# Patient Record
Sex: MALE | Race: BLACK OR AFRICAN AMERICAN | Employment: UNEMPLOYED | URBAN - METROPOLITAN AREA
[De-identification: names, ages, dates, MRNs, and addresses within clinical notes are randomized per-mention and may not be internally consistent; named-entity substitution may affect disease eponyms.]

---

## 2020-02-05 ENCOUNTER — HOSPITAL ENCOUNTER (INPATIENT)
Age: 65
LOS: 27 days | Discharge: HOSPICE | DRG: 870 | End: 2020-03-03
Attending: EMERGENCY MEDICINE | Admitting: INTERNAL MEDICINE
Payer: MEDICARE

## 2020-02-05 ENCOUNTER — APPOINTMENT (OUTPATIENT)
Dept: GENERAL RADIOLOGY | Age: 65
DRG: 870 | End: 2020-02-05
Payer: MEDICARE

## 2020-02-05 PROBLEM — N17.9 ACUTE RENAL FAILURE (ARF) (HCC): Status: ACTIVE | Noted: 2020-02-05

## 2020-02-05 LAB
ALBUMIN SERPL-MCNC: 3.1 G/DL (ref 3.5–5.2)
ALP BLD-CCNC: 58 U/L (ref 40–129)
ALT SERPL-CCNC: 22 U/L (ref 0–40)
ANION GAP SERPL CALCULATED.3IONS-SCNC: 19 MMOL/L (ref 7–16)
APTT: 35.4 SEC (ref 24.5–35.1)
AST SERPL-CCNC: 91 U/L (ref 0–39)
BACTERIA: ABNORMAL /HPF
BASOPHILS ABSOLUTE: 0 E9/L (ref 0–0.2)
BASOPHILS RELATIVE PERCENT: 0.1 % (ref 0–2)
BILIRUB SERPL-MCNC: 1.3 MG/DL (ref 0–1.2)
BILIRUBIN URINE: NEGATIVE
BLOOD, URINE: ABNORMAL
BUN BLDV-MCNC: 59 MG/DL (ref 8–23)
CALCIUM SERPL-MCNC: 8 MG/DL (ref 8.6–10.2)
CHLORIDE BLD-SCNC: 91 MMOL/L (ref 98–107)
CHLORIDE URINE RANDOM: 32 MMOL/L
CLARITY: ABNORMAL
CO2: 23 MMOL/L (ref 22–29)
COLOR: YELLOW
CORTISOL TOTAL: 78.4 MCG/DL (ref 2.68–18.4)
CREAT SERPL-MCNC: 4.1 MG/DL (ref 0.7–1.2)
CREATININE URINE: 142 MG/DL (ref 40–278)
EKG ATRIAL RATE: 141 BPM
EKG P AXIS: 70 DEGREES
EKG P-R INTERVAL: 140 MS
EKG Q-T INTERVAL: 284 MS
EKG QRS DURATION: 68 MS
EKG QTC CALCULATION (BAZETT): 434 MS
EKG R AXIS: 39 DEGREES
EKG T AXIS: 62 DEGREES
EKG VENTRICULAR RATE: 141 BPM
EOSINOPHILS ABSOLUTE: 0 E9/L (ref 0.05–0.5)
EOSINOPHILS RELATIVE PERCENT: 0 % (ref 0–6)
GFR AFRICAN AMERICAN: 18
GFR NON-AFRICAN AMERICAN: 18 ML/MIN/1.73
GLUCOSE BLD-MCNC: 132 MG/DL (ref 74–99)
GLUCOSE URINE: NEGATIVE MG/DL
HCT VFR BLD CALC: 34.8 % (ref 37–54)
HEMOGLOBIN: 12.2 G/DL (ref 12.5–16.5)
HYPOCHROMIA: ABNORMAL
INFLUENZA A BY PCR: NOT DETECTED
INFLUENZA B BY PCR: NOT DETECTED
INR BLD: 1.1
KETONES, URINE: ABNORMAL MG/DL
LACTIC ACID, SEPSIS: 2.7 MMOL/L (ref 0.5–1.9)
LACTIC ACID, SEPSIS: 3.2 MMOL/L (ref 0.5–1.9)
LACTIC ACID: 2.1 MMOL/L (ref 0.5–2.2)
LACTIC ACID: 3.3 MMOL/L (ref 0.5–2.2)
LEUKOCYTE ESTERASE, URINE: NEGATIVE
LYMPHOCYTES ABSOLUTE: 0.15 E9/L (ref 1.5–4)
LYMPHOCYTES RELATIVE PERCENT: 1.7 % (ref 20–42)
MAGNESIUM: 1.2 MG/DL (ref 1.6–2.6)
MCH RBC QN AUTO: 28.9 PG (ref 26–35)
MCHC RBC AUTO-ENTMCNC: 35.1 % (ref 32–34.5)
MCV RBC AUTO: 82.5 FL (ref 80–99.9)
METAMYELOCYTES RELATIVE PERCENT: 0.9 % (ref 0–1)
MONOCYTES ABSOLUTE: 0.68 E9/L (ref 0.1–0.95)
MONOCYTES RELATIVE PERCENT: 8.7 % (ref 2–12)
NEUTROPHILS ABSOLUTE: 6.75 E9/L (ref 1.8–7.3)
NEUTROPHILS RELATIVE PERCENT: 88.7 % (ref 43–80)
NITRITE, URINE: NEGATIVE
PDW BLD-RTO: 14.8 FL (ref 11.5–15)
PH UA: 5 (ref 5–9)
PLATELET # BLD: 25 E9/L (ref 130–450)
PLATELET CONFIRMATION: NORMAL
PMV BLD AUTO: ABNORMAL FL (ref 7–12)
POIKILOCYTES: ABNORMAL
POLYCHROMASIA: ABNORMAL
POTASSIUM SERPL-SCNC: 4.2 MMOL/L (ref 3.5–5)
POTASSIUM, UR: 54.8 MMOL/L
PRO-BNP: 3379 PG/ML (ref 0–125)
PROCALCITONIN: >100 NG/ML (ref 0–0.08)
PROTEIN UA: 100 MG/DL
PROTHROMBIN TIME: 12.5 SEC (ref 9.3–12.4)
RBC # BLD: 4.22 E12/L (ref 3.8–5.8)
RBC UA: ABNORMAL /HPF (ref 0–2)
SODIUM BLD-SCNC: 133 MMOL/L (ref 132–146)
SODIUM URINE: 38 MMOL/L
SPECIFIC GRAVITY UA: 1.02 (ref 1–1.03)
TARGET CELLS: ABNORMAL
TOTAL PROTEIN: 7.4 G/DL (ref 6.4–8.3)
TROPONIN: 0.03 NG/ML (ref 0–0.03)
TROPONIN: 0.04 NG/ML (ref 0–0.03)
TROPONIN: 0.05 NG/ML (ref 0–0.03)
UREA NITROGEN, UR: 503 MG/DL (ref 800–1666)
UROBILINOGEN, URINE: 1 E.U./DL
WBC # BLD: 7.5 E9/L (ref 4.5–11.5)
WBC UA: ABNORMAL /HPF (ref 0–5)

## 2020-02-05 PROCEDURE — 6360000002 HC RX W HCPCS: Performed by: INTERNAL MEDICINE

## 2020-02-05 PROCEDURE — 87502 INFLUENZA DNA AMP PROBE: CPT

## 2020-02-05 PROCEDURE — 94760 N-INVAS EAR/PLS OXIMETRY 1: CPT

## 2020-02-05 PROCEDURE — 87450 HC DIRECT STREP B ANTIGEN: CPT

## 2020-02-05 PROCEDURE — 80053 COMPREHEN METABOLIC PANEL: CPT

## 2020-02-05 PROCEDURE — 82570 ASSAY OF URINE CREATININE: CPT

## 2020-02-05 PROCEDURE — 93005 ELECTROCARDIOGRAM TRACING: CPT | Performed by: EMERGENCY MEDICINE

## 2020-02-05 PROCEDURE — 87150 DNA/RNA AMPLIFIED PROBE: CPT

## 2020-02-05 PROCEDURE — 2580000003 HC RX 258: Performed by: INTERNAL MEDICINE

## 2020-02-05 PROCEDURE — 2500000003 HC RX 250 WO HCPCS: Performed by: INTERNAL MEDICINE

## 2020-02-05 PROCEDURE — 6360000002 HC RX W HCPCS

## 2020-02-05 PROCEDURE — 87088 URINE BACTERIA CULTURE: CPT

## 2020-02-05 PROCEDURE — 82550 ASSAY OF CK (CPK): CPT

## 2020-02-05 PROCEDURE — 84300 ASSAY OF URINE SODIUM: CPT

## 2020-02-05 PROCEDURE — 99285 EMERGENCY DEPT VISIT HI MDM: CPT

## 2020-02-05 PROCEDURE — 93005 ELECTROCARDIOGRAM TRACING: CPT | Performed by: INTERNAL MEDICINE

## 2020-02-05 PROCEDURE — 84540 ASSAY OF URINE/UREA-N: CPT

## 2020-02-05 PROCEDURE — 87186 SC STD MICRODIL/AGAR DIL: CPT

## 2020-02-05 PROCEDURE — 73630 X-RAY EXAM OF FOOT: CPT

## 2020-02-05 PROCEDURE — 82533 TOTAL CORTISOL: CPT

## 2020-02-05 PROCEDURE — 93010 ELECTROCARDIOGRAM REPORT: CPT | Performed by: INTERNAL MEDICINE

## 2020-02-05 PROCEDURE — 36415 COLL VENOUS BLD VENIPUNCTURE: CPT

## 2020-02-05 PROCEDURE — 83935 ASSAY OF URINE OSMOLALITY: CPT

## 2020-02-05 PROCEDURE — 85610 PROTHROMBIN TIME: CPT

## 2020-02-05 PROCEDURE — 81001 URINALYSIS AUTO W/SCOPE: CPT

## 2020-02-05 PROCEDURE — 96374 THER/PROPH/DIAG INJ IV PUSH: CPT

## 2020-02-05 PROCEDURE — 2500000003 HC RX 250 WO HCPCS: Performed by: EMERGENCY MEDICINE

## 2020-02-05 PROCEDURE — 83880 ASSAY OF NATRIURETIC PEPTIDE: CPT

## 2020-02-05 PROCEDURE — 84133 ASSAY OF URINE POTASSIUM: CPT

## 2020-02-05 PROCEDURE — 87040 BLOOD CULTURE FOR BACTERIA: CPT

## 2020-02-05 PROCEDURE — 94640 AIRWAY INHALATION TREATMENT: CPT

## 2020-02-05 PROCEDURE — 85025 COMPLETE CBC W/AUTO DIFF WBC: CPT

## 2020-02-05 PROCEDURE — 2060000000 HC ICU INTERMEDIATE R&B

## 2020-02-05 PROCEDURE — 84145 PROCALCITONIN (PCT): CPT

## 2020-02-05 PROCEDURE — 84484 ASSAY OF TROPONIN QUANT: CPT

## 2020-02-05 PROCEDURE — 0CJY8ZZ INSPECTION OF MOUTH AND THROAT, VIA NATURAL OR ARTIFICIAL OPENING ENDOSCOPIC: ICD-10-PCS | Performed by: INTERNAL MEDICINE

## 2020-02-05 PROCEDURE — 82553 CREATINE MB FRACTION: CPT

## 2020-02-05 PROCEDURE — 82436 ASSAY OF URINE CHLORIDE: CPT

## 2020-02-05 PROCEDURE — 6360000002 HC RX W HCPCS: Performed by: EMERGENCY MEDICINE

## 2020-02-05 PROCEDURE — 2580000003 HC RX 258: Performed by: EMERGENCY MEDICINE

## 2020-02-05 PROCEDURE — 83735 ASSAY OF MAGNESIUM: CPT

## 2020-02-05 PROCEDURE — 71046 X-RAY EXAM CHEST 2 VIEWS: CPT

## 2020-02-05 PROCEDURE — 6370000000 HC RX 637 (ALT 250 FOR IP): Performed by: EMERGENCY MEDICINE

## 2020-02-05 PROCEDURE — 87147 CULTURE TYPE IMMUNOLOGIC: CPT

## 2020-02-05 PROCEDURE — 83605 ASSAY OF LACTIC ACID: CPT

## 2020-02-05 PROCEDURE — 85730 THROMBOPLASTIN TIME PARTIAL: CPT

## 2020-02-05 PROCEDURE — 2500000003 HC RX 250 WO HCPCS

## 2020-02-05 RX ORDER — ASPIRIN 81 MG/1
81 TABLET ORAL DAILY
Status: DISCONTINUED | OUTPATIENT
Start: 2020-02-05 | End: 2020-02-13 | Stop reason: ALTCHOICE

## 2020-02-05 RX ORDER — MAGNESIUM SULFATE IN WATER 40 MG/ML
2 INJECTION, SOLUTION INTRAVENOUS ONCE
Status: COMPLETED | OUTPATIENT
Start: 2020-02-05 | End: 2020-02-05

## 2020-02-05 RX ORDER — LISINOPRIL 10 MG/1
10 TABLET ORAL DAILY
Status: ON HOLD | COMMUNITY
End: 2020-03-03 | Stop reason: HOSPADM

## 2020-02-05 RX ORDER — SODIUM CHLORIDE 9 MG/ML
INJECTION, SOLUTION INTRAVENOUS CONTINUOUS
Status: DISCONTINUED | OUTPATIENT
Start: 2020-02-05 | End: 2020-02-06

## 2020-02-05 RX ORDER — SODIUM CHLORIDE 0.9 % (FLUSH) 0.9 %
10 SYRINGE (ML) INJECTION EVERY 12 HOURS SCHEDULED
Status: DISCONTINUED | OUTPATIENT
Start: 2020-02-05 | End: 2020-02-06 | Stop reason: SDUPTHER

## 2020-02-05 RX ORDER — 0.9 % SODIUM CHLORIDE 0.9 %
30 INTRAVENOUS SOLUTION INTRAVENOUS ONCE
Status: DISCONTINUED | OUTPATIENT
Start: 2020-02-05 | End: 2020-02-05 | Stop reason: ALTCHOICE

## 2020-02-05 RX ORDER — FAMOTIDINE 20 MG/1
20 TABLET, FILM COATED ORAL 2 TIMES DAILY
Status: DISCONTINUED | OUTPATIENT
Start: 2020-02-05 | End: 2020-02-07 | Stop reason: SDUPTHER

## 2020-02-05 RX ORDER — FAMOTIDINE 20 MG/1
20 TABLET, FILM COATED ORAL 2 TIMES DAILY
Status: ON HOLD | COMMUNITY
End: 2020-03-03 | Stop reason: HOSPADM

## 2020-02-05 RX ORDER — METOPROLOL TARTRATE 5 MG/5ML
5 INJECTION INTRAVENOUS EVERY 6 HOURS
Status: DISCONTINUED | OUTPATIENT
Start: 2020-02-05 | End: 2020-02-13

## 2020-02-05 RX ORDER — 0.9 % SODIUM CHLORIDE 0.9 %
1000 INTRAVENOUS SOLUTION INTRAVENOUS ONCE
Status: DISCONTINUED | OUTPATIENT
Start: 2020-02-05 | End: 2020-02-19

## 2020-02-05 RX ORDER — SODIUM CHLORIDE 0.9 % (FLUSH) 0.9 %
10 SYRINGE (ML) INJECTION PRN
Status: DISCONTINUED | OUTPATIENT
Start: 2020-02-05 | End: 2020-02-06 | Stop reason: SDUPTHER

## 2020-02-05 RX ORDER — IPRATROPIUM BROMIDE AND ALBUTEROL SULFATE 2.5; .5 MG/3ML; MG/3ML
3 SOLUTION RESPIRATORY (INHALATION) ONCE
Status: COMPLETED | OUTPATIENT
Start: 2020-02-05 | End: 2020-02-05

## 2020-02-05 RX ORDER — THIAMINE HYDROCHLORIDE 100 MG/ML
100 INJECTION, SOLUTION INTRAMUSCULAR; INTRAVENOUS DAILY
Status: DISCONTINUED | OUTPATIENT
Start: 2020-02-05 | End: 2020-02-06

## 2020-02-05 RX ORDER — FOLIC ACID 5 MG/ML
1 INJECTION, SOLUTION INTRAMUSCULAR; INTRAVENOUS; SUBCUTANEOUS DAILY
Status: DISCONTINUED | OUTPATIENT
Start: 2020-02-05 | End: 2020-02-06

## 2020-02-05 RX ADMIN — MAGNESIUM SULFATE HEPTAHYDRATE 2 G: 40 INJECTION, SOLUTION INTRAVENOUS at 16:14

## 2020-02-05 RX ADMIN — THIAMINE HYDROCHLORIDE 100 MG: 100 INJECTION, SOLUTION INTRAMUSCULAR; INTRAVENOUS at 22:12

## 2020-02-05 RX ADMIN — SODIUM CHLORIDE, PRESERVATIVE FREE 10 ML: 5 INJECTION INTRAVENOUS at 22:25

## 2020-02-05 RX ADMIN — METOPROLOL TARTRATE 5 MG: 5 INJECTION INTRAVENOUS at 22:12

## 2020-02-05 RX ADMIN — DOXYCYCLINE 100 MG: 100 INJECTION, POWDER, LYOPHILIZED, FOR SOLUTION INTRAVENOUS at 16:20

## 2020-02-05 RX ADMIN — SODIUM CHLORIDE, PRESERVATIVE FREE 10 ML: 5 INJECTION INTRAVENOUS at 22:12

## 2020-02-05 RX ADMIN — SODIUM CHLORIDE: 9 INJECTION, SOLUTION INTRAVENOUS at 12:55

## 2020-02-05 RX ADMIN — FOLIC ACID 1 MG: 5 INJECTION, SOLUTION INTRAMUSCULAR; INTRAVENOUS; SUBCUTANEOUS at 22:12

## 2020-02-05 RX ADMIN — HYDROCORTISONE SODIUM SUCCINATE 100 MG: 100 INJECTION, POWDER, FOR SOLUTION INTRAMUSCULAR; INTRAVENOUS at 12:45

## 2020-02-05 RX ADMIN — VANCOMYCIN HYDROCHLORIDE 1000 MG: 1 INJECTION, POWDER, LYOPHILIZED, FOR SOLUTION INTRAVENOUS at 22:24

## 2020-02-05 RX ADMIN — SODIUM CHLORIDE: 9 INJECTION, SOLUTION INTRAVENOUS at 18:30

## 2020-02-05 RX ADMIN — CEFTRIAXONE 2 G: 2 INJECTION, POWDER, FOR SOLUTION INTRAMUSCULAR; INTRAVENOUS at 16:02

## 2020-02-05 RX ADMIN — IPRATROPIUM BROMIDE AND ALBUTEROL SULFATE 3 AMPULE: .5; 3 SOLUTION RESPIRATORY (INHALATION) at 11:15

## 2020-02-05 ASSESSMENT — PAIN SCALES - GENERAL: PAINLEVEL_OUTOF10: 7

## 2020-02-05 ASSESSMENT — PAIN DESCRIPTION - LOCATION: LOCATION: BACK;ABDOMEN;FOOT

## 2020-02-05 ASSESSMENT — PAIN DESCRIPTION - PAIN TYPE: TYPE: ACUTE PAIN

## 2020-02-05 NOTE — ED NOTES
Bed: HB  Expected date:   Expected time:   Means of arrival:   Comments:  Sandra Turner RN  02/05/20 9109

## 2020-02-05 NOTE — ED PROVIDER NOTES
- 0.03 ng/mL   CBC Auto Differential   Result Value Ref Range    WBC 7.5 4.5 - 11.5 E9/L    RBC 4.22 3.80 - 5.80 E12/L    Hemoglobin 12.2 (L) 12.5 - 16.5 g/dL    Hematocrit 34.8 (L) 37.0 - 54.0 %    MCV 82.5 80.0 - 99.9 fL    MCH 28.9 26.0 - 35.0 pg    MCHC 35.1 (H) 32.0 - 34.5 %    RDW 14.8 11.5 - 15.0 fL    Platelets 25 (L) 618 - 450 E9/L    MPV NOT CALC 7.0 - 12.0 fL    Neutrophils % 88.7 (H) 43.0 - 80.0 %    Lymphocytes % 1.7 (L) 20.0 - 42.0 %    Monocytes % 8.7 2.0 - 12.0 %    Eosinophils % 0.0 0.0 - 6.0 %    Basophils % 0.1 0.0 - 2.0 %    Neutrophils Absolute 6.75 1.80 - 7.30 E9/L    Lymphocytes Absolute 0.15 (L) 1.50 - 4.00 E9/L    Monocytes Absolute 0.68 0.10 - 0.95 E9/L    Eosinophils Absolute 0.00 (L) 0.05 - 0.50 E9/L    Basophils Absolute 0.00 0.00 - 0.20 E9/L    Metamyelocytes Relative 0.9 0.0 - 1.0 %    Polychromasia 1+     Hypochromia 1+     Poikilocytes 1+     Target Cells 1+    Comprehensive Metabolic Panel   Result Value Ref Range    Sodium 133 132 - 146 mmol/L    Potassium 4.2 3.5 - 5.0 mmol/L    Chloride 91 (L) 98 - 107 mmol/L    CO2 23 22 - 29 mmol/L    Anion Gap 19 (H) 7 - 16 mmol/L    Glucose 132 (H) 74 - 99 mg/dL    BUN 59 (H) 8 - 23 mg/dL    CREATININE 4.1 (H) 0.7 - 1.2 mg/dL    GFR Non-African American 18 >=60 mL/min/1.73    GFR African American 18     Calcium 8.0 (L) 8.6 - 10.2 mg/dL    Total Protein 7.4 6.4 - 8.3 g/dL    Alb 3.1 (L) 3.5 - 5.2 g/dL    Total Bilirubin 1.3 (H) 0.0 - 1.2 mg/dL    Alkaline Phosphatase 58 40 - 129 U/L    ALT 22 0 - 40 U/L    AST 91 (H) 0 - 39 U/L   APTT   Result Value Ref Range    aPTT 35.4 (H) 24.5 - 35.1 sec   Protime-INR   Result Value Ref Range    Protime 12.5 (H) 9.3 - 12.4 sec    INR 1.1    Brain Natriuretic Peptide   Result Value Ref Range    Pro-BNP 3,379 (H) 0 - 125 pg/mL   Magnesium   Result Value Ref Range    Magnesium 1.2 (L) 1.6 - 2.6 mg/dL   Platelet Confirmation   Result Value Ref Range    Platelet Confirmation SEE BELOW    EKG 12 Lead   Result spouse/SO and discussed todays results, in addition to providing specific details for the plan of care and counseling regarding the diagnosis and prognosis. Questions are answered at this time and they are agreeable with the plan.       --------------------------------- IMPRESSION AND DISPOSITION ---------------------------------    IMPRESSION  1. Acute renal failure, unspecified acute renal failure type ([facility] [address]    2. Cough    3. Acute upper respiratory infection    4. Elevated troponin    5. Oral cancer ([facility] [address]    6. SIRS (systemic inflammatory response syndrome) (HCC)    7. Hypomagnesemia        DISPOSITION  Disposition: Admit to telemetry  Patient condition is serious        NOTE: This report was transcribed using voice recognition software.  Every effort was made to ensure accuracy; however, inadvertent computerized transcription errors may be present       Brenda Jenkins DO  02/05/20 4882

## 2020-02-05 NOTE — ED NOTES
Bed:  HA  Expected date:   Expected time:   Means of arrival:   Comments:  Elly Opitz Reents, RN  02/05/20 8035

## 2020-02-06 ENCOUNTER — APPOINTMENT (OUTPATIENT)
Dept: NEUROLOGY | Age: 65
DRG: 870 | End: 2020-02-06
Payer: MEDICARE

## 2020-02-06 ENCOUNTER — APPOINTMENT (OUTPATIENT)
Dept: GENERAL RADIOLOGY | Age: 65
DRG: 870 | End: 2020-02-06
Payer: MEDICARE

## 2020-02-06 LAB
ACINETOBACTER BAUMANNII BY PCR: NOT DETECTED
ALBUMIN SERPL-MCNC: 2.6 G/DL (ref 3.5–5.2)
ALP BLD-CCNC: 36 U/L (ref 40–129)
ALT SERPL-CCNC: 21 U/L (ref 0–40)
ANION GAP SERPL CALCULATED.3IONS-SCNC: 13 MMOL/L (ref 7–16)
ANION GAP SERPL CALCULATED.3IONS-SCNC: 15 MMOL/L (ref 7–16)
ANION GAP SERPL CALCULATED.3IONS-SCNC: 18 MMOL/L (ref 7–16)
ANION GAP SERPL CALCULATED.3IONS-SCNC: 19 MMOL/L (ref 7–16)
ANISOCYTOSIS: ABNORMAL
ANISOCYTOSIS: ABNORMAL
AST SERPL-CCNC: 83 U/L (ref 0–39)
BASOPHILS ABSOLUTE: 0 E9/L (ref 0–0.2)
BASOPHILS ABSOLUTE: 0 E9/L (ref 0–0.2)
BASOPHILS RELATIVE PERCENT: 0.1 % (ref 0–2)
BASOPHILS RELATIVE PERCENT: 0.3 % (ref 0–2)
BILIRUB SERPL-MCNC: 1.5 MG/DL (ref 0–1.2)
BILIRUBIN DIRECT: 1.1 MG/DL (ref 0–0.3)
BOTTLE TYPE: ABNORMAL
BUN BLDV-MCNC: 65 MG/DL (ref 8–23)
BUN BLDV-MCNC: 65 MG/DL (ref 8–23)
BUN BLDV-MCNC: 69 MG/DL (ref 8–23)
BUN BLDV-MCNC: 78 MG/DL (ref 8–23)
BURR CELLS: ABNORMAL
CALCIUM SERPL-MCNC: 7.9 MG/DL (ref 8.6–10.2)
CALCIUM SERPL-MCNC: 7.9 MG/DL (ref 8.6–10.2)
CALCIUM SERPL-MCNC: 8 MG/DL (ref 8.6–10.2)
CALCIUM SERPL-MCNC: 8.2 MG/DL (ref 8.6–10.2)
CANDIDA ALBICANS BY PCR: NOT DETECTED
CANDIDA GLABRATA BY PCR: NOT DETECTED
CANDIDA KRUSEI BY PCR: NOT DETECTED
CANDIDA PARAPSILOSIS BY PCR: NOT DETECTED
CANDIDA TROPICALIS BY PCR: NOT DETECTED
CHLORIDE BLD-SCNC: 94 MMOL/L (ref 98–107)
CHLORIDE BLD-SCNC: 94 MMOL/L (ref 98–107)
CHLORIDE BLD-SCNC: 97 MMOL/L (ref 98–107)
CHLORIDE BLD-SCNC: 98 MMOL/L (ref 98–107)
CK MB: 3.6 NG/ML (ref 0–7.7)
CO2: 15 MMOL/L (ref 22–29)
CO2: 19 MMOL/L (ref 22–29)
CO2: 20 MMOL/L (ref 22–29)
CO2: 21 MMOL/L (ref 22–29)
CREAT SERPL-MCNC: 3.1 MG/DL (ref 0.7–1.2)
CREAT SERPL-MCNC: 3.1 MG/DL (ref 0.7–1.2)
CREAT SERPL-MCNC: 3.2 MG/DL (ref 0.7–1.2)
CREAT SERPL-MCNC: 3.2 MG/DL (ref 0.7–1.2)
ENTEROBACTER CLOACAE COMPLEX BY PCR: NOT DETECTED
ENTEROBACTERALES BY PCR: NOT DETECTED
ENTEROCOCCUS BY PCR: NOT DETECTED
EOSINOPHILS ABSOLUTE: 0 E9/L (ref 0.05–0.5)
EOSINOPHILS ABSOLUTE: 0 E9/L (ref 0.05–0.5)
EOSINOPHILS RELATIVE PERCENT: 0 % (ref 0–6)
EOSINOPHILS RELATIVE PERCENT: 0 % (ref 0–6)
ESCHERICHIA COLI BY PCR: NOT DETECTED
FOLATE: 18.2 NG/ML (ref 4.8–24.2)
GFR AFRICAN AMERICAN: 24
GFR AFRICAN AMERICAN: 24
GFR AFRICAN AMERICAN: 25
GFR AFRICAN AMERICAN: 25
GFR NON-AFRICAN AMERICAN: 24 ML/MIN/1.73
GFR NON-AFRICAN AMERICAN: 24 ML/MIN/1.73
GFR NON-AFRICAN AMERICAN: 25 ML/MIN/1.73
GFR NON-AFRICAN AMERICAN: 25 ML/MIN/1.73
GLUCOSE BLD-MCNC: 121 MG/DL (ref 74–99)
GLUCOSE BLD-MCNC: 123 MG/DL (ref 74–99)
GLUCOSE BLD-MCNC: 124 MG/DL (ref 74–99)
GLUCOSE BLD-MCNC: 125 MG/DL (ref 74–99)
HAEMOPHILUS INFLUENZAE BY PCR: NOT DETECTED
HAV IGM SER IA-ACNC: NORMAL
HCT VFR BLD CALC: 30.8 % (ref 37–54)
HCT VFR BLD CALC: 32.6 % (ref 37–54)
HEMOGLOBIN: 11 G/DL (ref 12.5–16.5)
HEMOGLOBIN: 11.4 G/DL (ref 12.5–16.5)
HEPATITIS B CORE IGM ANTIBODY: NORMAL
HEPATITIS B SURFACE ANTIGEN INTERPRETATION: NORMAL
HEPATITIS C ANTIBODY INTERPRETATION: NORMAL
HIV-1 AND HIV-2 ANTIBODIES: NORMAL
HYPOCHROMIA: ABNORMAL
KLEBSIELLA OXYTOCA BY PCR: NOT DETECTED
KLEBSIELLA PNEUMONIAE GROUP BY PCR: NOT DETECTED
L. PNEUMOPHILA SEROGP 1 UR AG: NORMAL
LACTIC ACID: 1.6 MMOL/L (ref 0.5–2.2)
LISTERIA MONOCYTOGENES BY PCR: NOT DETECTED
LYMPHOCYTES ABSOLUTE: 0.19 E9/L (ref 1.5–4)
LYMPHOCYTES ABSOLUTE: 0.86 E9/L (ref 1.5–4)
LYMPHOCYTES RELATIVE PERCENT: 10.4 % (ref 20–42)
LYMPHOCYTES RELATIVE PERCENT: 2.6 % (ref 20–42)
MAGNESIUM: 1.7 MG/DL (ref 1.6–2.6)
MAGNESIUM: 1.8 MG/DL (ref 1.6–2.6)
MCH RBC QN AUTO: 28.5 PG (ref 26–35)
MCH RBC QN AUTO: 28.7 PG (ref 26–35)
MCHC RBC AUTO-ENTMCNC: 35 % (ref 32–34.5)
MCHC RBC AUTO-ENTMCNC: 35.7 % (ref 32–34.5)
MCV RBC AUTO: 80.4 FL (ref 80–99.9)
MCV RBC AUTO: 81.5 FL (ref 80–99.9)
METAMYELOCYTES RELATIVE PERCENT: 0.9 % (ref 0–1)
METAMYELOCYTES RELATIVE PERCENT: 0.9 % (ref 0–1)
METER GLUCOSE: 87 MG/DL (ref 74–99)
MONOCYTES ABSOLUTE: 1.09 E9/L (ref 0.1–0.95)
MONOCYTES ABSOLUTE: 1.81 E9/L (ref 0.1–0.95)
MONOCYTES RELATIVE PERCENT: 16.5 % (ref 2–12)
MONOCYTES RELATIVE PERCENT: 20.9 % (ref 2–12)
NEISSERIA MENINGITIDIS BY PCR: NOT DETECTED
NEUTROPHILS ABSOLUTE: 5.18 E9/L (ref 1.8–7.3)
NEUTROPHILS ABSOLUTE: 5.93 E9/L (ref 1.8–7.3)
NEUTROPHILS RELATIVE PERCENT: 67.8 % (ref 43–80)
NEUTROPHILS RELATIVE PERCENT: 80 % (ref 43–80)
NUCLEATED RED BLOOD CELLS: 0.9 /100 WBC
ORDER NUMBER: ABNORMAL
OSMOLALITY URINE: 393 MOSM/KG (ref 300–900)
OVALOCYTES: ABNORMAL
PATHOLOGIST REVIEW: NORMAL
PDW BLD-RTO: 14.6 FL (ref 11.5–15)
PDW BLD-RTO: 14.6 FL (ref 11.5–15)
PLATELET # BLD: 18 E9/L (ref 130–450)
PLATELET # BLD: 22 E9/L (ref 130–450)
PLATELET CONFIRMATION: NORMAL
PLATELET CONFIRMATION: NORMAL
PMV BLD AUTO: ABNORMAL FL (ref 7–12)
PMV BLD AUTO: ABNORMAL FL (ref 7–12)
POIKILOCYTES: ABNORMAL
POIKILOCYTES: ABNORMAL
POLYCHROMASIA: ABNORMAL
POTASSIUM REFLEX MAGNESIUM: 3 MMOL/L (ref 3.5–5)
POTASSIUM REFLEX MAGNESIUM: 3.1 MMOL/L (ref 3.5–5)
POTASSIUM REFLEX MAGNESIUM: 3.7 MMOL/L (ref 3.5–5)
POTASSIUM SERPL-SCNC: 4.4 MMOL/L (ref 3.5–5)
PROTEUS BY PCR: NOT DETECTED
PSEUDOMONAS AERUGINOSA BY PCR: NOT DETECTED
RBC # BLD: 3.83 E12/L (ref 3.8–5.8)
RBC # BLD: 4 E12/L (ref 3.8–5.8)
SCHISTOCYTES: ABNORMAL
SERRATIA MARCESCENS BY PCR: NOT DETECTED
SODIUM BLD-SCNC: 127 MMOL/L (ref 132–146)
SODIUM BLD-SCNC: 132 MMOL/L (ref 132–146)
SOURCE OF BLOOD CULTURE: ABNORMAL
SPHEROCYTES: ABNORMAL
STAPHYLOCOCCUS AUREUS BY PCR: NOT DETECTED
STAPHYLOCOCCUS SPECIES BY PCR: NOT DETECTED
STREP PNEUMONIAE ANTIGEN, URINE: NORMAL
STREPTOCOCCUS AGALACTIAE BY PCR: NOT DETECTED
STREPTOCOCCUS PNEUMONIAE BY PCR: NOT DETECTED
STREPTOCOCCUS PYOGENES  BY PCR: NOT DETECTED
STREPTOCOCCUS SPECIES BY PCR: DETECTED
TARGET CELLS: ABNORMAL
TARGET CELLS: ABNORMAL
TOTAL CK: 138 U/L (ref 20–200)
TOTAL CK: 365 U/L (ref 20–200)
TOTAL PROTEIN: 6.2 G/DL (ref 6.4–8.3)
VACUOLATED NEUTROPHILS: ABNORMAL
WBC # BLD: 6.4 E9/L (ref 4.5–11.5)
WBC # BLD: 8.6 E9/L (ref 4.5–11.5)

## 2020-02-06 PROCEDURE — 86703 HIV-1/HIV-2 1 RESULT ANTBDY: CPT

## 2020-02-06 PROCEDURE — 80048 BASIC METABOLIC PNL TOTAL CA: CPT

## 2020-02-06 PROCEDURE — 80053 COMPREHEN METABOLIC PANEL: CPT

## 2020-02-06 PROCEDURE — 2580000003 HC RX 258: Performed by: INTERNAL MEDICINE

## 2020-02-06 PROCEDURE — 36415 COLL VENOUS BLD VENIPUNCTURE: CPT

## 2020-02-06 PROCEDURE — 82746 ASSAY OF FOLIC ACID SERUM: CPT

## 2020-02-06 PROCEDURE — 82550 ASSAY OF CK (CPK): CPT

## 2020-02-06 PROCEDURE — 2500000003 HC RX 250 WO HCPCS: Performed by: INTERNAL MEDICINE

## 2020-02-06 PROCEDURE — 6360000002 HC RX W HCPCS

## 2020-02-06 PROCEDURE — 95816 EEG AWAKE AND DROWSY: CPT | Performed by: PSYCHIATRY & NEUROLOGY

## 2020-02-06 PROCEDURE — 80074 ACUTE HEPATITIS PANEL: CPT

## 2020-02-06 PROCEDURE — 82248 BILIRUBIN DIRECT: CPT

## 2020-02-06 PROCEDURE — 6360000002 HC RX W HCPCS: Performed by: INTERNAL MEDICINE

## 2020-02-06 PROCEDURE — 83605 ASSAY OF LACTIC ACID: CPT

## 2020-02-06 PROCEDURE — 95816 EEG AWAKE AND DROWSY: CPT

## 2020-02-06 PROCEDURE — 2700000000 HC OXYGEN THERAPY PER DAY

## 2020-02-06 PROCEDURE — 99231 SBSQ HOSP IP/OBS SF/LOW 25: CPT | Performed by: INTERNAL MEDICINE

## 2020-02-06 PROCEDURE — 82962 GLUCOSE BLOOD TEST: CPT

## 2020-02-06 PROCEDURE — 71045 X-RAY EXAM CHEST 1 VIEW: CPT

## 2020-02-06 PROCEDURE — 2060000000 HC ICU INTERMEDIATE R&B

## 2020-02-06 PROCEDURE — 85025 COMPLETE CBC W/AUTO DIFF WBC: CPT

## 2020-02-06 PROCEDURE — 83735 ASSAY OF MAGNESIUM: CPT

## 2020-02-06 RX ORDER — LORAZEPAM 2 MG/ML
3 INJECTION INTRAMUSCULAR
Status: DISCONTINUED | OUTPATIENT
Start: 2020-02-06 | End: 2020-02-07

## 2020-02-06 RX ORDER — THIAMINE HYDROCHLORIDE 100 MG/ML
100 INJECTION, SOLUTION INTRAMUSCULAR; INTRAVENOUS DAILY
Status: DISCONTINUED | OUTPATIENT
Start: 2020-02-07 | End: 2020-02-07

## 2020-02-06 RX ORDER — DEXTROSE AND SODIUM CHLORIDE 5; .45 G/100ML; G/100ML
INJECTION, SOLUTION INTRAVENOUS CONTINUOUS
Status: ACTIVE | OUTPATIENT
Start: 2020-02-06 | End: 2020-02-07

## 2020-02-06 RX ORDER — LORAZEPAM 2 MG/ML
2 INJECTION INTRAMUSCULAR
Status: DISCONTINUED | OUTPATIENT
Start: 2020-02-06 | End: 2020-02-07

## 2020-02-06 RX ORDER — DEXTROSE AND SODIUM CHLORIDE 5; .45 G/100ML; G/100ML
INJECTION, SOLUTION INTRAVENOUS CONTINUOUS
Status: DISCONTINUED | OUTPATIENT
Start: 2020-02-06 | End: 2020-02-06

## 2020-02-06 RX ORDER — LORAZEPAM 1 MG/1
1 TABLET ORAL
Status: DISCONTINUED | OUTPATIENT
Start: 2020-02-06 | End: 2020-02-07

## 2020-02-06 RX ORDER — SODIUM CHLORIDE 0.9 % (FLUSH) 0.9 %
10 SYRINGE (ML) INJECTION EVERY 12 HOURS SCHEDULED
Status: DISCONTINUED | OUTPATIENT
Start: 2020-02-06 | End: 2020-02-29

## 2020-02-06 RX ORDER — SODIUM CHLORIDE 0.9 % (FLUSH) 0.9 %
10 SYRINGE (ML) INJECTION PRN
Status: DISCONTINUED | OUTPATIENT
Start: 2020-02-06 | End: 2020-02-19

## 2020-02-06 RX ORDER — POTASSIUM CHLORIDE 7.45 MG/ML
INJECTION INTRAVENOUS
Status: COMPLETED
Start: 2020-02-06 | End: 2020-02-06

## 2020-02-06 RX ORDER — LORAZEPAM 2 MG/ML
4 INJECTION INTRAMUSCULAR
Status: DISCONTINUED | OUTPATIENT
Start: 2020-02-06 | End: 2020-02-07

## 2020-02-06 RX ORDER — LORAZEPAM 2 MG/ML
1 INJECTION INTRAMUSCULAR
Status: DISCONTINUED | OUTPATIENT
Start: 2020-02-06 | End: 2020-02-07

## 2020-02-06 RX ORDER — LORAZEPAM 1 MG/1
2 TABLET ORAL
Status: DISCONTINUED | OUTPATIENT
Start: 2020-02-06 | End: 2020-02-07

## 2020-02-06 RX ORDER — POTASSIUM CHLORIDE 7.45 MG/ML
10 INJECTION INTRAVENOUS
Status: COMPLETED | OUTPATIENT
Start: 2020-02-06 | End: 2020-02-06

## 2020-02-06 RX ORDER — LORAZEPAM 1 MG/1
3 TABLET ORAL
Status: DISCONTINUED | OUTPATIENT
Start: 2020-02-06 | End: 2020-02-07

## 2020-02-06 RX ORDER — MAGNESIUM SULFATE IN WATER 40 MG/ML
2 INJECTION, SOLUTION INTRAVENOUS ONCE
Status: COMPLETED | OUTPATIENT
Start: 2020-02-06 | End: 2020-02-06

## 2020-02-06 RX ORDER — LORAZEPAM 1 MG/1
4 TABLET ORAL
Status: DISCONTINUED | OUTPATIENT
Start: 2020-02-06 | End: 2020-02-07

## 2020-02-06 RX ORDER — FOLIC ACID 5 MG/ML
1 INJECTION, SOLUTION INTRAMUSCULAR; INTRAVENOUS; SUBCUTANEOUS DAILY
Status: DISCONTINUED | OUTPATIENT
Start: 2020-02-07 | End: 2020-02-13 | Stop reason: CLARIF

## 2020-02-06 RX ADMIN — DEXTROSE AND SODIUM CHLORIDE: 5; 450 INJECTION, SOLUTION INTRAVENOUS at 08:26

## 2020-02-06 RX ADMIN — MAGNESIUM SULFATE HEPTAHYDRATE 2 G: 40 INJECTION, SOLUTION INTRAVENOUS at 06:30

## 2020-02-06 RX ADMIN — POTASSIUM CHLORIDE 10 MEQ: 7.46 INJECTION, SOLUTION INTRAVENOUS at 09:13

## 2020-02-06 RX ADMIN — PIPERACILLIN AND TAZOBACTAM 3.38 G: 3; .375 INJECTION, POWDER, LYOPHILIZED, FOR SOLUTION INTRAVENOUS at 10:28

## 2020-02-06 RX ADMIN — POTASSIUM CHLORIDE 10 MEQ: 10 INJECTION, SOLUTION INTRAVENOUS at 13:31

## 2020-02-06 RX ADMIN — METOPROLOL TARTRATE 5 MG: 5 INJECTION INTRAVENOUS at 04:56

## 2020-02-06 RX ADMIN — DEXTROSE AND SODIUM CHLORIDE: 5; 450 INJECTION, SOLUTION INTRAVENOUS at 21:28

## 2020-02-06 RX ADMIN — SODIUM CHLORIDE, PRESERVATIVE FREE 10 ML: 5 INJECTION INTRAVENOUS at 04:56

## 2020-02-06 RX ADMIN — POTASSIUM CHLORIDE 10 MEQ: 7.46 INJECTION, SOLUTION INTRAVENOUS at 08:26

## 2020-02-06 RX ADMIN — METOPROLOL TARTRATE 5 MG: 5 INJECTION INTRAVENOUS at 21:27

## 2020-02-06 RX ADMIN — SODIUM CHLORIDE, PRESERVATIVE FREE 10 ML: 5 INJECTION INTRAVENOUS at 10:34

## 2020-02-06 RX ADMIN — SODIUM CHLORIDE, PRESERVATIVE FREE 10 ML: 5 INJECTION INTRAVENOUS at 09:14

## 2020-02-06 RX ADMIN — THIAMINE HYDROCHLORIDE 100 MG: 100 INJECTION, SOLUTION INTRAMUSCULAR; INTRAVENOUS at 09:13

## 2020-02-06 RX ADMIN — METOPROLOL TARTRATE 5 MG: 5 INJECTION INTRAVENOUS at 16:53

## 2020-02-06 RX ADMIN — METOPROLOL TARTRATE 5 MG: 5 INJECTION INTRAVENOUS at 10:28

## 2020-02-06 RX ADMIN — POTASSIUM CHLORIDE 10 MEQ: 7.46 INJECTION, SOLUTION INTRAVENOUS at 10:32

## 2020-02-06 RX ADMIN — SODIUM CHLORIDE, PRESERVATIVE FREE 10 ML: 5 INJECTION INTRAVENOUS at 06:30

## 2020-02-06 RX ADMIN — PIPERACILLIN AND TAZOBACTAM 3.38 G: 3; .375 INJECTION, POWDER, LYOPHILIZED, FOR SOLUTION INTRAVENOUS at 22:10

## 2020-02-06 RX ADMIN — POTASSIUM CHLORIDE 10 MEQ: 7.46 INJECTION, SOLUTION INTRAVENOUS at 06:30

## 2020-02-06 RX ADMIN — Medication 10 ML: at 23:45

## 2020-02-06 RX ADMIN — SODIUM CHLORIDE, PRESERVATIVE FREE 10 ML: 5 INJECTION INTRAVENOUS at 00:48

## 2020-02-06 RX ADMIN — PIPERACILLIN AND TAZOBACTAM 3.38 G: 3; .375 INJECTION, POWDER, LYOPHILIZED, FOR SOLUTION INTRAVENOUS at 00:48

## 2020-02-06 RX ADMIN — DEXTROSE AND SODIUM CHLORIDE: 5; 450 INJECTION, SOLUTION INTRAVENOUS at 15:52

## 2020-02-06 RX ADMIN — SODIUM CHLORIDE, PRESERVATIVE FREE 10 ML: 5 INJECTION INTRAVENOUS at 21:28

## 2020-02-06 RX ADMIN — SODIUM CHLORIDE: 9 INJECTION, SOLUTION INTRAVENOUS at 14:05

## 2020-02-06 RX ADMIN — POTASSIUM CHLORIDE 10 MEQ: 7.46 INJECTION, SOLUTION INTRAVENOUS at 13:31

## 2020-02-06 RX ADMIN — SODIUM CHLORIDE: 9 INJECTION, SOLUTION INTRAVENOUS at 04:58

## 2020-02-06 RX ADMIN — FOLIC ACID 1 MG: 5 INJECTION, SOLUTION INTRAMUSCULAR; INTRAVENOUS; SUBCUTANEOUS at 09:13

## 2020-02-06 RX ADMIN — LORAZEPAM 2 MG: 2 INJECTION INTRAMUSCULAR; INTRAVENOUS at 23:45

## 2020-02-06 SDOH — HEALTH STABILITY: PHYSICAL HEALTH: ON AVERAGE, HOW MANY MINUTES DO YOU ENGAGE IN EXERCISE AT THIS LEVEL?: 0 MIN

## 2020-02-06 SDOH — ECONOMIC STABILITY: TRANSPORTATION INSECURITY
IN THE PAST 12 MONTHS, HAS LACK OF TRANSPORTATION KEPT YOU FROM MEETINGS, WORK, OR FROM GETTING THINGS NEEDED FOR DAILY LIVING?: NO

## 2020-02-06 SDOH — SOCIAL STABILITY: SOCIAL INSECURITY
WITHIN THE LAST YEAR, HAVE YOU BEEN HUMILIATED OR EMOTIONALLY ABUSED IN OTHER WAYS BY YOUR PARTNER OR EX-PARTNER?: PATIENT DECLINED

## 2020-02-06 SDOH — ECONOMIC STABILITY: FOOD INSECURITY: WITHIN THE PAST 12 MONTHS, THE FOOD YOU BOUGHT JUST DIDN'T LAST AND YOU DIDN'T HAVE MONEY TO GET MORE.: NEVER TRUE

## 2020-02-06 SDOH — SOCIAL STABILITY: SOCIAL INSECURITY
WITHIN THE LAST YEAR, HAVE YOU BEEN KICKED, HIT, SLAPPED, OR OTHERWISE PHYSICALLY HURT BY YOUR PARTNER OR EX-PARTNER?: PATIENT DECLINED

## 2020-02-06 SDOH — SOCIAL STABILITY: SOCIAL NETWORK: ARE YOU MARRIED, WIDOWED, DIVORCED, SEPARATED, NEVER MARRIED, OR LIVING WITH A PARTNER?: PATIENT DECLINED

## 2020-02-06 SDOH — SOCIAL STABILITY: SOCIAL NETWORK: HOW OFTEN DO YOU ATTENT MEETINGS OF THE CLUB OR ORGANIZATION YOU BELONG TO?: PATIENT DECLINED

## 2020-02-06 SDOH — ECONOMIC STABILITY: INCOME INSECURITY: HOW HARD IS IT FOR YOU TO PAY FOR THE VERY BASICS LIKE FOOD, HOUSING, MEDICAL CARE, AND HEATING?: NOT VERY HARD

## 2020-02-06 SDOH — SOCIAL STABILITY: SOCIAL NETWORK
DO YOU BELONG TO ANY CLUBS OR ORGANIZATIONS SUCH AS CHURCH GROUPS UNIONS, FRATERNAL OR ATHLETIC GROUPS, OR SCHOOL GROUPS?: PATIENT DECLINED

## 2020-02-06 SDOH — SOCIAL STABILITY: SOCIAL NETWORK: HOW OFTEN DO YOU GET TOGETHER WITH FRIENDS OR RELATIVES?: MORE THAN THREE TIMES A WEEK

## 2020-02-06 SDOH — HEALTH STABILITY: MENTAL HEALTH: HOW OFTEN DO YOU HAVE A DRINK CONTAINING ALCOHOL?: 4 OR MORE TIMES A WEEK

## 2020-02-06 SDOH — HEALTH STABILITY: PHYSICAL HEALTH: ON AVERAGE, HOW MANY DAYS PER WEEK DO YOU ENGAGE IN MODERATE TO STRENUOUS EXERCISE (LIKE A BRISK WALK)?: 0 DAYS

## 2020-02-06 SDOH — HEALTH STABILITY: MENTAL HEALTH: HOW MANY STANDARD DRINKS CONTAINING ALCOHOL DO YOU HAVE ON A TYPICAL DAY?: 10 OR MORE

## 2020-02-06 SDOH — SOCIAL STABILITY: SOCIAL INSECURITY
WITHIN THE LAST YEAR, HAVE TO BEEN RAPED OR FORCED TO HAVE ANY KIND OF SEXUAL ACTIVITY BY YOUR PARTNER OR EX-PARTNER?: PATIENT DECLINED

## 2020-02-06 SDOH — SOCIAL STABILITY: SOCIAL NETWORK: HOW OFTEN DO YOU ATTEND CHURCH OR RELIGIOUS SERVICES?: PATIENT DECLINED

## 2020-02-06 SDOH — SOCIAL STABILITY: SOCIAL INSECURITY: WITHIN THE LAST YEAR, HAVE YOU BEEN AFRAID OF YOUR PARTNER OR EX-PARTNER?: PATIENT DECLINED

## 2020-02-06 SDOH — HEALTH STABILITY: MENTAL HEALTH
STRESS IS WHEN SOMEONE FEELS TENSE, NERVOUS, ANXIOUS, OR CAN'T SLEEP AT NIGHT BECAUSE THEIR MIND IS TROUBLED. HOW STRESSED ARE YOU?: NOT AT ALL

## 2020-02-06 SDOH — SOCIAL STABILITY: SOCIAL NETWORK
IN A TYPICAL WEEK, HOW MANY TIMES DO YOU TALK ON THE PHONE WITH FAMILY, FRIENDS, OR NEIGHBORS?: MORE THAN THREE TIMES A WEEK

## 2020-02-06 SDOH — ECONOMIC STABILITY: TRANSPORTATION INSECURITY
IN THE PAST 12 MONTHS, HAS THE LACK OF TRANSPORTATION KEPT YOU FROM MEDICAL APPOINTMENTS OR FROM GETTING MEDICATIONS?: NO

## 2020-02-06 SDOH — ECONOMIC STABILITY: FOOD INSECURITY: WITHIN THE PAST 12 MONTHS, YOU WORRIED THAT YOUR FOOD WOULD RUN OUT BEFORE YOU GOT MONEY TO BUY MORE.: NEVER TRUE

## 2020-02-06 ASSESSMENT — PAIN SCALES - GENERAL
PAINLEVEL_OUTOF10: 0

## 2020-02-06 NOTE — PROGRESS NOTES
[facility]  Internal Medicine Residency Program  Progress Note - House Team    Patient:  Tha Arita 59 y.o. male MRN: [id number]     Date of Service: 2/6/2020     CC: had concerns including Fatigue (for a couple of days). Overnight events: none    Subjective     Patient seen and examined in am. Reports fatigue. Denies CP, SOB, NVDC. Objective     Physical Exam:  · Vitals: /86   Pulse 96   Temp 97.4 °F (36.3 °C) (Temporal)   Resp 16   Ht 5' 4\" (1.626 m)   Wt 116 lb 3.2 oz (52.7 kg)   SpO2 96%   BMI 19.95 kg/m²       · General Appearance: alert, appears stated age, cachectic and cooperative  · HEENT:  Head: Normocephalic, no lesions, without obvious abnormality. · Neck: no JVD and supple, symmetrical, trachea midline  · Lung: rhonchi bilaterally and wheezes bilaterally  · Heart: regular rate and rhythm, S1, S2 normal, no murmur, click, rub or gallop  · Abdomen: soft, non-tender; bowel sounds normal; no masses,  no organomegaly  · Extremities:  extremities normal, atraumatic, no cyanosis or edema  · Musculokeletal: No joint swelling, no muscle tenderness. ROM normal in all joints of extremities.    · Neurologic: Mental status: Alert, oriented, thought content appropriate  Subject  Pertinent Labs & Imaging Studies   janice  CBC:   Lab Results   Component Value Date    WBC 6.4 02/06/2020    RBC 4.00 02/06/2020    HGB 11.4 02/06/2020    HCT 32.6 02/06/2020    MCV 81.5 02/06/2020    MCH 28.5 02/06/2020    MCHC 35.0 02/06/2020    RDW 14.6 02/06/2020    PLT 18 02/06/2020    MPV NOT CALC 02/06/2020     CMP:    Lab Results   Component Value Date     02/06/2020     02/06/2020    K 3.1 02/06/2020    K 3.0 02/06/2020    CL 94 02/06/2020    CL 94 02/06/2020    CO2 20 02/06/2020    CO2 19 02/06/2020    BUN 65 02/06/2020    BUN 65 02/06/2020    CREATININE 3.2 02/06/2020    CREATININE 3.2 02/06/2020    GFRAA 24 02/06/2020    GFRAA 24 02/06/2020    LABGLOM 24 02/06/2020    LABGLOM 24 02/06/2020    GLUCOSE 124 02/06/2020    GLUCOSE 125 02/06/2020    PROT 6.2 02/06/2020    LABALBU 2.6 02/06/2020    CALCIUM 7.9 02/06/2020    CALCIUM 7.9 02/06/2020    BILITOT 1.5 02/06/2020    ALKPHOS 36 02/06/2020    AST 83 02/06/2020    ALT 21 02/06/2020       Resident's Assessment and Plan     Rocio Suarez is a 59 y.o. male with a [facility] laryngeal cancer cancer, s/p laryngectomy (2005), chemo and radiotherapy, H/o CVA, active smoker, alcohol use presented with the complaints of fatigue. 1. S/p fall likely environmental/ unwitnessed seizure  · Denies any loss of consciousness  · Has remote history of seizure - not on meds  · History of alcohol use, last drink on 2/3/2020  · UA positive for hematuria  · If any episode of seizure or deterioration of mental status, will consult neurology, possible EEG  · Seizure precautions     2. Productive cough likely 2/2 pneumonia/URI  · check blood culture - positive for gram positive cocci in chains  · CXR unremarkable  · Will check respiratory panel - negative  · Procalcitonin 100  · Lactic acid level elevated - resolved with IVF  · Started on Zosyn, (vancomycin -stopped)     3. YVETTE   · BUN/Cr: 59/4.1  · No baseline Cr  · Will check urine elecatrolytes, urine creatinine - FENa 0,7% - prerenal  · Continue IV hydration  · Avoid nephrotoxic medications     4. Rhabdomyolysis? · UA positive for hematuria - myoglobinuria  · Will check CK level - 365  · Continue IVF     5. Elevated Troponin likely 2/2 YVETTE  · Troponin 0.05-->0.04, trending down  · Will trend troponin level - trending down  · Will check CK/CKMB - cardiac unlikely     6. Hypomagnesemia  - improved  · Replace Mg  · Will check Mg level     7. Thrombocytopenia in the setting of alcohol use  · No history of platelet disorder  · Will be holding Aspirin  · Monitor platelet level  · Folate wnl  · Transaminitis  · HIV negative; Hep panel negative  · Malignancy? 8. Decreased vision in left eye  · ? cataract  · ?likely

## 2020-02-06 NOTE — PROGRESS NOTES
Clinical Pharmacy Note    Pharmacy consulted on 2/5 by Dr. Yeison Dominique for vancomycin dosing for this patient. Vancomycin 1000 mg x1 given on 2/5 @ 0168 and Cr = 4.1 (3.2 today; unknown baseline). Blood Cx and PCR positive for Streptococcus species; no Staph species present. Recommend stop vancomycin and continue pip-tazo until Strep identification and (S) reported. Discussed with IM resident (Dr. Sophie Berrios) who agrees. Clinical Pharmacy sign off.     Tong Nash, PharmD  2/6/2020  1:35 PM  Pager: [phone]

## 2020-02-06 NOTE — PROGRESS NOTES
[facility]  Internal Medicine Residency Program  Progress Note - House Team 1    Patient:  Amparo Dalal 59 y.o. male MRN: [id number]     Date of Service: 2/6/2020     CC: fatigue (for a couple of days)   Overnight events: none    Subjective     Patient states he feel the same as yesterday. He is still fatigued with productive cough, chills and myalgias. Patient admitted to a fall the day prior to admission, he states he felt lightheaded before the event and had a  positive LOC. He denied incontinence of stool or urine. Patient's sister thought he had worsened slurred speech and fatigue following the event. Patient states he has been experiencing some blurred vision in the left recently. Patient has a PMHx of laryngeal cancer with laryngectomy and CVA, his speech is slurred at base line and he is difficult to understand. Patient also has residual weakness on the left following CVA. Patient denied shortness of breath, chest pain, dizziness, headache, abdominal pain, nausea, vomiting, diarrhea, and constipation dn dysuria. Objective     Physical Exam:  · Vitals: /86   Pulse 96   Temp 97.4 °F (36.3 °C) (Temporal)   Resp 16   Ht 5' 4\" (1.626 m)   Wt 116 lb 3.2 oz (52.7 kg)   SpO2 96%   BMI 19.95 kg/m²     · General Appearance: alert, cachectic, cooperative and difficult to understand 2/2 slured speech   · HEENT:  Head: Normal, normocephalic, atraumatic. · Eye: Normal external eye, conjunctiva, lids cornea, JAK. · Neck: no adenopathy, no JVD and supple, symmetrical, trachea midline  · Lung: coarse lung sounds in all fields, wheezes, on 2 L O2 via nasal canula, not in respiratory distress  · Heart: regular rate and rhythm, S1, S2 normal, no murmur, click, rub or gallop  · Abdomen: soft, non-tender; bowel sounds normal; no masses,  no organomegaly  · Extremities:  extremities normal, atraumatic, no cyanosis or edema  · Musculokeletal: No joint swelling, no muscle tenderness.  Muscle 02/06/2020    MG 1.8 02/06/2020     U/A:    Lab Results   Component Value Date    COLORU Yellow 02/05/2020    PROTEINU 100 02/05/2020    PHUR 5.0 02/05/2020    WBCUA 1-3 02/05/2020    RBCUA 2-5 02/05/2020    BACTERIA MANY 02/05/2020    CLARITYU SL CLOUDY 02/05/2020    SPECGRAV 1.025 02/05/2020    LEUKOCYTESUR Negative 02/05/2020    UROBILINOGEN 1.0 02/05/2020    BILIRUBINUR Negative 02/05/2020    BLOODU LARGE 02/05/2020    GLUCOSEU Negative 02/05/2020     Blood cultures:   Gram positive cocci in chains    Blood PCR:  Streptococci detected     EEG:  \"General Impression - This EEG shows a mild diffuse encephalopathy.  No epileptiform   activities or lateralizing signs are seen. \"    CXR:  \"No acute cardiopulmonary findings. \"    Assessment and Plan     Amie Glaser is a 59 y.o. male with PMHx significant for laryngeal cancer s/p laryngectomy, chemo and radiation (~2 yrs ago), CVA w/residual left sided weakness and slurred speech at base line), alcohol use (last drink 2/3) who presented with increased fatigue times 2 days. Patient also indorsed productive cough, chills, and myalgias. He also reported a fall at home on 2/4. He admits to feeling light headed before the event and stated he did loss consciousness. He denied incontinence of stool or urine. Patient's sister thought he had worsened slurred speech and fatigue following the event. His speech is slurred at base line and he is difficult to understand. In the ED he was hypotensive but responded well to fluid bolus. He was found to be thrombocytopenic and have YVETTE. He was afebrile but tachycardic. CXR did not show any acute infiltrates. 1. Fall - syncope/pre-syncope vs seizure    -Endorses LOC - previously denied?   -Hx of seizures - last in high school   -Hx alcohol use - last drink 2/2/20   -EEG - mild diffuse encephalopathy. No epileptiform activities or lateralizing signs are seen. -Postural hypotension? - orthostatics   -Discontinue ACEi   -Hydrate, ?

## 2020-02-06 NOTE — PROGRESS NOTES
Nursing bedside swallow done and pt began coughing on swallowed water. Pts lung sounds are rhonci. Diet order not recommended. IM house resident Team 1 notified via perfect serve.

## 2020-02-06 NOTE — PROCEDURES
EEG Report  Shirley Garcia is a 59 y.o. male      Appointment Date 2/6/2020   Appointment Time 11:30am   Facility Location [facility] EEG Number [id number]   Type of Study routine Floor 4516-A     Technical Specifications  Technician [facility] of consciousness awake   Sleep deprived? no   Hyperventilation tested? no   Photic stim tested? yes   EEG recording Standard 10-20 electrode placement    Duration of recording 30 mins   EEG complete?  Yes         Clinical History   ams    Medications    Current Facility-Administered Medications:     dextrose 5 % and 0.45 % sodium chloride infusion, , Intravenous, Continuous, Thomas Lopez MD, Last Rate: 75 mL/hr at 02/06/20 0826    sodium chloride flush 0.9 % injection 10 mL, 10 mL, Intravenous, 2 times per day, Thomas Lopez MD, 10 mL at 02/06/20 0914    sodium chloride flush 0.9 % injection 10 mL, 10 mL, Intravenous, PRN, Deb Bernstein MD    0.9 % sodium chloride bolus, 1,000 mL, Intravenous, Once, Thomas Lopez MD    sodium chloride flush 0.9 % injection 10 mL, 10 mL, Intravenous, 2 times per day, Thomas Lopez MD, 10 mL at 02/06/20 0914    sodium chloride flush 0.9 % injection 10 mL, 10 mL, Intravenous, PRN, Thomas Lopez MD, 10 mL at 02/06/20 1034    magnesium hydroxide (MILK OF MAGNESIA) 400 MG/5ML suspension 30 mL, 30 mL, Oral, Daily PRN, Thomas Lopez MD    [Held by provider] aspirin EC tablet 81 mg, 81 mg, Oral, Daily, Deb Bernstein MD    famotidine (PEPCID) tablet 20 mg, 20 mg, Oral, BID, Thomas Lopez MD, Stopped at 02/05/20 2159    metoprolol (LOPRESSOR) injection 5 mg, 5 mg, Intravenous, Q6H, Kiet Dumont MD, 5 mg at 02/06/20 1028    thiamine (B-1) injection 100 mg, 100 mg, Intravenous, Daily, Kiet Dumont MD, 100 mg at 35/36/73 2766    folic acid injection 1 mg, 1 mg, Intravenous, Daily, Kiet Dumont MD, 1 mg at 02/06/20 0913    piperacillin-tazobactam (ZOSYN) 3.375 g in dextrose 5 % 100 mL

## 2020-02-06 NOTE — H&P
[facility]  Internal Medicine Residency Program  History and Physical    Patient:  Titus Power 59 y.o. male MRN: [id number]     Date of Service: 2/5/2020    Hospital Day: 1      Chief complaint: had concerns including Fatigue (for a couple of days). History of Present Illness   The patient is a 59 y.o. male with a PMH laryngeal cancer cancer, s/p laryngectomy (2005), chemo and radiotherapy, H/o CVA, active smoker, alcohol use presented with the complaints of fatigue. Per sister, he has been more weak for the past 2 days, has not been eating or drinking well. Had a fall yesterday, after that became more weak. Per sister he has been having problem with balance. Patient has some baseline left sided weakness along with slurring of speech. In the ED, he was hypotensive, BP improved after fluid bolus. CXR unremarkable. Found to have YVETTE (baseline creatinine unknown), thrombocytopenia with a platelet level of 07,990. H/H stable. No evidence of bleeding noted. Past Medical History:      Diagnosis Date    Cancer ([facility] [address]     mouth    Hip fracture ([facility] [address]     Hypertension        Past Surgical History:    No past surgical history on file. Medications Prior to Admission:    Prior to Admission medications    Medication Sig Start Date End Date Taking? Authorizing Provider   metoprolol tartrate (LOPRESSOR) 25 MG tablet Take 25 mg by mouth 2 times daily   Yes Historical Provider, MD   lisinopril (PRINIVIL;ZESTRIL) 10 MG tablet Take 10 mg by mouth daily   Yes Historical Provider, MD   famotidine (PEPCID) 20 MG tablet Take 20 mg by mouth 2 times daily   Yes Historical Provider, MD   aspirin 81 MG tablet Take 81 mg by mouth daily   Yes Historical Provider, MD       Allergies:  Patient has no known allergies. Social History:   TOBACCO:   has no history on file for tobacco.  ETOH:   has no history on file for alcohol. Family History:   No family history on file.     REVIEW OF SYSTEMS:    · Constitutional: Fatigue, tiredness  · HEENT: No blurred vision, no ear problems, no sore throat, no rhinorrhea. · Respiratory: No cough, no sputum production, no pleuritic chest pain, no shortness of breath  · Cardiology: No angina, no dyspnea on exertion, no paroxysmal nocturnal dyspnea, no orthopnea, no palpitation, no leg swelling. · Gastroenterology: No dysphagia, no reflux; no abdominal pain, no nausea or vomiting; no constipation or diarrhea.  No hematochezia   · Genitourinary: No dysuria, no frequency, hesitancy; no hematuria  · Musculoskeletal: no joint pain, no myalgia, no change in range of movement  · Neurology: no focal weakness in extremities, no slurred speech, no double vision, no tingling or numbness sensation  · Endocrinology: no temperature intolerance, no polyphagia, polydipsia or polyuria  · Hematology: no increased bleeding, no bruising, no lymphadenopathy  · Skin: no skin changes noticed by patient  · Psychology: no depressed mood, no suicidal ideation    Physical Exam   · Vitals: BP (!) 171/95   Pulse 117   Temp 98.3 °F (36.8 °C) (Temporal)   Resp 18   SpO2 99%     General: alert, awake, in no acute distress  HEENT: NC, AT, moist oral mucosa, slurring of speech and facial deviation to the right noted, which is baseline per sister  Neck: supple  Pulmonary: clear to auscultation bilaterally, no wheezing or crackles  CV: RRR, S1 S2 heard, no MRG  Abd: soft, nontender, nondistended, BS +  Ext: no pedal edema  Neuro: Alert, awake, left UE, LE 4/5 strength, right UE/LE 5/5 strength    Labs and Imaging Studies   Basic Labs  Recent Labs     02/05/20  1140      K 4.2   CL 91*   CO2 23   BUN 59*   CREATININE 4.1*   GLUCOSE 132*   CALCIUM 8.0*       Recent Labs     02/05/20  1140   WBC 7.5   RBC 4.22   HGB 12.2*   HCT 34.8*   MCV 82.5   MCH 28.9   MCHC 35.1*   RDW 14.8   PLT 25*   MPV NOT CALC     Imaging Studies:     Xr Chest Standard (2 Vw)    Result Date: 2/5/2020  Patient

## 2020-02-06 NOTE — PROGRESS NOTES
[address] [facility]  Internal Medicine Residency / [address]    Attending Physician Statement  I have discussed the case, including pertinent history and exam findings with the resident and the team.  I have seen and examined the patient and the key elements of the encounter have been performed by me. I agree with the assessment, plan and orders as documented by the resident. More alert today  Has 2 positive blood cultures with streptococcus  VS stable, hypotension responded to iv fluids  On Vancomycin and Zosyn  Question of seizure as well  H&L distant  Abdomen soft  Dehydration . R/O ATN   Plan Continue same with nephro           O2 and chest PT          Follow Cr improvement   Remainder of medical problems as per resident note.       Effie Contreras  Internal Medicine Residency Faculty

## 2020-02-07 ENCOUNTER — APPOINTMENT (OUTPATIENT)
Dept: ULTRASOUND IMAGING | Age: 65
DRG: 870 | End: 2020-02-07
Payer: MEDICARE

## 2020-02-07 LAB
ADENOVIRUS BY PCR: NOT DETECTED
ANION GAP SERPL CALCULATED.3IONS-SCNC: 15 MMOL/L (ref 7–16)
ANISOCYTOSIS: ABNORMAL
ANISOCYTOSIS: ABNORMAL
BASOPHILS ABSOLUTE: 0 E9/L (ref 0–0.2)
BASOPHILS ABSOLUTE: 0.03 E9/L (ref 0–0.2)
BASOPHILS RELATIVE PERCENT: 0 % (ref 0–2)
BASOPHILS RELATIVE PERCENT: 0.3 % (ref 0–2)
BORDETELLA PARAPERTUSSIS BY PCR: NOT DETECTED
BORDETELLA PERTUSSIS BY PCR: NOT DETECTED
BUN BLDV-MCNC: 82 MG/DL (ref 8–23)
BURR CELLS: ABNORMAL
C3 COMPLEMENT: 64 MG/DL (ref 90–180)
C4 COMPLEMENT: 22 MG/DL (ref 10–40)
CALCIUM SERPL-MCNC: 8.2 MG/DL (ref 8.6–10.2)
CHLAMYDOPHILIA PNEUMONIAE BY PCR: NOT DETECTED
CHLORIDE BLD-SCNC: 98 MMOL/L (ref 98–107)
CO2: 20 MMOL/L (ref 22–29)
CORONAVIRUS 229E BY PCR: NOT DETECTED
CORONAVIRUS HKU1 BY PCR: NOT DETECTED
CORONAVIRUS NL63 BY PCR: NOT DETECTED
CORONAVIRUS OC43 BY PCR: NOT DETECTED
CREAT SERPL-MCNC: 3 MG/DL (ref 0.7–1.2)
CREATININE URINE: 69 MG/DL (ref 40–278)
DOHLE BODIES: ABNORMAL
DOHLE BODIES: ABNORMAL
EKG ATRIAL RATE: 126 BPM
EKG P AXIS: 76 DEGREES
EKG P-R INTERVAL: 132 MS
EKG Q-T INTERVAL: 346 MS
EKG QRS DURATION: 82 MS
EKG QTC CALCULATION (BAZETT): 501 MS
EKG R AXIS: 53 DEGREES
EKG T AXIS: 84 DEGREES
EKG VENTRICULAR RATE: 126 BPM
EOSINOPHILS ABSOLUTE: 0 E9/L (ref 0.05–0.5)
EOSINOPHILS ABSOLUTE: 0.01 E9/L (ref 0.05–0.5)
EOSINOPHILS RELATIVE PERCENT: 0.1 % (ref 0–6)
EOSINOPHILS RELATIVE PERCENT: 0.1 % (ref 0–6)
GFR AFRICAN AMERICAN: 26
GFR NON-AFRICAN AMERICAN: 26 ML/MIN/1.73
GLUCOSE BLD-MCNC: 121 MG/DL (ref 74–99)
HCT VFR BLD CALC: 31 % (ref 37–54)
HCT VFR BLD CALC: 36.1 % (ref 37–54)
HEMOGLOBIN: 11.2 G/DL (ref 12.5–16.5)
HEMOGLOBIN: 12.5 G/DL (ref 12.5–16.5)
HUMAN METAPNEUMOVIRUS BY PCR: NOT DETECTED
HUMAN RHINOVIRUS/ENTEROVIRUS BY PCR: NOT DETECTED
HYPOCHROMIA: ABNORMAL
HYPOCHROMIA: ABNORMAL
IMMATURE GRANULOCYTES #: 0.08 E9/L
IMMATURE GRANULOCYTES %: 0.8 % (ref 0–5)
INFLUENZA A BY PCR: NOT DETECTED
INFLUENZA B BY PCR: NOT DETECTED
LYMPHOCYTES ABSOLUTE: 0.43 E9/L (ref 1.5–4)
LYMPHOCYTES ABSOLUTE: 0.43 E9/L (ref 1.5–4)
LYMPHOCYTES RELATIVE PERCENT: 4.4 % (ref 20–42)
LYMPHOCYTES RELATIVE PERCENT: 5.2 % (ref 20–42)
MAGNESIUM: 2.1 MG/DL (ref 1.6–2.6)
MCH RBC QN AUTO: 28 PG (ref 26–35)
MCH RBC QN AUTO: 28.6 PG (ref 26–35)
MCHC RBC AUTO-ENTMCNC: 34.6 % (ref 32–34.5)
MCHC RBC AUTO-ENTMCNC: 36.1 % (ref 32–34.5)
MCV RBC AUTO: 79.1 FL (ref 80–99.9)
MCV RBC AUTO: 80.9 FL (ref 80–99.9)
METAMYELOCYTES RELATIVE PERCENT: 0.9 % (ref 0–1)
MICROALBUMIN UR-MCNC: 55.9 MG/L
MICROALBUMIN/CREAT UR-RTO: 81 (ref 0–30)
MONOCYTES ABSOLUTE: 1.81 E9/L (ref 0.1–0.95)
MONOCYTES ABSOLUTE: 1.93 E9/L (ref 0.1–0.95)
MONOCYTES RELATIVE PERCENT: 19.9 % (ref 2–12)
MONOCYTES RELATIVE PERCENT: 20.9 % (ref 2–12)
MYCOPLASMA PNEUMONIAE BY PCR: NOT DETECTED
NEUTROPHILS ABSOLUTE: 6.36 E9/L (ref 1.8–7.3)
NEUTROPHILS ABSOLUTE: 7.22 E9/L (ref 1.8–7.3)
NEUTROPHILS RELATIVE PERCENT: 73 % (ref 43–80)
NEUTROPHILS RELATIVE PERCENT: 74.5 % (ref 43–80)
OVALOCYTES: ABNORMAL
PARAINFLUENZA VIRUS 1 BY PCR: NOT DETECTED
PARAINFLUENZA VIRUS 2 BY PCR: NOT DETECTED
PARAINFLUENZA VIRUS 3 BY PCR: NOT DETECTED
PARAINFLUENZA VIRUS 4 BY PCR: NOT DETECTED
PDW BLD-RTO: 14.6 FL (ref 11.5–15)
PDW BLD-RTO: 15.4 FL (ref 11.5–15)
PLATELET # BLD: 24 E9/L (ref 130–450)
PLATELET # BLD: 25 E9/L (ref 130–450)
PLATELET CONFIRMATION: NORMAL
PLATELET CONFIRMATION: NORMAL
PMV BLD AUTO: ABNORMAL FL (ref 7–12)
PMV BLD AUTO: ABNORMAL FL (ref 7–12)
POIKILOCYTES: ABNORMAL
POIKILOCYTES: ABNORMAL
POLYCHROMASIA: ABNORMAL
POLYCHROMASIA: ABNORMAL
POTASSIUM REFLEX MAGNESIUM: 3.2 MMOL/L (ref 3.5–5)
PROTEIN PROTEIN: 35 MG/DL (ref 0–12)
PROTEIN/CREAT RATIO: 0.5
PROTEIN/CREAT RATIO: 0.5 (ref 0–0.2)
RBC # BLD: 3.92 E12/L (ref 3.8–5.8)
RBC # BLD: 4.46 E12/L (ref 3.8–5.8)
REASON FOR REJECTION: NORMAL
REJECTED TEST: NORMAL
RESPIRATORY SYNCYTIAL VIRUS BY PCR: NOT DETECTED
SCHISTOCYTES: ABNORMAL
SCHISTOCYTES: ABNORMAL
SODIUM BLD-SCNC: 133 MMOL/L (ref 132–146)
SPHEROCYTES: ABNORMAL
TARGET CELLS: ABNORMAL
TARGET CELLS: ABNORMAL
TOXIC GRANULATION: ABNORMAL
TOXIC GRANULATION: ABNORMAL
URINE CULTURE, ROUTINE: NORMAL
VACUOLATED NEUTROPHILS: ABNORMAL
WBC # BLD: 8.6 E9/L (ref 4.5–11.5)
WBC # BLD: 9.7 E9/L (ref 4.5–11.5)

## 2020-02-07 PROCEDURE — 2580000003 HC RX 258: Performed by: INTERNAL MEDICINE

## 2020-02-07 PROCEDURE — 2700000000 HC OXYGEN THERAPY PER DAY

## 2020-02-07 PROCEDURE — 2500000003 HC RX 250 WO HCPCS: Performed by: INTERNAL MEDICINE

## 2020-02-07 PROCEDURE — 6360000002 HC RX W HCPCS: Performed by: INTERNAL MEDICINE

## 2020-02-07 PROCEDURE — 87070 CULTURE OTHR SPECIMN AEROBIC: CPT

## 2020-02-07 PROCEDURE — 82570 ASSAY OF URINE CREATININE: CPT

## 2020-02-07 PROCEDURE — 51702 INSERT TEMP BLADDER CATH: CPT

## 2020-02-07 PROCEDURE — 0100U HC RESPIRPTHGN MULT REV TRANS & AMP PRB TECH 21 TRGT: CPT

## 2020-02-07 PROCEDURE — 94669 MECHANICAL CHEST WALL OSCILL: CPT

## 2020-02-07 PROCEDURE — 87206 SMEAR FLUORESCENT/ACID STAI: CPT

## 2020-02-07 PROCEDURE — 99231 SBSQ HOSP IP/OBS SF/LOW 25: CPT | Performed by: INTERNAL MEDICINE

## 2020-02-07 PROCEDURE — 6370000000 HC RX 637 (ALT 250 FOR IP): Performed by: INTERNAL MEDICINE

## 2020-02-07 PROCEDURE — 83735 ASSAY OF MAGNESIUM: CPT

## 2020-02-07 PROCEDURE — 80048 BASIC METABOLIC PNL TOTAL CA: CPT

## 2020-02-07 PROCEDURE — 2060000000 HC ICU INTERMEDIATE R&B

## 2020-02-07 PROCEDURE — 94640 AIRWAY INHALATION TREATMENT: CPT

## 2020-02-07 PROCEDURE — 84156 ASSAY OF PROTEIN URINE: CPT

## 2020-02-07 PROCEDURE — 36415 COLL VENOUS BLD VENIPUNCTURE: CPT

## 2020-02-07 PROCEDURE — 93010 ELECTROCARDIOGRAM REPORT: CPT | Performed by: INTERNAL MEDICINE

## 2020-02-07 PROCEDURE — 86160 COMPLEMENT ANTIGEN: CPT

## 2020-02-07 PROCEDURE — 87186 SC STD MICRODIL/AGAR DIL: CPT

## 2020-02-07 PROCEDURE — 85025 COMPLETE CBC W/AUTO DIFF WBC: CPT

## 2020-02-07 PROCEDURE — 82044 UR ALBUMIN SEMIQUANTITATIVE: CPT

## 2020-02-07 PROCEDURE — 76770 US EXAM ABDO BACK WALL COMP: CPT

## 2020-02-07 RX ORDER — LORAZEPAM 2 MG/ML
1 INJECTION INTRAMUSCULAR EVERY 4 HOURS PRN
Status: DISCONTINUED | OUTPATIENT
Start: 2020-02-07 | End: 2020-02-10

## 2020-02-07 RX ORDER — SODIUM CHLORIDE FOR INHALATION 3 %
4 VIAL, NEBULIZER (ML) INHALATION 2 TIMES DAILY
Status: DISCONTINUED | OUTPATIENT
Start: 2020-02-07 | End: 2020-02-29

## 2020-02-07 RX ORDER — THIAMINE HYDROCHLORIDE 100 MG/ML
100 INJECTION, SOLUTION INTRAMUSCULAR; INTRAVENOUS DAILY
Status: DISCONTINUED | OUTPATIENT
Start: 2020-02-08 | End: 2020-02-13 | Stop reason: CLARIF

## 2020-02-07 RX ORDER — FAMOTIDINE 20 MG/1
20 TABLET, FILM COATED ORAL DAILY
Status: DISCONTINUED | OUTPATIENT
Start: 2020-02-07 | End: 2020-02-10

## 2020-02-07 RX ORDER — HYDRALAZINE HYDROCHLORIDE 20 MG/ML
10 INJECTION INTRAMUSCULAR; INTRAVENOUS EVERY 6 HOURS PRN
Status: DISCONTINUED | OUTPATIENT
Start: 2020-02-07 | End: 2020-02-10

## 2020-02-07 RX ORDER — POTASSIUM CHLORIDE 7.45 MG/ML
10 INJECTION INTRAVENOUS
Status: COMPLETED | OUTPATIENT
Start: 2020-02-07 | End: 2020-02-07

## 2020-02-07 RX ORDER — IPRATROPIUM BROMIDE AND ALBUTEROL SULFATE 2.5; .5 MG/3ML; MG/3ML
1 SOLUTION RESPIRATORY (INHALATION)
Status: DISCONTINUED | OUTPATIENT
Start: 2020-02-07 | End: 2020-02-19

## 2020-02-07 RX ORDER — DEXTROSE, SODIUM CHLORIDE, SODIUM LACTATE, POTASSIUM CHLORIDE, AND CALCIUM CHLORIDE 5; .6; .31; .03; .02 G/100ML; G/100ML; G/100ML; G/100ML; G/100ML
INJECTION, SOLUTION INTRAVENOUS CONTINUOUS
Status: DISCONTINUED | OUTPATIENT
Start: 2020-02-07 | End: 2020-02-08

## 2020-02-07 RX ORDER — LABETALOL HYDROCHLORIDE 5 MG/ML
10 INJECTION, SOLUTION INTRAVENOUS EVERY 6 HOURS PRN
Status: DISCONTINUED | OUTPATIENT
Start: 2020-02-07 | End: 2020-02-29

## 2020-02-07 RX ADMIN — METOPROLOL TARTRATE 5 MG: 5 INJECTION INTRAVENOUS at 17:18

## 2020-02-07 RX ADMIN — SODIUM CHLORIDE: 9 INJECTION, SOLUTION INTRAVENOUS at 15:07

## 2020-02-07 RX ADMIN — SODIUM CHLORIDE, SODIUM LACTATE, POTASSIUM CHLORIDE, CALCIUM CHLORIDE AND DEXTROSE MONOHYDRATE: 5; 600; 310; 30; 20 INJECTION, SOLUTION INTRAVENOUS at 17:09

## 2020-02-07 RX ADMIN — SODIUM CHLORIDE: 9 INJECTION, SOLUTION INTRAVENOUS at 02:29

## 2020-02-07 RX ADMIN — THIAMINE HYDROCHLORIDE 100 MG: 100 INJECTION, SOLUTION INTRAMUSCULAR; INTRAVENOUS at 09:36

## 2020-02-07 RX ADMIN — IPRATROPIUM BROMIDE AND ALBUTEROL SULFATE 1 AMPULE: 2.5; .5 SOLUTION RESPIRATORY (INHALATION) at 20:44

## 2020-02-07 RX ADMIN — PIPERACILLIN AND TAZOBACTAM 3.38 G: 3; .375 INJECTION, POWDER, LYOPHILIZED, FOR SOLUTION INTRAVENOUS at 09:36

## 2020-02-07 RX ADMIN — Medication 10 ML: at 23:24

## 2020-02-07 RX ADMIN — PIPERACILLIN AND TAZOBACTAM 3.38 G: 3; .375 INJECTION, POWDER, LYOPHILIZED, FOR SOLUTION INTRAVENOUS at 23:23

## 2020-02-07 RX ADMIN — Medication 10 ML: at 10:28

## 2020-02-07 RX ADMIN — SODIUM CHLORIDE, PRESERVATIVE FREE 10 ML: 5 INJECTION INTRAVENOUS at 04:11

## 2020-02-07 RX ADMIN — METOPROLOL TARTRATE 5 MG: 5 INJECTION INTRAVENOUS at 09:36

## 2020-02-07 RX ADMIN — METOPROLOL TARTRATE 5 MG: 5 INJECTION INTRAVENOUS at 04:10

## 2020-02-07 RX ADMIN — IPRATROPIUM BROMIDE AND ALBUTEROL SULFATE 1 AMPULE: 2.5; .5 SOLUTION RESPIRATORY (INHALATION) at 14:33

## 2020-02-07 RX ADMIN — SODIUM CHLORIDE SOLN NEBU 3% 4 ML: 3 NEBU SOLN at 14:34

## 2020-02-07 RX ADMIN — POTASSIUM CHLORIDE 10 MEQ: 7.46 INJECTION, SOLUTION INTRAVENOUS at 12:26

## 2020-02-07 RX ADMIN — IPRATROPIUM BROMIDE AND ALBUTEROL SULFATE 1 AMPULE: 2.5; .5 SOLUTION RESPIRATORY (INHALATION) at 16:54

## 2020-02-07 RX ADMIN — POTASSIUM CHLORIDE 10 MEQ: 7.46 INJECTION, SOLUTION INTRAVENOUS at 11:27

## 2020-02-07 RX ADMIN — SODIUM CHLORIDE, PRESERVATIVE FREE 10 ML: 5 INJECTION INTRAVENOUS at 11:28

## 2020-02-07 RX ADMIN — METOPROLOL TARTRATE 5 MG: 5 INJECTION INTRAVENOUS at 23:23

## 2020-02-07 RX ADMIN — HYDRALAZINE HYDROCHLORIDE 10 MG: 20 INJECTION INTRAMUSCULAR; INTRAVENOUS at 19:17

## 2020-02-07 RX ADMIN — LORAZEPAM 2 MG: 2 INJECTION INTRAMUSCULAR; INTRAVENOUS at 09:36

## 2020-02-07 RX ADMIN — POTASSIUM CHLORIDE 10 MEQ: 7.46 INJECTION, SOLUTION INTRAVENOUS at 13:31

## 2020-02-07 RX ADMIN — FOLIC ACID 1 MG: 5 INJECTION, SOLUTION INTRAMUSCULAR; INTRAVENOUS; SUBCUTANEOUS at 09:35

## 2020-02-07 RX ADMIN — SODIUM CHLORIDE SOLN NEBU 3% 4 ML: 3 NEBU SOLN at 20:44

## 2020-02-07 RX ADMIN — LORAZEPAM 1 MG: 2 INJECTION INTRAMUSCULAR; INTRAVENOUS at 17:18

## 2020-02-07 ASSESSMENT — PAIN SCALES - GENERAL: PAINLEVEL_OUTOF10: 0

## 2020-02-07 NOTE — CARE COORDINATION
Spoke with sister, I explained patient will likely not be ready for discharge Monday when plane ride is scheduled. She is reaching out to the airport to change his arrangements. I let her know if the airline is requesting a letter as proof of hospitalization I can provide that on Monday. Plan remains home with sister until he can return to [facility]. Currently utilizing CIWA scale.

## 2020-02-07 NOTE — PROGRESS NOTES
Toni Duran,    Your patient is on a medication that requires a renal dose adjustment. Renal Function Assessment:    Date Body Weight IBW Adj. Body Weight SCr CrCl Dialysis status   2/7/2020 54.4 kg  kg  kg 3.1 19 ml/min no       Pharmacy has renally dose-adjusted the following medication(s):    Date Medication Original Dosing Regimen New Dosing Regimen   2/7/2020 pepcid 20 mg bid 20 mg qd           These changes were made per protocol according to the [facility] Renal Function-Based Dose Adjustments Policy    *Please note this dose may need readjusted if your patient's renal function significantly improves. Please contact pharmacy with any questions regarding these changes.     Sujey Weems, PharmSANTI 2/7/2020 10:41 AM

## 2020-02-07 NOTE — CONSULTS
Department of Internal Medicine  Nephrology Attending Consult Note      Reason for Consult: YVETTE  Requesting Physician:  Dr. Nato Rodríguez:  Fatigue    History Obtained From:  patient    HISTORY OF PRESENT ILLNESS:  Briefly, Mr. Willian Spaulding is a 14-year-old gentleman with a significant past medical history of hypertension, laryngeal cancer s/p laryngectomy in 2005 and chemo and radiation, history of CVA with baseline left sided weakness and some slurred speech, current smoker, and history of alcohol abuse who presented to the ED on 2/5/20 for fatigue. Patient was found to be extremely hypotensive at 50/palpated, tachycardic and fatigued in the ED. He was given fluid bolus with BP improvement, was found to have YVETTE (baseline unknown) with a creatinine of 4.1. Patients creatinine today is 3.0, reason for this consult. Past Medical History:        Diagnosis Date    Cancer ([facility] [address]     mouth    Hip fracture ([facility] [address]     Hypertension      Past Surgical History:    No past surgical history on file.   Current Medications:    Current Facility-Administered Medications: [Held by provider] famotidine (PEPCID) tablet 20 mg, 20 mg, Oral, Daily  ipratropium-albuterol (DUONEB) nebulizer solution 1 ampule, 1 ampule, Inhalation, Q4H WA  sodium chloride (Inhalant) 3 % nebulizer solution 4 mL, 4 mL, Nebulization, BID  LORazepam (ATIVAN) injection 1 mg, 1 mg, Intravenous, Q4H PRN  sodium chloride flush 0.9 % injection 10 mL, 10 mL, Intravenous, 2 times per day  sodium chloride flush 0.9 % injection 10 mL, 10 mL, Intravenous, PRN  thiamine (B-1) injection 100 mg, 100 mg, Intramuscular, Daily  folic acid injection 1 mg, 1 mg, Intravenous, Daily  0.9 % sodium chloride bolus, 1,000 mL, Intravenous, Once  magnesium hydroxide (MILK OF MAGNESIA) 400 MG/5ML suspension 30 mL, 30 mL, Oral, Daily PRN  [Held by provider] aspirin EC tablet 81 mg, 81 mg, Oral, Daily  metoprolol (LOPRESSOR) injection 5 mg, 5 mg, Intravenous, Q6H  piperacillin-tazobactam (ZOSYN) 3.375 g in dextrose 5 % 100 mL IVPB extended infusion (mini-bag), 3.375 g, Intravenous, Q12H  0.9 % sodium chloride infusion admixture, , Intravenous, Q12H  Allergies:  Patient has no known allergies. Social History:    TOBACCO:   reports that he has been smoking cigarettes. He started smoking about 50 years ago. He has a 10.00 pack-year smoking history. He uses smokeless tobacco.  ETOH:   reports current alcohol use of about 14.0 standard drinks of alcohol per week. Family History:   No family history on file. REVIEW OF SYSTEMS:  Minimally verbal- unable to answer questions   CONSTITUTIONAL:   Negative  HEENT:  negative  RESPIRATORY:  negative  CARDIOVASCULAR:  negative  GASTROINTESTINAL: negative  MUSCULOSKELETAL: negative    NEUROLOGICAL: negative     PHYSICAL EXAM:      Vitals:    VITALS:  /68   Pulse 99   Temp 97.5 °F (36.4 °C) (Temporal)   Resp 18   Ht 5' 4\" (1.626 m)   Wt 120 lb (54.4 kg)   SpO2 95%   BMI 20.60 kg/m²     Constitutional:  Lethargic, laying in bed.  Responds to simple commands and nods appropriately  HEENT:  Scarred sugical incision at neck; facial droop  Respiratory:  Clear, diminished bases bilaterally  Cardiovascular/Edema:  RRR, no edema noted  Gastrointestinal:  Abdomen soft and nontender, bowel sounds active  Neurologic:  Contracted extremities, facial droop   Skin:  Dry,warm  flaky   Other:  No edema     DATA:    CBC:   Lab Results   Component Value Date    WBC 8.6 02/07/2020    RBC 3.92 02/07/2020    HGB 11.2 02/07/2020    HCT 31.0 02/07/2020    MCV 79.1 02/07/2020    MCH 28.6 02/07/2020    MCHC 36.1 02/07/2020    RDW 14.6 02/07/2020    PLT 24 02/07/2020    MPV NOT CALC 02/07/2020     CMP:    Lab Results   Component Value Date     02/07/2020    K 3.2 02/07/2020    CL 98 02/07/2020    CO2 20 02/07/2020    BUN 82 02/07/2020    CREATININE 3.0 02/07/2020    GFRAA 26 02/07/2020    LABGLOM 26 02/07/2020    GLUCOSE 121 02/07/2020

## 2020-02-07 NOTE — PROGRESS NOTES
[facility]  Internal Medicine Residency / [address]    Attending Physician Statement  I have discussed the case, including pertinent history and exam findings with the resident and the team.  I have seen and examined the patient and the key elements of the encounter have been performed by me. I agree with the assessment, plan and orders as documented by the resident. Very Zonked this Am after Ativan 2 mg last night  For ETOH withdrawl  VS stable  Platelets slightly better   Off ETOH on FA   Cr 3.0 , K is 3.2 mixed etiology  Has a CAP , strep in blood on Zosyn  Plan: Nephro consult           Consider ABG, decrease Ativan  Remainder of medical problems as per resident note.       Olive Pineda  Internal Medicine Residency Faculty

## 2020-02-07 NOTE — PROGRESS NOTES
Occupational Therapy  Received OT orders, Reviewed Chart, Presented to Pt's Room for OT assessment, however, per RN, pt very recently received medication d/t agitation and was sleeping soundly. RN Recommended therapist attempt Eval at a later time. Will check in w/ RN later this date.   Thank you for this referral.  ROSS Reaves, OTR/L  # [phone]

## 2020-02-07 NOTE — PROGRESS NOTES
[address] [facility]  Department of Internal Medicine  Internal Medicine Residency Program  Resident Progress  Note      Patient:  Marely Gilman 59 y.o. male MRN: [id number]     Date of Service: 2/7/2020    Allergy: Patient has no known allergies. CC: fatigue  Subjective       Patient was seen and examined this morning at bedside. He appeared very sleepy. Overnight, he was agitated, given his hx of heavy drinking, he was started on CIWA, received 2 mg Ativan at 11pm last night. Objective     Physical Exam:  · Vitals: /68   Pulse 99   Temp 97.5 °F (36.4 °C) (Temporal)   Resp 18   Ht 5' 4\" (1.626 m)   Wt 120 lb (54.4 kg)   SpO2 95%   BMI 20.60 kg/m²     · I & O - 24hr:    Intake/Output Summary (Last 24 hours) at 2/7/2020 1345  Last data filed at 2/7/2020 1325  Gross per 24 hour   Intake 2157.54 ml   Output 100 ml   Net 2057.54 ml     I/O last 3 completed shifts: In: 2167.5 [I.V.:1561.5; IV CWAJECOBC:974]  Out: 250 [Urine:250] I/O this shift:  In: 20 [I.V.:20]  Out: -    Weight change: 7 lb (3.175 kg)    · General Appearance: appeared malnutritional, lethargic, but follow commands  · HEENT:  Head: Normal, normocephalic, atraumatic.   · Neck: no carotid bruit and no JVD  · Lung: bilateral bronchial breath sound  · Heart: regular rate and rhythm, S1, S2 normal, no murmur, click, rub or gallop  · Abdomen: soft, non-tender; bowel sounds normal; no masses,  no organomegaly  · Extremities:  extremities normal, atraumatic, no cyanosis or edema  · Musculokeletal: No joint swelling,move all four extremities  · Neurologic: Mental status: awake to voice, but very lethargic        Pertinent/ New Labs and Imaging Studies     CBC:   Recent Labs     02/06/20  0429 02/06/20 2034 02/07/20  0833   WBC 6.4 8.6 8.6   HGB 11.4* 11.0* 11.2*   HCT 32.6* 30.8* 31.0*   MCV 81.5 80.4 79.1*   PLT 18* 22* 24*       BMP:    Recent Labs     02/06/20  1725 02/06/20 2034 02/07/20  0833   * 132 133   K 4.4 3.7 for mentation, if more obtunded, need ABG  · Nephrology consult, check PCR ACR, C3 C4, kidney ultrasound  · Monitor CBC daily, no antiplatelet or anticoagulation  · NPO, swallow benoit Fournier M.D., PGY 3    Attending physician: Dr. Valarie Anderson

## 2020-02-07 NOTE — PROGRESS NOTES
Speech Language Pathology      NAME:  Samuel Kate  :  1955  DATE: 2020  ROOM:  [address] [place] [address]    Consult received for clinical swallow. Unable to complete d/t Pt's lethargy, confirmed that Pt was given meds causing lethargy. Upon assessement, it was noted that Pt is not a total laryngectomy as he is without a stoma however is likely a partial laryngectomy and with possible oral involvement secondary to reports of completing XRT 2 years ago with notable scar down midline of chin. Given this information, will recommend Modified Barium Swallow Study (MBSS) to accurately assess oropharyngeal swallow function.     Acute renal failure (ARF) ([facility] [address] [N17.9]    Katerina Portillo., [address] Pathologist  [id number]  2020

## 2020-02-08 ENCOUNTER — APPOINTMENT (OUTPATIENT)
Dept: GENERAL RADIOLOGY | Age: 65
DRG: 870 | End: 2020-02-08
Payer: MEDICARE

## 2020-02-08 PROBLEM — R78.81 BACTEREMIA: Status: ACTIVE | Noted: 2020-02-08

## 2020-02-08 PROBLEM — R13.19 OTHER DYSPHAGIA: Status: ACTIVE | Noted: 2020-02-08

## 2020-02-08 LAB
AMMONIA: 17 UMOL/L (ref 16–60)
ANION GAP SERPL CALCULATED.3IONS-SCNC: 12 MMOL/L (ref 7–16)
ANION GAP SERPL CALCULATED.3IONS-SCNC: 13 MMOL/L (ref 7–16)
ANION GAP SERPL CALCULATED.3IONS-SCNC: 15 MMOL/L (ref 7–16)
ANISOCYTOSIS: ABNORMAL
ANISOCYTOSIS: ABNORMAL
BASOPHILS ABSOLUTE: 0.01 E9/L (ref 0–0.2)
BASOPHILS ABSOLUTE: 0.01 E9/L (ref 0–0.2)
BASOPHILS RELATIVE PERCENT: 0.1 % (ref 0–2)
BASOPHILS RELATIVE PERCENT: 0.1 % (ref 0–2)
BUN BLDV-MCNC: 66 MG/DL (ref 8–23)
BUN BLDV-MCNC: 71 MG/DL (ref 8–23)
BUN BLDV-MCNC: 81 MG/DL (ref 8–23)
BURR CELLS: ABNORMAL
BURR CELLS: ABNORMAL
CALCIUM SERPL-MCNC: 8.1 MG/DL (ref 8.6–10.2)
CALCIUM SERPL-MCNC: 8.3 MG/DL (ref 8.6–10.2)
CALCIUM SERPL-MCNC: 8.5 MG/DL (ref 8.6–10.2)
CHLORIDE BLD-SCNC: 101 MMOL/L (ref 98–107)
CHLORIDE BLD-SCNC: 103 MMOL/L (ref 98–107)
CHLORIDE BLD-SCNC: 106 MMOL/L (ref 98–107)
CO2: 19 MMOL/L (ref 22–29)
CO2: 22 MMOL/L (ref 22–29)
CO2: 22 MMOL/L (ref 22–29)
CREAT SERPL-MCNC: 2.6 MG/DL (ref 0.7–1.2)
CREAT SERPL-MCNC: 2.9 MG/DL (ref 0.7–1.2)
CREAT SERPL-MCNC: 2.9 MG/DL (ref 0.7–1.2)
CULTURE, BLOOD 2: ABNORMAL
CULTURE, BLOOD 2: ABNORMAL
EOSINOPHILS ABSOLUTE: 0 E9/L (ref 0.05–0.5)
EOSINOPHILS ABSOLUTE: 0 E9/L (ref 0.05–0.5)
EOSINOPHILS RELATIVE PERCENT: 0 % (ref 0–6)
EOSINOPHILS RELATIVE PERCENT: 0 % (ref 0–6)
GFR AFRICAN AMERICAN: 27
GFR AFRICAN AMERICAN: 27
GFR AFRICAN AMERICAN: 30
GFR NON-AFRICAN AMERICAN: 27 ML/MIN/1.73
GFR NON-AFRICAN AMERICAN: 27 ML/MIN/1.73
GFR NON-AFRICAN AMERICAN: 30 ML/MIN/1.73
GLUCOSE BLD-MCNC: 102 MG/DL (ref 74–99)
GLUCOSE BLD-MCNC: 90 MG/DL (ref 74–99)
GLUCOSE BLD-MCNC: 93 MG/DL (ref 74–99)
HCT VFR BLD CALC: 27.5 % (ref 37–54)
HCT VFR BLD CALC: 28 % (ref 37–54)
HEMOGLOBIN: 10.1 G/DL (ref 12.5–16.5)
HEMOGLOBIN: 9.8 G/DL (ref 12.5–16.5)
HYPOCHROMIA: ABNORMAL
HYPOCHROMIA: ABNORMAL
IMMATURE GRANULOCYTES #: 0.16 E9/L
IMMATURE GRANULOCYTES #: 0.19 E9/L
IMMATURE GRANULOCYTES %: 1.6 % (ref 0–5)
IMMATURE GRANULOCYTES %: 1.6 % (ref 0–5)
LYMPHOCYTES ABSOLUTE: 0.54 E9/L (ref 1.5–4)
LYMPHOCYTES ABSOLUTE: 0.86 E9/L (ref 1.5–4)
LYMPHOCYTES RELATIVE PERCENT: 5.4 % (ref 20–42)
LYMPHOCYTES RELATIVE PERCENT: 7 % (ref 20–42)
MAGNESIUM: 1.9 MG/DL (ref 1.6–2.6)
MAGNESIUM: 1.9 MG/DL (ref 1.6–2.6)
MCH RBC QN AUTO: 28.1 PG (ref 26–35)
MCH RBC QN AUTO: 29 PG (ref 26–35)
MCHC RBC AUTO-ENTMCNC: 35 % (ref 32–34.5)
MCHC RBC AUTO-ENTMCNC: 36.7 % (ref 32–34.5)
MCV RBC AUTO: 79 FL (ref 80–99.9)
MCV RBC AUTO: 80.2 FL (ref 80–99.9)
METER GLUCOSE: 89 MG/DL (ref 74–99)
MONOCYTES ABSOLUTE: 1.99 E9/L (ref 0.1–0.95)
MONOCYTES ABSOLUTE: 2.1 E9/L (ref 0.1–0.95)
MONOCYTES RELATIVE PERCENT: 16.3 % (ref 2–12)
MONOCYTES RELATIVE PERCENT: 20.9 % (ref 2–12)
NEUTROPHILS ABSOLUTE: 7.26 E9/L (ref 1.8–7.3)
NEUTROPHILS ABSOLUTE: 9.19 E9/L (ref 1.8–7.3)
NEUTROPHILS RELATIVE PERCENT: 72 % (ref 43–80)
NEUTROPHILS RELATIVE PERCENT: 75 % (ref 43–80)
ORGANISM: ABNORMAL
ORGANISM: ABNORMAL
OVALOCYTES: ABNORMAL
PDW BLD-RTO: 14.6 FL (ref 11.5–15)
PDW BLD-RTO: 15.1 FL (ref 11.5–15)
PLATELET # BLD: 45 E9/L (ref 130–450)
PLATELET # BLD: 61 E9/L (ref 130–450)
PLATELET CONFIRMATION: NORMAL
PMV BLD AUTO: 11.7 FL (ref 7–12)
PMV BLD AUTO: ABNORMAL FL (ref 7–12)
POIKILOCYTES: ABNORMAL
POIKILOCYTES: ABNORMAL
POLYCHROMASIA: ABNORMAL
POLYCHROMASIA: ABNORMAL
POTASSIUM REFLEX MAGNESIUM: 3.4 MMOL/L (ref 3.5–5)
POTASSIUM REFLEX MAGNESIUM: 3.4 MMOL/L (ref 3.5–5)
POTASSIUM REFLEX MAGNESIUM: 3.6 MMOL/L (ref 3.5–5)
RBC # BLD: 3.48 E12/L (ref 3.8–5.8)
RBC # BLD: 3.49 E12/L (ref 3.8–5.8)
REASON FOR REJECTION: NORMAL
REJECTED TEST: NORMAL
SCHISTOCYTES: ABNORMAL
SODIUM BLD-SCNC: 136 MMOL/L (ref 132–146)
SODIUM BLD-SCNC: 137 MMOL/L (ref 132–146)
SODIUM BLD-SCNC: 140 MMOL/L (ref 132–146)
TARGET CELLS: ABNORMAL
TARGET CELLS: ABNORMAL
TOXIC GRANULATION: ABNORMAL
TSH SERPL DL<=0.05 MIU/L-ACNC: 1.05 UIU/ML (ref 0.27–4.2)
WBC # BLD: 10.1 E9/L (ref 4.5–11.5)
WBC # BLD: 12.2 E9/L (ref 4.5–11.5)

## 2020-02-08 PROCEDURE — 2500000003 HC RX 250 WO HCPCS: Performed by: INTERNAL MEDICINE

## 2020-02-08 PROCEDURE — 2500000003 HC RX 250 WO HCPCS: Performed by: RADIOLOGY

## 2020-02-08 PROCEDURE — 94761 N-INVAS EAR/PLS OXIMETRY MLT: CPT

## 2020-02-08 PROCEDURE — 6360000002 HC RX W HCPCS: Performed by: INTERNAL MEDICINE

## 2020-02-08 PROCEDURE — 92611 MOTION FLUOROSCOPY/SWALLOW: CPT | Performed by: SPEECH-LANGUAGE PATHOLOGIST

## 2020-02-08 PROCEDURE — 2580000003 HC RX 258: Performed by: INTERNAL MEDICINE

## 2020-02-08 PROCEDURE — 82962 GLUCOSE BLOOD TEST: CPT

## 2020-02-08 PROCEDURE — 2700000000 HC OXYGEN THERAPY PER DAY

## 2020-02-08 PROCEDURE — 94760 N-INVAS EAR/PLS OXIMETRY 1: CPT

## 2020-02-08 PROCEDURE — 85025 COMPLETE CBC W/AUTO DIFF WBC: CPT

## 2020-02-08 PROCEDURE — 94640 AIRWAY INHALATION TREATMENT: CPT

## 2020-02-08 PROCEDURE — 80048 BASIC METABOLIC PNL TOTAL CA: CPT

## 2020-02-08 PROCEDURE — 84443 ASSAY THYROID STIM HORMONE: CPT

## 2020-02-08 PROCEDURE — 2060000000 HC ICU INTERMEDIATE R&B

## 2020-02-08 PROCEDURE — 87040 BLOOD CULTURE FOR BACTERIA: CPT

## 2020-02-08 PROCEDURE — 36415 COLL VENOUS BLD VENIPUNCTURE: CPT

## 2020-02-08 PROCEDURE — 83735 ASSAY OF MAGNESIUM: CPT

## 2020-02-08 PROCEDURE — 82140 ASSAY OF AMMONIA: CPT

## 2020-02-08 PROCEDURE — 74230 X-RAY XM SWLNG FUNCJ C+: CPT

## 2020-02-08 PROCEDURE — 99233 SBSQ HOSP IP/OBS HIGH 50: CPT | Performed by: INTERNAL MEDICINE

## 2020-02-08 PROCEDURE — 6370000000 HC RX 637 (ALT 250 FOR IP): Performed by: INTERNAL MEDICINE

## 2020-02-08 PROCEDURE — 94667 MNPJ CHEST WALL 1ST: CPT

## 2020-02-08 RX ORDER — 0.9 % SODIUM CHLORIDE 0.9 %
500 INTRAVENOUS SOLUTION INTRAVENOUS ONCE
Status: COMPLETED | OUTPATIENT
Start: 2020-02-08 | End: 2020-02-08

## 2020-02-08 RX ORDER — MAGNESIUM SULFATE IN WATER 40 MG/ML
2 INJECTION, SOLUTION INTRAVENOUS ONCE
Status: COMPLETED | OUTPATIENT
Start: 2020-02-08 | End: 2020-02-08

## 2020-02-08 RX ORDER — SODIUM CHLORIDE AND POTASSIUM CHLORIDE .9; .15 G/100ML; G/100ML
SOLUTION INTRAVENOUS CONTINUOUS
Status: DISCONTINUED | OUTPATIENT
Start: 2020-02-08 | End: 2020-02-10

## 2020-02-08 RX ORDER — POTASSIUM CHLORIDE 7.45 MG/ML
10 INJECTION INTRAVENOUS
Status: COMPLETED | OUTPATIENT
Start: 2020-02-08 | End: 2020-02-08

## 2020-02-08 RX ORDER — 0.9 % SODIUM CHLORIDE 0.9 %
1000 INTRAVENOUS SOLUTION INTRAVENOUS ONCE
Status: COMPLETED | OUTPATIENT
Start: 2020-02-08 | End: 2020-02-08

## 2020-02-08 RX ADMIN — SODIUM CHLORIDE SOLN NEBU 3% 4 ML: 3 NEBU SOLN at 07:46

## 2020-02-08 RX ADMIN — SODIUM CHLORIDE 500 ML: 9 INJECTION, SOLUTION INTRAVENOUS at 15:52

## 2020-02-08 RX ADMIN — SODIUM CHLORIDE, SODIUM LACTATE, POTASSIUM CHLORIDE, CALCIUM CHLORIDE AND DEXTROSE MONOHYDRATE: 5; 600; 310; 30; 20 INJECTION, SOLUTION INTRAVENOUS at 09:32

## 2020-02-08 RX ADMIN — Medication 10 ML: at 08:37

## 2020-02-08 RX ADMIN — SODIUM CHLORIDE: 9 INJECTION, SOLUTION INTRAVENOUS at 04:06

## 2020-02-08 RX ADMIN — SODIUM CHLORIDE SOLN NEBU 3% 4 ML: 3 NEBU SOLN at 19:00

## 2020-02-08 RX ADMIN — POTASSIUM CHLORIDE 10 MEQ: 7.46 INJECTION, SOLUTION INTRAVENOUS at 10:39

## 2020-02-08 RX ADMIN — PIPERACILLIN AND TAZOBACTAM 3.38 G: 3; .375 INJECTION, POWDER, LYOPHILIZED, FOR SOLUTION INTRAVENOUS at 10:46

## 2020-02-08 RX ADMIN — CEFTRIAXONE 2 G: 2 INJECTION, POWDER, FOR SOLUTION INTRAMUSCULAR; INTRAVENOUS at 19:04

## 2020-02-08 RX ADMIN — SODIUM CHLORIDE, SODIUM LACTATE, POTASSIUM CHLORIDE, CALCIUM CHLORIDE AND DEXTROSE MONOHYDRATE: 5; 600; 310; 30; 20 INJECTION, SOLUTION INTRAVENOUS at 04:03

## 2020-02-08 RX ADMIN — SODIUM CHLORIDE AND POTASSIUM CHLORIDE: .9; .15 SOLUTION INTRAVENOUS at 17:12

## 2020-02-08 RX ADMIN — POTASSIUM CHLORIDE 10 MEQ: 7.46 INJECTION, SOLUTION INTRAVENOUS at 08:44

## 2020-02-08 RX ADMIN — Medication 10 ML: at 22:19

## 2020-02-08 RX ADMIN — METOPROLOL TARTRATE 5 MG: 5 INJECTION INTRAVENOUS at 06:18

## 2020-02-08 RX ADMIN — SODIUM CHLORIDE 1000 ML: 9 INJECTION, SOLUTION INTRAVENOUS at 13:54

## 2020-02-08 RX ADMIN — FOLIC ACID 1 MG: 5 INJECTION, SOLUTION INTRAMUSCULAR; INTRAVENOUS; SUBCUTANEOUS at 09:23

## 2020-02-08 RX ADMIN — THIAMINE HYDROCHLORIDE 100 MG: 100 INJECTION, SOLUTION INTRAMUSCULAR; INTRAVENOUS at 09:23

## 2020-02-08 RX ADMIN — METOPROLOL TARTRATE 5 MG: 5 INJECTION INTRAVENOUS at 09:23

## 2020-02-08 RX ADMIN — IPRATROPIUM BROMIDE AND ALBUTEROL SULFATE 1 AMPULE: 2.5; .5 SOLUTION RESPIRATORY (INHALATION) at 15:34

## 2020-02-08 RX ADMIN — METOPROLOL TARTRATE 5 MG: 5 INJECTION INTRAVENOUS at 17:25

## 2020-02-08 RX ADMIN — SODIUM CHLORIDE, PRESERVATIVE FREE 10 ML: 5 INJECTION INTRAVENOUS at 10:47

## 2020-02-08 RX ADMIN — POTASSIUM CHLORIDE 10 MEQ: 7.46 INJECTION, SOLUTION INTRAVENOUS at 09:24

## 2020-02-08 RX ADMIN — METOPROLOL TARTRATE 5 MG: 5 INJECTION INTRAVENOUS at 22:18

## 2020-02-08 RX ADMIN — IPRATROPIUM BROMIDE AND ALBUTEROL SULFATE 1 AMPULE: 2.5; .5 SOLUTION RESPIRATORY (INHALATION) at 07:46

## 2020-02-08 RX ADMIN — BARIUM SULFATE 15 ML: 400 SUSPENSION ORAL at 11:45

## 2020-02-08 RX ADMIN — IPRATROPIUM BROMIDE AND ALBUTEROL SULFATE 1 AMPULE: 2.5; .5 SOLUTION RESPIRATORY (INHALATION) at 18:59

## 2020-02-08 RX ADMIN — BARIUM SULFATE 15 ML: 400 PASTE ORAL at 11:44

## 2020-02-08 RX ADMIN — SODIUM CHLORIDE: 9 INJECTION, SOLUTION INTRAVENOUS at 15:40

## 2020-02-08 RX ADMIN — MAGNESIUM SULFATE HEPTAHYDRATE 2 G: 40 INJECTION, SOLUTION INTRAVENOUS at 08:38

## 2020-02-08 ASSESSMENT — PAIN SCALES - GENERAL
PAINLEVEL_OUTOF10: 0
PAINLEVEL_OUTOF10: 0

## 2020-02-08 NOTE — PROGRESS NOTES
0.43 02/07/2020    EOSABS 0.01 02/07/2020    BASOSABS 0.03 02/07/2020     CMP:    Lab Results   Component Value Date     02/08/2020    K 3.4 02/08/2020     02/08/2020    CO2 22 02/08/2020    BUN 71 02/08/2020    CREATININE 2.9 02/08/2020    GFRAA 27 02/08/2020    LABGLOM 27 02/08/2020    GLUCOSE 102 02/08/2020    PROT 6.2 02/06/2020    LABALBU 2.6 02/06/2020    CALCIUM 8.5 02/08/2020    BILITOT 1.5 02/06/2020    ALKPHOS 36 02/06/2020    AST 83 02/06/2020    ALT 21 02/06/2020     FOLATE:    Lab Results   Component Value Date    FOLATE 18.2 02/06/2020     Kidney US:   Probable bilateral medical renal disease with some perinephric fluid   bilaterally that could indicate inflammation.       There is no evidence of calcification or obstruction in either kidney. Assessment and Plan     Rocio Suarez is a 59 y.o. male with a [facility] laryngeal cancer cancer, s/p laryngectomy (2005), chemo and radiotherapy, H/o CVA, active smoker, alcohol use presented with the complaints of fatigue. 1. S/p fall - likely secondary to metabolic encephalopathy   · Denies LOC, endorses light headedness prior to fall  · Has remote history of seizure - not on meds  · History of alcohol use, last drink on 2/3/2020  · UA positive for hematuria  · EEG - \"mid diffuse encephalopathy\"     2. Sepsis bacteremia   · Blood cultures positive for gram positive bacteria in chains  · PCR - group G streptococcus   · CXR - unremarkable   · Procalcitonin >100  · Lactic acid elevated - resolved with IV fluids   · Respiratory panel - negative   · Zosyn Q12h (vancomycin - stopped) - will check sensitivity and down grade antibiotic if appropriate     3.  YVETTE  · BUN 71, Cr 2.9  · Per Nephrology - \"likely volume responsive prerenal YVETTE\"  · Renal function improving with IV hydration,  Will continue   · Renal US - \"probably bilateral medical renal disease w/some perineohric fluid  bilaterally that could indicate inflammation\"   · C3 64  · Avoid

## 2020-02-08 NOTE — PROGRESS NOTES
[facility]  Internal Medicine Residency Program  Progress Note - House Team    Patient:  Willow Schilling 59 y.o. male MRN: [id number]     Date of Service: 2/8/2020     CC: had concerns including Fatigue (for a couple of days). Overnight events: none    Subjective     Patient seen and examined in am. Reports fatigue. Denies CP, SOB, NVDC. Objective     Physical Exam:  · Vitals: /78   Pulse 102   Temp 99.2 °F (37.3 °C) (Temporal)   Resp 16   Ht 5' 4\" (1.626 m)   Wt 126 lb 14.4 oz (57.6 kg)   SpO2 100%   BMI 21.78 kg/m²       · General Appearance: alert, appears stated age, cachectic and cooperative  · HEENT:  Head: Normocephalic, no lesions, without obvious abnormality. · Neck: no JVD and supple, symmetrical, trachea midline  · Lung: rhonchi bilaterally and wheezes bilaterally  · Heart: regular rate and rhythm, S1, S2 normal, no murmur, click, rub or gallop  · Abdomen: soft, non-tender; bowel sounds normal; no masses,  no organomegaly  · Extremities:  extremities normal, atraumatic, no cyanosis or edema  · Musculokeletal: No joint swelling, no muscle tenderness. ROM normal in all joints of extremities.    · Neurologic: Mental status: Alert, oriented, thought content appropriate  Subject  Pertinent Labs & Imaging Studies   janice  CBC:   Lab Results   Component Value Date    WBC 9.7 02/07/2020    RBC 4.46 02/07/2020    HGB 12.5 02/07/2020    HCT 36.1 02/07/2020    MCV 80.9 02/07/2020    MCH 28.0 02/07/2020    MCHC 34.6 02/07/2020    RDW 15.4 02/07/2020    PLT 25 02/07/2020    MPV NOT CALC 02/07/2020     CMP:    Lab Results   Component Value Date     02/08/2020    K 3.4 02/08/2020     02/08/2020    CO2 22 02/08/2020    BUN 71 02/08/2020    CREATININE 2.9 02/08/2020    GFRAA 27 02/08/2020    LABGLOM 27 02/08/2020    GLUCOSE 102 02/08/2020    PROT 6.2 02/06/2020    LABALBU 2.6 02/06/2020    CALCIUM 8.5 02/08/2020    BILITOT 1.5 02/06/2020    ALKPHOS 36 02/06/2020    AST 83 platelets  Platelets slightly better now, defer ITP workup for now  Improved    fena <1-- dry on admission - sp IV fluids  Cr 4.0 , K is 3.2 -- carmelo mixed etiology  Cr. 2.9    Low c3-- post infectious GN possibility, but P/C low  I feels needs more fluids  - holding ace  initiall trop elevation- due to carmelo and strain type 2 myocardial injury    Dec vision L eye - cataracts    Swallow eval when fully awake  Remainder of medical problems as per resident note.

## 2020-02-09 ENCOUNTER — APPOINTMENT (OUTPATIENT)
Dept: CT IMAGING | Age: 65
DRG: 870 | End: 2020-02-09
Payer: MEDICARE

## 2020-02-09 LAB
ANION GAP SERPL CALCULATED.3IONS-SCNC: 15 MMOL/L (ref 7–16)
ANION GAP SERPL CALCULATED.3IONS-SCNC: 16 MMOL/L (ref 7–16)
ANISOCYTOSIS: ABNORMAL
ANISOCYTOSIS: ABNORMAL
APTT: 33.2 SEC (ref 24.5–35.1)
BASOPHILS ABSOLUTE: 0 E9/L (ref 0–0.2)
BASOPHILS ABSOLUTE: 0.01 E9/L (ref 0–0.2)
BASOPHILS RELATIVE PERCENT: 0.1 % (ref 0–2)
BASOPHILS RELATIVE PERCENT: 0.1 % (ref 0–2)
BUN BLDV-MCNC: 69 MG/DL (ref 8–23)
BUN BLDV-MCNC: 72 MG/DL (ref 8–23)
CALCIUM SERPL-MCNC: 8 MG/DL (ref 8.6–10.2)
CALCIUM SERPL-MCNC: 8.1 MG/DL (ref 8.6–10.2)
CHLORIDE BLD-SCNC: 109 MMOL/L (ref 98–107)
CHLORIDE BLD-SCNC: 114 MMOL/L (ref 98–107)
CO2: 16 MMOL/L (ref 22–29)
CO2: 17 MMOL/L (ref 22–29)
CREAT SERPL-MCNC: 2.3 MG/DL (ref 0.7–1.2)
CREAT SERPL-MCNC: 2.5 MG/DL (ref 0.7–1.2)
EOSINOPHILS ABSOLUTE: 0 E9/L (ref 0.05–0.5)
EOSINOPHILS ABSOLUTE: 0 E9/L (ref 0.05–0.5)
EOSINOPHILS RELATIVE PERCENT: 0 % (ref 0–6)
EOSINOPHILS RELATIVE PERCENT: 0 % (ref 0–6)
GFR AFRICAN AMERICAN: 32
GFR AFRICAN AMERICAN: 35
GFR NON-AFRICAN AMERICAN: 32 ML/MIN/1.73
GFR NON-AFRICAN AMERICAN: 35 ML/MIN/1.73
GLUCOSE BLD-MCNC: 108 MG/DL (ref 74–99)
GLUCOSE BLD-MCNC: 94 MG/DL (ref 74–99)
HCT VFR BLD CALC: 25.5 % (ref 37–54)
HCT VFR BLD CALC: 26.4 % (ref 37–54)
HEMOGLOBIN: 8.9 G/DL (ref 12.5–16.5)
HEMOGLOBIN: 9.1 G/DL (ref 12.5–16.5)
HYPOCHROMIA: ABNORMAL
HYPOCHROMIA: ABNORMAL
IMMATURE GRANULOCYTES #: 0.25 E9/L
IMMATURE GRANULOCYTES %: 1.9 % (ref 0–5)
INR BLD: 1.5
LYMPHOCYTES ABSOLUTE: 0.49 E9/L (ref 1.5–4)
LYMPHOCYTES ABSOLUTE: 0.57 E9/L (ref 1.5–4)
LYMPHOCYTES RELATIVE PERCENT: 3.5 % (ref 20–42)
LYMPHOCYTES RELATIVE PERCENT: 4.4 % (ref 20–42)
MCH RBC QN AUTO: 28.4 PG (ref 26–35)
MCH RBC QN AUTO: 28.4 PG (ref 26–35)
MCHC RBC AUTO-ENTMCNC: 34.5 % (ref 32–34.5)
MCHC RBC AUTO-ENTMCNC: 34.9 % (ref 32–34.5)
MCV RBC AUTO: 81.5 FL (ref 80–99.9)
MCV RBC AUTO: 82.5 FL (ref 80–99.9)
METAMYELOCYTES RELATIVE PERCENT: 0.9 % (ref 0–1)
METER GLUCOSE: 100 MG/DL (ref 74–99)
MONOCYTES ABSOLUTE: 0.74 E9/L (ref 0.1–0.95)
MONOCYTES ABSOLUTE: 2.02 E9/L (ref 0.1–0.95)
MONOCYTES RELATIVE PERCENT: 15.4 % (ref 2–12)
MONOCYTES RELATIVE PERCENT: 6.1 % (ref 2–12)
MYELOCYTE PERCENT: 0.9 % (ref 0–0)
NEUTROPHILS ABSOLUTE: 10.23 E9/L (ref 1.8–7.3)
NEUTROPHILS ABSOLUTE: 11.19 E9/L (ref 1.8–7.3)
NEUTROPHILS RELATIVE PERCENT: 78.2 % (ref 43–80)
NEUTROPHILS RELATIVE PERCENT: 88.7 % (ref 43–80)
PDW BLD-RTO: 15.4 FL (ref 11.5–15)
PDW BLD-RTO: 15.9 FL (ref 11.5–15)
PLATELET # BLD: 82 E9/L (ref 130–450)
PLATELET # BLD: 96 E9/L (ref 130–450)
PLATELET CONFIRMATION: NORMAL
PMV BLD AUTO: 12.1 FL (ref 7–12)
PMV BLD AUTO: 12.1 FL (ref 7–12)
POIKILOCYTES: ABNORMAL
POIKILOCYTES: ABNORMAL
POLYCHROMASIA: ABNORMAL
POLYCHROMASIA: ABNORMAL
POTASSIUM REFLEX MAGNESIUM: 3.7 MMOL/L (ref 3.5–5)
POTASSIUM REFLEX MAGNESIUM: 4 MMOL/L (ref 3.5–5)
PREALBUMIN: 5 MG/DL (ref 20–40)
PROTHROMBIN TIME: 16.7 SEC (ref 9.3–12.4)
RBC # BLD: 3.13 E12/L (ref 3.8–5.8)
RBC # BLD: 3.2 E12/L (ref 3.8–5.8)
SCHISTOCYTES: ABNORMAL
SODIUM BLD-SCNC: 141 MMOL/L (ref 132–146)
SODIUM BLD-SCNC: 146 MMOL/L (ref 132–146)
TARGET CELLS: ABNORMAL
TARGET CELLS: ABNORMAL
WBC # BLD: 12.3 E9/L (ref 4.5–11.5)
WBC # BLD: 13.1 E9/L (ref 4.5–11.5)

## 2020-02-09 PROCEDURE — 2580000003 HC RX 258: Performed by: INTERNAL MEDICINE

## 2020-02-09 PROCEDURE — 85025 COMPLETE CBC W/AUTO DIFF WBC: CPT

## 2020-02-09 PROCEDURE — 6360000002 HC RX W HCPCS: Performed by: INTERNAL MEDICINE

## 2020-02-09 PROCEDURE — 6370000000 HC RX 637 (ALT 250 FOR IP): Performed by: INTERNAL MEDICINE

## 2020-02-09 PROCEDURE — 94761 N-INVAS EAR/PLS OXIMETRY MLT: CPT

## 2020-02-09 PROCEDURE — C9113 INJ PANTOPRAZOLE SODIUM, VIA: HCPCS | Performed by: INTERNAL MEDICINE

## 2020-02-09 PROCEDURE — 94760 N-INVAS EAR/PLS OXIMETRY 1: CPT

## 2020-02-09 PROCEDURE — 85610 PROTHROMBIN TIME: CPT

## 2020-02-09 PROCEDURE — 0DH63UZ INSERTION OF FEEDING DEVICE INTO STOMACH, PERCUTANEOUS APPROACH: ICD-10-PCS | Performed by: STUDENT IN AN ORGANIZED HEALTH CARE EDUCATION/TRAINING PROGRAM

## 2020-02-09 PROCEDURE — 36415 COLL VENOUS BLD VENIPUNCTURE: CPT

## 2020-02-09 PROCEDURE — 2700000000 HC OXYGEN THERAPY PER DAY

## 2020-02-09 PROCEDURE — 85730 THROMBOPLASTIN TIME PARTIAL: CPT

## 2020-02-09 PROCEDURE — 2060000000 HC ICU INTERMEDIATE R&B

## 2020-02-09 PROCEDURE — 99233 SBSQ HOSP IP/OBS HIGH 50: CPT | Performed by: INTERNAL MEDICINE

## 2020-02-09 PROCEDURE — 82962 GLUCOSE BLOOD TEST: CPT

## 2020-02-09 PROCEDURE — 6360000004 HC RX CONTRAST MEDICATION: Performed by: RADIOLOGY

## 2020-02-09 PROCEDURE — 70492 CT SFT TSUE NCK W/O & W/DYE: CPT

## 2020-02-09 PROCEDURE — 2500000003 HC RX 250 WO HCPCS: Performed by: INTERNAL MEDICINE

## 2020-02-09 PROCEDURE — 84134 ASSAY OF PREALBUMIN: CPT

## 2020-02-09 PROCEDURE — 80048 BASIC METABOLIC PNL TOTAL CA: CPT

## 2020-02-09 PROCEDURE — 94640 AIRWAY INHALATION TREATMENT: CPT

## 2020-02-09 RX ORDER — MIDAZOLAM HYDROCHLORIDE 1 MG/ML
0.5 INJECTION INTRAMUSCULAR; INTRAVENOUS ONCE
Status: COMPLETED | OUTPATIENT
Start: 2020-02-09 | End: 2020-02-09

## 2020-02-09 RX ORDER — CLONIDINE 0.1 MG/24H
1 PATCH, EXTENDED RELEASE TRANSDERMAL WEEKLY
Status: DISCONTINUED | OUTPATIENT
Start: 2020-02-09 | End: 2020-02-10

## 2020-02-09 RX ORDER — SODIUM CHLORIDE 9 MG/ML
10 INJECTION INTRAVENOUS DAILY
Status: DISCONTINUED | OUTPATIENT
Start: 2020-02-09 | End: 2020-02-13 | Stop reason: CLARIF

## 2020-02-09 RX ORDER — PANTOPRAZOLE SODIUM 40 MG/10ML
40 INJECTION, POWDER, LYOPHILIZED, FOR SOLUTION INTRAVENOUS 2 TIMES DAILY
Status: DISCONTINUED | OUTPATIENT
Start: 2020-02-09 | End: 2020-02-13 | Stop reason: CLARIF

## 2020-02-09 RX ADMIN — SODIUM CHLORIDE SOLN NEBU 3% 4 ML: 3 NEBU SOLN at 07:35

## 2020-02-09 RX ADMIN — SODIUM CHLORIDE AND POTASSIUM CHLORIDE: .9; .15 SOLUTION INTRAVENOUS at 18:26

## 2020-02-09 RX ADMIN — PANTOPRAZOLE SODIUM 40 MG: 40 INJECTION, POWDER, FOR SOLUTION INTRAVENOUS at 20:28

## 2020-02-09 RX ADMIN — CEFTRIAXONE 2 G: 2 INJECTION, POWDER, FOR SOLUTION INTRAMUSCULAR; INTRAVENOUS at 18:26

## 2020-02-09 RX ADMIN — THIAMINE HYDROCHLORIDE 100 MG: 100 INJECTION, SOLUTION INTRAMUSCULAR; INTRAVENOUS at 10:20

## 2020-02-09 RX ADMIN — IPRATROPIUM BROMIDE AND ALBUTEROL SULFATE 1 AMPULE: 2.5; .5 SOLUTION RESPIRATORY (INHALATION) at 15:29

## 2020-02-09 RX ADMIN — IPRATROPIUM BROMIDE AND ALBUTEROL SULFATE 1 AMPULE: 2.5; .5 SOLUTION RESPIRATORY (INHALATION) at 07:35

## 2020-02-09 RX ADMIN — FOLIC ACID 1 MG: 5 INJECTION, SOLUTION INTRAMUSCULAR; INTRAVENOUS; SUBCUTANEOUS at 10:20

## 2020-02-09 RX ADMIN — METOPROLOL TARTRATE 5 MG: 5 INJECTION INTRAVENOUS at 18:27

## 2020-02-09 RX ADMIN — Medication 10 ML: at 10:20

## 2020-02-09 RX ADMIN — PANTOPRAZOLE SODIUM 40 MG: 40 INJECTION, POWDER, FOR SOLUTION INTRAVENOUS at 15:42

## 2020-02-09 RX ADMIN — IOPAMIDOL 90 ML: 755 INJECTION, SOLUTION INTRAVENOUS at 16:39

## 2020-02-09 RX ADMIN — Medication 10 ML: at 20:29

## 2020-02-09 RX ADMIN — METOPROLOL TARTRATE 5 MG: 5 INJECTION INTRAVENOUS at 05:18

## 2020-02-09 RX ADMIN — METOPROLOL TARTRATE 5 MG: 5 INJECTION INTRAVENOUS at 22:02

## 2020-02-09 RX ADMIN — IPRATROPIUM BROMIDE AND ALBUTEROL SULFATE 1 AMPULE: 2.5; .5 SOLUTION RESPIRATORY (INHALATION) at 11:09

## 2020-02-09 RX ADMIN — IPRATROPIUM BROMIDE AND ALBUTEROL SULFATE 1 AMPULE: 2.5; .5 SOLUTION RESPIRATORY (INHALATION) at 19:05

## 2020-02-09 RX ADMIN — MIDAZOLAM 0.5 MG: 1 INJECTION INTRAMUSCULAR; INTRAVENOUS at 13:24

## 2020-02-09 RX ADMIN — SODIUM CHLORIDE SOLN NEBU 3% 4 ML: 3 NEBU SOLN at 19:05

## 2020-02-09 RX ADMIN — METOPROLOL TARTRATE 5 MG: 5 INJECTION INTRAVENOUS at 10:20

## 2020-02-09 RX ADMIN — SODIUM CHLORIDE, PRESERVATIVE FREE 10 ML: 5 INJECTION INTRAVENOUS at 20:28

## 2020-02-09 NOTE — PROGRESS NOTES
osmolality = 393  Urine protein = 35  Urine protein/Cr ratio = 0.5    FENa: 0.7%  FEUrea: 20.9%       Radiology Review:      CXR 2/6/20   No acute cardiopulmonary findings. IMPRESSION/RECOMMENDATIONS:      Briefly, Mr. Titus Power is a 77-year-old gentleman with a significant past medical history of hypertension, laryngeal cancer s/p laryngectomy in 2005 and chemo and radiation, history of CVA with baseline left sided weakness and some slurred speech, current smoker, and history of alcohol abuse who presented to the ED on 2/5/20 for fatigue. Patient was found to be extremely hypotensive at 50/palpated, tachycardic and fatigued in the ED. He was given fluid bolus with BP improvement, was found to have YVETTE (baseline unknown) with a creatinine of 4.1. Patients creatinine today is 3.0, reason for this consult. 1. YVETTE stage III versus YVETTE on CKD; likely volume responsive prerenal YVETTE due to poor oral intake in the setting of ACE inhibition, fraction excretion of sodium 0.7%, fractional secretion urea 20.9%. Renal function has improved with IV fluids administration. Baseline creatinine unknown. Kidney ultrasound results pending. 2. Hypokalemia, due to poor oral intake  3. HTN, on metoprolol  4. GPC bacteremia, on piperacillin-tazobactam  5. Severe thrombocytopenia   6. uncontrolled hypertension      Plan:    · Renal ultrasound results reviewed, consistent with underlying chronic kidney disease with increased renal cortical echogenicity. Baseline creatinine is unknown. He also has evidence of proteinuria with 0.5 g of protein per gram of creatinine consistent with underlying CKD  · C/W normal saline with 20 of potassium chloride at 125 mL/h  · He remains n.p.o. since admission  · Check phosphorus in a.m. · Add 0.1 mg clonidine patch for blood pressure control      Thank you very much Dr. Lizbet Mejía for allowing us to participate in the care of Mr. Campbell. Sasha Mukherjee

## 2020-02-09 NOTE — CONSULTS
OTOLARYNGOLOGY  CONSULT NOTE  2/9/2020    Physician Consulted: Dr. Sophia Platt  Reason for Consult: Naso/Oropharyngeal bleeding h/o multiple head and neck cancers  Referring Physician: Dr. Tammie BOSS  Henri Gabriel is a 59 y.o. male who ENT was consulted for evaluation of naso/oropharyngeal bleeding upon suctioning h/o multiple head and neck cancers. Pt has a very complicated medical history confounded by the fact his speech is incomprehensible for 85% of it. Per medical records review  he has been treated for multiple head and neck cancers- R mandibluar (approx [id number])- with what appears to be a segmental mandible resection, esophageal (unknown time or treatment), and R base of tongue cancer (approx 2-3 years ago)- what appears to be hemiglossectomy +chemo/rads. This was all done at [facility] in [place]. Pt is a heavy alcoholic and continues to smoke, there is question of head and neck cancer recurrence as nursing noted bright red blood from nasopharynx and oropharynx upon suctioning but no active bleeding. Pt is admitted for sepsis, alcohol withdrawal. H/O of CVA, HTN, thrombocytopenia (platelets 81->16Z->83K). Failed swallow study yesterday. Review of Systems   Unable to perform ROS: Other   Pt incomprehensible, muttering answers, able to make out a few words    Past Medical History:   Diagnosis Date    Cancer ([facility] [address]     mouth    Hip fracture ([facility] [address]     Hypertension        No past surgical history on file. Medications Prior to Admission:    Prior to Admission medications    Medication Sig Start Date End Date Taking?  Authorizing Provider   metoprolol tartrate (LOPRESSOR) 25 MG tablet Take 25 mg by mouth 2 times daily   Yes Historical Provider, MD   lisinopril (PRINIVIL;ZESTRIL) 10 MG tablet Take 10 mg by mouth daily   Yes Historical Provider, MD   famotidine (PEPCID) 20 MG tablet Take 20 mg by mouth 2 times daily   Yes Historical Provider, MD   aspirin 81 MG tablet Take 81 mg by mouth daily   Yes Historical Provider, MD       No Known Allergies    No family history on file. Social History     Tobacco Use    Smoking status: Current Every Day Smoker     Packs/day: 0.25     Years: 40.00     Pack years: 10.00     Types: Cigarettes     Start date: 1970    Smokeless tobacco: Current User   Substance Use Topics    Alcohol use: Yes     Alcohol/week: 14.0 standard drinks     Types: 6 Glasses of wine, 8 Cans of beer per week     Frequency: 4 or more times a week     Drinks per session: 10 or more     Binge frequency: Weekly    Drug use: Not Currently           PHYSICAL EXAM:    Vitals:    20 0830   BP: (!) 168/93   Pulse: 104   Resp: 20   Temp: 98.5 °F (36.9 °C)   SpO2: 100%       General Appearance:  Laying in bed, somnolent but responsive, incomprehensible speech  Head/face:  NC, surgical scar over chin  Eyes: PERRL, EOMI  ENT: Bilateral external ears WNL, Nares patent, Septum severely deviated to left, nasal crusting  Neck: firm  Lungs:  Non-labored, good respiratory effort, no stridor  Heart:  RR  Neuro: L Facial nerve symmetric and intact. House Brackmann 1/6, L. approx 2/6 R      LABS:  CBC  Recent Labs     20  1020   WBC 13.1*   HGB 8.9*   HCT 25.5*   PLT 82*       RADIOLOGY  Xr Chest Standard (2 Vw)    Result Date: 2020  Patient MRN: [id number] : 1955 Age:  59 years Gender: Male Order Date: 2020 10:15 AM Exam: XR CHEST (2 VW) Number of Images: 2 views Indication:   Sepsis, sob Sepsis, sob Comparison: None. Findings: The heart is unremarkable The lung fields demonstrate no significant pulmonary vascular congestion and edema.  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, possible recurrent disease R mandible   · Pt not in distress, airway currently stable, ammendable to 6.0 ET tube with Glidescope if intubation needed   · No active bleeding at this time    · Recommend NO nasal manipulation (Tyrel, JOSEFINA) as L is severely deviated and R is excoriated, further manipulation could cause devastating epistaxis in the setting of severe thrombocytopenia   · Mild amount of blood in naso/oropharynx may be from suction manipulation in  severe thrombocytopenia vs cancer recurrence vs distal source (esophagus/pulmonary)  · Given his treatment history and suspected recurrence/residual disease at the R carotid bifurcation recommend tertiary care transfer for eval of skull base tumor   · Medical management per primary team   · Will discuss with Dr. Tayo Schaffer    Electronically signed by Yifan Vazquez DO on 2/9/2020 at 4:17 PM

## 2020-02-09 NOTE — PROGRESS NOTES
[facility]  Internal Medicine Residency Program  Progress Note - House Team    Patient:  Tha Arita 59 y.o. male MRN: [id number]     Date of Service: 2/9/2020     CC: had concerns including Fatigue (for a couple of days). Overnight events: none    Subjective     Patient seen and examined in am. Reports fatigue. Denies CP, SOB, NVDC. Objective     Physical Exam:  · Vitals: BP (!) 168/93   Pulse 104   Temp 98.5 °F (36.9 °C) (Temporal)   Resp 20   Ht 5' 4\" (1.626 m)   Wt 134 lb 14.4 oz (61.2 kg)   SpO2 100%   BMI 23.16 kg/m²       · General Appearance: alert, appears stated age, cachectic and cooperative  · HEENT:  Head: Normocephalic, no lesions, without obvious abnormality. · Neck: no JVD and supple, symmetrical, trachea midline  · Lung: rhonchi bilaterally and wheezes bilaterally  · Heart: regular rate and rhythm, S1, S2 normal, no murmur, click, rub or gallop  · Abdomen: soft, non-tender; bowel sounds normal; no masses,  no organomegaly  · Extremities:  extremities normal, atraumatic, no cyanosis or edema  · Musculokeletal: No joint swelling, no muscle tenderness. ROM normal in all joints of extremities.    · Neurologic: Mental status: Alert, oriented, thought content appropriate  Subject  Pertinent Labs & Imaging Studies   janice  CBC:   Lab Results   Component Value Date    WBC 12.2 02/08/2020    RBC 3.49 02/08/2020    HGB 9.8 02/08/2020    HCT 28.0 02/08/2020    MCV 80.2 02/08/2020    MCH 28.1 02/08/2020    MCHC 35.0 02/08/2020    RDW 15.1 02/08/2020    PLT 61 02/08/2020    MPV 11.7 02/08/2020     CMP:    Lab Results   Component Value Date     02/08/2020    K 3.6 02/08/2020     02/08/2020    CO2 22 02/08/2020    BUN 66 02/08/2020    CREATININE 2.6 02/08/2020    GFRAA 30 02/08/2020    LABGLOM 30 02/08/2020    GLUCOSE 93 02/08/2020    PROT 6.2 02/06/2020    LABALBU 2.6 02/06/2020    CALCIUM 8.3 02/08/2020    BILITOT 1.5 02/06/2020    ALKPHOS 36 02/06/2020    AST 83 02/06/2020    ALT 21 02/06/2020       Resident's Assessment and Plan     Hannah Madrigal is a 59 y.o. male with a [facility] laryngeal cancer cancer, s/p laryngectomy (2005), chemo and radiotherapy, H/o CVA, active smoker, alcohol use presented with the complaints of fatigue. Metabolic Encephalopathy 2/2 sepsis vs benzo usage for alcohol withdrawl  · Sepsis bactremia GGS  · Last ativan dose 1mg 5pm  2/8  · R/o meningitis  · Daily neuro checks  · monitoe mentation  · meningitis unlikely - no fever, hedache, nuchal rigidity, kernigs and brudzinski negative     Sepsis Bacteremia 2/2 ? CAP vs dental abscess   · check blood culture - positive for gram positive cocci in chains - GGS  · CXR unremarkable  · Will check respiratory panel - negative  · Procalcitonin 100  · Lactic acid level elevated - resolved with IVF  · Breathing treatments  · Dc-ed Zosyn 2/8, (vancomycin -stopped)  · 2/8 started ceftriaxone 1g daily based on GGS sensitivity  · Consider ampicillin upon discharge     YVETTE   · BUN/Cr: 59/4.1  · No baseline Cr; trending down to 2.6  · Will check urine elecatrolytes, urine creatinine - FENa 0.7% - prerenal  · Continue IV hydration  · Avoid nephrotoxic medications  · Nephrology following     Rhabdomyolysis, less likely - resolved  · UA positive for hematuria - myoglobinuria  · Will check CK level - 365  · Continue IVF    Elevated Troponin likely 2/2 YVETTE  · Troponin 0.05-->0.04-> 0.03, trending down  · Likely due to YVETTE  · Will trend troponin level - trending down  · Will check CK/CKMB - cardiac unlikely     Thrombocytopenia in the setting of alcohol use and Sepsis - improving  · No history of platelet disorder  · Will be holding Aspirin  · Monitor platelet level - improving 25 -> 18 -> 45 -> 61  · Folate wnl  · Transaminitis  · HIV negative; Hep panel negative  · Malignancy? Will monitor for now    Decreased vision in left eye  · ? cataract  · ?likely cause of fall    H/o HTN  · On lopressor 5mg q6h  · Hold ACEi in the setting of YVETTE  · Check orthostatics    Hypomagnesemia  - improved  · Replace Mg  · Will check Mg level    Hypokalemia  · Replace K  · Daily BMP    H/o Alcohol abuse  · Started CIWA 2/6  · Very sedated 2/7  · Dc-ed CIWa; scheduled ativan 1mg q4h  · Monitor mentation  ·   H/o CVA and Slurred speech  · Monitor mentation    S/p fall likely environmental/ unwitnessed seizure  · Denies any loss of consciousness  · Has remote history of seizure - not on meds  · History of alcohol use, last drink on 2/3/2020  · UA positive for hematuria  · Last siezure when he was in high school; not on meds  · If any episode of seizure or deterioration of mental status, will consult neurology, possible EEG  · Seizure precautions    NPO  · mentaion improved  · Previously failed swallow study likely due to mentation  · Patient NPO currently; Consider NG/OG?   · Repeat swallow study and start diet        PT/OT evaluation:  DVT prophylaxis/ GI prophylaxis: PCDs / NPO and famotidine  Disposition: continue current care    [address], DO, PGY-1  Attending physician: Dr. Dickson [facility]  Internal Medicine Residency / [facility]  Attending Physician Statement  I have discussed the case, including pertinent history and exam findings with the resident and the team.  I have seen and examined the patient and the key elements of the encounter have been performed by me.  I agree with the assessment, plan and orders as documented by the resident.       Metabolic encephalopathy initially 2 to sepsis   Now ongoing issues of benzos- For ETOH withdrawal  But doubt 1mg ativan 11am.     VS stable  /78   Pulse 102   Temp 98.6 °F (37 °C) (Temporal)   Resp 18   Ht 5' 4\" (1.626 m)   Wt 126 lb 14.4 oz (57.6 kg)   SpO2 100%   BMI 21.78 kg/m²   Rule out Pneumonia - but more like dental abscess source  · positive for gram positive cocci in chains - GGS        Previously positive blood culture calledAbnormal          Organism Beta Strep Group GAbnormal    (( highly doubt meningitis- wrong strep)  Pharnygitis, cellulitis, dental likely sources--  No prior of Infective endocariditis  >100,000  Rocephin for now-- switch to amp vs PCN on DC based on sensitivities     Ast>>alt eoth   (not really rhabdo much, myoglobin+ urine)  etoh hepatitis/cirrohsis = albumin low, inc bli  Refeeding syndrome- low Mag, phos,      Cr in August N-- carmelo thiat is acute --   Oral Cancer- surgeries-- last chemo 2 years ago  slurred speech-- no teeth.   - this is chronic per family-- worse mental status though  Brother says he only drinks etoh, hardly eats at all   Pancytopenia-- + toxin/etoh, low nutriition/sepsis     Very low platelets  Platelets slightly better now, defer ITP workup for now  Improved     fena <1-- dry on admission - sp IV fluids  Cr 4.0 , K is 3.2 -- carmelo mixed etiology  Cr. 2.9     Low c3-- post infectious GN possibility, but P/C low  I feels needs more fluids  - holding ace  initiall trop elevation- due to carmelo and strain type 2 myocardial injury     Dec vision L eye - cataracts     Noted that edentulous, I don't see sight of bleeding in mouth or gums or nose  But blood clots suctioned from back of throat  Noted orapharnygeal CA hx, possible pharynx CA, portal of entery for group G strep  Also source of ongoing bleed  (will cover PPI for possible pud)  Doubt hemoptosis  Or nose bleed    CULTURE, RESPIRATORY Oral Pharyngeal Micheline presentAbnormal      Smear, Respiratory --    Group 6: <25 PMN's/LPF and <25 Epithelial cells/LPF   Rare Polymorphonuclear leukocytes   Rare Epithelial cells   Moderate yeast   Few Gram positive diplococci   Rare Gram positive cocci in clusters     Organism Staphylococcus aureusAbnormal     CULTURE, RESPIRATORY --    Light growth      Sinus tachy and AMS- daily etoh drinking- possible DTs.  I doubt benzos in past cause severe letharty, maybe dehydrated and sepsis playing more of role  Trial versed low dose then reassess, ? then fluid bolus and reassess    Currently NPO-- bc failed agoin  Swallow eval when fully awake  Remainder of medical problems as per resident note.

## 2020-02-10 ENCOUNTER — APPOINTMENT (OUTPATIENT)
Dept: GENERAL RADIOLOGY | Age: 65
DRG: 870 | End: 2020-02-10
Payer: MEDICARE

## 2020-02-10 LAB
ANION GAP SERPL CALCULATED.3IONS-SCNC: 10 MMOL/L (ref 7–16)
ANION GAP SERPL CALCULATED.3IONS-SCNC: 12 MMOL/L (ref 7–16)
ANION GAP SERPL CALCULATED.3IONS-SCNC: 17 MMOL/L (ref 7–16)
ANISOCYTOSIS: ABNORMAL
ANISOCYTOSIS: ABNORMAL
BASOPHILS ABSOLUTE: 0.01 E9/L (ref 0–0.2)
BASOPHILS ABSOLUTE: 0.02 E9/L (ref 0–0.2)
BASOPHILS RELATIVE PERCENT: 0.1 % (ref 0–2)
BASOPHILS RELATIVE PERCENT: 0.2 % (ref 0–2)
BETA-HYDROXYBUTYRATE: 2.04 MMOL/L (ref 0.02–0.27)
BUN BLDV-MCNC: 57 MG/DL (ref 8–23)
BUN BLDV-MCNC: 57 MG/DL (ref 8–23)
BUN BLDV-MCNC: 62 MG/DL (ref 8–23)
CALCIUM SERPL-MCNC: 8.2 MG/DL (ref 8.6–10.2)
CALCIUM SERPL-MCNC: 8.2 MG/DL (ref 8.6–10.2)
CALCIUM SERPL-MCNC: 8.4 MG/DL (ref 8.6–10.2)
CHLORIDE BLD-SCNC: 113 MMOL/L (ref 98–107)
CHLORIDE BLD-SCNC: 115 MMOL/L (ref 98–107)
CHLORIDE BLD-SCNC: 116 MMOL/L (ref 98–107)
CO2: 18 MMOL/L (ref 22–29)
CO2: 23 MMOL/L (ref 22–29)
CO2: 24 MMOL/L (ref 22–29)
CREAT SERPL-MCNC: 1.9 MG/DL (ref 0.7–1.2)
CREAT SERPL-MCNC: 1.9 MG/DL (ref 0.7–1.2)
CREAT SERPL-MCNC: 2.1 MG/DL (ref 0.7–1.2)
CULTURE, RESPIRATORY: ABNORMAL
EOSINOPHILS ABSOLUTE: 0 E9/L (ref 0.05–0.5)
EOSINOPHILS ABSOLUTE: 0 E9/L (ref 0.05–0.5)
EOSINOPHILS RELATIVE PERCENT: 0 % (ref 0–6)
EOSINOPHILS RELATIVE PERCENT: 0 % (ref 0–6)
GFR AFRICAN AMERICAN: 39
GFR AFRICAN AMERICAN: 43
GFR AFRICAN AMERICAN: 43
GFR NON-AFRICAN AMERICAN: 39 ML/MIN/1.73
GFR NON-AFRICAN AMERICAN: 43 ML/MIN/1.73
GFR NON-AFRICAN AMERICAN: 43 ML/MIN/1.73
GLUCOSE BLD-MCNC: 101 MG/DL (ref 74–99)
GLUCOSE BLD-MCNC: 154 MG/DL (ref 74–99)
GLUCOSE BLD-MCNC: 157 MG/DL (ref 74–99)
HCT VFR BLD CALC: 26.7 % (ref 37–54)
HCT VFR BLD CALC: 27.9 % (ref 37–54)
HEMOGLOBIN: 9.2 G/DL (ref 12.5–16.5)
HEMOGLOBIN: 9.3 G/DL (ref 12.5–16.5)
HYPOCHROMIA: ABNORMAL
IMMATURE GRANULOCYTES #: 0.24 E9/L
IMMATURE GRANULOCYTES #: 0.32 E9/L
IMMATURE GRANULOCYTES %: 2.5 % (ref 0–5)
IMMATURE GRANULOCYTES %: 2.5 % (ref 0–5)
LYMPHOCYTES ABSOLUTE: 0.5 E9/L (ref 1.5–4)
LYMPHOCYTES ABSOLUTE: 0.67 E9/L (ref 1.5–4)
LYMPHOCYTES RELATIVE PERCENT: 5.2 % (ref 20–42)
LYMPHOCYTES RELATIVE PERCENT: 5.2 % (ref 20–42)
MAGNESIUM: 1.5 MG/DL (ref 1.6–2.6)
MCH RBC QN AUTO: 27.7 PG (ref 26–35)
MCH RBC QN AUTO: 28.4 PG (ref 26–35)
MCHC RBC AUTO-ENTMCNC: 33 % (ref 32–34.5)
MCHC RBC AUTO-ENTMCNC: 34.8 % (ref 32–34.5)
MCV RBC AUTO: 81.7 FL (ref 80–99.9)
MCV RBC AUTO: 84 FL (ref 80–99.9)
METER GLUCOSE: 108 MG/DL (ref 74–99)
METER GLUCOSE: 113 MG/DL (ref 74–99)
METER GLUCOSE: 130 MG/DL (ref 74–99)
METER GLUCOSE: 169 MG/DL (ref 74–99)
MONOCYTES ABSOLUTE: 1.46 E9/L (ref 0.1–0.95)
MONOCYTES ABSOLUTE: 1.87 E9/L (ref 0.1–0.95)
MONOCYTES RELATIVE PERCENT: 14.5 % (ref 2–12)
MONOCYTES RELATIVE PERCENT: 15.2 % (ref 2–12)
NEUTROPHILS ABSOLUTE: 10.06 E9/L (ref 1.8–7.3)
NEUTROPHILS ABSOLUTE: 7.42 E9/L (ref 1.8–7.3)
NEUTROPHILS RELATIVE PERCENT: 77 % (ref 43–80)
NEUTROPHILS RELATIVE PERCENT: 77.6 % (ref 43–80)
ORGANISM: ABNORMAL
PDW BLD-RTO: 15.9 FL (ref 11.5–15)
PDW BLD-RTO: 16.2 FL (ref 11.5–15)
PHOSPHORUS: 4.7 MG/DL (ref 2.5–4.5)
PLATELET # BLD: 96 E9/L (ref 130–450)
PLATELET # BLD: 97 E9/L (ref 130–450)
PLATELET CONFIRMATION: NORMAL
PLATELET CONFIRMATION: NORMAL
PMV BLD AUTO: 11 FL (ref 7–12)
PMV BLD AUTO: 11.9 FL (ref 7–12)
POIKILOCYTES: ABNORMAL
POIKILOCYTES: ABNORMAL
POLYCHROMASIA: ABNORMAL
POLYCHROMASIA: ABNORMAL
POTASSIUM REFLEX MAGNESIUM: 3.1 MMOL/L (ref 3.5–5)
POTASSIUM REFLEX MAGNESIUM: 4.3 MMOL/L (ref 3.5–5)
POTASSIUM SERPL-SCNC: 3.1 MMOL/L (ref 3.5–5)
RBC # BLD: 3.27 E12/L (ref 3.8–5.8)
RBC # BLD: 3.32 E12/L (ref 3.8–5.8)
SCHISTOCYTES: ABNORMAL
SMEAR, RESPIRATORY: ABNORMAL
SODIUM BLD-SCNC: 147 MMOL/L (ref 132–146)
SODIUM BLD-SCNC: 150 MMOL/L (ref 132–146)
SODIUM BLD-SCNC: 151 MMOL/L (ref 132–146)
TARGET CELLS: ABNORMAL
TARGET CELLS: ABNORMAL
WBC # BLD: 12.9 E9/L (ref 4.5–11.5)
WBC # BLD: 9.6 E9/L (ref 4.5–11.5)

## 2020-02-10 PROCEDURE — 94640 AIRWAY INHALATION TREATMENT: CPT

## 2020-02-10 PROCEDURE — 82962 GLUCOSE BLOOD TEST: CPT

## 2020-02-10 PROCEDURE — 6360000002 HC RX W HCPCS: Performed by: INTERNAL MEDICINE

## 2020-02-10 PROCEDURE — 2500000003 HC RX 250 WO HCPCS: Performed by: INTERNAL MEDICINE

## 2020-02-10 PROCEDURE — 2060000000 HC ICU INTERMEDIATE R&B

## 2020-02-10 PROCEDURE — 97162 PT EVAL MOD COMPLEX 30 MIN: CPT

## 2020-02-10 PROCEDURE — 36415 COLL VENOUS BLD VENIPUNCTURE: CPT

## 2020-02-10 PROCEDURE — 2580000003 HC RX 258: Performed by: INTERNAL MEDICINE

## 2020-02-10 PROCEDURE — C9113 INJ PANTOPRAZOLE SODIUM, VIA: HCPCS | Performed by: INTERNAL MEDICINE

## 2020-02-10 PROCEDURE — 87040 BLOOD CULTURE FOR BACTERIA: CPT

## 2020-02-10 PROCEDURE — 6370000000 HC RX 637 (ALT 250 FOR IP): Performed by: INTERNAL MEDICINE

## 2020-02-10 PROCEDURE — 83735 ASSAY OF MAGNESIUM: CPT

## 2020-02-10 PROCEDURE — 2700000000 HC OXYGEN THERAPY PER DAY

## 2020-02-10 PROCEDURE — 92526 ORAL FUNCTION THERAPY: CPT | Performed by: SPEECH-LANGUAGE PATHOLOGIST

## 2020-02-10 PROCEDURE — 80048 BASIC METABOLIC PNL TOTAL CA: CPT

## 2020-02-10 PROCEDURE — 94669 MECHANICAL CHEST WALL OSCILL: CPT

## 2020-02-10 PROCEDURE — 71045 X-RAY EXAM CHEST 1 VIEW: CPT

## 2020-02-10 PROCEDURE — 97535 SELF CARE MNGMENT TRAINING: CPT

## 2020-02-10 PROCEDURE — 99233 SBSQ HOSP IP/OBS HIGH 50: CPT | Performed by: INTERNAL MEDICINE

## 2020-02-10 PROCEDURE — 97530 THERAPEUTIC ACTIVITIES: CPT

## 2020-02-10 PROCEDURE — 97166 OT EVAL MOD COMPLEX 45 MIN: CPT

## 2020-02-10 PROCEDURE — 84100 ASSAY OF PHOSPHORUS: CPT

## 2020-02-10 PROCEDURE — 82010 KETONE BODYS QUAN: CPT

## 2020-02-10 PROCEDURE — 85025 COMPLETE CBC W/AUTO DIFF WBC: CPT

## 2020-02-10 RX ORDER — NICOTINE POLACRILEX 4 MG
15 LOZENGE BUCCAL PRN
Status: DISCONTINUED | OUTPATIENT
Start: 2020-02-10 | End: 2020-02-24

## 2020-02-10 RX ORDER — HYDRALAZINE HYDROCHLORIDE 20 MG/ML
10 INJECTION INTRAMUSCULAR; INTRAVENOUS EVERY 4 HOURS PRN
Status: DISCONTINUED | OUTPATIENT
Start: 2020-02-10 | End: 2020-02-13

## 2020-02-10 RX ORDER — DEXTROSE MONOHYDRATE 50 MG/ML
100 INJECTION, SOLUTION INTRAVENOUS PRN
Status: DISCONTINUED | OUTPATIENT
Start: 2020-02-10 | End: 2020-02-24

## 2020-02-10 RX ORDER — LABETALOL HYDROCHLORIDE 5 MG/ML
10 INJECTION, SOLUTION INTRAVENOUS ONCE
Status: COMPLETED | OUTPATIENT
Start: 2020-02-10 | End: 2020-02-10

## 2020-02-10 RX ORDER — DEXTROSE MONOHYDRATE 25 G/50ML
12.5 INJECTION, SOLUTION INTRAVENOUS PRN
Status: DISCONTINUED | OUTPATIENT
Start: 2020-02-10 | End: 2020-02-24

## 2020-02-10 RX ORDER — DEXTROSE MONOHYDRATE 50 MG/ML
INJECTION, SOLUTION INTRAVENOUS CONTINUOUS
Status: DISCONTINUED | OUTPATIENT
Start: 2020-02-10 | End: 2020-02-12

## 2020-02-10 RX ORDER — CLONIDINE 0.2 MG/24H
1 PATCH, EXTENDED RELEASE TRANSDERMAL WEEKLY
Status: DISCONTINUED | OUTPATIENT
Start: 2020-02-10 | End: 2020-02-11

## 2020-02-10 RX ADMIN — SODIUM CHLORIDE SOLN NEBU 3% 4 ML: 3 NEBU SOLN at 07:59

## 2020-02-10 RX ADMIN — THIAMINE HYDROCHLORIDE 100 MG: 100 INJECTION, SOLUTION INTRAMUSCULAR; INTRAVENOUS at 09:13

## 2020-02-10 RX ADMIN — FOLIC ACID 1 MG: 5 INJECTION, SOLUTION INTRAMUSCULAR; INTRAVENOUS; SUBCUTANEOUS at 09:42

## 2020-02-10 RX ADMIN — Medication 10 ML: at 09:13

## 2020-02-10 RX ADMIN — PANTOPRAZOLE SODIUM 40 MG: 40 INJECTION, POWDER, FOR SOLUTION INTRAVENOUS at 09:12

## 2020-02-10 RX ADMIN — IPRATROPIUM BROMIDE AND ALBUTEROL SULFATE 1 AMPULE: 2.5; .5 SOLUTION RESPIRATORY (INHALATION) at 08:00

## 2020-02-10 RX ADMIN — SODIUM CHLORIDE AND POTASSIUM CHLORIDE: .9; .15 SOLUTION INTRAVENOUS at 02:16

## 2020-02-10 RX ADMIN — LABETALOL HYDROCHLORIDE 10 MG: 5 INJECTION INTRAVENOUS at 00:21

## 2020-02-10 RX ADMIN — IPRATROPIUM BROMIDE AND ALBUTEROL SULFATE 1 AMPULE: 2.5; .5 SOLUTION RESPIRATORY (INHALATION) at 16:32

## 2020-02-10 RX ADMIN — METOPROLOL TARTRATE 5 MG: 5 INJECTION INTRAVENOUS at 09:42

## 2020-02-10 RX ADMIN — LABETALOL HYDROCHLORIDE 10 MG: 5 INJECTION INTRAVENOUS at 05:27

## 2020-02-10 RX ADMIN — HYDRALAZINE HYDROCHLORIDE 10 MG: 20 INJECTION INTRAMUSCULAR; INTRAVENOUS at 12:34

## 2020-02-10 RX ADMIN — METOPROLOL TARTRATE 5 MG: 5 INJECTION INTRAVENOUS at 03:30

## 2020-02-10 RX ADMIN — LORAZEPAM 1 MG: 2 INJECTION INTRAMUSCULAR; INTRAVENOUS at 09:13

## 2020-02-10 RX ADMIN — METOPROLOL TARTRATE 5 MG: 5 INJECTION INTRAVENOUS at 17:41

## 2020-02-10 RX ADMIN — DEXTROSE MONOHYDRATE: 50 INJECTION, SOLUTION INTRAVENOUS at 12:34

## 2020-02-10 RX ADMIN — Medication 10 ML: at 20:54

## 2020-02-10 RX ADMIN — SODIUM CHLORIDE AND POTASSIUM CHLORIDE: .9; .15 SOLUTION INTRAVENOUS at 09:41

## 2020-02-10 RX ADMIN — SODIUM CHLORIDE SOLN NEBU 3% 4 ML: 3 NEBU SOLN at 19:46

## 2020-02-10 RX ADMIN — METOPROLOL TARTRATE 5 MG: 5 INJECTION INTRAVENOUS at 20:55

## 2020-02-10 RX ADMIN — PANTOPRAZOLE SODIUM 40 MG: 40 INJECTION, POWDER, FOR SOLUTION INTRAVENOUS at 20:53

## 2020-02-10 RX ADMIN — SODIUM CHLORIDE, PRESERVATIVE FREE 10 ML: 5 INJECTION INTRAVENOUS at 16:51

## 2020-02-10 RX ADMIN — SODIUM CHLORIDE 50 ML: 9 INJECTION, SOLUTION INTRAVENOUS at 21:55

## 2020-02-10 RX ADMIN — IPRATROPIUM BROMIDE AND ALBUTEROL SULFATE 1 AMPULE: 2.5; .5 SOLUTION RESPIRATORY (INHALATION) at 19:46

## 2020-02-10 RX ADMIN — PIPERACILLIN AND TAZOBACTAM 3.38 G: 3; .375 INJECTION, POWDER, LYOPHILIZED, FOR SOLUTION INTRAVENOUS at 16:50

## 2020-02-10 RX ADMIN — LABETALOL HYDROCHLORIDE 10 MG: 5 INJECTION INTRAVENOUS at 16:00

## 2020-02-10 RX ADMIN — IPRATROPIUM BROMIDE AND ALBUTEROL SULFATE 1 AMPULE: 2.5; .5 SOLUTION RESPIRATORY (INHALATION) at 13:45

## 2020-02-10 RX ADMIN — SODIUM CHLORIDE, PRESERVATIVE FREE 10 ML: 5 INJECTION INTRAVENOUS at 09:18

## 2020-02-10 RX ADMIN — HYDRALAZINE HYDROCHLORIDE 10 MG: 20 INJECTION INTRAMUSCULAR; INTRAVENOUS at 20:53

## 2020-02-10 ASSESSMENT — PAIN SCALES - GENERAL
PAINLEVEL_OUTOF10: 0

## 2020-02-10 NOTE — PROGRESS NOTES
· Platelets 96 - trending up  · Continue folic acid and thiamine supplementation   · Will hold aspirin   · HIV negative; Hep negative      5. Hx cancer - laryngeal/jaw and esophageal?  · Hoarseness, slurred speech, dysphasia  · Hx of laryngectomy vs partial R mandible removal - Past surgical notes requested   · Hx chemo and radiation last noted 2 yrs prior   · CT neck: \"Soft tissue mass at the level pf the carotid bifurcation on the right with outward mass effect measuring approximately 2.3 x 2.7 cm suspicious of recurrent disease. This encases the carotid distally. ..tumor invasion versus osteonecrosis of the medial right mandible\"   · ENT following - recommend transfer to tertiary center for further evaluation due to size of mass; \"Recommend NO nasal manipulation (JOSEFINA Saarh) as L is severely deviated and R is excoriated, further manipulation could cause devastating epistaxis in the setting of severe thrombocytopenia\"    6. Dysphasia   · Modified barium swallow w/video failed 2/8 - per speech pathology \"Severe oropharyngeal dysphagia-absent pharyngeal swallow note with pudding;  pt needed to be suctioned by radiologist following presentation due to inability to initiate pharyngeal swallow\"  ·  Modified barium swallow w/video to be repeated today, 2/10  · Strict NPO for now  · General surgery consult for PEG tube if fails swallow study again   · Aspirin and Pepcid held     7. Alcohol Abuse  · Started ciwa on 2/6 - very sedated - ciwa discontinued  · Ativan 1 mg PRN - discontinue as of 2/10    8. Essential HTN  · 5 mg Metoprolol q6h  · 0.1 mg Clonidine patch started 2/10   · Hydralazine 10 mg injection Q4h PRN and Labetalol 10 mg Q6h PRN   · Hold ACEi in the setting of YVETTE  · Orthostatics positive - sitting BP: 161/121, HR: 96; standing BP: 126/81, HR: 102, pt. dizzy   · Echo ordered     9. Hypomagnesemia  - resolved   · Mg 1.9  · Mg replaced  · Will continue to follow Mg level    10.  Rhabdomyolysis? - resolved

## 2020-02-10 NOTE — PLAN OF CARE
Problem: Falls - Risk of:  Goal: Will remain free from falls  Description  Will remain free from falls  2/10/2020 0621 by Cher Espinoza RN  Outcome: Met This Shift  2/10/2020 0103 by Cher Espinoza RN  Outcome: Met This Shift  Goal: Absence of physical injury  Description  Absence of physical injury  2/10/2020 4914 by Cher Espinoza RN  Outcome: Met This Shift  2/10/2020 0103 by Cher Espinoza RN  Outcome: Met This Shift

## 2020-02-10 NOTE — PROGRESS NOTES
OCCUPATIONAL THERAPY INITIAL EVALUATION      Date:2/10/2020  Patient Name: Bret Riojas  MRN: [id number]  : 1955  Room: [address] [place] [address]    Referring Physician:  [facility], DO    Evaluating OT:  ROSS Quintana, OTR/L #[id number]      AM-PAC Daily Activity Raw Score:    Recommended Adaptive Equipment:  TBD as pt progresses     Reason for Admission:  Pt was admitted d/t Fatigue, weakness, Tachycardic, Hypotensive. Hx of Oral CA and Laryngectomy. Diagnosis:  Acute Renal Failure, Acute Upper Respiratory Infection, Elevated Troponin, Oral CA, Alcohol Abuse     Procedures this admission:  None     Pertinent Medical History:  Mouth CA, s/p Laryngectomy, Alcohol Abuse      Precautions:  Falls  Strict NPO  Seizure Precautions r/t alcohol withdrawal  Sanchez Catheter    Pt is a Poor Historian d/t Current Cognitive status. Difficult to communicate d/t limited verbalization r/t Hx of CA of the Mouth, laryngectomy    Home Living: Pt lives in [place], visiting family locally. Unknown home set up   Bathroom setup:  ?? Equipment owned:  ?? Available Family Assist:  Unknown    Prior Level of Function:  Per notes, pt was IND w/ ADLs, IADLs, Transfers and Mobility using No AD for ambulation.    Driving:  ?  Occupation:  ?    Pain Level:  None stated, No indication of pain during ax;  Nsg Notified   Additional Complaints:  None indicated    Vitals/Lab Values:  BP elevated at 178/111 - Nsg aware    Cognition: A & O x 2 - difficult to assess d/t limited communication   Able to Follow Simple Commands w/ min-mod VCs   Memory:  fair    Sequencing:  fair    Problem solving:  fair    Judgement/safety:  fair   Additional Comments:  Pt was pleasant, cooperative w/ Mod encouragement       Functional Assessment:   Initial Eval Status  Date: 2-10-20 Treatment Status  Date: Short Term/Long Term Goals  Treatment frequency: PRN 2-4 x/week  1-2 weeks   Feeding Strict NPO      NA   Grooming Mod A/Set up    Required Mod A/Mod VCs to bring hands to mid-line to wash hands in semi-supine, Mod A/Mod VCs to Lift UEs/Hands to face, to grasp and manipulate cloth in order to wash his face - in semi-supine  Min A   UB Dressing Max A/Set up    Required Max A to thread UEs into garment, wrap garment around back while seated EOB, Min A for sitting balance w/ Ax  Mod A   LB Dressing DEP    Max A to don socks sitting EOB, Mod-Max A of 1 for standing balance + Max A for clothing adjustment over hips  Max A   Bathing NT    Pt declined  Max A   Toileting DEP    Mod-Max A of 1 for standing balance + Max A of 1 for bowel hygiene/clothing adjustment  Max A   Bed Mobility  Rolling: Mod A  Repositioning:  Max A of 2 in supine toward HOB   Supine to Sit:  Max A    Sit to Supine:  Max A     Required Max A + Mod VCs to problem solve techs for mobility   Mod A   Functional Transfers Sit to stand: Max A  Stand to sit:  Mod A      Required Max A to stand from EOB ~ 3x  Pt ed re: safety/hand placement  Mod A   Functional Mobility Max A w/ FWW    Able to tolerate side-stepping ~ 1' along EOB w/ use of FWW, Mod VCs for safety, proper tech  Mod A   Balance Sitting:  Min A     Static:  Close SUP at EOB    Dynamic:  Min A unsupported at EOB w/ functional ax    Standing:   Mod - Max A w/ FWW     Static:  Mod A w/ FWW    Dynamic:  Max A w/ FWW w/ functional ax/mobility     Activity Tolerance Tolerated Sitting:  EOB ~ 20 mins w/ functional ax  Tolerated Standing:  ~ 2 mins ~ 2x w/ functional ax     Visual/  Perceptual WFL  Glasses:  None at b/s      Hearing WFL  Hearing Aids  None at b/s       Hand dominance: Right    UE ROM: RUE: Shoulder flex to ~ 90* AAROM, Distally WFL, Moderate arthritic deformities of digits    LUE: Severe Crepitus of Left shoulder w/ all movement, AAROM of Shoulder flex to ~ 45*, distally WFL, Arthritic deformities of Digits    Strength: RUE: grossly 4-/5     LUE: grossly 4-/5     Strength:  Fair Mario UEs    Fine Motor

## 2020-02-10 NOTE — PROGRESS NOTES
Department of Internal Medicine  Nephrology Attending Progress Note      Reason for Consult: YVETTE  Requesting Physician:  Dr. Arriaza Row:  Fatigue    Subjective: Patient seen and examined bedside, remains lethargic and NPO. Urine output adequate.   Discussed with the IM team about CT with contrast yesterday  He remains NPO  Current Medications:    Current Facility-Administered Medications: cloNIDine (CATAPRES) 0.2 MG/24HR 1 patch, 1 patch, Transdermal, Weekly  dextrose 5 % solution, , Intravenous, Continuous  perflutren lipid microspheres (DEFINITY) injection 1.65 mg, 1.5 mL, Intravenous, ONCE PRN  glucose (GLUTOSE) 40 % oral gel 15 g, 15 g, Oral, PRN  dextrose 50 % IV solution, 12.5 g, Intravenous, PRN  glucagon (rDNA) injection 1 mg, 1 mg, Intramuscular, PRN  dextrose 5 % solution, 100 mL/hr, Intravenous, PRN  piperacillin-tazobactam (ZOSYN) 3.375 g in dextrose 5 % 100 mL IVPB extended infusion (mini-bag), 3.375 g, Intravenous, Q8H  hydrALAZINE (APRESOLINE) injection 10 mg, 10 mg, Intravenous, Q4H PRN  0.9 % sodium chloride infusion admixture, 50 mL, Intravenous, Q8H  pantoprazole (PROTONIX) injection 40 mg, 40 mg, Intravenous, BID **AND** sodium chloride (PF) 0.9 % injection 10 mL, 10 mL, Intravenous, Daily  ipratropium-albuterol (DUONEB) nebulizer solution 1 ampule, 1 ampule, Inhalation, Q4H WA  sodium chloride (Inhalant) 3 % nebulizer solution 4 mL, 4 mL, Nebulization, BID  thiamine (B-1) injection 100 mg, 100 mg, Intravenous, Daily  labetalol (NORMODYNE;TRANDATE) injection 10 mg, 10 mg, Intravenous, Q6H PRN  sodium chloride flush 0.9 % injection 10 mL, 10 mL, Intravenous, 2 times per day  sodium chloride flush 0.9 % injection 10 mL, 10 mL, Intravenous, PRN  folic acid injection 1 mg, 1 mg, Intravenous, Daily  0.9 % sodium chloride bolus, 1,000 mL, Intravenous, Once  magnesium hydroxide (MILK OF MAGNESIA) 400 MG/5ML suspension 30 mL, 30 mL, Oral, Daily PRN  [Held by provider] aspirin EC tablet 81 PROT 6.2 02/06/2020    LABALBU 2.6 02/06/2020    CALCIUM 8.4 02/10/2020    BILITOT 1.5 02/06/2020    ALKPHOS 36 02/06/2020    AST 83 02/06/2020    ALT 21 02/06/2020     Magnesium:    Lab Results   Component Value Date    MG 1.9 02/08/2020     Urine chemistry:  Urine creatinine = 69  Urine Microalbumin = 55.9  Urine Microalbumin/Cr ratio = 81  Urine osmolality = 393  Urine protein = 35  Urine protein/Cr ratio = 0.5    FENa: 0.7%  FEUrea: 20.9%       Radiology Review:      CXR 2/6/20   No acute cardiopulmonary findings. Problems resolved:   · Hypokalemia, due to poor oral intake    IMPRESSION/RECOMMENDATIONS:      Briefly, Mr. 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 Patient was found to be extremely hypotensive at 50/palpated, tachycardic and fatigued in the ED. He was given fluid bolus with BP improvement, was found to have YVETTE (baseline unknown) with a creatinine of 4.1. Patients creatinine today is 3.0, reason for this consult. 1. YVETTE stage III on CKD; likely volume responsive prerenal YVETTE due to poor oral intake in the setting of ACE inhibition, fraction excretion of sodium 0.7%, fractional secretion urea 20.9%. Renal function has improved with IV fluid administration. Baseline creatinine unknown however does have evidence of proteinuria with 0.5 g of protein per gram of creatinine, also kidney ultrasound consistent with underlying chronic kidney disease with increased renal cortical echogenicity. · Creatinine 2.1 mg/dL today, urine output excellent. 2. Uncontrolled HTN, on metoprolol and clonidine patch.  Patch increased to 0.2 mg/24 hour this morning per medicine team.  3. GPC bacteremia, on

## 2020-02-10 NOTE — PROGRESS NOTES
current medical information, gathering information on past medical history/social history and prior level of function, completion of standardized testing/informal observation of tasks, assessment of data and education on plan of care and goals.     CPT codes:  [] Low Complexity PT evaluation 63061  [x] Moderate Complexity PT evaluation 66670  [] High Complexity PT evaluation 66134  [] PT Re-evaluation 40503  [] Gait training 60271 0 minutes  [] Manual therapy 85163 0 minutes  [x] Therapeutic activities 63081 27 minutes  [] Therapeutic exercises 52543 0 minutes  [] Neuromuscular reeducation 08454 0 minutes     Violeta Sorenson, PT, DPT  License [id number]

## 2020-02-11 ENCOUNTER — APPOINTMENT (OUTPATIENT)
Dept: GENERAL RADIOLOGY | Age: 65
DRG: 870 | End: 2020-02-11
Payer: MEDICARE

## 2020-02-11 PROBLEM — E43 SEVERE PROTEIN-CALORIE MALNUTRITION (HCC): Chronic | Status: ACTIVE | Noted: 2020-02-11

## 2020-02-11 LAB
ANION GAP SERPL CALCULATED.3IONS-SCNC: 10 MMOL/L (ref 7–16)
ANION GAP SERPL CALCULATED.3IONS-SCNC: 13 MMOL/L (ref 7–16)
ANISOCYTOSIS: ABNORMAL
BASOPHILS ABSOLUTE: 0 E9/L (ref 0–0.2)
BASOPHILS ABSOLUTE: 0.01 E9/L (ref 0–0.2)
BASOPHILS RELATIVE PERCENT: 0.1 % (ref 0–2)
BASOPHILS RELATIVE PERCENT: 0.1 % (ref 0–2)
BUN BLDV-MCNC: 46 MG/DL (ref 8–23)
BUN BLDV-MCNC: 50 MG/DL (ref 8–23)
CALCIUM SERPL-MCNC: 8.1 MG/DL (ref 8.6–10.2)
CALCIUM SERPL-MCNC: 8.2 MG/DL (ref 8.6–10.2)
CHLORIDE BLD-SCNC: 110 MMOL/L (ref 98–107)
CHLORIDE BLD-SCNC: 113 MMOL/L (ref 98–107)
CHLORIDE URINE RANDOM: 62 MMOL/L
CO2: 23 MMOL/L (ref 22–29)
CO2: 24 MMOL/L (ref 22–29)
CREAT SERPL-MCNC: 1.7 MG/DL (ref 0.7–1.2)
CREAT SERPL-MCNC: 1.7 MG/DL (ref 0.7–1.2)
EOSINOPHILS ABSOLUTE: 0 E9/L (ref 0.05–0.5)
EOSINOPHILS ABSOLUTE: 0.02 E9/L (ref 0.05–0.5)
EOSINOPHILS RELATIVE PERCENT: 0.2 % (ref 0–6)
EOSINOPHILS RELATIVE PERCENT: 0.2 % (ref 0–6)
GFR AFRICAN AMERICAN: 49
GFR AFRICAN AMERICAN: 49
GFR NON-AFRICAN AMERICAN: 49 ML/MIN/1.73
GFR NON-AFRICAN AMERICAN: 49 ML/MIN/1.73
GLUCOSE BLD-MCNC: 151 MG/DL (ref 74–99)
GLUCOSE BLD-MCNC: 153 MG/DL (ref 74–99)
HCT VFR BLD CALC: 23.9 % (ref 37–54)
HCT VFR BLD CALC: 26.4 % (ref 37–54)
HEMOGLOBIN: 8.1 G/DL (ref 12.5–16.5)
HEMOGLOBIN: 9.2 G/DL (ref 12.5–16.5)
HYPOCHROMIA: ABNORMAL
IMMATURE GRANULOCYTES #: 0.31 E9/L
IMMATURE GRANULOCYTES %: 3.4 % (ref 0–5)
LYMPHOCYTES ABSOLUTE: 0.23 E9/L (ref 1.5–4)
LYMPHOCYTES ABSOLUTE: 0.68 E9/L (ref 1.5–4)
LYMPHOCYTES RELATIVE PERCENT: 1.7 % (ref 20–42)
LYMPHOCYTES RELATIVE PERCENT: 7.5 % (ref 20–42)
MAGNESIUM: 1.3 MG/DL (ref 1.6–2.6)
MCH RBC QN AUTO: 28.2 PG (ref 26–35)
MCH RBC QN AUTO: 28.8 PG (ref 26–35)
MCHC RBC AUTO-ENTMCNC: 33.9 % (ref 32–34.5)
MCHC RBC AUTO-ENTMCNC: 34.8 % (ref 32–34.5)
MCV RBC AUTO: 82.8 FL (ref 80–99.9)
MCV RBC AUTO: 83.3 FL (ref 80–99.9)
METER GLUCOSE: 152 MG/DL (ref 74–99)
METER GLUCOSE: 155 MG/DL (ref 74–99)
METER GLUCOSE: 159 MG/DL (ref 74–99)
MONOCYTES ABSOLUTE: 0.7 E9/L (ref 0.1–0.95)
MONOCYTES ABSOLUTE: 1.06 E9/L (ref 0.1–0.95)
MONOCYTES RELATIVE PERCENT: 11.7 % (ref 2–12)
MONOCYTES RELATIVE PERCENT: 6.1 % (ref 2–12)
MYELOCYTE PERCENT: 1.7 % (ref 0–0)
NEUTROPHILS ABSOLUTE: 10.76 E9/L (ref 1.8–7.3)
NEUTROPHILS ABSOLUTE: 6.98 E9/L (ref 1.8–7.3)
NEUTROPHILS RELATIVE PERCENT: 77.1 % (ref 43–80)
NEUTROPHILS RELATIVE PERCENT: 90.4 % (ref 43–80)
OSMOLALITY URINE: 463 MOSM/KG (ref 300–900)
PDW BLD-RTO: 15.9 FL (ref 11.5–15)
PDW BLD-RTO: 15.9 FL (ref 11.5–15)
PLATELET # BLD: 104 E9/L (ref 130–450)
PLATELET # BLD: 91 E9/L (ref 130–450)
PLATELET CONFIRMATION: NORMAL
PMV BLD AUTO: 11.5 FL (ref 7–12)
PMV BLD AUTO: 12.2 FL (ref 7–12)
POIKILOCYTES: ABNORMAL
POTASSIUM REFLEX MAGNESIUM: 2.9 MMOL/L (ref 3.5–5)
POTASSIUM SERPL-SCNC: 3.3 MMOL/L (ref 3.5–5)
POTASSIUM, UR: 40.6 MMOL/L
RBC # BLD: 2.87 E12/L (ref 3.8–5.8)
RBC # BLD: 3.19 E12/L (ref 3.8–5.8)
SODIUM BLD-SCNC: 146 MMOL/L (ref 132–146)
SODIUM BLD-SCNC: 147 MMOL/L (ref 132–146)
SODIUM URINE: 37 MMOL/L
TARGET CELLS: ABNORMAL
UREA NITROGEN, UR: 681 MG/DL (ref 800–1666)
WBC # BLD: 11.7 E9/L (ref 4.5–11.5)
WBC # BLD: 9.1 E9/L (ref 4.5–11.5)

## 2020-02-11 PROCEDURE — 2580000003 HC RX 258: Performed by: INTERNAL MEDICINE

## 2020-02-11 PROCEDURE — 2500000003 HC RX 250 WO HCPCS: Performed by: INTERNAL MEDICINE

## 2020-02-11 PROCEDURE — 6360000002 HC RX W HCPCS: Performed by: INTERNAL MEDICINE

## 2020-02-11 PROCEDURE — 80048 BASIC METABOLIC PNL TOTAL CA: CPT

## 2020-02-11 PROCEDURE — 6370000000 HC RX 637 (ALT 250 FOR IP): Performed by: INTERNAL MEDICINE

## 2020-02-11 PROCEDURE — 94640 AIRWAY INHALATION TREATMENT: CPT

## 2020-02-11 PROCEDURE — 82962 GLUCOSE BLOOD TEST: CPT

## 2020-02-11 PROCEDURE — 84133 ASSAY OF URINE POTASSIUM: CPT

## 2020-02-11 PROCEDURE — 83735 ASSAY OF MAGNESIUM: CPT

## 2020-02-11 PROCEDURE — C9113 INJ PANTOPRAZOLE SODIUM, VIA: HCPCS | Performed by: INTERNAL MEDICINE

## 2020-02-11 PROCEDURE — 36415 COLL VENOUS BLD VENIPUNCTURE: CPT

## 2020-02-11 PROCEDURE — 2700000000 HC OXYGEN THERAPY PER DAY

## 2020-02-11 PROCEDURE — 92611 MOTION FLUOROSCOPY/SWALLOW: CPT

## 2020-02-11 PROCEDURE — 74230 X-RAY XM SWLNG FUNCJ C+: CPT

## 2020-02-11 PROCEDURE — 83935 ASSAY OF URINE OSMOLALITY: CPT

## 2020-02-11 PROCEDURE — 82436 ASSAY OF URINE CHLORIDE: CPT

## 2020-02-11 PROCEDURE — 85025 COMPLETE CBC W/AUTO DIFF WBC: CPT

## 2020-02-11 PROCEDURE — 84300 ASSAY OF URINE SODIUM: CPT

## 2020-02-11 PROCEDURE — 99233 SBSQ HOSP IP/OBS HIGH 50: CPT | Performed by: INTERNAL MEDICINE

## 2020-02-11 PROCEDURE — 97530 THERAPEUTIC ACTIVITIES: CPT

## 2020-02-11 PROCEDURE — 2060000000 HC ICU INTERMEDIATE R&B

## 2020-02-11 PROCEDURE — 97535 SELF CARE MNGMENT TRAINING: CPT

## 2020-02-11 PROCEDURE — 84540 ASSAY OF URINE/UREA-N: CPT

## 2020-02-11 RX ORDER — HYDRALAZINE HYDROCHLORIDE 20 MG/ML
10 INJECTION INTRAMUSCULAR; INTRAVENOUS EVERY 6 HOURS
Status: DISCONTINUED | OUTPATIENT
Start: 2020-02-11 | End: 2020-02-13

## 2020-02-11 RX ORDER — MAGNESIUM SULFATE IN WATER 40 MG/ML
2 INJECTION, SOLUTION INTRAVENOUS ONCE
Status: COMPLETED | OUTPATIENT
Start: 2020-02-11 | End: 2020-02-11

## 2020-02-11 RX ORDER — CLONIDINE 0.1 MG/24H
1 PATCH, EXTENDED RELEASE TRANSDERMAL WEEKLY
Status: DISCONTINUED | OUTPATIENT
Start: 2020-02-11 | End: 2020-02-13

## 2020-02-11 RX ORDER — POTASSIUM CHLORIDE 7.45 MG/ML
10 INJECTION INTRAVENOUS
Status: COMPLETED | OUTPATIENT
Start: 2020-02-11 | End: 2020-02-11

## 2020-02-11 RX ORDER — DEXTROSE MONOHYDRATE 50 MG/ML
INJECTION, SOLUTION INTRAVENOUS CONTINUOUS
Status: DISCONTINUED | OUTPATIENT
Start: 2020-02-11 | End: 2020-02-11

## 2020-02-11 RX ORDER — AMLODIPINE BESYLATE 10 MG/1
10 TABLET ORAL DAILY
Status: DISCONTINUED | OUTPATIENT
Start: 2020-02-11 | End: 2020-02-11

## 2020-02-11 RX ADMIN — SODIUM CHLORIDE 50 ML: 9 INJECTION, SOLUTION INTRAVENOUS at 05:26

## 2020-02-11 RX ADMIN — PANTOPRAZOLE SODIUM 40 MG: 40 INJECTION, POWDER, FOR SOLUTION INTRAVENOUS at 11:39

## 2020-02-11 RX ADMIN — METOPROLOL TARTRATE 5 MG: 5 INJECTION INTRAVENOUS at 22:18

## 2020-02-11 RX ADMIN — Medication 10 ML: at 22:18

## 2020-02-11 RX ADMIN — POTASSIUM CHLORIDE 10 MEQ: 10 INJECTION, SOLUTION INTRAVENOUS at 13:59

## 2020-02-11 RX ADMIN — POTASSIUM CHLORIDE 10 MEQ: 10 INJECTION, SOLUTION INTRAVENOUS at 12:47

## 2020-02-11 RX ADMIN — IPRATROPIUM BROMIDE AND ALBUTEROL SULFATE 1 AMPULE: 2.5; .5 SOLUTION RESPIRATORY (INHALATION) at 21:27

## 2020-02-11 RX ADMIN — THIAMINE HYDROCHLORIDE 100 MG: 100 INJECTION, SOLUTION INTRAMUSCULAR; INTRAVENOUS at 12:00

## 2020-02-11 RX ADMIN — IPRATROPIUM BROMIDE AND ALBUTEROL SULFATE 1 AMPULE: 2.5; .5 SOLUTION RESPIRATORY (INHALATION) at 08:49

## 2020-02-11 RX ADMIN — DEXTROSE MONOHYDRATE: 50 INJECTION, SOLUTION INTRAVENOUS at 10:41

## 2020-02-11 RX ADMIN — METOPROLOL TARTRATE 5 MG: 5 INJECTION INTRAVENOUS at 11:40

## 2020-02-11 RX ADMIN — PIPERACILLIN AND TAZOBACTAM 3.38 G: 3; .375 INJECTION, POWDER, LYOPHILIZED, FOR SOLUTION INTRAVENOUS at 00:23

## 2020-02-11 RX ADMIN — SODIUM CHLORIDE 50 ML: 9 INJECTION, SOLUTION INTRAVENOUS at 12:48

## 2020-02-11 RX ADMIN — LABETALOL HYDROCHLORIDE 10 MG: 5 INJECTION INTRAVENOUS at 00:13

## 2020-02-11 RX ADMIN — HYDRALAZINE HYDROCHLORIDE 10 MG: 20 INJECTION INTRAMUSCULAR; INTRAVENOUS at 14:11

## 2020-02-11 RX ADMIN — IPRATROPIUM BROMIDE AND ALBUTEROL SULFATE 1 AMPULE: 2.5; .5 SOLUTION RESPIRATORY (INHALATION) at 18:28

## 2020-02-11 RX ADMIN — PIPERACILLIN AND TAZOBACTAM 3.38 G: 3; .375 INJECTION, POWDER, LYOPHILIZED, FOR SOLUTION INTRAVENOUS at 16:54

## 2020-02-11 RX ADMIN — FOLIC ACID 1 MG: 5 INJECTION, SOLUTION INTRAMUSCULAR; INTRAVENOUS; SUBCUTANEOUS at 11:45

## 2020-02-11 RX ADMIN — SODIUM CHLORIDE SOLN NEBU 3% 4 ML: 3 NEBU SOLN at 08:56

## 2020-02-11 RX ADMIN — PANTOPRAZOLE SODIUM 40 MG: 40 INJECTION, POWDER, FOR SOLUTION INTRAVENOUS at 22:17

## 2020-02-11 RX ADMIN — METOPROLOL TARTRATE 5 MG: 5 INJECTION INTRAVENOUS at 05:26

## 2020-02-11 RX ADMIN — DEXTROSE MONOHYDRATE: 50 INJECTION, SOLUTION INTRAVENOUS at 19:30

## 2020-02-11 RX ADMIN — POTASSIUM CHLORIDE 10 MEQ: 10 INJECTION, SOLUTION INTRAVENOUS at 15:27

## 2020-02-11 RX ADMIN — METOPROLOL TARTRATE 5 MG: 5 INJECTION INTRAVENOUS at 17:14

## 2020-02-11 RX ADMIN — Medication 10 ML: at 11:58

## 2020-02-11 RX ADMIN — SODIUM CHLORIDE SOLN NEBU 3% 4 ML: 3 NEBU SOLN at 21:27

## 2020-02-11 RX ADMIN — PIPERACILLIN AND TAZOBACTAM 3.38 G: 3; .375 INJECTION, POWDER, LYOPHILIZED, FOR SOLUTION INTRAVENOUS at 08:11

## 2020-02-11 RX ADMIN — MAGNESIUM SULFATE HEPTAHYDRATE 2 G: 40 INJECTION, SOLUTION INTRAVENOUS at 11:57

## 2020-02-11 RX ADMIN — SODIUM CHLORIDE 50 ML: 9 INJECTION, SOLUTION INTRAVENOUS at 22:17

## 2020-02-11 RX ADMIN — HYDRALAZINE HYDROCHLORIDE 10 MG: 20 INJECTION INTRAMUSCULAR; INTRAVENOUS at 22:18

## 2020-02-11 RX ADMIN — IPRATROPIUM BROMIDE AND ALBUTEROL SULFATE 1 AMPULE: 2.5; .5 SOLUTION RESPIRATORY (INHALATION) at 12:39

## 2020-02-11 RX ADMIN — LABETALOL HYDROCHLORIDE 10 MG: 5 INJECTION INTRAVENOUS at 08:22

## 2020-02-11 RX ADMIN — SODIUM CHLORIDE, PRESERVATIVE FREE 10 ML: 5 INJECTION INTRAVENOUS at 11:39

## 2020-02-11 RX ADMIN — POTASSIUM CHLORIDE 10 MEQ: 10 INJECTION, SOLUTION INTRAVENOUS at 11:40

## 2020-02-11 ASSESSMENT — PAIN SCALES - GENERAL
PAINLEVEL_OUTOF10: 0

## 2020-02-11 NOTE — PROGRESS NOTES
Department of Internal Medicine  Nephrology Attending Progress Note    Events reviewed. SUBJECTIVE: We are following Mr. Nilesh Gilman for YVETTE on CKD. Patient seen and examined bedside. No events overnight. Patient is awake and alert. PHYSICAL EXAM:      Vitals:    VITALS:  BP (!) 170/103   Pulse 100   Temp 99.1 °F (37.3 °C) (Tympanic)   Resp 28   Ht 5' 4\" (1.626 m)   Wt 134 lb 1.6 oz (60.8 kg)   SpO2 100%   BMI 23.02 kg/m²     Constitutional:  Lethargic, laying in bed. Answer simple questions appropriately.   HEENT:  Scarred sugical incision at neck; facial droop  Respiratory:  Clear, diminished bases bilaterally  Cardiovascular/Edema:  RRR, no edema noted  Gastrointestinal:  Abdomen soft and nontender, bowel sounds active  Neurologic:  Contracted extremities, facial droop   Skin:  Dry,warm  flaky   Other:  No edema     Scheduled Meds:   magnesium sulfate  2 g Intravenous Once    potassium chloride  10 mEq Intravenous Q1H    amLODIPine  10 mg Oral Daily    cloNIDine  1 patch Transdermal Weekly    piperacillin-tazobactam  3.375 g Intravenous Q8H    sodium chloride  50 mL Intravenous Q8H    pantoprazole  40 mg Intravenous BID    And    sodium chloride (PF)  10 mL Intravenous Daily    ipratropium-albuterol  1 ampule Inhalation Q4H WA    sodium chloride (Inhalant)  4 mL Nebulization BID    thiamine  100 mg Intravenous Daily    sodium chloride flush  10 mL Intravenous 2 times per day    folic acid  1 mg Intravenous Daily    sodium chloride  1,000 mL Intravenous Once    [Held by provider] aspirin  81 mg Oral Daily    metoprolol  5 mg Intravenous Q6H    sodium chloride   Intravenous Q12H     Continuous Infusions:   dextrose 100 mL/hr at 02/11/20 1041    dextrose       PRN Meds:.perflutren lipid microspheres, glucose, dextrose, glucagon (rDNA), dextrose, hydrALAZINE, labetalol, sodium chloride flush, magnesium hydroxide      DATA:    CBC:   Lab Results   Component Value Date    WBC 11.7 invasion. Comparison   studies would be necessary to distinguish recurrent neoplasm from   previous radiation therapy. Tumor invasion versus osteonecrosis of the medial right mandible           Problems resolved:       IMPRESSION/RECOMMENDATIONS:      Briefly, Mr. 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 Patient was found to be extremely hypotensive at 50/palpated, tachycardic and fatigued in the ED. He was given fluid bolus with BP improvement, was found to have YVETTE (baseline unknown) with a creatinine of 4.1. Patients creatinine today is 3.0, reason for this consult. 1. YVETTE stage III on CKD;  volume responsive prerenal YVETTE due to poor oral intake in the setting of ACE inhibition, fraction excretion of sodium 0.7%, fractional secretion urea 20.9%. Renal function has improved with IV fluid administration. Creatinine 1.7 mg/dL today, urine output excellent. 2. CKD, stage unknown; Baseline creatinine unknown however does have evidence of proteinuria with 0.5 g of protein per gram of creatinine, also kidney ultrasound consistent with underlying chronic kidney disease with increased renal cortical echogenicity. 3. Hypernatremia, with water deficit; calculated free water deficit is 1.8 L, increase IVF to D5W @ 125 mL/hr  4. Uncontrolled HTN, on metoprolol and clonidine patch. Will decrease patch and add hydralazine scheduled. 5. Hypokalemia likely 2/2 to poor oral intake. 6. Hypomagnesemia likely 2/2 poor oral intake  7. GPC bacteremia, on piperacillin-tazobactam  8. Severe thrombocytopenia, improving.   9. Nutrition, NPO      Plan:    · Increase D5 water to 125 cc/hour  · Decrease Clonidine patch to 0.1mg/24hr patch  · Start hydralazine 10mg IV every 6 hours   · Replace K  · Replace Mg  · Continue to monitor BP  · Continue to monitor renal function

## 2020-02-11 NOTE — PROGRESS NOTES
Telephone consent obtained for PEG placement from patient's sister, Coy Whitley [phone]. Consent placed in soft chart.

## 2020-02-11 NOTE — PLAN OF CARE
Problem: Malnutrition  (NI-5.2)  Goal: Food and/or Nutrient Delivery  Description- monitor/ EN  Individualized approach for food/nutrient provision.   Outcome: Met This Shift

## 2020-02-11 NOTE — PROGRESS NOTES
Strict NPO NA   Grooming Mod A/Set up     Required Mod A/Mod VCs to bring hands to mid-line to wash hands in semi-supine, Mod A/Mod VCs to Lift UEs/Hands to face, to grasp and manipulate cloth in order to wash his face - in semi-supine  Min A;    Min A with min verbal prompting to wash face while sitting up on the EOB. Min A   UB Dressing Max A/Set up     Required Max A to thread UEs into garment, wrap garment around back while seated EOB, Min A for sitting balance w/ Ax  Mod A; To maria/doff gown with assistance due to monitors. Mod A   LB Dressing DEP     Max A to don socks sitting EOB, Mod-Max A of 1 for standing balance + Max A for clothing adjustment over hips  Dep; To maria/doff socks due to limited ROM. Max A   Bathing NT     Pt declined Dep; To complete bathing tasks while in supine with increased time. Max A   Toileting DEP     Mod-Max A of 1 for standing balance + Max A of 1 for bowel hygiene/clothing adjustment  Dep; Due to incontinence when in standing with use of islas present. Max A   Bed Mobility  Rolling: Mod A  Repositioning:  Max A of 2 in supine toward HOB   Supine to Sit:  Max A    Sit to Supine:  Max A      Required Max A + Mod VCs to problem solve techs for mobility  Rolling: Mod A  Repositioning:  Max A of 2 in supine toward HOB   Supine to Sit:  Max A x2    Sit to Supine:  Max A x2;    Max A of 2 to transfer from supine to sitting position with increased time and increased time due to fear.        Mod A   Functional Transfers Sit to stand: Max A  Stand to sit:  Mod A       Required Max A to stand from EOB ~ 3x  Pt ed re: safety/hand placement Sit to stand: Mod A  Stand to sit:  Mod A; To transfer on/off EOB with Mod A with HHA.    Mod A   Functional Mobility Max A w/ FWW     Able to tolerate side-stepping ~ 1' along EOB w/ use of FWW, Mod VCs for safety, proper tech  Mod A with HHA    To side step 2-3 steps with HHA.   Mod A   Balance Sitting:  Min A     Static: Close SUP at EOB    Dynamic:  Min A unsupported at EOB w/ functional ax     Standing: Mod - Max A w/ FWW     Static:  Mod A w/ FWW    Dynamic:  Max A w/ FWW w/ functional ax/mobility  Sitting:      Static: Sup    Dynamic: SBA     Standing:     Static: Mod A    Dynamic:  Max A w/ HHA w/ functional ax/mobility       Activity Tolerance Tolerated Sitting:  EOB ~ 20 mins w/ functional ax  Tolerated Standing:  ~ 2 mins ~ 2x w/ functional ax  Tolerated Sitting:  EOB ~ 20 mins w/ functional ax    Tolerated Standing:  ~ 1 min ~ 2x w/ functional ax       Visual/  Perceptual WFL  Glasses:  None at b/s        Hearing WFL  Hearing Aids  None at b/s          Hand dominance: Right     UE ROM:        RUE:    Shoulder flex to ~ 90* AAROM, Distally WFL, Moderate arthritic deformities of digits                          LUE:    Severe Crepitus of Left shoulder w/ all movement, AAROM of Shoulder flex to ~ 45*, distally WFL, Arthritic deformities of Digits     Strength:        RUE:    grossly 4-/5                               LUE:    grossly 4-/5      Strength:  Fair Mario UEs     Fine Motor Coordination:  Fair(-) Mario UEs     Sensation:  Denies numbness or tingling Mario UEs  Tone:  WFL Mario UEs  Edema: Moderate edema Left Forearm - Infiltration of IV - Nsg aware                            Comments: Upon arrival, patient supine in bed and agreeable to therapy session. Pt demonstrating fair understanding of education/techniques, requiring additional training / education. At end of session, patient in semi-supine position in bed with HOB elevated, BUEs and BLEs elevated on pillows to reduce edema, call light and phone within reach, all lines and tubes intact. Pt would benefit from continued skilled OT to increase safety,  independence and quality of life.      Treatment:  OT services provided: Facilitation of bed mobility, sitting balance at EOB, functional sit to stand transfers - skilled cuing on hand placement, posture, body mechanics

## 2020-02-11 NOTE — PROGRESS NOTES
Physical Therapy     Name: Vanessa Blue  : 1955  MRN: [id number]    Referring Provider:  [address], DO    Date of Service: 2020    Evaluating PT:  Andi Sheppard PT, DPT    Room #:  6143/0273-R  Diagnosis:  Acute renal failure  PMHx/PSHx:  Mouth/throat Cancer  Procedure/Surgery:  NA  Precautions:  Falls, Seizure pads, O2  Equipment Needs:  TBD    SUBJECTIVE:    Pt lives with ? in a ? story home with ? stairs to enter and ? rail. Patient lives in [place] and per CM note was independent prior. Patient not able to provide home set up. Pt ambulated with a [employer] PTA. OBJECTIVE:   Initial Evaluation  Date: 2/10/2020 Treatment   Short Term/ Long Term   Goals   AM-PAC 6 Clicks     Was pt agreeable to Eval/treatment? Yes  yes    Does pt have pain? No indications or c/o pain. no    Bed Mobility  Rolling: Max A  Supine to sit: Max A  Sit to supine: Max A  Scooting: Max A Rolling:max a  Supine <>sit:max a x2  Scooting;max a Min A   Transfers Sit to stand: Max A  Stand to sit: Max A  Stand pivot: NT Sit<>stand:mod a  Stand pivot:NT Mod A with [employer]   Ambulation    1 step laterally with no AD and Mod A +2 2 side steps mod a no AD >50 feet with WW with Mod A   Stair negotiation: ascended and descended  NT NT 4 steps with 1 rail Min A   ROM BUE:  WFL  BLE:  WFL     Strength BUE:  Per OT note  BLE:  3-/5  3+/5   Balance Sitting EOB:  SBA  Dynamic Standing: Max A  Sitting EOB:  Supervision  Dynamic Standing:  Min/Mod A with [employer]       Therapeutic Exercises:  10x of the following:ankle pumps, glute sets, quad sets, LAQ, 5x B UE flex/ext at elbow, 3x finger dexterity/pinching    Patient education  Pt educated on importance of mobility and therapy    Patient response to education:   Pt verbalized understanding Pt demonstrated skill Pt requires further education in this area   x x x     ASSESSMENT:    Comments:  Pt supine upon entering room.   Pt returned to bed at end of session d/t

## 2020-02-11 NOTE — PROGRESS NOTES
Attempted to call [facility] to obtain medical records at [phone]. They can not find record of him ever being treated there. Attempted to call ENT office at [phone]/ The office was closed. Please, try to call in the morning.

## 2020-02-11 NOTE — PROGRESS NOTES
LABALBU 2.6 02/06/2020    CREATININE 1.7 02/11/2020    CALCIUM 8.1 02/11/2020    GFRAA 49 02/11/2020    LABGLOM 49 02/11/2020    GLUCOSE 151 02/11/2020     Magnesium:    Lab Results   Component Value Date    MG 1.3 02/11/2020       Resident's Assessment and Plan     Samuel Kate is a 59 y.o. male with a PMH laryngeal cancer cancer, s/p laryngectomy (2005), chemo and radiotherapy, H/o CVA, active smoker, alcohol use presented with the complaints of fatigue.     Assessment   1. Group G Strep Bacteremia   2. Acute Metabolic Encephalopathy 2/2 Alcohol withdrawal, Bacteremia, and benzodiazepine   3. Dysphagia   4. Syncope with non mechanical fall  5. YVETTE on CKD - improving   6. Elevated CK - resolved   7. Type 2 NSTEMI - samantha 2/2 prerenal in nature   8. Thrombocytopenia - possible 2/2 cancer reoccurance   9. Right Carotid Artery Bifurcation mass - consider reoccurance of cancer   10. HTN  11. Alcohol Abuse - withdrawals resolved      Plan   1. 2/2 staph aureus in respiratory culture stop rocephin and start zosyn, switch to augmentin when able to give oral, both staph and strep sensitive to oxacillin,  2. Stop ativan 2/2 encephalopathy   3. Monitor Mentation   4. Continue monitoring renal status   5. Formal swallow evaluation with modified barium swallow showed silent aspiration w/ nectar consistency, consult General Surgery for PEG, patient has had PEG in past and wants PEG and or Trach if needed; understands risks and benefits   6. ENT unable to place NG/corpak due to bleeding  7. General Surgery consulted for PEG placement  8. Records from [facility] unable to be obtained, nursing attempted calling [facility] and they have not record of treating patient  9. Continue IV and patch HTN medications, switch to crushed when patient gets PEG   10. RAFI vs LTAC placement in future  11.  Echocardiogram 2/2 syncope and +orthostatics; decrease in SBP of 20 with symptoms and increased HR; patient has

## 2020-02-11 NOTE — PROGRESS NOTES
Walked into pt room, found pt sitting on floor with back against the side of the bed. Antonio STRONG was notified, pt showed no sign of injury, VSS. Pt was assisted back into bed with bed alarm on.   Electronically signed by Pretty Linder RN on 2/11/2020 at 5:08 AM

## 2020-02-11 NOTE — PROGRESS NOTES
Nutrition Assessment    Type and Reason for Visit: Initial    Nutrition Recommendations: NPO status x 6 days. Noted failed MBS x 2. Possible PEG placement. TF rec when needed: Standard with fiber (Jevity1.2) @ 50 ml/hr + 1 pro mod daily. Will provide: 1200 ml tv, 1440 kcals, 67 gm pro (1540 kcals & 93 gm pro w/ mod), 968 ml free water  Flush with 100 ml water q 6 hr when IVF d/c= 1568 ml total water     *Monitor for re-feeding syndrome/ replace electrolytes prn - current hypokalemia & hypomagnesemia     Nutrition Assessment: Pt w/ severe malnutrition AEB h/o decreased intake & fat/muscle wasting/ cachectic state 2/2 multiple head/neck CA h/o chemoRT. Noted ETOH abuse PTA & withdrawal. Pt at further risk d/t failed swallow eval/dysphagia & h/o previous PEG. Will provide TF rec if/when needed & continue to monitor. Malnutrition Assessment:  · Malnutrition Status: Meets the criteria for severe malnutrition  · Context: Chronic illness  · Findings of the 6 clinical characteristics of malnutrition (Minimum of 2 out of 6 clinical characteristics is required to make the diagnosis of moderate or severe Protein Calorie Malnutrition based on AND/ASPEN Guidelines):  1. Energy Intake-Less than or equal to 50% of estimated energy requirement, Greater than or equal to 3 months    2. Weight Loss-Unable to assess(no hx on file)   3. Fat Loss-Severe subcutaneous fat loss, Orbital, Triceps  4. Muscle Loss-Severe muscle mass loss, Temples (temporalis muscle), Clavicles (pectoralis and deltoids), Thigh (quadriceps), Calf (gastrocnemius)  5. Fluid Accumulation-No significant fluid accumulation  6.   Strength-Not measured    Nutrition Risk Level: High    Nutrient Needs:  · Estimated Daily Total Kcal: 1730-7848(MSJ REE 1219 x 1.3 SF)  · Estimated Daily Protein (g): 75-90(1.5-1.8 g/kg)  · Estimated Daily Total Fluid (ml/day): 2007-0956 (1 ml/kcal )    Nutrition Diagnosis:   · Problem: Severe malnutrition, In context of chronic illness  · Etiology: related to Catabolic illness(head/neck CA)     Signs and symptoms:  as evidenced by Diet history of poor intake, Severe loss of subcutaneous fat, Severe muscle loss    Objective Information:  · Nutrition-Focused Physical Findings: poor attention, +I/O's, +3 pitting LUE edema, active BS, h/o head & neck CA, dysphagia     · Wound Type: None     · Current Nutrition Therapies:  · Oral Diet Orders: NPO(x 6 days)     · Anthropometric Measures:  · Ht: 5' 4\" (162.6 cm)   · Current Body Wt: 134 lb (60.8 kg)(2/10 actual - wt is elevated)  · Admission Body Wt: 113 lb (51.3 kg)(2/5 first measured)  · Usual Body Wt: (UTO no actual EMR hx on file)  · % Weight Change:   Unable to properly assess wt changes no hx on file   · Ideal Body Wt: 130 lb (59 kg), % Ideal Body 87%(using adm wt)  · BMI Classification: BMI 18.5 - 24.9 Normal Weight(using adm wt 19.4)    Nutrition Interventions:   Continued Inpatient Monitoring, Education not appropriate at this time, Coordination of Care(Pt status d/w charge RN)    Nutrition Evaluation:   · Evaluation: Goals set   · Goals: Nutrition Progression    · Monitoring: Nutrition Progression, Skin Integrity, I&O, Mental Status/Confusion, Weight, Pertinent Labs, Monitor Bowel Function, Chewing/Swallowing      Electronically signed by Aroldo Aragon RD, LD on 2/11/20 at 11:44 AM    Contact Number: Ext [phone]

## 2020-02-11 NOTE — CONSULTS
Provider, MD   lisinopril (PRINIVIL;ZESTRIL) 10 MG tablet Take 10 mg by mouth daily   Yes Historical Provider, MD   famotidine (PEPCID) 20 MG tablet Take 20 mg by mouth 2 times daily   Yes Historical Provider, MD   aspirin 81 MG tablet Take 81 mg by mouth daily   Yes Historical Provider, MD       No Known Allergies    No family history on file. Social History     Tobacco Use    Smoking status: Current Every Day Smoker     Packs/day: 0.25     Years: 40.00     Pack years: 10.00     Types: Cigarettes     Start date: 2/6/1970    Smokeless tobacco: Current User   Substance Use Topics    Alcohol use: Yes     Alcohol/week: 14.0 standard drinks     Types: 6 Glasses of wine, 8 Cans of beer per week     Frequency: 4 or more times a week     Drinks per session: 10 or more     Binge frequency: Weekly    Drug use: Not Currently         Review of Systems - unable to assess  General ROS: negative  Hematological and Lymphatic ROS: negative  Respiratory ROS: negative  Cardiovascular ROS: negative  Gastrointestinal ROS: negative  Genito-Urinary ROS: negative  Musculoskeletal ROS: negative      PHYSICAL EXAM:    Vitals:    02/11/20 1431   BP: 135/80   Pulse: 99   Resp: 22   Temp: 98.7 °F (37.1 °C)   SpO2: 99%       General Appearance:  awake, alert, in no acute distress  Skin:  Skin color, texture, turgor normal. No rashes or lesions. Head/face:  Evidence of partial mandibular resection  Eyes:  No gross abnormalities. Lungs:  No increased work of breathing on 3LNC  Heart:  Heart tachycardic rate and regular rhythm  Abdomen:  Soft, NT, ND. No rebound, guarding, rigidity. Scar from previous PEG  Extremities: pulses present in all extremities    LABS:    CBC  Recent Labs     02/11/20  0743   WBC 11.7*   HGB 9.2*   HCT 26.4*   *     BMP  Recent Labs     02/11/20  1428      K 3.3*   *   CO2 23   BUN 46*   CREATININE 1.7*   CALCIUM 8.2*     Liver Function  No results for input(s):  AMYLASE, LIPASE, BILITOT, BILIDIR, AST, ALT, ALKPHOS, PROT, LABALBU in the last 72 hours. No results for input(s): LACTATE in the last 72 hours. Recent Labs     20  1540   INR 1.5       RADIOLOGY    Xr Chest Standard (2 Vw)    Result Date: 2020  Patient MRN: [id number] : 1955 Age:  59 years Gender: Male Order Date: 2020 10:15 AM Exam: XR CHEST (2 VW) Number of Images: 2 views Indication:   Sepsis, sob Sepsis, sob Comparison: None. Findings: The heart is unremarkable The lung fields demonstrate no significant pulmonary vascular congestion and edema. The aorta is tortuous ectatic There are findings throughout the lung fields which are likely chronic     Findings compatible with atherosclerotic disease No acute infiltrate     Xr Foot Left (min 3 Views)    Result Date: 2020  Patient MRN:  [id number] : 1955 Age: 59 years Gender: Male Order Date:  2020 2:45 PM EXAM: XR FOOT LEFT (MIN 3 VIEWS) NUMBER OF IMAGES:  3 views INDICATION:  pain, swelling pain, swelling COMPARISON: None . The bones appear to be in anatomic alignment. No foreign body is identified No fracture is identified  There is moderate joint space loss The are moderate degenerative changes present     Moderate degenerative changes    Xr Chest 1 Vw    Result Date: 2020  Patient MRN: [id number] : 1955 Age:  59 years Gender: Male Order Date: 2020 6:00 AM Exam: XR CHEST 1 VIEW Number of Images: 1 view Indication: Acute weakness. Comparison: None. FINDINGS: Heart and pulmonary vascularity normal. Lungs clear. Costophrenic angles sharp. Normal aorta. No acute cardiopulmonary findings.          ASSESSMENT:  59 y.o. male with history of multiple head and neck cancers, failed swallow studies for PEG consult    PLAN:  - Continue to replete electrolytes  - Will plan for PEG   - Continue care as per primary    Electronically signed by Kary Murcia MD on 20 at 4:49 PM

## 2020-02-11 NOTE — PROGRESS NOTES
SPEECH/LANGUAGE PATHOLOGY  VIDEOFLUOROSCOPIC STUDY OF SWALLOWING (MBS)      PATIENT NAME:  Violette Valentin      :  1955      TODAY'S DATE:  2020    SUMMARY OF EVALUATION     DYSPHAGIA DIAGNOSIS:  Severe oropharyngeal dysphagia-      DIET RECOMMENDATIONS: Strict NPO (nothing by mouth including oral meds)      FEEDING RECOMMENDATIONS:       Assistance level:  Not applicable      Compensatory strategies recommended: Not applicable    THERAPY RECOMMENDATIONS:      Dysphagia therapy is  recommended to be attempted in attempt to improve oral ROM, pharyngeal onset of swallow               PROCEDURE     Consistencies Administered During the Evaluation   Liquids: nectar thick liquid   Solids:  Not administered    Method of Intake:   spoon  Fed by clinician      Position:   Seated, upright, Lateral plane    Current Respiratory Status   nasal canula                RESULTS     ORAL STAGE       Inadequate labial seal resulting anterior labial spillage from midline , Delayed A-P transit due to: decreased ability for initiation and reduced lingual strength and ROM , Oral residuals were noted :  throughout the oral cavity and Decreased bolus formation resulting in premature pharyngeal spillage. Limited oral cavity opening during evaluation. PHARYNGEAL STAGE           ONSET TIME     Marked delay in pharyngeal swallow with NTL. Swallow initiated only after verbal, cues. PHARYNGEAL RESIDUALS          Vallecula/Pharyngeal Wall           Reduced tongue base retraction resulting in residuals in vallecula and/or posterior pharyngeal wall for all consistencies administered which did not clear with cued multiple swallow and Residuals in the vallecula due to inadequate epiglottic inversion were noted for all consistencies administered which did not clear with cued multiple swallow. Pt as unable to complete multiple swallow on command.        Pyriform Sinuses      Residuals in the pyriform sinuses were noted due to

## 2020-02-12 ENCOUNTER — ANESTHESIA (OUTPATIENT)
Dept: ENDOSCOPY | Age: 65
DRG: 870 | End: 2020-02-12
Payer: MEDICARE

## 2020-02-12 ENCOUNTER — ANESTHESIA EVENT (OUTPATIENT)
Dept: ENDOSCOPY | Age: 65
DRG: 870 | End: 2020-02-12
Payer: MEDICARE

## 2020-02-12 VITALS — OXYGEN SATURATION: 100 % | SYSTOLIC BLOOD PRESSURE: 86 MMHG | DIASTOLIC BLOOD PRESSURE: 48 MMHG

## 2020-02-12 LAB
ANION GAP SERPL CALCULATED.3IONS-SCNC: 12 MMOL/L (ref 7–16)
ANION GAP SERPL CALCULATED.3IONS-SCNC: 13 MMOL/L (ref 7–16)
BASOPHILS ABSOLUTE: 0.02 E9/L (ref 0–0.2)
BASOPHILS RELATIVE PERCENT: 0.2 % (ref 0–2)
BUN BLDV-MCNC: 31 MG/DL (ref 8–23)
BUN BLDV-MCNC: 35 MG/DL (ref 8–23)
CALCIUM SERPL-MCNC: 8 MG/DL (ref 8.6–10.2)
CALCIUM SERPL-MCNC: 8 MG/DL (ref 8.6–10.2)
CHLORIDE BLD-SCNC: 105 MMOL/L (ref 98–107)
CHLORIDE BLD-SCNC: 105 MMOL/L (ref 98–107)
CO2: 19 MMOL/L (ref 22–29)
CO2: 23 MMOL/L (ref 22–29)
CREAT SERPL-MCNC: 1.5 MG/DL (ref 0.7–1.2)
CREAT SERPL-MCNC: 1.6 MG/DL (ref 0.7–1.2)
EKG ATRIAL RATE: 109 BPM
EKG P AXIS: 74 DEGREES
EKG P-R INTERVAL: 134 MS
EKG Q-T INTERVAL: 354 MS
EKG QRS DURATION: 76 MS
EKG QTC CALCULATION (BAZETT): 476 MS
EKG R AXIS: -5 DEGREES
EKG T AXIS: 30 DEGREES
EKG VENTRICULAR RATE: 109 BPM
EOSINOPHILS ABSOLUTE: 0.02 E9/L (ref 0.05–0.5)
EOSINOPHILS RELATIVE PERCENT: 0.2 % (ref 0–6)
GFR AFRICAN AMERICAN: 53
GFR AFRICAN AMERICAN: 57
GFR NON-AFRICAN AMERICAN: 53 ML/MIN/1.73
GFR NON-AFRICAN AMERICAN: 57 ML/MIN/1.73
GLUCOSE BLD-MCNC: 101 MG/DL (ref 74–99)
GLUCOSE BLD-MCNC: 113 MG/DL (ref 74–99)
HCT VFR BLD CALC: 29.8 % (ref 37–54)
HEMOGLOBIN: 9.5 G/DL (ref 12.5–16.5)
IMMATURE GRANULOCYTES #: 0.36 E9/L
IMMATURE GRANULOCYTES %: 3.7 % (ref 0–5)
LV EF: 63 %
LVEF MODALITY: NORMAL
LYMPHOCYTES ABSOLUTE: 0.87 E9/L (ref 1.5–4)
LYMPHOCYTES RELATIVE PERCENT: 8.9 % (ref 20–42)
MAGNESIUM: 1.6 MG/DL (ref 1.6–2.6)
MCH RBC QN AUTO: 27.9 PG (ref 26–35)
MCHC RBC AUTO-ENTMCNC: 31.9 % (ref 32–34.5)
MCV RBC AUTO: 87.6 FL (ref 80–99.9)
METER GLUCOSE: 105 MG/DL (ref 74–99)
METER GLUCOSE: 114 MG/DL (ref 74–99)
METER GLUCOSE: 129 MG/DL (ref 74–99)
MONOCYTES ABSOLUTE: 1.15 E9/L (ref 0.1–0.95)
MONOCYTES RELATIVE PERCENT: 11.8 % (ref 2–12)
NEUTROPHILS ABSOLUTE: 7.31 E9/L (ref 1.8–7.3)
NEUTROPHILS RELATIVE PERCENT: 75.2 % (ref 43–80)
PDW BLD-RTO: 17.2 FL (ref 11.5–15)
PHOSPHORUS: 2.8 MG/DL (ref 2.5–4.5)
PLATELET # BLD: 130 E9/L (ref 130–450)
PMV BLD AUTO: 13.6 FL (ref 7–12)
POTASSIUM SERPL-SCNC: 2.9 MMOL/L (ref 3.5–5)
POTASSIUM SERPL-SCNC: 3.9 MMOL/L (ref 3.5–5)
RBC # BLD: 3.4 E12/L (ref 3.8–5.8)
SODIUM BLD-SCNC: 137 MMOL/L (ref 132–146)
SODIUM BLD-SCNC: 140 MMOL/L (ref 132–146)
WBC # BLD: 9.7 E9/L (ref 4.5–11.5)

## 2020-02-12 PROCEDURE — 2580000003 HC RX 258: Performed by: INTERNAL MEDICINE

## 2020-02-12 PROCEDURE — 6360000002 HC RX W HCPCS: Performed by: INTERNAL MEDICINE

## 2020-02-12 PROCEDURE — 6360000002 HC RX W HCPCS: Performed by: STUDENT IN AN ORGANIZED HEALTH CARE EDUCATION/TRAINING PROGRAM

## 2020-02-12 PROCEDURE — 6370000000 HC RX 637 (ALT 250 FOR IP): Performed by: INTERNAL MEDICINE

## 2020-02-12 PROCEDURE — 92526 ORAL FUNCTION THERAPY: CPT

## 2020-02-12 PROCEDURE — 2500000003 HC RX 250 WO HCPCS: Performed by: SURGERY

## 2020-02-12 PROCEDURE — 2580000003 HC RX 258: Performed by: STUDENT IN AN ORGANIZED HEALTH CARE EDUCATION/TRAINING PROGRAM

## 2020-02-12 PROCEDURE — 3700000000 HC ANESTHESIA ATTENDED CARE: Performed by: SURGERY

## 2020-02-12 PROCEDURE — 7100000001 HC PACU RECOVERY - ADDTL 15 MIN: Performed by: SURGERY

## 2020-02-12 PROCEDURE — 36415 COLL VENOUS BLD VENIPUNCTURE: CPT

## 2020-02-12 PROCEDURE — 2500000003 HC RX 250 WO HCPCS: Performed by: INTERNAL MEDICINE

## 2020-02-12 PROCEDURE — 83735 ASSAY OF MAGNESIUM: CPT

## 2020-02-12 PROCEDURE — C9113 INJ PANTOPRAZOLE SODIUM, VIA: HCPCS | Performed by: STUDENT IN AN ORGANIZED HEALTH CARE EDUCATION/TRAINING PROGRAM

## 2020-02-12 PROCEDURE — 0DH63UZ INSERTION OF FEEDING DEVICE INTO STOMACH, PERCUTANEOUS APPROACH: ICD-10-PCS | Performed by: SURGERY

## 2020-02-12 PROCEDURE — 94640 AIRWAY INHALATION TREATMENT: CPT

## 2020-02-12 PROCEDURE — 3700000001 HC ADD 15 MINUTES (ANESTHESIA): Performed by: SURGERY

## 2020-02-12 PROCEDURE — C9113 INJ PANTOPRAZOLE SODIUM, VIA: HCPCS | Performed by: INTERNAL MEDICINE

## 2020-02-12 PROCEDURE — 3609013300 HC EGD TUBE PLACEMENT: Performed by: SURGERY

## 2020-02-12 PROCEDURE — 6370000000 HC RX 637 (ALT 250 FOR IP): Performed by: STUDENT IN AN ORGANIZED HEALTH CARE EDUCATION/TRAINING PROGRAM

## 2020-02-12 PROCEDURE — 80048 BASIC METABOLIC PNL TOTAL CA: CPT

## 2020-02-12 PROCEDURE — 2700000000 HC OXYGEN THERAPY PER DAY

## 2020-02-12 PROCEDURE — 93306 TTE W/DOPPLER COMPLETE: CPT

## 2020-02-12 PROCEDURE — 99233 SBSQ HOSP IP/OBS HIGH 50: CPT | Performed by: INTERNAL MEDICINE

## 2020-02-12 PROCEDURE — 85025 COMPLETE CBC W/AUTO DIFF WBC: CPT

## 2020-02-12 PROCEDURE — 2500000003 HC RX 250 WO HCPCS: Performed by: STUDENT IN AN ORGANIZED HEALTH CARE EDUCATION/TRAINING PROGRAM

## 2020-02-12 PROCEDURE — 84100 ASSAY OF PHOSPHORUS: CPT

## 2020-02-12 PROCEDURE — 2709999900 HC NON-CHARGEABLE SUPPLY: Performed by: SURGERY

## 2020-02-12 PROCEDURE — 6360000002 HC RX W HCPCS: Performed by: NURSE ANESTHETIST, CERTIFIED REGISTERED

## 2020-02-12 PROCEDURE — 82962 GLUCOSE BLOOD TEST: CPT

## 2020-02-12 PROCEDURE — 43246 EGD PLACE GASTROSTOMY TUBE: CPT | Performed by: SURGERY

## 2020-02-12 PROCEDURE — 2060000000 HC ICU INTERMEDIATE R&B

## 2020-02-12 PROCEDURE — 2580000003 HC RX 258: Performed by: NURSE ANESTHETIST, CERTIFIED REGISTERED

## 2020-02-12 PROCEDURE — 7100000000 HC PACU RECOVERY - FIRST 15 MIN: Performed by: SURGERY

## 2020-02-12 RX ORDER — MEPERIDINE HYDROCHLORIDE 50 MG/ML
12.5 INJECTION INTRAMUSCULAR; INTRAVENOUS; SUBCUTANEOUS EVERY 5 MIN PRN
Status: DISCONTINUED | OUTPATIENT
Start: 2020-02-12 | End: 2020-02-12 | Stop reason: HOSPADM

## 2020-02-12 RX ORDER — HYDROCODONE BITARTRATE AND ACETAMINOPHEN 5; 325 MG/1; MG/1
2 TABLET ORAL PRN
Status: DISCONTINUED | OUTPATIENT
Start: 2020-02-12 | End: 2020-02-12 | Stop reason: HOSPADM

## 2020-02-12 RX ORDER — SODIUM CHLORIDE 9 MG/ML
INJECTION, SOLUTION INTRAVENOUS CONTINUOUS PRN
Status: DISCONTINUED | OUTPATIENT
Start: 2020-02-12 | End: 2020-02-12 | Stop reason: SDUPTHER

## 2020-02-12 RX ORDER — LIDOCAINE HYDROCHLORIDE 10 MG/ML
INJECTION, SOLUTION INFILTRATION; PERINEURAL PRN
Status: DISCONTINUED | OUTPATIENT
Start: 2020-02-12 | End: 2020-02-12 | Stop reason: ALTCHOICE

## 2020-02-12 RX ORDER — MORPHINE SULFATE 2 MG/ML
1 INJECTION, SOLUTION INTRAMUSCULAR; INTRAVENOUS EVERY 5 MIN PRN
Status: DISCONTINUED | OUTPATIENT
Start: 2020-02-12 | End: 2020-02-12 | Stop reason: HOSPADM

## 2020-02-12 RX ORDER — POTASSIUM CHLORIDE 7.45 MG/ML
10 INJECTION INTRAVENOUS
Status: COMPLETED | OUTPATIENT
Start: 2020-02-12 | End: 2020-02-12

## 2020-02-12 RX ORDER — PROMETHAZINE HYDROCHLORIDE 25 MG/ML
6.25 INJECTION, SOLUTION INTRAMUSCULAR; INTRAVENOUS EVERY 10 MIN PRN
Status: DISCONTINUED | OUTPATIENT
Start: 2020-02-12 | End: 2020-02-12 | Stop reason: HOSPADM

## 2020-02-12 RX ORDER — MORPHINE SULFATE 2 MG/ML
2 INJECTION, SOLUTION INTRAMUSCULAR; INTRAVENOUS EVERY 5 MIN PRN
Status: DISCONTINUED | OUTPATIENT
Start: 2020-02-12 | End: 2020-02-12 | Stop reason: HOSPADM

## 2020-02-12 RX ORDER — POTASSIUM CHLORIDE 20 MEQ/1
40 TABLET, EXTENDED RELEASE ORAL ONCE
Status: COMPLETED | OUTPATIENT
Start: 2020-02-12 | End: 2020-02-12

## 2020-02-12 RX ORDER — DEXTROSE, SODIUM CHLORIDE, AND POTASSIUM CHLORIDE 5; .45; .15 G/100ML; G/100ML; G/100ML
INJECTION INTRAVENOUS CONTINUOUS
Status: DISCONTINUED | OUTPATIENT
Start: 2020-02-12 | End: 2020-02-12

## 2020-02-12 RX ORDER — HYDROCODONE BITARTRATE AND ACETAMINOPHEN 5; 325 MG/1; MG/1
1 TABLET ORAL PRN
Status: DISCONTINUED | OUTPATIENT
Start: 2020-02-12 | End: 2020-02-12 | Stop reason: HOSPADM

## 2020-02-12 RX ORDER — MAGNESIUM SULFATE IN WATER 40 MG/ML
2 INJECTION, SOLUTION INTRAVENOUS ONCE
Status: COMPLETED | OUTPATIENT
Start: 2020-02-12 | End: 2020-02-12

## 2020-02-12 RX ORDER — PROPOFOL 10 MG/ML
INJECTION, EMULSION INTRAVENOUS PRN
Status: DISCONTINUED | OUTPATIENT
Start: 2020-02-12 | End: 2020-02-12 | Stop reason: SDUPTHER

## 2020-02-12 RX ADMIN — POTASSIUM CHLORIDE 40 MEQ: 20 TABLET, EXTENDED RELEASE ORAL at 23:58

## 2020-02-12 RX ADMIN — PANTOPRAZOLE SODIUM 40 MG: 40 INJECTION, POWDER, FOR SOLUTION INTRAVENOUS at 09:39

## 2020-02-12 RX ADMIN — METOPROLOL TARTRATE 5 MG: 5 INJECTION INTRAVENOUS at 04:40

## 2020-02-12 RX ADMIN — FOLIC ACID 1 MG: 5 INJECTION, SOLUTION INTRAMUSCULAR; INTRAVENOUS; SUBCUTANEOUS at 09:38

## 2020-02-12 RX ADMIN — PANTOPRAZOLE SODIUM 40 MG: 40 INJECTION, POWDER, FOR SOLUTION INTRAVENOUS at 20:27

## 2020-02-12 RX ADMIN — THIAMINE HYDROCHLORIDE 100 MG: 100 INJECTION, SOLUTION INTRAMUSCULAR; INTRAVENOUS at 09:39

## 2020-02-12 RX ADMIN — SODIUM CHLORIDE 50 ML: 9 INJECTION, SOLUTION INTRAVENOUS at 04:41

## 2020-02-12 RX ADMIN — POTASSIUM CHLORIDE 10 MEQ: 10 INJECTION, SOLUTION INTRAVENOUS at 23:00

## 2020-02-12 RX ADMIN — SODIUM CHLORIDE, PRESERVATIVE FREE 10 ML: 5 INJECTION INTRAVENOUS at 17:11

## 2020-02-12 RX ADMIN — HYDRALAZINE HYDROCHLORIDE 10 MG: 20 INJECTION INTRAMUSCULAR; INTRAVENOUS at 20:28

## 2020-02-12 RX ADMIN — POTASSIUM CHLORIDE 10 MEQ: 10 INJECTION, SOLUTION INTRAVENOUS at 22:03

## 2020-02-12 RX ADMIN — Medication 10 ML: at 20:28

## 2020-02-12 RX ADMIN — IPRATROPIUM BROMIDE AND ALBUTEROL SULFATE 1 AMPULE: 2.5; .5 SOLUTION RESPIRATORY (INHALATION) at 12:07

## 2020-02-12 RX ADMIN — SODIUM CHLORIDE SOLN NEBU 3% 4 ML: 3 NEBU SOLN at 19:54

## 2020-02-12 RX ADMIN — Medication 10 ML: at 22:02

## 2020-02-12 RX ADMIN — METOPROLOL TARTRATE 5 MG: 5 INJECTION INTRAVENOUS at 09:39

## 2020-02-12 RX ADMIN — AMPICILLIN SODIUM AND SULBACTAM SODIUM 3 G: 2; 1 INJECTION, POWDER, FOR SOLUTION INTRAMUSCULAR; INTRAVENOUS at 17:09

## 2020-02-12 RX ADMIN — SODIUM CHLORIDE, PRESERVATIVE FREE 10 ML: 5 INJECTION INTRAVENOUS at 09:54

## 2020-02-12 RX ADMIN — SODIUM CHLORIDE, PRESERVATIVE FREE 10 ML: 5 INJECTION INTRAVENOUS at 04:40

## 2020-02-12 RX ADMIN — PROPOFOL 250 MG: 10 INJECTION, EMULSION INTRAVENOUS at 14:25

## 2020-02-12 RX ADMIN — METOPROLOL TARTRATE 5 MG: 5 INJECTION INTRAVENOUS at 22:02

## 2020-02-12 RX ADMIN — Medication 10 ML: at 09:39

## 2020-02-12 RX ADMIN — POTASSIUM CHLORIDE 10 MEQ: 10 INJECTION, SOLUTION INTRAVENOUS at 23:57

## 2020-02-12 RX ADMIN — DEXTROSE MONOHYDRATE: 50 INJECTION, SOLUTION INTRAVENOUS at 04:39

## 2020-02-12 RX ADMIN — DEXTROSE, SODIUM CHLORIDE, AND POTASSIUM CHLORIDE: 5; .45; .15 INJECTION INTRAVENOUS at 16:10

## 2020-02-12 RX ADMIN — METOPROLOL TARTRATE 5 MG: 5 INJECTION INTRAVENOUS at 17:11

## 2020-02-12 RX ADMIN — PIPERACILLIN AND TAZOBACTAM 3.38 G: 3; .375 INJECTION, POWDER, LYOPHILIZED, FOR SOLUTION INTRAVENOUS at 00:45

## 2020-02-12 RX ADMIN — HYDRALAZINE HYDROCHLORIDE 10 MG: 20 INJECTION INTRAMUSCULAR; INTRAVENOUS at 09:39

## 2020-02-12 RX ADMIN — SODIUM CHLORIDE 50 ML: 9 INJECTION, SOLUTION INTRAVENOUS at 17:40

## 2020-02-12 RX ADMIN — PIPERACILLIN AND TAZOBACTAM 3.38 G: 3; .375 INJECTION, POWDER, LYOPHILIZED, FOR SOLUTION INTRAVENOUS at 09:39

## 2020-02-12 RX ADMIN — POTASSIUM CHLORIDE 40 MEQ: 1500 TABLET, EXTENDED RELEASE ORAL at 21:43

## 2020-02-12 RX ADMIN — MAGNESIUM SULFATE HEPTAHYDRATE 2 G: 40 INJECTION, SOLUTION INTRAVENOUS at 09:39

## 2020-02-12 RX ADMIN — HYDRALAZINE HYDROCHLORIDE 10 MG: 20 INJECTION INTRAMUSCULAR; INTRAVENOUS at 02:50

## 2020-02-12 RX ADMIN — SODIUM CHLORIDE: 9 INJECTION, SOLUTION INTRAVENOUS at 14:23

## 2020-02-12 RX ADMIN — POTASSIUM CHLORIDE 10 MEQ: 10 INJECTION, SOLUTION INTRAVENOUS at 21:01

## 2020-02-12 RX ADMIN — IPRATROPIUM BROMIDE AND ALBUTEROL SULFATE 1 AMPULE: 2.5; .5 SOLUTION RESPIRATORY (INHALATION) at 19:53

## 2020-02-12 ASSESSMENT — PAIN SCALES - GENERAL
PAINLEVEL_OUTOF10: 0

## 2020-02-12 ASSESSMENT — LIFESTYLE VARIABLES: SMOKING_STATUS: 1

## 2020-02-12 NOTE — PROGRESS NOTES
Physical Therapy  Facility/Department: [facility]  Daily Treatment Note  NAME: Stephany Watson  : 1955  MRN: [id number]    Date of Service: 2020    Pt unavailable at this time d/t testing. Cont with POC at later date/time.     Dianne Sanders PTA 1449

## 2020-02-12 NOTE — PROGRESS NOTES
Occupational Therapy       Date:2020  Patient Name: Marely Gilman  MRN: [id number]  : 1955  Room: [facility] POOL ROOM/NONE       Reviewed chart and attempted to treat pt however pt off unit at testing. Will attempt at later date/time.          Taylor Tarango 46, 50 [facility]

## 2020-02-12 NOTE — PROGRESS NOTES
Department of Internal Medicine  Nephrology Attending Progress Note    Events reviewed. SUBJECTIVE: We are following Mr. Daniela Gold for YVETTE on CKD. Patient seen and examined bedside. No events overnight. PHYSICAL EXAM:      Vitals:    VITALS:  /78   Pulse 104   Temp 99.3 °F (37.4 °C) (Temporal)   Resp 22   Ht 5' 4\" (1.626 m)   Wt 134 lb 1.6 oz (60.8 kg)   SpO2 99%   BMI 23.02 kg/m²     Constitutional:  Lethargic, laying in bed. Answer simple questions appropriately.   HEENT:  Scarred sugical incision at neck; facial droop  Respiratory:  Clear, diminished bases bilaterally  Cardiovascular/Edema:  RRR, no edema noted  Gastrointestinal:  Abdomen soft and nontender, bowel sounds active  Neurologic:  Contracted extremities, facial droop   Skin:  Dry,warm  flaky   Other:  No edema     Scheduled Meds:   cloNIDine  1 patch Transdermal Weekly    hydrALAZINE  10 mg Intravenous Q6H    piperacillin-tazobactam  3.375 g Intravenous Q8H    sodium chloride  50 mL Intravenous Q8H    pantoprazole  40 mg Intravenous BID    And    sodium chloride (PF)  10 mL Intravenous Daily    ipratropium-albuterol  1 ampule Inhalation Q4H WA    sodium chloride (Inhalant)  4 mL Nebulization BID    thiamine  100 mg Intravenous Daily    sodium chloride flush  10 mL Intravenous 2 times per day    folic acid  1 mg Intravenous Daily    sodium chloride  1,000 mL Intravenous Once    [Held by provider] aspirin  81 mg Oral Daily    metoprolol  5 mg Intravenous Q6H    sodium chloride   Intravenous Q12H     Continuous Infusions:   dextrose 125 mL/hr at 02/12/20 0439    dextrose       PRN Meds:.perflutren lipid microspheres, glucose, dextrose, glucagon (rDNA), dextrose, hydrALAZINE, labetalol, sodium chloride flush, magnesium hydroxide      DATA:    CBC:   Lab Results   Component Value Date    WBC 9.7 02/12/2020    RBC 3.40 02/12/2020    HGB 9.5 02/12/2020    HCT 29.8 02/12/2020    MCV 87.6 02/12/2020    MCH 27.9 previous radiation therapy. Tumor invasion versus osteonecrosis of the medial right mandible           Problems resolved:       BRIEF SUMMARY OF INITIAL CONSULT:    Briefly, Mr. Henri Gabriel is a 70-year-old gentleman with a significant past medical history of hypertension, laryngeal cancer s/p laryngectomy in 2005 and chemo and radiation, history of CVA with baseline left sided weakness and some slurred speech, current smoker, and history of alcohol abuse who presented to the ED on 2/5/20 for fatigue. Patient was found to be extremely hypotensive at 50/palpated, tachycardic and fatigued in the ED. He was given fluid bolus with BP improvement, was found to have YVETTE (baseline unknown) with a creatinine of 4.1. Patients creatinine today is 3.0, reason for this consult. IMPRESSION/RECOMMENDATIONS:      1. YVETTE stage III on CKD;  volume responsive prerenal YVETTE due to poor oral intake in the setting of ACE inhibition, fraction excretion of sodium 0.7%, fractional secretion urea 20.9%. Renal function continues to improve, creatinine 1.5 mg/dL with excellent urine. 2. CKD, stage unknown; Baseline creatinine unknown however does have evidence of proteinuria with 0.5 g of protein per gram of creatinine. Kidney ultrasound with increased renal cortical echogenicity. 3. Hypernatremia, with water deficit; calculated free water deficit is 1.8 L. Resolved   4. HTN, improved; on hydralazine, metoprolol and clonidine patch. 5. Hypokalemia likely 2/2 to poor oral intake. Improved   6. Hypomagnesemia likely 2/2 poor oral intake  7. GPC bacteremia, on piperacillin-tazobactam  8. Severe thrombocytopenia, improving.   9. Nutrition, NPO      Plan:    · Change iv fluids to D5 0.45% + 20 mEq KCL at 75 cc/hour while npo   · Continue  Clonidine patch to 0.1mg/24hr patch  · Continue hydralazine 10mg IV every 6 hours   · Replace Mg  · Continue to monitor BP  · Continue to monitor renal function

## 2020-02-12 NOTE — PROGRESS NOTES
[facility]  Internal Medicine Residency Program  Progress Note  - House Team 1    Patient:  Bret Riojas 59 y.o. male MRN: [id number]     Date of Service: 2/12/2020     CC: fatigue    Subjective       Patient was seen and examined in AM. Was sleeping in bed and breathing 3L NC, pO2at 99%. Patient states feels alright. No complaints. Denies fever, CP, SOB, chills, and N/V.     24 hour change: Stable overnight. No acute overnight issues reported. Objective     Physical Exam:  · Vitals: /78   Pulse 104   Temp 99.3 °F (37.4 °C) (Temporal)   Resp 22   Ht 5' 4\" (1.626 m)   Wt 134 lb 1.6 oz (60.8 kg)   SpO2 99%   BMI 23.02 kg/m²     · I & O - 24hr: I/O this shift:  · In: 511 [I.V.:461; IV Piggyback:50]  · Out: -    General Appearance:   Somnolent, malnutrition,  cooperative, no acute distress. HEENT:   Evidence of partial mandibular resection, mass in right subclavicular region with no tenderness, supple,       Resp:    CTAB, No wheezes, No rhonchi   Heart:    RRR, S1 and S2 normal, no murmur, rub or gallop. Abdomen:     Soft, non tender, BS + all four quadrants, no masses, no organomegaly   Extremities:   Atraumatic, no cyanosis or edema   Pulses:  Radial and pedal pulses are intact bilaterally   Neurologic:  drowsy, follows all commends, no focal weakness     Pertinent Labs & Imaging Studies     CBC:   Recent Labs     02/11/20  0743 02/11/20  2048 02/12/20  0442   WBC 11.7* 9.1 9.7   HGB 9.2* 8.1* 9.5*   HCT 26.4* 23.9* 29.8*   MCV 82.8 83.3 87.6   * 91* 130       BMP:    Recent Labs     02/11/20  0743 02/11/20  1428 02/12/20  0442   * 146 137   K 2.9* 3.3* 3.9   * 110* 105   CO2 24 23 19*   BUN 50* 46* 35*   CREATININE 1.7* 1.7* 1.5*   GLUCOSE 151* 153* 113*       LIVER PROFILE:   No results for input(s): AST, ALT, LIPASE, BILIDIR, BILITOT, ALKPHOS in the last 72 hours. Invalid input(s):   AMYLASE,  ALB    PT/INR:   Recent Labs     02/09/20  1540   PROTIME 16.7*   INR 1.5       APTT:   Recent Labs     02/09/20  1540   APTT 33.2     Cardiac Enzymes:    Lab Results   Component Value Date    CKTOTAL 138 02/06/2020    CKTOTAL 365 (H) 02/05/2020    CKMB 3.6 02/05/2020    TROPONINI 0.03 02/05/2020    TROPONINI 0.04 (H) 02/05/2020    TROPONINI 0.05 (H) 02/05/2020     TSH:    Lab Results   Component Value Date    TSH 1.050 02/08/2020       Imaging studies:     Radiology  Date  Result    barium swallow  2/11  Study is remarkable for silent aspiration with nectar  consistency. cxr  2/10  Tortuous ectatic aorta  Cardiomegaly   Airspace disease compatible with atelectasis or pneumonia, worse at the left lung base as compared to the right  The chest appears to be worse in the interval                   Resident's Assessment and Plan     Toya Herzog is a 59 y.o. male with HTN, laryngeal cancer cancer, s/p laryngectomy (2005), Ctx, Rtx, H/o CVA, active smoker, alcohol use presented with CC of fatigue. Assessment  · Group G Strepococcus bacteremia, unclear source, possible neck abscess? · MSSA PNA  · Acute metabolic encephalopathy 2/2 alcohol withdrawal, bacteremia, and benzodiazepine, off CIWA-resolved  · Dysphagia 2/2 neck mass  · Syncope with non mechanical fall  · YVETTE on CKD - improving   · Thrombocytopenia - possible 2/2 cancer reoccurance   · soft tissue mass 2.3x2.7 cm mass at right carotid artery bifurcation leve -concern for recur vs abscess   · Orthostatic hypotension    Plan  · Will switch from Zosyn to Unasyn per ID. Will switch to Augmentin once PEG tube in. · Will talk with radiologist for [facility]oun[facility] unit for neck mass  · Echo showed no definitive vegetations. But concern for suspicious leaflet thickening.  Consider GERDA  · Get CXR in AM  · For peg, NPO  · On D5 0.45% + 20 mEq KCL at 75 cc/h while NPO   · Consider to restart aspirin and DVT prophylaxis after PEG tube  · Check morning cortisol to r/o adrenal insufficiency   · Oral care   · Tried to obtain records from [facility]) his sister mentioned but no record found    PT/OT evaluation: pending  DVT prophylaxis/ GI prophylaxis: PCD, anticoagulation held due to bleeding from NG attempts/protonix  Disposition: RAFI Miller MD , PGY-3  Attending physician: Dr. Jeronimo Roque  Department of Internal Medicine  Internal Medicine Residency / [address] [facility] [place] [facility]    Attending Physician Statement    Violette Valentin case was discussed, including pertinent history and examination findings with the multidisciplinary team during bedside rounds. I have seen and examined the patient and the key elements of the encounter have been performed by myself. I have read and reviewed the documentation. If needed or disputed the following findings, counseling and treatment options which have been corrected and communicated back to the patient, family if applicable and contributing physicians. I agree with the assessment, plan and orders as noted by the resident.       Damian Lombard, DO , [place]   Professor of Medicine  Pulmonary, [facility]  Internal Medicine Academic Faculty

## 2020-02-12 NOTE — CONSULTS
02/08-02/10, piperacillin-tazobactam since 02/05 then switched to nafcillin on 02/12. ID service was subsequently consulted for further recommendations.     Past Medical History  Past Medical History:   Diagnosis Date    Cancer ([facility] [address]     mouth    Hip fracture ([facility] [address]     Hypertension        Current Facility-Administered Medications   Medication Dose Route Frequency Provider Last Rate Last Dose    dextrose 5 % and 0.45 % NaCl with KCl 20 mEq infusion   Intravenous Continuous Saroj Schumacher MD        nafcillin 2 g in dextrose 5 % 100 mL IVPB (mini-bag)  2 g Intravenous Q4H Jared Maldonado MD        morphine (PF) injection 1 mg  1 mg Intravenous Q5 Min PRN Aidan Osorio MD        morphine (PF) injection 2 mg  2 mg Intravenous Q5 Min PRN Aidan Osorio MD        HYDROmorphone (DILAUDID) injection 0.25 mg  0.25 mg Intravenous Q5 Min PRN Aidan Osorio MD        HYDROmorphone (DILAUDID) injection 0.5 mg  0.5 mg Intravenous Q5 Min PRN Aidan Osorio MD        HYDROcodone-acetaminophen (NORCO) 5-325 MG per tablet 1 tablet  1 tablet Oral PRN Aidan Osorio MD        Or    HYDROcodone-acetaminophen (NORCO) 5-325 MG per tablet 2 tablet  2 tablet Oral PRN Aidan Osorio MD        promethazine (PHENERGAN) injection 6.25 mg  6.25 mg Intravenous Q10 Min PRN Aidan Osorio MD        meperidine (DEMEROL) injection 12.5 mg  12.5 mg Intravenous Q5 Min PRN Aidan Osorio MD        cloNIDine (CATAPRES) 0.1 MG/24HR 1 patch  1 patch Transdermal Weekly Saroj Florence MD   1 patch at 02/11/20 1410    hydrALAZINE (APRESOLINE) injection 10 mg  10 mg Intravenous Q6H Saroj Schumacher MD   10 mg at 02/12/20 2373    perflutren lipid microspheres (DEFINITY) injection 1.65 mg  1.5 mL Intravenous ONCE PRN Sophia Dave., DO        glucose (GLUTOSE) 40 % oral gel 15 g  15 g Oral PRN Sophia Dave., DO        dextrose 50 % IV solution  12.5 g Intravenous PRN Sophiacary Dave., DO Intravenous Q12H Michael Skaggs MD   Stopped at 02/08/20 1725       No Known Allergies    Surgical History  No past surgical history on file. Social History  Social History     Socioeconomic History    Marital status: Single   Tobacco Use    Smoking status: Current Every Day Smoker     Packs/day: 0.25     Years: 40.00     Pack years: 10.00     Types: Cigarettes     Start date: 2/6/1970    Smokeless tobacco: Current User   Substance and Sexual Activity    Alcohol use: Yes     Alcohol/week: 14.0 standard drinks     Types: 6 Glasses of wine, 8 Cans of beer per week     Frequency: 4 or more times a week     Drinks per session: 10 or more     Binge frequency: Weekly    Drug use: Not Currently     Family Medical History  No family history on file. Review of Systems:  Constitutional: No fever, no chills  Eyes: No vision changes, no retroorbital pain  ENT: No hearing changes, no ear pain  Respiratory: No cough, no dyspnea  Cardiovascular: No chest pain, no palpitations  Gastrointestinal: No abdominal pain, no diarrhea  Genitourinary: No dysuria, no hematuria  Integumentary: No rash, no itching  Musculoskeletal: No muscle pain, no joint pain  Neurologic: No headache, no numbness in extremities    Physical Examination:  Vitals:    02/11/20 1921 02/12/20 0000 02/12/20 0245 02/12/20 0930   BP: (!) 138/54 (!) 149/93 (!) 155/98 133/78   Pulse: 101 102 108 104   Resp: 20 20 22   Temp: 99.4 °F (37.4 °C) 99 °F (37.2 °C)  99.3 °F (37.4 °C)   TempSrc: Temporal Temporal  Temporal   SpO2:  99%  99%   Weight:       Height:         Constitutional: Alert, not in distress  Eyes: Sclerae anicteric, no conjunctival erythema  ENT: Has necrotic-appearing tissue on oropharyngeal area  Neck: No nuchal rigidity, no cervical adenopathy  Lungs: Clear breath sounds, no crackles, no wheezes  Heart: Regular rate and rhythm, no murmurs  Abdomen:  Bowel sounds present, soft, nontender  Skin: Warm and dry, no active dermatoses  Musculoskeletal: No joint erythema, no joint swelling    Labs, imaging, and medical records/notes were personally reviewed. Assessment:  Group G Streptococcus bacteremia, etiology unclear, suspect oropharyngeal in etiology with possible aspiration pneumonia  MSSA pneumonia  History of head and neck cancer, s/p resection and chemoradiation therapy (detail unclear)    Plan:  Change nafcillin to ampicillin-sulbactam 3g q8h dosed according to renal function to target dose of 3 g every 6 hours for creatinine clearance of at least 50 mL/min. Plan to switch to amoxicillin-clavulanate once PEG tube in place and functioning. Observe aspiration precautions and oral hygiene. Thank you for involving me in the care of Samuel Kate. I will continue to follow. Please do not hesitate to call for any questions or concerns.     Electronically signed by Yohan Guerrero MD on 2/12/2020 at 2:26 PM

## 2020-02-12 NOTE — ANESTHESIA PRE PROCEDURE
Department of Anesthesiology  Preprocedure Note       Name:  Titus Power   Age:  59 y.o.  :  1955                                          MRN:  [id number]         Date:  2020      Surgeon: Carlton Luna):  Hernán Johnson MD    Procedure: EGD PEG TUBE PLACEMENT (N/A )    Vital Signs (Current)   Vitals:    20 0930   BP: 133/78   Pulse: 104   Resp: 22   Temp: 37.4 °C (99.3 °F)   SpO2: 99%     Vital Signs Statistics (for past 48 hrs)     Temp  Av.9 °C (98.4 °F)  Min: 36.3 °C (97.3 °F)   Min taken time: 02/10/20 1550  Max: 37.4 °C (99.4 °F)   Max taken time: 20 1921  Pulse  Av.5  Min: 80   Min taken time: 20 1135  Max: 115   Max taken time: 02/10/20 1550  Resp  Av.4  Min: 25   Min taken time: 20 0425  Max: 29   Max taken time: 20 0807  BP  Min: 115/77   Min taken time: 20 0425  Max: 176/104   Max taken time: 02/10/20 2000  SpO2  Av.1 %  Min: 98 %   Min taken time: 20 0000  Max: 100 %   Max taken time: 20 1135    BP Readings from Last 3 Encounters:   20 133/78     BMI  Body mass index is 23.02 kg/m². Estimated body mass index is 23.02 kg/m² as calculated from the following:    Height as of this encounter: 5' 4\" (1.626 m). Weight as of this encounter: 134 lb 1.6 oz (60.8 kg).     CBC   Lab Results   Component Value Date    WBC 9.7 2020    RBC 3.40 2020    HGB 9.5 2020    HCT 29.8 2020    MCV 87.6 2020    RDW 17.2 2020     2020     CMP    Lab Results   Component Value Date     2020    K 3.9 2020    K 2.9 2020     2020    CO2 19 2020    BUN 35 2020    CREATININE 1.5 2020    GFRAA 57 2020    LABGLOM 57 2020    GLUCOSE 113 2020    PROT 6.2 2020    CALCIUM 8.0 2020    BILITOT 1.5 2020    ALKPHOS 36 2020    AST 83 2020    ALT 21 2020     BMP    Lab Results   Component Value Date    NA Intravenous ONCE PRN Lorie Post., DO        glucose (GLUTOSE) 40 % oral gel 15 g  15 g Oral PRN Lorie Post., DO        dextrose 50 % IV solution  12.5 g Intravenous PRN Lorie Post., DO        glucagon (rDNA) injection 1 mg  1 mg Intramuscular PRN Lorie Post., DO        dextrose 5 % solution  100 mL/hr Intravenous PRN Lorie Post., DO        hydrALAZINE (APRESOLINE) injection 10 mg  10 mg Intravenous Q4H PRN Lorie Post., DO   10 mg at 02/10/20 2053    0.9 % sodium chloride infusion admixture  50 mL Intravenous Q8H Lorie Post., DO 12.5 mL/hr at 02/12/20 0441 50 mL at 02/12/20 0441    pantoprazole (PROTONIX) injection 40 mg  40 mg Intravenous BID Pranav Vega MD   40 mg at 02/12/20 2845    And    sodium chloride (PF) 0.9 % injection 10 mL  10 mL Intravenous Daily Pranav Vega MD   10 mL at 02/12/20 0954    ipratropium-albuterol (DUONEB) nebulizer solution 1 ampule  1 ampule Inhalation Q4H WA Berenice Chaudhary MD   1 ampule at 02/12/20 1207    sodium chloride (Inhalant) 3 % nebulizer solution 4 mL  4 mL Nebulization BID Joey Jason MD   4 mL at 02/11/20 2127    thiamine (B-1) injection 100 mg  100 mg Intravenous Daily Joey Jason MD   100 mg at 02/12/20 0939    labetalol (NORMODYNE;TRANDATE) injection 10 mg  10 mg Intravenous Q6H PRN Singh Waddell MD   10 mg at 02/11/20 1509    sodium chloride flush 0.9 % injection 10 mL  10 mL Intravenous 2 times per day Lorie Post., DO   10 mL at 02/12/20 8931    sodium chloride flush 0.9 % injection 10 mL  10 mL Intravenous PRN Lorie Post., DO   10 mL at 98/35/26 6229    folic acid injection 1 mg  1 mg Intravenous Daily Lorie Post., DO   1 mg at 02/12/20 3233    0.9 % sodium chloride bolus  1,000 mL Intravenous Once Fanny Salas MD        magnesium hydroxide (MILK OF MAGNESIA) 400 MG/5ML suspension 30 mL  30 mL Oral Daily PRN Fanny Salas MD        [Held by provider] aspirin EC tablet 81 mg  81 mg Oral Daily Deb Sierra MD        metoprolol (LOPRESSOR) injection 5 mg  5 mg Intravenous Q6H Mu Owen MD   5 mg at 02/12/20 3948    0.9 % sodium chloride infusion admixture   Intravenous Q12H Mu Owen MD   Stopped at 02/08/20 1725       Allergies:  No Known Allergies    Problem List:    Patient Active Problem List   Diagnosis Code    Acute renal failure (ARF) ([facility] N17.9    Bacteremia R78.81    Other dysphagia R13.19    Severe protein-calorie malnutrition ([facility] E43       Past Medical History:        Diagnosis Date    Cancer ([facility]     mouth    [facility]     Hypertension        Past Surgical History:  No past surgical history on file. Social History:    Social History     Tobacco Use    Smoking status: Current Every Day Smoker     Packs/day: 0.25     Years: 40.00     Pack years: 10.00     Types: Cigarettes     Start date: 2/6/1970    Smokeless tobacco: Current User   Substance Use Topics    Alcohol use: Yes     Alcohol/week: 14.0 standard drinks     Types: 6 Glasses of wine, 8 Cans of beer per week     Frequency: 4 or more times a week     Drinks per session: 10 or more     Binge frequency: Weekly                                Ready to quit: No  Counseling given: Yes      Vital Signs (Current):   Vitals:    02/11/20 1921 02/12/20 0000 02/12/20 0245 02/12/20 0930   BP: (!) 138/54 (!) 149/93 (!) 155/98 133/78   Pulse: 101 102 108 104   Resp: 20 20 22   Temp: 37.4 °C (99.4 °F) 37.2 °C (99 °F)  37.4 °C (99.3 °F)   TempSrc: Temporal Temporal  Temporal   SpO2:  99%  99%   Weight:       Height:                                                  BP Readings from Last 3 Encounters:   02/12/20 133/78       NPO Status:                                                                                 BMI:   Wt Readings from Last 3 Encounters:   02/10/20 134 lb 1.6 oz (60.8 kg)     Body mass index is 23.02 kg/m².     CBC:   Lab Results   Component Value Date    WBC 9.7 discussed with attending. TAI Tavarez CRNA   2/12/2020        Pt seen, examined, chart reviewed, plan discussed.   Nicholas Villanueva  2/12/2020  2:17 PM

## 2020-02-12 NOTE — PROGRESS NOTES
Int med resident notified we are unable to obtain medical records at this time, no record of patient at facilities mentioned and previous Dr doty

## 2020-02-12 NOTE — ANESTHESIA POSTPROCEDURE EVALUATION
Department of Anesthesiology  Postprocedure Note    Patient: Dang Arcos  MRN: [id number]  YOB: 1955  Date of evaluation: 2/13/2020  Time:  7:07 AM     Procedure Summary     Date:  02/12/20 Room / Location:  [facility] 01 / [facility]    Anesthesia Start:   Anesthesia Stop:      Procedure:  EGD PEG TUBE PLACEMENT (N/A ) Diagnosis:  (dysphagia)    Surgeon:  Pranav Weeks MD Responsible Provider:      Anesthesia Type:  MAC ASA Status:  3          Anesthesia Type: MAC    Cassius Phase I:      Cassius Phase II:      Last vitals: Reviewed and per EMR flowsheets.        Anesthesia Post Evaluation    Patient location during evaluation: PACU  Patient participation: complete - patient participated  Level of consciousness: awake  Pain score: 3  Airway patency: patent  Nausea & Vomiting: no nausea and no vomiting  Complications: no  Cardiovascular status: blood pressure returned to baseline  Respiratory status: acceptable  Hydration status: euvolemic

## 2020-02-12 NOTE — CARE COORDINATION
[facility] plans to accept at discharge. Patient for PEG today. Facility to initiate precert today. HENS and ambulance on soft chart.

## 2020-02-12 NOTE — CARE COORDINATION
Spoke to representative with [facility], precert obtained to [facility]. Auth good for 14 days, 2/12-2/25. Jenna notified.

## 2020-02-12 NOTE — PROGRESS NOTES
31.9 02/12/2020    RDW 17.2 02/12/2020    NRBC 0.9 02/06/2020    METASPCT 0.9 02/09/2020    LYMPHOPCT 8.9 02/12/2020    MONOPCT 11.8 02/12/2020    MYELOPCT 1.7 02/11/2020    BASOPCT 0.2 02/12/2020    MONOSABS 1.15 02/12/2020    LYMPHSABS 0.87 02/12/2020    EOSABS 0.02 02/12/2020    BASOSABS 0.02 02/12/2020     CMP:    Lab Results   Component Value Date     02/12/2020    K 3.9 02/12/2020    K 2.9 02/11/2020     02/12/2020    CO2 19 02/12/2020    BUN 35 02/12/2020    CREATININE 1.5 02/12/2020    GFRAA 57 02/12/2020    LABGLOM 57 02/12/2020    GLUCOSE 113 02/12/2020    PROT 6.2 02/06/2020    LABALBU 2.6 02/06/2020    CALCIUM 8.0 02/12/2020    BILITOT 1.5 02/06/2020    ALKPHOS 36 02/06/2020    AST 83 02/06/2020    ALT 21 02/06/2020     CT soft tissue neck: 2/9  Probable prior right radical neck dissection with sequela   of prior surgery and radiation therapy. There is extensive soft tissue   distortion which is suggestive of previous therapy   Finds compatible with a approximately 1 cm right thyroid nodule   There is evidence to suggest severe stenosis of the right distal   internal carotid artery   Soft tissue mass at the level the carotid bifurcation on the right   with outward mass effect measuring approximately 2.3 x 2.7 cm   suspicious for recurrent disease. This encases the carotid distally   and the distal carotid stenosis may represent invasion. Comparison   studies would be necessary to distinguish recurrent neoplasm from   previous radiation therapy.    Tumor invasion versus osteonecrosis of the medial right mandible      CXR 2/10  Tortuous ectatic aorta   Cardiomegaly    Airspace disease compatible with atelectasis or pneumonia, worse at   the left lung base as compared to the right   The chest appears to be worse in the interval     Assessment and Plan     Jaylyn Mckeon is a 59 y.o. male, with PMH laryngeal cancer cancer, s/p laryngectomy (2005), chemo and radiotherapy, H/o CVA, active smoker, alcohol use presented with the complaints of fatigue. 1. S/p fall - likely secondary to metabolic encephalopathy   · Denies LOC, endorses light headedness prior to fall  · Has remote history of seizure, high school - not on meds  · History of alcohol use, last drink on 2/3/2020  · UA positive for hematuria  · EEG - \"mid diffuse encephalopathy\"  · Seizure precautions discontinued      2. Sepsis bacteremia   · Blood cultures positive for gram positive bacteria in chains - 2/5  · PCR - group G streptococcus - 2/5  · CXR - unremarkable   · Procalcitonin >100  · Lactic acid elevated - resolved with IV fluids   · Respiratory panel negative - 2/7  · Respiratory culture positive for Staph aureus - 2/7  · Repeat blood cultures negative - 2/9 and 2/11  · Discontinued zosyn  · Started Unasyn 3 g Q6h           3. Positive respiratory culture   · Culture (2/10) positive for Staph aureus, candida albicans and other non-candida yeast   · Zosyn discontinued - 2/12  · Started Unasyn 3 g Q6h  · CXR ordered     4. YVETTE  · BUN 35, Cr 1.5 - baseline creatinine unknown   · Per Nephrology - \"likely volume responsive prerenal YVETTE\"  · Renal function improving with IV hydration,  will continue   · Renal US - \"probably bilateral medical renal disease w/some perineohric fluid  bilaterally that could indicate inflammation\"   · Fraction excretion of sodium 0.7%   · Fractional secretion urea 20.9%  · C3 64  · Protein urea 35  · Microalbuminuria 55.9   · Avoid nephrotoxin medications                 5. Thrombocytopenia in the setting of alcohol use   · Platelets 857 - trending up  · Continue folic acid and thiamine supplementation   · Will hold aspirin   · HIV negative;  Hep negative                 6. Hx cancer - laryngeal/jaw and esophageal?  · Hoarseness, slurred speech, dysphasia  · Hx of laryngectomy vs partial R mandible removal - Past surgical notes requested   · Hx chemo and radiation last noted 2 yrs prior   · CT neck: \"Soft tissue mass

## 2020-02-13 ENCOUNTER — APPOINTMENT (OUTPATIENT)
Dept: GENERAL RADIOLOGY | Age: 65
DRG: 870 | End: 2020-02-13
Payer: MEDICARE

## 2020-02-13 LAB
ANION GAP SERPL CALCULATED.3IONS-SCNC: 12 MMOL/L (ref 7–16)
ANION GAP SERPL CALCULATED.3IONS-SCNC: 12 MMOL/L (ref 7–16)
BASOPHILS ABSOLUTE: 0.02 E9/L (ref 0–0.2)
BASOPHILS RELATIVE PERCENT: 0.2 % (ref 0–2)
BLOOD CULTURE, ROUTINE: NORMAL
BUN BLDV-MCNC: 28 MG/DL (ref 8–23)
BUN BLDV-MCNC: 29 MG/DL (ref 8–23)
CALCIUM SERPL-MCNC: 8 MG/DL (ref 8.6–10.2)
CALCIUM SERPL-MCNC: 8.1 MG/DL (ref 8.6–10.2)
CHLORIDE BLD-SCNC: 108 MMOL/L (ref 98–107)
CHLORIDE BLD-SCNC: 109 MMOL/L (ref 98–107)
CO2: 22 MMOL/L (ref 22–29)
CO2: 24 MMOL/L (ref 22–29)
CORTISOL TOTAL: 20.71 MCG/DL (ref 2.68–18.4)
CREAT SERPL-MCNC: 1.4 MG/DL (ref 0.7–1.2)
CREAT SERPL-MCNC: 1.4 MG/DL (ref 0.7–1.2)
CULTURE, BLOOD 2: NORMAL
EOSINOPHILS ABSOLUTE: 0.01 E9/L (ref 0.05–0.5)
EOSINOPHILS RELATIVE PERCENT: 0.1 % (ref 0–6)
GFR AFRICAN AMERICAN: >60
GFR AFRICAN AMERICAN: >60
GFR NON-AFRICAN AMERICAN: >60 ML/MIN/1.73
GFR NON-AFRICAN AMERICAN: >60 ML/MIN/1.73
GLUCOSE BLD-MCNC: 126 MG/DL (ref 74–99)
GLUCOSE BLD-MCNC: 141 MG/DL (ref 74–99)
HCT VFR BLD CALC: 24.3 % (ref 37–54)
HEMOGLOBIN: 8 G/DL (ref 12.5–16.5)
IMMATURE GRANULOCYTES #: 0.31 E9/L
IMMATURE GRANULOCYTES %: 2.5 % (ref 0–5)
LYMPHOCYTES ABSOLUTE: 0.8 E9/L (ref 1.5–4)
LYMPHOCYTES RELATIVE PERCENT: 6.4 % (ref 20–42)
MCH RBC QN AUTO: 28.2 PG (ref 26–35)
MCHC RBC AUTO-ENTMCNC: 32.9 % (ref 32–34.5)
MCV RBC AUTO: 85.6 FL (ref 80–99.9)
METER GLUCOSE: 127 MG/DL (ref 74–99)
METER GLUCOSE: 152 MG/DL (ref 74–99)
MONOCYTES ABSOLUTE: 1.08 E9/L (ref 0.1–0.95)
MONOCYTES RELATIVE PERCENT: 8.7 % (ref 2–12)
NEUTROPHILS ABSOLUTE: 10.22 E9/L (ref 1.8–7.3)
NEUTROPHILS RELATIVE PERCENT: 82.1 % (ref 43–80)
PDW BLD-RTO: 16.5 FL (ref 11.5–15)
PLATELET # BLD: 107 E9/L (ref 130–450)
PMV BLD AUTO: 11.7 FL (ref 7–12)
POTASSIUM SERPL-SCNC: 3.3 MMOL/L (ref 3.5–5)
POTASSIUM SERPL-SCNC: 3.8 MMOL/L (ref 3.5–5)
RBC # BLD: 2.84 E12/L (ref 3.8–5.8)
SODIUM BLD-SCNC: 142 MMOL/L (ref 132–146)
SODIUM BLD-SCNC: 145 MMOL/L (ref 132–146)
WBC # BLD: 12.4 E9/L (ref 4.5–11.5)

## 2020-02-13 PROCEDURE — 94668 MNPJ CHEST WALL SBSQ: CPT

## 2020-02-13 PROCEDURE — 36415 COLL VENOUS BLD VENIPUNCTURE: CPT

## 2020-02-13 PROCEDURE — 99231 SBSQ HOSP IP/OBS SF/LOW 25: CPT | Performed by: SURGERY

## 2020-02-13 PROCEDURE — 2500000003 HC RX 250 WO HCPCS: Performed by: STUDENT IN AN ORGANIZED HEALTH CARE EDUCATION/TRAINING PROGRAM

## 2020-02-13 PROCEDURE — 6370000000 HC RX 637 (ALT 250 FOR IP): Performed by: INTERNAL MEDICINE

## 2020-02-13 PROCEDURE — 71045 X-RAY EXAM CHEST 1 VIEW: CPT

## 2020-02-13 PROCEDURE — 97110 THERAPEUTIC EXERCISES: CPT

## 2020-02-13 PROCEDURE — 6360000002 HC RX W HCPCS: Performed by: INTERNAL MEDICINE

## 2020-02-13 PROCEDURE — 2580000003 HC RX 258: Performed by: STUDENT IN AN ORGANIZED HEALTH CARE EDUCATION/TRAINING PROGRAM

## 2020-02-13 PROCEDURE — 97535 SELF CARE MNGMENT TRAINING: CPT

## 2020-02-13 PROCEDURE — 6360000002 HC RX W HCPCS: Performed by: STUDENT IN AN ORGANIZED HEALTH CARE EDUCATION/TRAINING PROGRAM

## 2020-02-13 PROCEDURE — 85025 COMPLETE CBC W/AUTO DIFF WBC: CPT

## 2020-02-13 PROCEDURE — C9113 INJ PANTOPRAZOLE SODIUM, VIA: HCPCS | Performed by: STUDENT IN AN ORGANIZED HEALTH CARE EDUCATION/TRAINING PROGRAM

## 2020-02-13 PROCEDURE — 94640 AIRWAY INHALATION TREATMENT: CPT

## 2020-02-13 PROCEDURE — 2060000000 HC ICU INTERMEDIATE R&B

## 2020-02-13 PROCEDURE — 6370000000 HC RX 637 (ALT 250 FOR IP): Performed by: STUDENT IN AN ORGANIZED HEALTH CARE EDUCATION/TRAINING PROGRAM

## 2020-02-13 PROCEDURE — 82962 GLUCOSE BLOOD TEST: CPT

## 2020-02-13 PROCEDURE — 2700000000 HC OXYGEN THERAPY PER DAY

## 2020-02-13 PROCEDURE — 82533 TOTAL CORTISOL: CPT

## 2020-02-13 PROCEDURE — 80048 BASIC METABOLIC PNL TOTAL CA: CPT

## 2020-02-13 PROCEDURE — 99233 SBSQ HOSP IP/OBS HIGH 50: CPT | Performed by: INTERNAL MEDICINE

## 2020-02-13 PROCEDURE — 92526 ORAL FUNCTION THERAPY: CPT

## 2020-02-13 PROCEDURE — 97530 THERAPEUTIC ACTIVITIES: CPT

## 2020-02-13 RX ORDER — HYDRALAZINE HYDROCHLORIDE 25 MG/1
25 TABLET, FILM COATED ORAL EVERY 8 HOURS SCHEDULED
Status: DISCONTINUED | OUTPATIENT
Start: 2020-02-13 | End: 2020-02-29

## 2020-02-13 RX ORDER — FOLIC ACID 1 MG/1
1 TABLET ORAL DAILY
Status: DISCONTINUED | OUTPATIENT
Start: 2020-02-14 | End: 2020-02-25

## 2020-02-13 RX ORDER — POTASSIUM CHLORIDE 7.45 MG/ML
10 INJECTION INTRAVENOUS
Status: COMPLETED | OUTPATIENT
Start: 2020-02-13 | End: 2020-02-13

## 2020-02-13 RX ORDER — AMOXICILLIN AND CLAVULANATE POTASSIUM 250; 62.5 MG/5ML; MG/5ML
500 POWDER, FOR SUSPENSION ORAL EVERY 8 HOURS
Status: DISCONTINUED | OUTPATIENT
Start: 2020-02-13 | End: 2020-02-18 | Stop reason: SDUPTHER

## 2020-02-13 RX ORDER — METOPROLOL TARTRATE 50 MG/1
50 TABLET, FILM COATED ORAL 2 TIMES DAILY
Status: DISCONTINUED | OUTPATIENT
Start: 2020-02-13 | End: 2020-02-29

## 2020-02-13 RX ORDER — HYDRALAZINE HYDROCHLORIDE 25 MG/1
25 TABLET, FILM COATED ORAL EVERY 8 HOURS SCHEDULED
Status: DISCONTINUED | OUTPATIENT
Start: 2020-02-13 | End: 2020-02-13

## 2020-02-13 RX ORDER — METOPROLOL TARTRATE 50 MG/1
50 TABLET, FILM COATED ORAL 2 TIMES DAILY
Status: DISCONTINUED | OUTPATIENT
Start: 2020-02-13 | End: 2020-02-13

## 2020-02-13 RX ORDER — THIAMINE MONONITRATE (VIT B1) 100 MG
100 TABLET ORAL DAILY
Status: DISCONTINUED | OUTPATIENT
Start: 2020-02-14 | End: 2020-02-25

## 2020-02-13 RX ORDER — AMOXICILLIN AND CLAVULANATE POTASSIUM 875; 125 MG/1; MG/1
1 TABLET, FILM COATED ORAL EVERY 12 HOURS SCHEDULED
Status: DISCONTINUED | OUTPATIENT
Start: 2020-02-13 | End: 2020-02-13

## 2020-02-13 RX ORDER — LANSOPRAZOLE
30 KIT
Status: DISCONTINUED | OUTPATIENT
Start: 2020-02-14 | End: 2020-02-29

## 2020-02-13 RX ORDER — ASPIRIN 81 MG/1
81 TABLET, CHEWABLE ORAL DAILY
Status: DISCONTINUED | OUTPATIENT
Start: 2020-02-14 | End: 2020-02-29

## 2020-02-13 RX ORDER — AMLODIPINE BESYLATE 5 MG/1
5 TABLET ORAL DAILY
Status: DISCONTINUED | OUTPATIENT
Start: 2020-02-13 | End: 2020-02-13

## 2020-02-13 RX ORDER — AMLODIPINE BESYLATE 10 MG/1
10 TABLET ORAL DAILY
Status: DISCONTINUED | OUTPATIENT
Start: 2020-02-14 | End: 2020-02-13

## 2020-02-13 RX ADMIN — SODIUM CHLORIDE, PRESERVATIVE FREE 10 ML: 5 INJECTION INTRAVENOUS at 10:05

## 2020-02-13 RX ADMIN — METOPROLOL TARTRATE 5 MG: 5 INJECTION INTRAVENOUS at 10:04

## 2020-02-13 RX ADMIN — POTASSIUM CHLORIDE 10 MEQ: 10 INJECTION, SOLUTION INTRAVENOUS at 14:40

## 2020-02-13 RX ADMIN — IPRATROPIUM BROMIDE AND ALBUTEROL SULFATE 1 AMPULE: 2.5; .5 SOLUTION RESPIRATORY (INHALATION) at 07:47

## 2020-02-13 RX ADMIN — SODIUM CHLORIDE, PRESERVATIVE FREE 10 ML: 5 INJECTION INTRAVENOUS at 14:38

## 2020-02-13 RX ADMIN — SODIUM CHLORIDE: 9 INJECTION, SOLUTION INTRAVENOUS at 01:41

## 2020-02-13 RX ADMIN — Medication 10 ML: at 10:03

## 2020-02-13 RX ADMIN — IPRATROPIUM BROMIDE AND ALBUTEROL SULFATE 1 AMPULE: 2.5; .5 SOLUTION RESPIRATORY (INHALATION) at 15:28

## 2020-02-13 RX ADMIN — PANTOPRAZOLE SODIUM 40 MG: 40 INJECTION, POWDER, FOR SOLUTION INTRAVENOUS at 10:04

## 2020-02-13 RX ADMIN — SODIUM CHLORIDE SOLN NEBU 3% 4 ML: 3 NEBU SOLN at 07:47

## 2020-02-13 RX ADMIN — POTASSIUM BICARBONATE 20 MEQ: 782 TABLET, EFFERVESCENT ORAL at 14:38

## 2020-02-13 RX ADMIN — SODIUM CHLORIDE SOLN NEBU 3% 4 ML: 3 NEBU SOLN at 20:03

## 2020-02-13 RX ADMIN — METOPROLOL TARTRATE 50 MG: 50 TABLET, FILM COATED ORAL at 21:17

## 2020-02-13 RX ADMIN — IPRATROPIUM BROMIDE AND ALBUTEROL SULFATE 1 AMPULE: 2.5; .5 SOLUTION RESPIRATORY (INHALATION) at 20:03

## 2020-02-13 RX ADMIN — HYDRALAZINE HYDROCHLORIDE 10 MG: 20 INJECTION INTRAMUSCULAR; INTRAVENOUS at 10:04

## 2020-02-13 RX ADMIN — IPRATROPIUM BROMIDE AND ALBUTEROL SULFATE 1 AMPULE: 2.5; .5 SOLUTION RESPIRATORY (INHALATION) at 11:05

## 2020-02-13 RX ADMIN — HYDRALAZINE HYDROCHLORIDE 10 MG: 20 INJECTION INTRAMUSCULAR; INTRAVENOUS at 01:38

## 2020-02-13 RX ADMIN — AMOXICILLIN AND CLAVULANATE POTASSIUM 500 MG: 250; 62.5 POWDER, FOR SUSPENSION ORAL at 17:36

## 2020-02-13 RX ADMIN — HYDRALAZINE HYDROCHLORIDE 25 MG: 25 TABLET, FILM COATED ORAL at 21:17

## 2020-02-13 RX ADMIN — SODIUM CHLORIDE, PRESERVATIVE FREE 10 ML: 5 INJECTION INTRAVENOUS at 10:04

## 2020-02-13 RX ADMIN — POTASSIUM CHLORIDE 10 MEQ: 10 INJECTION, SOLUTION INTRAVENOUS at 16:28

## 2020-02-13 RX ADMIN — METOPROLOL TARTRATE 5 MG: 5 INJECTION INTRAVENOUS at 04:43

## 2020-02-13 RX ADMIN — AMPICILLIN SODIUM AND SULBACTAM SODIUM 3 G: 2; 1 INJECTION, POWDER, FOR SOLUTION INTRAMUSCULAR; INTRAVENOUS at 06:27

## 2020-02-13 RX ADMIN — AMPICILLIN SODIUM AND SULBACTAM SODIUM 3 G: 2; 1 INJECTION, POWDER, FOR SOLUTION INTRAMUSCULAR; INTRAVENOUS at 01:14

## 2020-02-13 RX ADMIN — THIAMINE HYDROCHLORIDE 100 MG: 100 INJECTION, SOLUTION INTRAMUSCULAR; INTRAVENOUS at 10:04

## 2020-02-13 RX ADMIN — HYDRALAZINE HYDROCHLORIDE 10 MG: 20 INJECTION INTRAMUSCULAR; INTRAVENOUS at 14:38

## 2020-02-13 RX ADMIN — FOLIC ACID 1 MG: 5 INJECTION, SOLUTION INTRAMUSCULAR; INTRAVENOUS; SUBCUTANEOUS at 10:06

## 2020-02-13 ASSESSMENT — PAIN SCALES - GENERAL
PAINLEVEL_OUTOF10: 0

## 2020-02-13 NOTE — PROGRESS NOTES
[facility] - [facility] TEAM  TRAUMA/GENERAL SURGERY  ATTENDING PROGRESS NOTE      CC:   Postop visit    S:  Went for uncomplicated PEG yesterday, on tube feeds, no reported problems with PEG      O:  Vitals:    02/13/20 0012 02/13/20 0441 02/13/20 0710 02/13/20 0720   BP: (!) 143/73 139/83  122/77   Pulse: 99 104  109   Resp: 18 20  20   Temp: 98.7 °F (37.1 °C) 99 °F (37.2 °C)  99.4 °F (37.4 °C)   TempSrc: Temporal Temporal  Temporal   SpO2: 96% 95%  99%   Weight:   124 lb 3.2 oz (56.3 kg)    Height:            I/O last 3 completed shifts:   In: 7781 [I.V.:1136; NG/GT:261; IV Piggyback:150]  Out: 1250 [Urine:1250]    General - no apparent distress   Neuro - Awake, alert, attentive   Abdomen -PEG secure in epigastrium at 2 cm, non distended, nontender    A/P:    Status post uncomplicated PEG 9/85- continue feeds per primary service    Disposition: Surgery will sign off, please call if any problems with PEG

## 2020-02-13 NOTE — PROGRESS NOTES
Physical Therapy     Name: Willian Spaulding  : 1955  MRN: [id number]    Referring Provider:  Pradip Nobles DO    Date of Service: 2020    Evaluating PT:  Kumar Delcid PT, DPT    Room #:  0717/1965-J  Diagnosis:  Acute renal failure  PMHx/PSHx:  Mouth/throat Cancer  Procedure/Surgery:  Peg tube   Precautions:  Falls, Seizure pads, O2  Equipment Needs:  TBD    SUBJECTIVE:    Pt lives with ? in a ? story home with ? stairs to enter and ? rail. Patient lives in [place] and per CM note was independent prior. Patient not able to provide home set up. Pt ambulated with a [employer] PTA. OBJECTIVE:   Initial Evaluation  Date: 2/10/2020 Treatment   Short Term/ Long Term   Goals   AM-PAC 6 Clicks     Was pt agreeable to Eval/treatment? Yes  yes    Does pt have pain? No indications or c/o pain. Peg tube soreness    Bed Mobility  Rolling: Max A  Supine to sit: Max A  Sit to supine: Max A  Scooting: Max A Rolling:mod a  Supine <>sit:mod a  Scooting;max a Min A   Transfers Sit to stand: Max A  Stand to sit: Max A  Stand pivot: NT NT;see note Mod A with [employer]   Ambulation    1 step laterally with no AD and Mod A +2 NT >50 feet with WW with Mod A   Stair negotiation: ascended and descended  NT NT 4 steps with 1 rail Min A   ROM BUE:  WFL  BLE:  WFL     Strength BUE:  Per OT note  BLE:  3-/5  3+/5   Balance Sitting EOB:  SBA  Dynamic Standing: Max A  Sitting EOB:  Supervision  Dynamic Standing:  Min/Mod A with [employer]       Therapeutic Exercises:  10x of the following:ankle pumps, glute sets, quad sets, LAQ, 5x B UE flex/ext at elbow, cervical rotation/flex/ext with min a to reach end range    Patient education  Pt educated on importance of mobility and therapy    Patient response to education:   Pt verbalized understanding Pt demonstrated skill Pt requires further education in this area   x x x     ASSESSMENT:    Comments:  Pt supine upon entering room.   Sitting EOB pt's HR increased to 152 per nursing;pt unable to stand at this time however scooted along EOB towards [facility]. Pt returned to bed at end of session d/t fatigue and increased HR. Treatment:  Patient practiced and was instructed in the following treatment:    Therapeutic Activity:Pt sat EOB focusing on dynamic/static sitting balance. Pt coughing up small amount of blood with sputum upon sitting EOB;nursing notified. Pt unable to stand at this time d/t fatigue. Educated pt on correct technique to scoot along EOB towards [facility]. Educated pt on weight shifting throughout the day to prevent skin breakdown and importance of TAPS. Elevated pt's B UE on pillows d/t breakdown on elbows.  Therapeutic Exercise: As noted above to increase strength and ROM. Pt demo decreased ROM in cervical area this AM;educated on multiple exercises to perform throughout the day to increase ROM and soreness. PLAN:    Patient is making limited progress towards established goals. Will continue with current POC.       Time in  1005  Time out  1030    Total Treatment Time  25 minutes     CPT codes:  [] Gait training 82747  minutes  [] Manual therapy 76632 minutes  [x] Therapeutic activities 67212 15 minutes  [x] Therapeutic exercises 40356 8 minutes  [] Neuromuscular reeducation 37943 minutes    Cristine Green PTA [id number]

## 2020-02-13 NOTE — PROGRESS NOTES
32.9 02/13/2020    RDW 16.5 02/13/2020    NRBC 0.9 02/06/2020    METASPCT 0.9 02/09/2020    LYMPHOPCT 6.4 02/13/2020    MONOPCT 8.7 02/13/2020    MYELOPCT 1.7 02/11/2020    BASOPCT 0.2 02/13/2020    MONOSABS 1.08 02/13/2020    LYMPHSABS 0.80 02/13/2020    EOSABS 0.01 02/13/2020    BASOSABS 0.02 02/13/2020     CMP:    Lab Results   Component Value Date     02/13/2020    K 3.3 02/13/2020    K 2.9 02/11/2020     02/13/2020    CO2 22 02/13/2020    BUN 29 02/13/2020    CREATININE 1.4 02/13/2020    GFRAA >60 02/13/2020    LABGLOM >60 02/13/2020    GLUCOSE 141 02/13/2020    PROT 6.2 02/06/2020    LABALBU 2.6 02/06/2020    CALCIUM 8.0 02/13/2020    BILITOT 1.5 02/06/2020    ALKPHOS 36 02/06/2020    AST 83 02/06/2020    ALT 21 02/06/2020         Assessment and Plan     Karsten Viveros is a 59 y.o. male with PMH laryngeal cancer cancer, s/p laryngectomy (2005), chemo and radiotherapy, H/o CVA, active smoker, alcohol use presented with the complaints of fatigue. 1. S/p fall - likely secondary to metabolic encephalopathy  · Denies LOC, endorses light headedness prior to fall  · Has remote history of seizure, high school - not on meds  · History of alcohol use, last drink on 2/3/2020  · UA positive for hematuria  · EEG - \"mid diffuse encephalopathy\"    2. Sepsis bacteremia   · Blood cultures positive for gram positive bacteria in chains - 2/5  · PCR - group G streptococcus - 2/5  · CXR - unremarkable   · Procalcitonin >100  · Lactic acid elevated - resolved with IV fluids   · Respiratory panel negative - 2/7  · Respiratory culture positive for Staph aureus - 2/7  · Repeat blood cultures negative - 2/9 and 2/11  · Zosyn discontinued - 2/12  · Ampicillin-sulbactam 3 g IV Q8h -discontinued      3.  Positive respiratory culture   · Culture (2/10) positive for Staph aureus, candida albicans and other non-candida yeast   · Zosyn discontinued - 2/12  · Ampicillin-sulbactam 3 g IV Q8h -discontinued · Amoxicillin-clavulanate (AUGMENTIN) 875-125 MG per tablet 1 tablet BID started      4.  YVETTE  · BUN 31, Cr 1.6 - baseline creatinine unknown   · Per Nephrology - \"likely volume responsive prerenal YVETTE\"  · Renal function improving with IV hydration,  will continue   · Renal US - \"probably bilateral medical renal disease w/some perineohric fluid  bilaterally that could indicate inflammation\"   · Fraction excretion of sodium 0.7%   · Fractional secretion urea 20.9%  · C3 64  · Protein urea 35  · Microalbuminuria 55.9   · Avoid nephrotoxin medications      5. Thrombocytopenia in the setting of alcohol use - resolved   · Platelets 130 - trending up  · Continue folic acid and thiamine supplementation   · Will hold aspirin   · HIV negative; Hep negative      6. Hx cancer - laryngeal/jaw and esophageal?  · Hoarseness, slurred speech, dysphasia  · Hx of laryngectomy vs partial R mandible removal - Past surgical notes requested   · Hx chemo and radiation last noted 2 yrs prior   · CT neck: \"Soft tissue mass at the level pf the carotid bifurcation on the right with outward mass effect measuring approximately 2.3 x 2.7 cm suspicious of recurrent disease. This encases the carotid distally. ..tumor invasion versus osteonecrosis of the medial right mandible\"   · ENT following - recommend transfer to tertiary center for further evaluation due to size of mass;  \"Recommend NO nasal manipulation (JOSEFINA Sarah) as L is severely deviated and R is excoriated, further manipulation could cause devastating epistaxis in the setting of severe thrombocytopenia\"  · US of mass ordered      7. Dysphasia   · Modified barium swallow w/video failed 2/8 - per speech pathology \"Severe oropharyngeal dysphagia-absent pharyngeal swallow note with pudding;  pt needed to be suctioned by radiologist following presentation due to inability to initiate pharyngeal swallow\"  ·  Modified barium swallow w/video failed 2/12  · PEG tube placed - 1/12  · Tube feeds started   · ? Restart Aspirin and Pepcid      8. Alcohol Abuse  · Started ciwa on 2/6 - very sedated - ciwa discontinued  · Ativan 1 mg PRN - discontinue as of 2/10     9. Essential HTN  · 5 mg Metoprolol q6h  · 0.1 mg Clonidine patch started 2/10   · Hydralazine 10 mg injection Q4h PRN and Labetalol 10 mg Q6h PRN   · Hold ACEi in the setting of YVETTE  · Orthostatics positive - sitting BP: 161/121, HR: 96; standing BP: 126/81, HR: 102, pt. dizzy   · Adrenal insufficiency? - cortisol level ordered   · Echo completed 2/12 - EF 60-65%, R and L ventricles normal, no wall motion abnormalities      10. Hypomagnesemia  - resolved   · Mg 1.9  · Mg replaced  · Will continue to follow Mg level     11. Rhabdomyolysis? - resolved   · UA positive for hematuria - myoglobinuria  · CK: 138 - 2/6/2020    12.  Hypokalemia - likely 2/2 poor oral intake   · Potasium 2.9 - replacement initiated   · Will continue to monitor     PT/OT evaluation:  DVT prophylaxis/ GI prophylaxis:   Disposition: home +/- home health / Jones Hart / Marychuy Reynolds / Mariano Moran, PGY-1  Attending physician: Dr. Lear Alpers

## 2020-02-13 NOTE — PROGRESS NOTES
Department of Internal Medicine  Nephrology Attending Progress Note    Events reviewed. SUBJECTIVE: We are following Mr. Alice Giordano for YVETTE on CKD. Patient seen and examined bedside. PEG placed yesterday. PHYSICAL EXAM:      Vitals:    VITALS:  /68   Pulse 107   Temp 98 °F (36.7 °C) (Oral)   Resp 20   Ht 5' 4\" (1.626 m)   Wt 124 lb 3.2 oz (56.3 kg)   SpO2 100%   BMI 21.32 kg/m²     Constitutional:  Lethargic, laying in bed. Answer simple questions appropriately. HEENT:  Scarred sugical incision at neck; facial droop  Respiratory:  Clear, diminished bases bilaterally  Cardiovascular/Edema:  RRR, no edema noted  Gastrointestinal:  Abdomen soft and nontender, bowel sounds active. PEG.   Neurologic:  Contracted extremities, facial droop   Skin:  Dry,warm  flaky   Other:  No edema     Scheduled Meds:   [START ON 5/70/5250] folic acid  1 mg PEG Tube Daily    [START ON 2/14/2020] lansoprazole  30 mg Per G Tube QAM AC    [START ON 2/14/2020] vitamin B-1  100 mg PEG Tube Daily    ampicillin-sulbactam  3 g Intravenous Q8H    cloNIDine  1 patch Transdermal Weekly    hydrALAZINE  10 mg Intravenous Q6H    sodium chloride  50 mL Intravenous Q8H    ipratropium-albuterol  1 ampule Inhalation Q4H WA    sodium chloride (Inhalant)  4 mL Nebulization BID    sodium chloride flush  10 mL Intravenous 2 times per day    sodium chloride  1,000 mL Intravenous Once    [Held by provider] aspirin  81 mg Oral Daily    metoprolol  5 mg Intravenous Q6H    sodium chloride   Intravenous Q12H     Continuous Infusions:   dextrose       PRN Meds:.perflutren lipid microspheres, glucose, dextrose, glucagon (rDNA), dextrose, hydrALAZINE, labetalol, sodium chloride flush, magnesium hydroxide      DATA:    CBC:   Lab Results   Component Value Date    WBC 12.4 02/13/2020    RBC 2.84 02/13/2020    HGB 8.0 02/13/2020    HCT 24.3 02/13/2020    MCV 85.6 02/13/2020    MCH 28.2 02/13/2020    MCHC 32.9 02/13/2020    RDW 16.5 osteonecrosis of the medial right mandible           Problems resolved:       BRIEF SUMMARY OF INITIAL CONSULT:    Briefly, Mr. Sara Rodriguez is a 80-year-old gentleman with a significant past medical history of hypertension, laryngeal cancer s/p laryngectomy in 2005 and chemo and radiation, history of CVA with baseline left sided weakness and some slurred speech, current smoker, and history of alcohol abuse who presented to the ED on 2/5/20 for fatigue. Patient was found to be extremely hypotensive at 50/palpated, tachycardic and fatigued in the ED. He was given fluid bolus with BP improvement, was found to have YVETTE (baseline unknown) with a creatinine of 4.1. Patients creatinine today is 3.0, reason for this consult. Problems resolved:  · Hypernatremia, with water deficit; calculated free water deficit is 1.8 L. Resolved. · Hypomagnesemia likely 2/2 poor oral intake    IMPRESSION/RECOMMENDATIONS:      1. YVETTE stage III on CKD;  volume responsive prerenal YVETTE due to poor oral intake in the setting of ACE inhibition, fraction excretion of sodium 0.7%, fractional secretion urea 20.9%. Renal function continues to improve, creatinine 1.4 mg/dL with excellent urine. 2. CKD, stage unknown; Baseline creatinine unknown however does have evidence of proteinuria with 0.5 g of protein per gram of creatinine. Kidney ultrasound with increased renal cortical echogenicity. 3. HTN, improved; on hydralazine, metoprolol and clonidine patch. 4. Hypokalemia likely 2/2 to poor oral intake  5. GPC bacteremia, antibiotics switched to unasyn and will start augmentin through PEG. 6. Severe thrombocytopenia, improving.   7. Nutrition, s/p PEG, on tube feeds at 50 mL/hr, Free water at 100mL q 6 hours      Plan:    · Stop IVF  · Discontinue Clonidine patch  · Change hydralazine to 25 mg tid    · Increase metoprolol to 50 mg bid  · Replace K  · Continue to monitor BP  · Continue to monitor renal function  · Discharge

## 2020-02-14 ENCOUNTER — APPOINTMENT (OUTPATIENT)
Dept: ULTRASOUND IMAGING | Age: 65
DRG: 870 | End: 2020-02-14
Payer: MEDICARE

## 2020-02-14 LAB
ABO/RH: NORMAL
ANION GAP SERPL CALCULATED.3IONS-SCNC: 10 MMOL/L (ref 7–16)
ANION GAP SERPL CALCULATED.3IONS-SCNC: 12 MMOL/L (ref 7–16)
ANTIBODY SCREEN: NORMAL
BASOPHILS ABSOLUTE: 0.02 E9/L (ref 0–0.2)
BASOPHILS RELATIVE PERCENT: 0.1 % (ref 0–2)
BLOOD BANK DISPENSE STATUS: NORMAL
BLOOD BANK PRODUCT CODE: NORMAL
BPU ID: NORMAL
BUN BLDV-MCNC: 25 MG/DL (ref 8–23)
BUN BLDV-MCNC: 27 MG/DL (ref 8–23)
CALCIUM SERPL-MCNC: 7.9 MG/DL (ref 8.6–10.2)
CALCIUM SERPL-MCNC: 8.3 MG/DL (ref 8.6–10.2)
CHLORIDE BLD-SCNC: 110 MMOL/L (ref 98–107)
CHLORIDE BLD-SCNC: 110 MMOL/L (ref 98–107)
CO2: 23 MMOL/L (ref 22–29)
CO2: 27 MMOL/L (ref 22–29)
CREAT SERPL-MCNC: 1.3 MG/DL (ref 0.7–1.2)
CREAT SERPL-MCNC: 1.3 MG/DL (ref 0.7–1.2)
DESCRIPTION BLOOD BANK: NORMAL
EOSINOPHILS ABSOLUTE: 0.02 E9/L (ref 0.05–0.5)
EOSINOPHILS RELATIVE PERCENT: 0.1 % (ref 0–6)
FERRITIN: 1019 NG/ML
GFR AFRICAN AMERICAN: >60
GFR AFRICAN AMERICAN: >60
GFR NON-AFRICAN AMERICAN: >60 ML/MIN/1.73
GFR NON-AFRICAN AMERICAN: >60 ML/MIN/1.73
GLUCOSE BLD-MCNC: 109 MG/DL (ref 74–99)
GLUCOSE BLD-MCNC: 132 MG/DL (ref 74–99)
HCT VFR BLD CALC: 20.7 % (ref 37–54)
HEMOGLOBIN: 6.7 G/DL (ref 12.5–16.5)
IMMATURE GRANULOCYTES #: 0.32 E9/L
IMMATURE GRANULOCYTES %: 2.2 % (ref 0–5)
LYMPHOCYTES ABSOLUTE: 1.19 E9/L (ref 1.5–4)
LYMPHOCYTES RELATIVE PERCENT: 8.3 % (ref 20–42)
MCH RBC QN AUTO: 27.8 PG (ref 26–35)
MCHC RBC AUTO-ENTMCNC: 32.4 % (ref 32–34.5)
MCV RBC AUTO: 85.9 FL (ref 80–99.9)
METER GLUCOSE: 110 MG/DL (ref 74–99)
MONOCYTES ABSOLUTE: 1.26 E9/L (ref 0.1–0.95)
MONOCYTES RELATIVE PERCENT: 8.7 % (ref 2–12)
NEUTROPHILS ABSOLUTE: 11.61 E9/L (ref 1.8–7.3)
NEUTROPHILS RELATIVE PERCENT: 80.6 % (ref 43–80)
PDW BLD-RTO: 16.3 FL (ref 11.5–15)
PLATELET # BLD: 103 E9/L (ref 130–450)
PMV BLD AUTO: ABNORMAL FL (ref 7–12)
POTASSIUM SERPL-SCNC: 3.4 MMOL/L (ref 3.5–5)
POTASSIUM SERPL-SCNC: 3.9 MMOL/L (ref 3.5–5)
RBC # BLD: 2.41 E12/L (ref 3.8–5.8)
SODIUM BLD-SCNC: 145 MMOL/L (ref 132–146)
SODIUM BLD-SCNC: 147 MMOL/L (ref 132–146)
WBC # BLD: 14.4 E9/L (ref 4.5–11.5)

## 2020-02-14 PROCEDURE — 2580000003 HC RX 258: Performed by: STUDENT IN AN ORGANIZED HEALTH CARE EDUCATION/TRAINING PROGRAM

## 2020-02-14 PROCEDURE — 92526 ORAL FUNCTION THERAPY: CPT

## 2020-02-14 PROCEDURE — 86923 COMPATIBILITY TEST ELECTRIC: CPT

## 2020-02-14 PROCEDURE — 76536 US EXAM OF HEAD AND NECK: CPT

## 2020-02-14 PROCEDURE — 2700000000 HC OXYGEN THERAPY PER DAY

## 2020-02-14 PROCEDURE — 86901 BLOOD TYPING SEROLOGIC RH(D): CPT

## 2020-02-14 PROCEDURE — 6370000000 HC RX 637 (ALT 250 FOR IP): Performed by: INTERNAL MEDICINE

## 2020-02-14 PROCEDURE — 36415 COLL VENOUS BLD VENIPUNCTURE: CPT

## 2020-02-14 PROCEDURE — 86850 RBC ANTIBODY SCREEN: CPT

## 2020-02-14 PROCEDURE — 82728 ASSAY OF FERRITIN: CPT

## 2020-02-14 PROCEDURE — 82962 GLUCOSE BLOOD TEST: CPT

## 2020-02-14 PROCEDURE — 94640 AIRWAY INHALATION TREATMENT: CPT

## 2020-02-14 PROCEDURE — 80048 BASIC METABOLIC PNL TOTAL CA: CPT

## 2020-02-14 PROCEDURE — 85025 COMPLETE CBC W/AUTO DIFF WBC: CPT

## 2020-02-14 PROCEDURE — 2060000000 HC ICU INTERMEDIATE R&B

## 2020-02-14 PROCEDURE — 99233 SBSQ HOSP IP/OBS HIGH 50: CPT | Performed by: INTERNAL MEDICINE

## 2020-02-14 PROCEDURE — 94760 N-INVAS EAR/PLS OXIMETRY 1: CPT

## 2020-02-14 PROCEDURE — P9016 RBC LEUKOCYTES REDUCED: HCPCS

## 2020-02-14 PROCEDURE — 6370000000 HC RX 637 (ALT 250 FOR IP): Performed by: STUDENT IN AN ORGANIZED HEALTH CARE EDUCATION/TRAINING PROGRAM

## 2020-02-14 PROCEDURE — 86900 BLOOD TYPING SEROLOGIC ABO: CPT

## 2020-02-14 PROCEDURE — 36430 TRANSFUSION BLD/BLD COMPNT: CPT

## 2020-02-14 PROCEDURE — 94669 MECHANICAL CHEST WALL OSCILL: CPT

## 2020-02-14 RX ORDER — ACETAMINOPHEN 325 MG/1
650 TABLET ORAL ONCE
Status: COMPLETED | OUTPATIENT
Start: 2020-02-14 | End: 2020-02-14

## 2020-02-14 RX ORDER — AMOXICILLIN AND CLAVULANATE POTASSIUM 250; 62.5 MG/5ML; MG/5ML
500 POWDER, FOR SUSPENSION ORAL 2 TIMES DAILY
Qty: 140 ML | Refills: 0
Start: 2020-02-14 | End: 2020-03-03 | Stop reason: HOSPADM

## 2020-02-14 RX ORDER — 0.9 % SODIUM CHLORIDE 0.9 %
20 INTRAVENOUS SOLUTION INTRAVENOUS ONCE
Status: DISCONTINUED | OUTPATIENT
Start: 2020-02-14 | End: 2020-02-19

## 2020-02-14 RX ADMIN — AMOXICILLIN AND CLAVULANATE POTASSIUM 500 MG: 250; 62.5 POWDER, FOR SUSPENSION ORAL at 16:21

## 2020-02-14 RX ADMIN — IPRATROPIUM BROMIDE AND ALBUTEROL SULFATE 1 AMPULE: 2.5; .5 SOLUTION RESPIRATORY (INHALATION) at 09:40

## 2020-02-14 RX ADMIN — AMOXICILLIN AND CLAVULANATE POTASSIUM 500 MG: 250; 62.5 POWDER, FOR SUSPENSION ORAL at 10:10

## 2020-02-14 RX ADMIN — HYDRALAZINE HYDROCHLORIDE 25 MG: 25 TABLET, FILM COATED ORAL at 14:47

## 2020-02-14 RX ADMIN — ACETAMINOPHEN 650 MG: 325 TABLET ORAL at 19:50

## 2020-02-14 RX ADMIN — IPRATROPIUM BROMIDE AND ALBUTEROL SULFATE 1 AMPULE: 2.5; .5 SOLUTION RESPIRATORY (INHALATION) at 13:10

## 2020-02-14 RX ADMIN — POTASSIUM BICARBONATE 40 MEQ: 782 TABLET, EFFERVESCENT ORAL at 10:08

## 2020-02-14 RX ADMIN — IPRATROPIUM BROMIDE AND ALBUTEROL SULFATE 1 AMPULE: 2.5; .5 SOLUTION RESPIRATORY (INHALATION) at 19:47

## 2020-02-14 RX ADMIN — Medication 10 ML: at 21:54

## 2020-02-14 RX ADMIN — METOPROLOL TARTRATE 50 MG: 50 TABLET, FILM COATED ORAL at 10:09

## 2020-02-14 RX ADMIN — LANSOPRAZOLE 30 MG: KIT at 06:17

## 2020-02-14 RX ADMIN — HYDRALAZINE HYDROCHLORIDE 25 MG: 25 TABLET, FILM COATED ORAL at 06:17

## 2020-02-14 RX ADMIN — FOLIC ACID 1 MG: 1 TABLET ORAL at 10:09

## 2020-02-14 RX ADMIN — SODIUM CHLORIDE SOLN NEBU 3% 4 ML: 3 NEBU SOLN at 19:47

## 2020-02-14 RX ADMIN — SODIUM CHLORIDE SOLN NEBU 3% 4 ML: 3 NEBU SOLN at 09:40

## 2020-02-14 RX ADMIN — Medication 100 MG: at 10:09

## 2020-02-14 RX ADMIN — ASPIRIN 81 MG 81 MG: 81 TABLET ORAL at 10:09

## 2020-02-14 RX ADMIN — METOPROLOL TARTRATE 50 MG: 50 TABLET, FILM COATED ORAL at 21:53

## 2020-02-14 RX ADMIN — IPRATROPIUM BROMIDE AND ALBUTEROL SULFATE 1 AMPULE: 2.5; .5 SOLUTION RESPIRATORY (INHALATION) at 16:13

## 2020-02-14 RX ADMIN — HYDRALAZINE HYDROCHLORIDE 25 MG: 25 TABLET, FILM COATED ORAL at 21:53

## 2020-02-14 RX ADMIN — AMOXICILLIN AND CLAVULANATE POTASSIUM 500 MG: 250; 62.5 POWDER, FOR SUSPENSION ORAL at 00:17

## 2020-02-14 ASSESSMENT — PAIN SCALES - GENERAL
PAINLEVEL_OUTOF10: 0

## 2020-02-14 NOTE — PROGRESS NOTES
[facility]  Internal Medicine Residency Program  Progress Note - House Team 1    Patient:  Bret Riojas 59 y.o. male   MRN: [id number]       Date of Service: 2020    Allergy: Patient has no known allergies. Subjective     Patient was seen and examined in AM.  Patient stated that he did not sleep overnight otherwise feels okay. 24 hour change: Tachycardia increased overnight- free water increased 250 cc every 4 hours from 100 cc every 6 hours through PEG tube. Hemoglobin dropped to 6.7 g/dL from 8 g/dL. Per ID- IV antibiotics changed to oral amoxicillin-calculate every 8 hours. Per nephrology, metoprolol increased to 50 twice daily, hydralazine increased to 25 3 times daily and clonidine discontinued. Per wound care, patient has left elbow pressure ulcer and right third knuckle bluish discoloration. Objective     TEMPERATURE:  Current - Temp: 99.6 °F (37.6 °C); Max - Temp  Av.7 °F (37.6 °C)  Min: 99.6 °F (37.6 °C)  Max: 99.7 °F (37.6 °C)  RESPIRATIONS RANGE: Resp  Av  Min: 18  Max: 20  PULSE RANGE: Pulse  Av.5  Min: 107  Max: 126  BLOOD PRESSURE RANGE:  Systolic (30QGV), EIH:423 , Min:117 , UYO:510   ; Diastolic (57SPS), UVE:41, Min:66, Max:76    PULSE OXIMETRY RANGE: SpO2  Av.5 %  Min: 97 %  Max: 100 %    I & O - 24hr:    Intake/Output Summary (Last 24 hours) at 2020 1419  Last data filed at 2020 1413  Gross per 24 hour   Intake 1576 ml   Output 1375 ml   Net 201 ml     I/O last 3 completed shifts: In: 1626 [NG/GT:1426; IV Piggyback:200]  Out: 5938 [Urine:1025] I/O this shift: In: 0   Out: 350 [Urine:350]   Weight change:     Physical Exam  Vitals signs and nursing note reviewed. Constitutional:       Appearance: He is normal weight. He is ill-appearing. HENT:      Head: Normocephalic and atraumatic.       Right Ear: External ear normal.      Left Ear: External ear normal.      Nose: Nose normal.      Mouth/Throat:      Mouth: Mucous membranes are moist.   Eyes:      General: Lids are normal. No scleral icterus. Right eye: No discharge. Left eye: No discharge. Conjunctiva/sclera: Conjunctivae normal.      Pupils: Pupils are equal, round, and reactive to light. Pupils are equal.      Right eye: Pupil is reactive and not sluggish. Left eye: Pupil is reactive and not sluggish. Cardiovascular:      Rate and Rhythm: Regular rhythm. Tachycardia present. Pulses: Normal pulses. Carotid pulses are 2+ on the right side and 2+ on the left side. Radial pulses are 2+ on the right side and 2+ on the left side. Dorsalis pedis pulses are 2+ on the right side and 2+ on the left side. Heart sounds: S1 normal and S2 normal. Gallop present. S4 sounds present. No S3 sounds. Pulmonary:      Effort: Pulmonary effort is normal.      Breath sounds: Normal breath sounds and air entry. Transmitted upper airway sounds present. No decreased breath sounds, wheezing, rhonchi or rales. Neurological:      Mental Status: He is alert. Comments: Tongue deviated to the left side. Follows commands. Labs and Imaging Studies     CBC:   Recent Labs     02/12/20  0442 02/13/20  0947 02/14/20  0439   WBC 9.7 12.4* 14.4*   HGB 9.5* 8.0* 6.7*   HCT 29.8* 24.3* 20.7*   MCV 87.6 85.6 85.9    107* 103*       BMP:    Recent Labs     02/13/20  0947 02/13/20  1912 02/14/20  0439    145 145   K 3.3* 3.8 3.4*   * 109* 110*   CO2 22 24 23   BUN 29* 28* 27*   CREATININE 1.4* 1.4* 1.3*   GLUCOSE 141* 126* 132*       LIVER PROFILE:   No results for input(s): AST, ALT, LIPASE, BILIDIR, BILITOT, ALKPHOS in the last 72 hours. Invalid input(s): AMYLASE,  ALB    PT/INR:   No results for input(s): PROTIME, INR in the last 72 hours. APTT:   No results for input(s): APTT in the last 72 hours.     Fasting Lipid Panel:    No results found for: CHOL, TRIG, HDL    Notable Cultures:      Blood cultures   Blood Culture, Routine   Date Value Ref Range Status   02/10/2020 24 Hours- no growth  Preliminary     Respiratory cultures No results found for: RESPCULTURE No results found for: LABGRAM  Urine   Urine Culture, Routine   Date Value Ref Range Status   2020 <10,000 CFU/mL  Mixed gram positive organisms    Final     Legionella No results found for: LABLEGI  C Diff PCR No results found for: CDIFPCR  Wound culture/abscess: No results for input(s): WNDABS in the last 72 hours. Tip culture:No results for input(s): CXCATHTIP in the last 72 hours. Xr Chest Standard (2 Vw)    Result Date: 2020  Patient MRN: [id number] : 1955 Age:  59 years Gender: Male Order Date: 2020 10:15 AM Exam: XR CHEST (2 VW) Number of Images: 2 views Indication:   Sepsis, sob Sepsis, sob Comparison: None. Findings: The heart is unremarkable The lung fields demonstrate no significant pulmonary vascular congestion and edema. The aorta is tortuous ectatic There are findings throughout the lung fields which are likely chronic     Findings compatible with atherosclerotic disease No acute infiltrate     Xr Foot Left (min 3 Views)    Result Date: 2020  Patient MRN:  [id number] : 1955 Age: 59 years Gender: Male Order Date:  2020 2:45 PM EXAM: XR FOOT LEFT (MIN 3 VIEWS) NUMBER OF IMAGES:  3 views INDICATION:  pain, swelling pain, swelling COMPARISON: None . The bones appear to be in anatomic alignment.  No foreign body is identified No fracture is identified  There is moderate joint space loss The are moderate degenerative changes present     Moderate degenerative changes    Ct Soft Tissue Neck W Wo Contrast    Result Date: 2020  Patient MRN:  [id number] : 1955 Age: 59 years Gender: Male Order Date:  2020 3:45 PM EXAM: CT SOFT TISSUE NECK W WO CONTRAST NUMBER OF IMAGES \ views:  36 INDICATION:  h/o R mandible/base of tongue ca, sp resection and chemorads, concern for recurrence Per nephro team ok to do scan with contrast h/o R mandible/base of tongue ca, sp resection and chemorads, concern for recurrence COMPARISON: None Technique: Low-dose CT  acquisition technique included one of following options; 1 . Automated exposure control, 2. Adjustment of MA and or KV according to patient's size or 3. Use of iterative reconstruction. The larynx appears unremarkable The soft tissues appear abnormal there is sequela of extensive prior surgery. No convincing abscess is seen. There is distortion of the oropharynx and the posterior superior hypopharynx there is significant distortion of the right sternocleidomastoid and a pattern compatible with previous radical neck dissection. There are findings to suggest previous therapy. The right jugular vein is not seen likely status post resection. There is been resection at the level of the tongue base and floor the mouth. There is sequela of previous resection. . Atherosclerotic disease of both proximal internal carotid arteries is identified without proximal internal carotid artery hemodynamically significant stenosis. At the level the distal right internal carotid artery at the level the cervical component there is evidence to suggest severe stenosis. Also the level of the carotid bifurcation on the right there is a soft tissue mass effect suspicious for recurrent neoplasm. This measures approximately 2.7 x 2.3 cm. This could also be related to previous therapy. Comparison studies would be necessary. There is a mottled eaten appearance to the medial aspect of the right mandible. This could be related to tumor invasion or osteonecrosis. Scattered lymph nodes are visualized which do not meet the CT criteria for adenopathy. There is mixed density in the right thyroid possibly representing a thyroid nodule measuring approximately 1 cm. There is density scattered throughout the sinuses possibly related to mild chronic sinusitis.  There is likely a right sphenoid retention cyst     Probable prior right radical neck dissection with sequela of prior surgery and radiation therapy. There is extensive soft tissue distortion which is suggestive of previous therapy Finds compatible with a approximately 1 cm right thyroid nodule There is evidence to suggest severe stenosis of the right distal internal carotid artery Soft tissue mass at the level the carotid bifurcation on the right with outward mass effect measuring approximately 2.3 x 2.7 cm suspicious for recurrent disease. This encases the carotid distally and the distal carotid stenosis may represent invasion. Comparison studies would be necessary to distinguish recurrent neoplasm from previous radiation therapy. Tumor invasion versus osteonecrosis of the medial right mandible ALERT:  THIS IS AN ABNORMAL REPORT      Us Head Neck Soft Tissue Thyroid    Result Date: 2020  Patient MRN:  [id number] : 1955 Age: 59 years Gender: Male Order Date:  2020 2:15 PM Exam: US HEAD NECK SOFT TISSUE THYROID Number of images:  26 Indication:  right carotid bifurcation mass With special focus to soft tissue mass at the right carotid bifurcation. right carotid bifurcation mass Comparison: Correlation with CT dated 2020. Technique: Grayscale and color Doppler ultrasound images of the region of interest were obtained by the sonographic technologist. FINDINGS: The study is limited by technically suboptimal image quality. There is abnormal heterogeneous soft tissue in the region of interest.     Ill-defined, heterogeneous, abnormal appearing soft tissue in the region of interest. No specific clinical question is asked. Ultrasound is not the appropriate modality for this evaluation. Contrast-enhanced MRI may provide more information. Correlation with clinical and surgical history is needed.       Xr Chest Portable    Result Date: 2020  Patient MRN:  [id number] : 1955 Age: 59 years Gender: Male Order Date:  2020 6:00 AM EXAM: XR CHEST PORTABLE COMPARISON: February 10, 2020 INDICATION:  PNA PNA FINDINGS: There are opacities at the left lung base silhouetting left hemidiaphragm which appears similar since prior. The heart is normal size. No pneumothorax. Stable opacities at left lung base. Continued follow-up could be helpful for further evaluation. Xr Chest Portable    Result Date: 2/10/2020  Patient MRN: [id number] : 1955 Age:  59 years Gender: Male Order Date: 2/10/2020 3:00 PM Exam: XR CHEST PORTABLE Number of Images: 1 view Indication:   SOB SOB Comparison: 2020 Findings: The heart is enlarged. The lung fields demonstrate evidence for airspace disease. The aorta is tortuous and ectatic. Tortuous ectatic aorta Cardiomegaly Airspace disease compatible with atelectasis or pneumonia, worse at the left lung base as compared to the right The chest appears to be worse in the interval     Xr Chest 1 Vw    Result Date: 2020  Patient MRN: [id number] : 1955 Age:  59 years Gender: Male Order Date: 2020 6:00 AM Exam: XR CHEST 1 VIEW Number of Images: 1 view Indication: Acute weakness. Comparison: None. FINDINGS: Heart and pulmonary vascularity normal. Lungs clear. Costophrenic angles sharp. Normal aorta. No acute cardiopulmonary findings. Us Retroperitoneal Complete    Result Date: 2020  Patient Mrn: [id number] : 1955 Age:  59 years Gender: Male Order Date: 2020 7:45 PM Exam: US RETROPERITONEAL COMPLETE Number Of Images: 54 Views Indication:  Acute renal insufficiency Comparison: None. TECHNIQUE: 2-D grayscale and color Doppler imaging were utilized for evaluation of the kidneys and bladder. Findings: The right kidney measures 11.8 x 5.5 x 6.3 cm, and the left kidney measures 11.5 x 5.6 x 5.9 cm. There is no evidence of collecting system calcification, obstructive dilation, or perinephric inflammatory change. Cortical echogenicity is increased bilaterally,. Suggesting the spectrum of medical renal disease. Central perfusion is intact bilaterally by color Doppler imaging, however. There is some perinephric fluid bilaterally. The bladder is decompressed with Sanchez catheter, and is not further characterized. Probable bilateral medical renal disease with some perinephric fluid bilaterally that could indicate inflammation. There is no evidence of calcification or obstruction in either kidney. The bladder was not evaluated due to a decompressing Sanchez catheter. Fl Modified Barium Swallow W Video    Result Date: 2020  Patient MRN: [id number] : 1955 Age:  59 years Gender: Male Order Date: 2/10/2020 1:45 PM Exam: FL MODIFIED BARIUM SWALLOW W VIDEO Number of Images: 1 Indication:   dysphagia Comparison: None. Fluoroscopy time: 1.1 minute Findings: Textures given, nectar Aspiration, nectar Laryngeal penetration, nectar Cough, None Oral delay, Yes Retention in vallecula, Yes Retention in piriform sinuses, None. Pharyngeal delay, Yes Laryngeal elevation, reduced. Study is remarkable for silent aspiration with nectar consistency. This procedure was performed and dictated by Reid Gallego PA-C with indirect supervision, and Bill Hinson. [facility] MD reviewed and concurred with the findings. Fl Modified Barium Swallow W Video    Result Date: 2020  Patient MRN: [id number] : 1955 Age:  59 years Gender: Male Order Date: 2020 7:45 AM Exam: FL MODIFIED BARIUM SWALLOW W VIDEO Number of Images: views Indication:   dysphagia dysphagia Comparison: None. Findings: Patient was given nectar and pudding patient is unable to swallow with no gag reflex. Therefore patient was suctioned and the study was canceled.      Patient unable to swallow nectar and pudding which was suctioned         Medications     Current Meds:  Current Facility-Administered Medications   Medication Dose Route Frequency Provider Last Rate Last Dose    0.9 % sodium chloride bolus  20 mL Intravenous Once Cheri Botello MD        folic acid (FOLVITE) tablet 1 mg  1 mg PEG Tube Daily Tahir Pass., DO   1 mg at 02/14/20 1009    lansoprazole suspension SUSP 30 mg  30 mg Per G Tube QAM TRAY Hernandez Jr., DO   30 mg at 02/14/20 4482    vitamin B-1 (THIAMINE) tablet 100 mg  100 mg PEG Tube Daily Shae Guerrero Alla Mendoza., DO   100 mg at 02/14/20 1009    amoxicillin-clavulanate (AUGMENTIN) 250-62.5 MG/5ML suspension 500 mg  500 mg Per G Tube Q8H Ivelisse Moe MD   500 mg at 02/14/20 1010    aspirin chewable tablet 81 mg  81 mg Per G Tube Daily Saul Askew MD   81 mg at 02/14/20 1009    hydrALAZINE (APRESOLINE) tablet 25 mg  25 mg PEG Tube 3 times per day Leann Angeles MD   25 mg at 02/14/20 0617    metoprolol tartrate (LOPRESSOR) tablet 50 mg  50 mg PEG Tube BID Saroj Schumacher MD   50 mg at 02/14/20 1009    perflutren lipid microspheres (DEFINITY) injection 1.65 mg  1.5 mL Intravenous ONCE PRN Saqib B Capal, DO        glucose (GLUTOSE) 40 % oral gel 15 g  15 g Oral PRN Saqib B Capal, DO        dextrose 50 % IV solution  12.5 g Intravenous PRN Warren Farfanon, DO        glucagon (rDNA) injection 1 mg  1 mg Intramuscular PRN Nestora Manus B Capal, DO        dextrose 5 % solution  100 mL/hr Intravenous PRN Warren Riddle, DO        ipratropium-albuterol (DUONEB) nebulizer solution 1 ampule  1 ampule Inhalation Q4H WA Saqib B Capal, DO   1 ampule at 02/14/20 1310    sodium chloride (Inhalant) 3 % nebulizer solution 4 mL  4 mL Nebulization BID Saqib B Capal, DO   4 mL at 02/14/20 0940    labetalol (NORMODYNE;TRANDATE) injection 10 mg  10 mg Intravenous Q6H PRN Saqib B Capal, DO   10 mg at 02/11/20 3122    sodium chloride flush 0.9 % injection 10 mL  10 mL Intravenous 2 times per day Nestora Manus B Capal, DO   10 mL at 02/13/20 1003    sodium chloride flush 0.9 % injection 10 mL  10 mL Intravenous PRN Saqib CASTILLO Capal, DO   10 mL at 02/13/20 1438    0.9 % sodium chloride bolus  1,000 mL Intravenous Once Starla Kramer Capal, DO        magnesium hydroxide (MILK OF MAGNESIA) 400 MG/5ML suspension 30 mL  30 mL Oral Daily PRN Saqib Solis,            Continuous Infusions:   dextrose       Scheduled Meds:   sodium chloride  20 mL Intravenous Once    folic acid  1 mg PEG Tube Daily    lansoprazole  30 mg Per G Tube QAM AC    vitamin B-1  100 mg PEG Tube Daily    amoxicillin-clavulanate  500 mg Per G Tube Q8H    aspirin  81 mg Per G Tube Daily    hydrALAZINE  25 mg PEG Tube 3 times per day    metoprolol tartrate  50 mg PEG Tube BID    ipratropium-albuterol  1 ampule Inhalation Q4H WA    sodium chloride (Inhalant)  4 mL Nebulization BID    sodium chloride flush  10 mL Intravenous 2 times per day    sodium chloride  1,000 mL Intravenous Once     PRN Meds: perflutren lipid microspheres, glucose, dextrose, glucagon (rDNA), dextrose, labetalol, sodium chloride flush, magnesium hydroxide      Resident's Assessment and Plan     Betzaida Willis is 59 y.o. male was admitted on 2/5/2020 due to fatigue, unable to eat and had an episode of a fall. He was found to be hypotensive, thrombocytopenic (platelet 61,424) and in YVETTE. He has baseline Lt. Sided weakness and slurred speech. Betzaida Willis has a PH of laryngeal cancer s/p laryngectomy (2005) with chemo and radio, HTN, H/O CVA with residual Lt. Weakness and slurred speech, smoker and active alcohol abuser. Diagnosis Date    [facility])     mouth    Hip fracture ([facility] [address]     Hypertension        <Neurologic>  Acute Metabolic Encephalopathy - Resolved  2/2 Alcohol withdrawal, bacteremia, benzo use    Fall - Stable  2/2 Syncope 2/2 possible alcohol withdrawal vs weakness vs orthostatic Hypotension    H/O CVA with residual Lt. Weakness - Stable  Aspirin restarted on 2/14/2020    <Cardiovascular>  Hypertension and Tachycardia  Currently on hydralazine 25mg Q8H, metoprolol 50mg BID. HR in the 110-120's in the AM. BP stable. Watch for tachycardia. <Pulmonary>  Pneumonia  2/2 MSSA  Changed to amoxicillin - clavulanate via PEG from ampicillin - sulbactam on 2/13/2020. Continue till 2/21/2020. WBC trending up to 14.4 from 12.4. ID on board. Chest vest BID. Nebulization with ipratropium-albuterol Q4H and 3% NaCl nebulization BID.     <Gastrointestinal>  Dysphagia s/p PEG placement (2/12/2020)  2/2 Neck mass   TF on hold due to Hb drop. Get H/H in the afternoon and CBC daily. Severe Malnutrition  2/2 Laryngeal Ca, dysphagia  Per dietitian, pt. Meets criteria for severe malnutrition. TF placed on hold due to Hb drop. Plan to restart TF once Hb stable. <Infectious Disease>  Group G Streptococcus Bacteremia - Resolved  Echo showed no vegetations. Repeat culture -ve. No GERDA done. ID on board. <Genitourinary/Renal>  YVETTE Stage III on CKD - Resolving  FeNa 0.7%, FeUrea 20.9%. Baseline Cr. Unknown. Cr. 1.3 today has been trending down from 1.4 from 1.6. Urine output 1L over last 24 hours. Nephrology on board. Hypokalemia   2/2 likely to poor oral intake. K 3.4 in the AM.   Replace as neded. Hypomagnesemia - Resolved    <Hematology/Oncology>  Acute Normocytic Anemia  2/2 ? Blood loss  Hb drop from 8 to 6.7 this AM.   1U packed RBC transfused. Will get H/H post transfusion. Iron studies sent per Nephro. Will get FOBT. Currently on thiamine 502LL OD and folic acid 1mg OD. Might need re-consult to GS if Hb does not rise appropriately vs drops. Soft Tissue Mass  2/2 possible recurrence of laryngeal Ca. CT and USG suggestive of mass at Rt. Carotic bifurcation - 2.3 x 2.7 cm. Thrombocytopenia - Resolving  2/2 Sepsis vs alcohol use vs recurrence of laryngeal malignancy   Initially presented with 70270. Improving to 1.3 this AM.       <Dermatologic/Musculoskeletal>  Pressure ulcer  Lt. Elbow present. Wound care on board.      <Psychiatry>  H/O Alcohol Abuse - Stable  Currently on thiamine 371GG OD and folic

## 2020-02-14 NOTE — PLAN OF CARE
Problem: Malnutrition  (NI-5.2)  Goal: Food and/or Nutrient Delivery  Description- EN  Individualized approach for food/nutrient provision.   Outcome: Met This Shift

## 2020-02-14 NOTE — PROGRESS NOTES
Department of Internal Medicine  Nephrology Attending Progress Note    Events reviewed. SUBJECTIVE: We are following Mr. Vanessa Blue for YVETTE on CKD. Patient seen and examined bedside. Reports feeling better today. PHYSICAL EXAM:      Vitals:    VITALS:  /76   Pulse 126   Temp 99.6 °F (37.6 °C) (Temporal)   Resp 20   Ht 5' 4\" (1.626 m)   Wt 126 lb 14.4 oz (57.6 kg)   SpO2 97%   BMI 21.78 kg/m²     Constitutional:  Lethargic, laying in bed. Answer simple questions appropriately. HEENT:  Scarred sugical incision at neck; facial droop  Respiratory:  Clear, diminished bases bilaterally  Cardiovascular/Edema:  RRR, no edema noted  Gastrointestinal:  Abdomen soft and nontender, bowel sounds active. PEG.   Neurologic:  Contracted extremities, facial droop   Skin:  Dry,warm  flaky   Other:  No edema     Scheduled Meds:   sodium chloride  20 mL Intravenous Once    folic acid  1 mg PEG Tube Daily    lansoprazole  30 mg Per G Tube QAM AC    vitamin B-1  100 mg PEG Tube Daily    amoxicillin-clavulanate  500 mg Per G Tube Q8H    aspirin  81 mg Per G Tube Daily    hydrALAZINE  25 mg PEG Tube 3 times per day    metoprolol tartrate  50 mg PEG Tube BID    ipratropium-albuterol  1 ampule Inhalation Q4H WA    sodium chloride (Inhalant)  4 mL Nebulization BID    sodium chloride flush  10 mL Intravenous 2 times per day    sodium chloride  1,000 mL Intravenous Once     Continuous Infusions:   dextrose       PRN Meds:.perflutren lipid microspheres, glucose, dextrose, glucagon (rDNA), dextrose, labetalol, sodium chloride flush, magnesium hydroxide      DATA:    CBC:   Lab Results   Component Value Date    WBC 14.4 02/14/2020    RBC 2.41 02/14/2020    HGB 6.7 02/14/2020    HCT 20.7 02/14/2020    MCV 85.9 02/14/2020    MCH 27.8 02/14/2020    MCHC 32.4 02/14/2020    RDW 16.3 02/14/2020     02/14/2020    MPV NOT CALC 02/14/2020     CMP:    Lab Results   Component Value Date     02/14/2020    K Jaylyn Mckeon is a 60-year-old gentleman with a significant past medical history of hypertension, laryngeal cancer s/p laryngectomy in 2005 and chemo and radiation, history of CVA with baseline left sided weakness and some slurred speech, current smoker, and history of alcohol abuse who presented to the ED on 2/5/20 for fatigue. Patient was found to be extremely hypotensive at 50/palpated, tachycardic and fatigued in the ED. He was given fluid bolus with BP improvement, was found to have YVETTE (baseline unknown) with a creatinine of 4.1. Patients creatinine today is 3.0, reason for this consult. Problems resolved:  · Hypernatremia, with water deficit; calculated free water deficit is 1.8 L. Resolved. · Hypomagnesemia likely 2/2 poor oral intake    IMPRESSION/RECOMMENDATIONS:      1. YVETTE stage III on CKD;  volume responsive prerenal YVETTE due to poor oral intake in the setting of ACE inhibition, fraction excretion of sodium 0.7%, fractional secretion urea 20.9%. · Renal function continues to improve daily with excellent urine output. Creatinine down to 1.3 mg/dL. 2. CKD, stage unknown; Baseline creatinine unknown however does have evidence of proteinuria with 0.5 g of protein per gram of creatinine. Kidney ultrasound with increased renal cortical echogenicity. 3. Normocytic Anemia, Acute blood loss?, Self-suctioning blood from oropharynx. S/p blood transfusion today. · Will obtain iron studies      4. HTN, on hydralazine, metoprolol, improved with increased dose of metoprolol  5. Hypokalemia likely 2/2 to poor oral intake  6. GPC bacteremia, antibiotics switched to unasyn and will start augmentin through PEG. 7. Severe thrombocytopenia, improving.   8. Nutrition, s/p PEG, on tube feeds at 50 mL/hr, Free water at 150 mL q 4 hours      Plan:    · Obtain iron studies  · Obtain BMP this afternoon  · Continue to monitor renal function  · Continue to monitor H&H  · Discharge planning

## 2020-02-14 NOTE — PROGRESS NOTES
[facility] PROGRESS NOTE      Chief complaint: Follow-up of group G Streptococcus bacteremia    The patient is a 59 y.o. 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 On admission, he was afebrile with no leukocytosis. Respiratory pathogen PCR panel, urine Streptococcus pneumoniae and Legionella antigens were negative. Chest x-ray showed no acute infiltrate. CT of the neck and soft tissues showed sequela of extensive prior surgery, distortion of oropharynx and posterior superior hypopharynx and of the right sternocleidomastoid with pattern compatible with previous radical neck dissection, resection at the level of the tongue base and floor of the mouth, soft tissue mass-effect suspicious for recurrent neoplasm at the level of the carotid bifurcation on the right measuring 2.7 x 2.3 cm, moth-eaten appearance to the medial aspect of the right mandible probably tumor invasion or osteonecrosis, no evident abscess. Blood cultures grew beta-hemolytic group G Streptococcus (susceptible to ampicillin, resistant to clindamycin). Repeat blood cultures from 02/08 and from 02/10 showed no growth to date. Sputum Gram stain and culture showed less than 25 PMNs per LPF and less than 25 epithelial cells per LPF, light growth of MSSA, moderate growth of Candida albicans, absent oral pharyngeal talib. He received doxycycline on 02/05, ceftriaxone on 02/05 and from 02/08-02/10, piperacillin-tazobactam since 02/05 then switched to nafcillin on 02/12. Nafcillin was switched to ampicillin-sulbactam on 02/12.  He underwent PEG tube placement on 02/12. Subjective: Patient was seen and examined. No chills, no abdominal pain, has diarrhea, no rash, no itching. Objective:    Vitals:    02/13/20 2036   BP: 117/76   Pulse: 126   Resp: 20   Temp: 99.6 °F (37.6 °C)   SpO2: 97%     Constitutional: Alert, not in distress  Respiratory: Clear breath sounds, no crackles, no wheezes  Cardiovascular: Regular rate and rhythm, no murmurs  Gastrointestinal: Bowel sounds present, soft, nontender  Skin: Warm and dry, no active dermatoses  Musculoskeletal: No joint swelling, no joint erythema    Labs, imaging, and medical records/notes were personally reviewed. Assessment:  Group G Streptococcus bacteremia, etiology unclear, suspect oropharyngeal in etiology with possible aspiration pneumonia  MSSA pneumonia  History of head and neck cancer, s/p resection and chemoradiation therapy (details unclear)    Recommendations:  Continue amoxicillin-clavulanate 500-125 mg q8h to finish 14 days from 02/08-02/21. Observe aspiration precautions and oral hygiene.     Thank you for involving me in the care of Rut Tariq. I will continue to follow. Please do not hesitate to call for any questions or concerns.     Electronically signed by Kavitha Walker MD on 2/14/2020 at 9:23 AM

## 2020-02-14 NOTE — PROGRESS NOTES
Nutrition Assessment (Enteral Nutrition)    Type and Reason for Visit: Reassess, Consult    Nutrition Recommendations: Continue NPO, Continue current Tube Feeding  Will add 1 protein modular to regimen daily to meet 100% est needs    Nutrition Assessment: Pt status remains unchanged w/ noted severe malnutrition 2/2 mlt head/neck CA h/o chemoRT/, noted wounds/, & dysphagia now s/p new PEG placement. EN support initiated & pt tolerating well. Will continue to monitor. Malnutrition Assessment:  · Malnutrition Status: Meets the criteria for severe malnutrition  · Context: Chronic illness  · Findings of the 6 clinical characteristics of malnutrition (Minimum of 2 out of 6 clinical characteristics is required to make the diagnosis of moderate or severe Protein Calorie Malnutrition based on AND/ASPEN Guidelines):  1. Energy Intake-Less than or equal to 50% of estimated energy requirement, Greater than or equal to 3 months    2. Weight Loss-Unable to assess(no hx on file)   3. Fat Loss-Severe subcutaneous fat loss, Orbital, Triceps  4. Muscle Loss-Severe muscle mass loss, Temples (temporalis muscle), Clavicles (pectoralis and deltoids), Thigh (quadriceps), Calf (gastrocnemius)  5. Fluid Accumulation-No significant fluid accumulation  6.  Strength-Not measured    Nutrition Risk Level:  Moderate    Nutrition Needs:  · Estimated Daily Total Kcal: 6366-2692 (MSJ REE 1219 x 1.3 SF)  · Estimated Daily Protein (g): 75-90(1.5-1.8 g/kg )  · Estimated Daily Fluid (ml/day): 8596-7243     Nutrition Diagnosis:   · Problem: Severe malnutrition, In context of chronic illness  · Etiology: related to Catabolic illness(head/neck CA)     Signs and symptoms:  as evidenced by Diet history of poor intake, Severe loss of subcutaneous fat, Severe muscle loss    Objective Information:  · Nutrition-Focused Physical Findings: poor attention, cachectic, +I/O's, +2 edema, active BS, h/o head & neck CA, dysphagia, s/p new PEG w/ TF Wound Type: Full Thickness     · Current Nutrition Therapies:  · Oral Diet Orders: NPO   · Tube Feeding (TF) Orders:   · Feeding Route: Gastrostomy  · Formula: Standard w/Fiber  · Rate (ml/hr):50 ml/hr    · Volume (ml/day): 1200 ml tv   · Duration: Continuous  · Water Flushes: 150 ml q 4 hr= 900 ml water   · Current TF & Flush Orders Provides: 1440 kcals, 67 gm pro, 968 ml free water, 1868 ml total water w/ flushes   · Goal TF & Flush Orders Provides: at goal      · Anthropometric Measures:  · Ht: 5' 4\" (162.6 cm)   · Current Body Wt: 126 lb (57.2 kg)(2/14 actual )  · Admission Body Wt: 113 lb (51.3 kg)(2/5 first measured)  · Usual Body Wt: (UTO no actual EMR hx on file)  · Weight Change: Unable to properly assess wt changes no hx on file    · Ideal Body Wt: 130 lb (59 kg), % Ideal Body 87%(using adm wt)  · BMI Classification: BMI 18.5 - 24.9 Normal Weight(using adm wt 19.4)    Nutrition Interventions:   Continued Inpatient Monitoring, Education not appropriate at this time, Coordination of Care    Nutrition Evaluation:   · Evaluation: Goals set(new goal)   · Goals: pt to tolerate TF at goal rate    · Monitoring: TF Intake, TF Tolerance, Skin Integrity, Wound Healing, I&O, Mental Status/Confusion, Pertinent Labs, Monitor Bowel Function, Weight      Electronically signed by Jessenia Lima RD, LD on 2/14/20 at 11:56 AM    Contact Number: Ext [phone]

## 2020-02-14 NOTE — FLOWSHEET NOTE
Inpatient Wound Care(initial evaluation) 4504a    Admit Date: 2/5/2020  9:48 AM    Reason for consult:  Left elbow, right hand    Significant history:  The patient is a 59 y.o. male with a PMH laryngeal cancer, s/p laryngectomy (2005), chemo and radiotherapy, H/o CVA, active smoker, alcohol use presented with the complaints of fatigue, recent fall       Findings:     02/13/20 1420   Skin Integrity   Skin Integrity   (soft, blanchable)   Location heels   Skin Integrity Site 2   Skin Integrity Location 2   (edema, discoloration)   Location 2 right hand   Wound 02/12/20 Hand Right ; 3rd knuckle   Date First Assessed/Time First Assessed: 02/12/20 1630   Present on Hospital Admission: No  Location: Hand  Wound Location Orientation: Right  Wound Description (Comments): ; 3rd knuckle   Wound Image    Dressing/Treatment Open to air   Wound Length (cm) 3 cm   Wound Width (cm) 2 cm   Wound Depth (cm)   (unable to determine)   Wound Surface Area (cm^2) 6 cm^2   Change in Wound Size % (l*w) -50   Wound Assessment Purple   Drainage Amount None   Jacqueline-wound Assessment   (purple discoloration)   Purple%Wound Bed 100   Wound 02/12/20 Elbow Left   Date First Assessed/Time First Assessed: 02/12/20 1817   Present on Hospital Admission: No  Primary Wound Type: Pressure Injury  Location: (!) Elbow  Wound Location Orientation: Left   Wound Image    Wound Pressure Stage  2   Dressing/Treatment Open to air   Wound Length (cm) 4 cm   Wound Width (cm) 3 cm   Wound Depth (cm) 0.1 cm   Wound Surface Area (cm^2) 12 cm^2   Change in Wound Size % (l*w) -100   Wound Volume (cm^3) 1.2 cm^3   Wound Healing % -100   Wound Assessment   (red)   Drainage Amount None   Jacqueline-wound Assessment Black     **Informed Consent**    The patient has given verbal consent to have photos taken of wounds and inserted into their chart as part of their permanent medical record for purposes of documentation, treatment management and/or medical review.    All Images taken on 2/13/20 of patient name: Willow Schilling were transmitted and stored on secured [employer] located within [facility] by a registered [employer] Mobile Application Device. Impression:  Left elbow stage 2  Right knuckle etiology unknown    Plan:   Will monitor  No wound care  Will need continued preventative care  Dietary consult    Antonio Dow 2/13/2020 8:21 PM

## 2020-02-15 LAB
ANION GAP SERPL CALCULATED.3IONS-SCNC: 11 MMOL/L (ref 7–16)
ANION GAP SERPL CALCULATED.3IONS-SCNC: 12 MMOL/L (ref 7–16)
BASOPHILS ABSOLUTE: 0.03 E9/L (ref 0–0.2)
BASOPHILS RELATIVE PERCENT: 0.2 % (ref 0–2)
BLOOD CULTURE, ROUTINE: NORMAL
BUN BLDV-MCNC: 23 MG/DL (ref 8–23)
BUN BLDV-MCNC: 24 MG/DL (ref 8–23)
CALCIUM SERPL-MCNC: 7.6 MG/DL (ref 8.6–10.2)
CALCIUM SERPL-MCNC: 7.7 MG/DL (ref 8.6–10.2)
CHLORIDE BLD-SCNC: 105 MMOL/L (ref 98–107)
CHLORIDE BLD-SCNC: 109 MMOL/L (ref 98–107)
CO2: 27 MMOL/L (ref 22–29)
CO2: 28 MMOL/L (ref 22–29)
CREAT SERPL-MCNC: 1.1 MG/DL (ref 0.7–1.2)
CREAT SERPL-MCNC: 1.2 MG/DL (ref 0.7–1.2)
CULTURE, BLOOD 2: NORMAL
EOSINOPHILS ABSOLUTE: 0.04 E9/L (ref 0.05–0.5)
EOSINOPHILS RELATIVE PERCENT: 0.3 % (ref 0–6)
FOLATE: 14.2 NG/ML (ref 4.8–24.2)
GFR AFRICAN AMERICAN: >60
GFR AFRICAN AMERICAN: >60
GFR NON-AFRICAN AMERICAN: >60 ML/MIN/1.73
GFR NON-AFRICAN AMERICAN: >60 ML/MIN/1.73
GLUCOSE BLD-MCNC: 106 MG/DL (ref 74–99)
GLUCOSE BLD-MCNC: 114 MG/DL (ref 74–99)
HCT VFR BLD CALC: 20.3 % (ref 37–54)
HCT VFR BLD CALC: 20.8 % (ref 37–54)
HCT VFR BLD CALC: 23.3 % (ref 37–54)
HEMOGLOBIN: 6.7 G/DL (ref 12.5–16.5)
HEMOGLOBIN: 6.9 G/DL (ref 12.5–16.5)
HEMOGLOBIN: 7.5 G/DL (ref 12.5–16.5)
IMMATURE GRANULOCYTES #: 0.24 E9/L
IMMATURE GRANULOCYTES %: 1.7 % (ref 0–5)
IRON SATURATION: 20 % (ref 20–55)
IRON: 20 MCG/DL (ref 59–158)
LYMPHOCYTES ABSOLUTE: 0.85 E9/L (ref 1.5–4)
LYMPHOCYTES RELATIVE PERCENT: 5.9 % (ref 20–42)
MCH RBC QN AUTO: 28.9 PG (ref 26–35)
MCHC RBC AUTO-ENTMCNC: 33.2 % (ref 32–34.5)
MCV RBC AUTO: 87 FL (ref 80–99.9)
METER GLUCOSE: 110 MG/DL (ref 74–99)
METER GLUCOSE: 126 MG/DL (ref 74–99)
METER GLUCOSE: 133 MG/DL (ref 74–99)
METER GLUCOSE: 137 MG/DL (ref 74–99)
MONOCYTES ABSOLUTE: 1.21 E9/L (ref 0.1–0.95)
MONOCYTES RELATIVE PERCENT: 8.5 % (ref 2–12)
NEUTROPHILS ABSOLUTE: 11.93 E9/L (ref 1.8–7.3)
NEUTROPHILS RELATIVE PERCENT: 83.4 % (ref 43–80)
PDW BLD-RTO: 15.4 FL (ref 11.5–15)
PLATELET # BLD: 118 E9/L (ref 130–450)
PMV BLD AUTO: 12.3 FL (ref 7–12)
POTASSIUM SERPL-SCNC: 3.4 MMOL/L (ref 3.5–5)
POTASSIUM SERPL-SCNC: 3.5 MMOL/L (ref 3.5–5)
RBC # BLD: 2.39 E12/L (ref 3.8–5.8)
SODIUM BLD-SCNC: 144 MMOL/L (ref 132–146)
SODIUM BLD-SCNC: 148 MMOL/L (ref 132–146)
TOTAL IRON BINDING CAPACITY: 101 MCG/DL (ref 250–450)
VITAMIN B-12: 383 PG/ML (ref 211–946)
WBC # BLD: 14.3 E9/L (ref 4.5–11.5)

## 2020-02-15 PROCEDURE — 6370000000 HC RX 637 (ALT 250 FOR IP): Performed by: INTERNAL MEDICINE

## 2020-02-15 PROCEDURE — 2060000000 HC ICU INTERMEDIATE R&B

## 2020-02-15 PROCEDURE — 6370000000 HC RX 637 (ALT 250 FOR IP): Performed by: STUDENT IN AN ORGANIZED HEALTH CARE EDUCATION/TRAINING PROGRAM

## 2020-02-15 PROCEDURE — 2580000003 HC RX 258: Performed by: STUDENT IN AN ORGANIZED HEALTH CARE EDUCATION/TRAINING PROGRAM

## 2020-02-15 PROCEDURE — 82746 ASSAY OF FOLIC ACID SERUM: CPT

## 2020-02-15 PROCEDURE — 82607 VITAMIN B-12: CPT

## 2020-02-15 PROCEDURE — 85018 HEMOGLOBIN: CPT

## 2020-02-15 PROCEDURE — 82962 GLUCOSE BLOOD TEST: CPT

## 2020-02-15 PROCEDURE — 2500000003 HC RX 250 WO HCPCS: Performed by: STUDENT IN AN ORGANIZED HEALTH CARE EDUCATION/TRAINING PROGRAM

## 2020-02-15 PROCEDURE — 80048 BASIC METABOLIC PNL TOTAL CA: CPT

## 2020-02-15 PROCEDURE — 99231 SBSQ HOSP IP/OBS SF/LOW 25: CPT | Performed by: INTERNAL MEDICINE

## 2020-02-15 PROCEDURE — 83550 IRON BINDING TEST: CPT

## 2020-02-15 PROCEDURE — 85025 COMPLETE CBC W/AUTO DIFF WBC: CPT

## 2020-02-15 PROCEDURE — 2700000000 HC OXYGEN THERAPY PER DAY

## 2020-02-15 PROCEDURE — 36415 COLL VENOUS BLD VENIPUNCTURE: CPT

## 2020-02-15 PROCEDURE — 94640 AIRWAY INHALATION TREATMENT: CPT

## 2020-02-15 PROCEDURE — 83540 ASSAY OF IRON: CPT

## 2020-02-15 PROCEDURE — 85014 HEMATOCRIT: CPT

## 2020-02-15 RX ORDER — DOCUSATE SODIUM 50 MG/5ML
100 LIQUID ORAL DAILY
Status: DISCONTINUED | OUTPATIENT
Start: 2020-02-15 | End: 2020-02-19

## 2020-02-15 RX ADMIN — SODIUM CHLORIDE, PRESERVATIVE FREE 10 ML: 5 INJECTION INTRAVENOUS at 02:49

## 2020-02-15 RX ADMIN — METOPROLOL TARTRATE 50 MG: 50 TABLET, FILM COATED ORAL at 21:48

## 2020-02-15 RX ADMIN — Medication 10 ML: at 21:48

## 2020-02-15 RX ADMIN — AMOXICILLIN AND CLAVULANATE POTASSIUM 500 MG: 250; 62.5 POWDER, FOR SUSPENSION ORAL at 01:19

## 2020-02-15 RX ADMIN — METOPROLOL TARTRATE 50 MG: 50 TABLET, FILM COATED ORAL at 09:05

## 2020-02-15 RX ADMIN — HYDRALAZINE HYDROCHLORIDE 25 MG: 25 TABLET, FILM COATED ORAL at 14:54

## 2020-02-15 RX ADMIN — SENNOSIDES 5 ML: 8.8 LIQUID ORAL at 21:48

## 2020-02-15 RX ADMIN — DOCUSATE SODIUM 100 MG: 50 LIQUID ORAL at 12:32

## 2020-02-15 RX ADMIN — Medication 100 MG: at 09:05

## 2020-02-15 RX ADMIN — SODIUM CHLORIDE SOLN NEBU 3% 4 ML: 3 NEBU SOLN at 19:57

## 2020-02-15 RX ADMIN — Medication 10 ML: at 09:22

## 2020-02-15 RX ADMIN — LABETALOL HYDROCHLORIDE 10 MG: 5 INJECTION INTRAVENOUS at 02:47

## 2020-02-15 RX ADMIN — AMOXICILLIN AND CLAVULANATE POTASSIUM 500 MG: 250; 62.5 POWDER, FOR SUSPENSION ORAL at 17:33

## 2020-02-15 RX ADMIN — LABETALOL HYDROCHLORIDE 10 MG: 5 INJECTION INTRAVENOUS at 17:48

## 2020-02-15 RX ADMIN — LANSOPRAZOLE 30 MG: KIT at 05:57

## 2020-02-15 RX ADMIN — HYDRALAZINE HYDROCHLORIDE 25 MG: 25 TABLET, FILM COATED ORAL at 21:48

## 2020-02-15 RX ADMIN — IPRATROPIUM BROMIDE AND ALBUTEROL SULFATE 1 AMPULE: 2.5; .5 SOLUTION RESPIRATORY (INHALATION) at 19:57

## 2020-02-15 RX ADMIN — AMOXICILLIN AND CLAVULANATE POTASSIUM 500 MG: 250; 62.5 POWDER, FOR SUSPENSION ORAL at 09:04

## 2020-02-15 RX ADMIN — HYDRALAZINE HYDROCHLORIDE 25 MG: 25 TABLET, FILM COATED ORAL at 05:57

## 2020-02-15 RX ADMIN — FOLIC ACID 1 MG: 1 TABLET ORAL at 09:05

## 2020-02-15 ASSESSMENT — PAIN SCALES - GENERAL
PAINLEVEL_OUTOF10: 0

## 2020-02-15 NOTE — PROGRESS NOTES
Attending Physician Statement  I have discussed the case, including pertinent history and exam findings with the resident. I have seen and examined the patient and the key elements of the encounter have been performed by me. I agree with the assessment, plan and orders as documented by the resident. History of laryngeal ca with PEG placement, had been able to tolerate TF well, denies any pain today. New right neck mass found this admission, report stated the homogenous mass, pending determination of mass at this bud. Pt expressed the desire to go to toilet but pt has been having positive ortho BP changes. Ativan has been DCed.   [facility]

## 2020-02-15 NOTE — PROGRESS NOTES
MYELOPCT 1.7 02/11/2020    BASOPCT 0.2 02/15/2020    MONOSABS 1.21 02/15/2020    LYMPHSABS 0.85 02/15/2020    EOSABS 0.04 02/15/2020    BASOSABS 0.03 02/15/2020     CMP:    Lab Results   Component Value Date     02/15/2020    K 3.5 02/15/2020    K 2.9 02/11/2020     02/15/2020    CO2 27 02/15/2020    BUN 24 02/15/2020    CREATININE 1.2 02/15/2020    GFRAA >60 02/15/2020    LABGLOM >60 02/15/2020    GLUCOSE 114 02/15/2020    PROT 6.2 02/06/2020    LABALBU 2.6 02/06/2020    CALCIUM 7.6 02/15/2020    BILITOT 1.5 02/06/2020    ALKPHOS 36 02/06/2020    AST 83 02/06/2020    ALT 21 02/06/2020       Resident's Assessment and Plan     Hannah Madrigal is a 59 y.o. male    1. Acute Metabolic Encephalopathy - Resolved  · Likely 2/2 Alcohol withdrawal, bacteremia, benzo use     2. Fall - Stable  · 2/2 Syncope--possible alcohol withdrawal vs weakness vs orthostatic Hypotension  · Patient is still quite weak and needs assistance to sit up  · Continue PT/OT  · Considered bedside commode to accommodate patient; however, patient is high fall risk with both weakness and orthostatic hypotension and like requires more than one person to assist with transfering     3. H/O CVA with residual Lt. Weakness - Stable  · Aspirin restarted on 2/14/2020     4. Hypertension and Tachycardia  · Currently on hydralazine 25mg Q8H, metoprolol 50mg BID. · Labetalol 10 mg IV q6h prn bp >170, hold for HR < 60  · Most recent pulse 112, /99  · Monitor vitals     5. Pneumonia  · 2/2 MSSA  · Changed to amoxicillin - clavulanate via PEG from ampicillin - sulbactam on 2/13/2020. · Continue till 2/21/2020  · WBC 14.3 today, decreased from 14.4  · ID following  · Chest vest BID   · Nebulization with ipratropium-albuterol Q4H and 3% NaCl nebulization BID    6. Dysphagia s/p PEG placement (2/12/2020)  · 2/2 Neck mass   · Last Hb 7.5, TF restarted  · Monitor CBC     7.  Severe Malnutrition  · Likely 2/2 Laryngeal Ca, dysphagia  · Per dietitian,

## 2020-02-15 NOTE — PROGRESS NOTES
placement on 02/12. Subjective: Patient was seen and examined. No chills, no abdominal pain, no diarrhea, no rash, no itching. Objective:    Vitals:    02/15/20 0752   BP: (!) 161/99   Pulse: 112   Resp: 20   Temp: 97.7 °F (36.5 °C)   SpO2: 100%     Constitutional: Alert, not in distress  Respiratory: Clear breath sounds, no crackles, no wheezes  Cardiovascular: Regular rate and rhythm, no murmurs  Gastrointestinal: Bowel sounds present, soft, nontender  Skin: Warm and dry, no active dermatoses  Musculoskeletal: No joint swelling, no joint erythema    Labs, imaging, and medical records/notes were personally reviewed. Assessment:  Group G Streptococcus bacteremia, etiology unclear, suspect oropharyngeal in etiology with possible aspiration pneumonia  MSSA pneumonia  History of head and neck cancer, s/p resection and chemoradiation therapy (details unclear)    Recommendations:  Continue amoxicillin-clavulanate 500-125 mg q8h to finish 14 days from 02/08-02/21. Observe aspiration precautions and oral hygiene.     Thank you for involving me in the care of Brigdet Salvador. I will continue to follow. Please do not hesitate to call for any questions or concerns.     Electronically signed by Vilma Calles MD on 2/15/2020 at 9:08 AM

## 2020-02-16 ENCOUNTER — ANESTHESIA (OUTPATIENT)
Dept: PEDIATRIC ICU | Age: 65
DRG: 870 | End: 2020-02-16
Payer: MEDICARE

## 2020-02-16 ENCOUNTER — ANESTHESIA EVENT (OUTPATIENT)
Dept: PEDIATRIC ICU | Age: 65
DRG: 870 | End: 2020-02-16
Payer: MEDICARE

## 2020-02-16 ENCOUNTER — APPOINTMENT (OUTPATIENT)
Dept: GENERAL RADIOLOGY | Age: 65
DRG: 870 | End: 2020-02-16
Payer: MEDICARE

## 2020-02-16 LAB
ANION GAP SERPL CALCULATED.3IONS-SCNC: 11 MMOL/L (ref 7–16)
ANION GAP SERPL CALCULATED.3IONS-SCNC: 14 MMOL/L (ref 7–16)
ANISOCYTOSIS: ABNORMAL
B.E.: -9.7 MMOL/L (ref -3–3)
B.E.: 2.7 MMOL/L (ref -3–0)
BASOPHILS ABSOLUTE: 0.24 E9/L (ref 0–0.2)
BASOPHILS RELATIVE PERCENT: 0.9 % (ref 0–2)
BUN BLDV-MCNC: 23 MG/DL (ref 8–23)
BUN BLDV-MCNC: 32 MG/DL (ref 8–23)
CALCIUM SERPL-MCNC: 7.5 MG/DL (ref 8.6–10.2)
CALCIUM SERPL-MCNC: 8.1 MG/DL (ref 8.6–10.2)
CHLORIDE BLD-SCNC: 101 MMOL/L (ref 98–107)
CHLORIDE BLD-SCNC: 102 MMOL/L (ref 98–107)
CO2: 27 MMOL/L (ref 22–29)
CO2: 29 MMOL/L (ref 22–29)
COHB: 0.3 % (ref 0–1.5)
CREAT SERPL-MCNC: 1.2 MG/DL (ref 0.7–1.2)
CREAT SERPL-MCNC: 1.8 MG/DL (ref 0.7–1.2)
CRITICAL NOTIFICATION: YES
CRITICAL: ABNORMAL
DATE ANALYZED: ABNORMAL
DATE OF COLLECTION: ABNORMAL
DEVICE: ABNORMAL
EKG ATRIAL RATE: 119 BPM
EKG P AXIS: 61 DEGREES
EKG P-R INTERVAL: 126 MS
EKG Q-T INTERVAL: 342 MS
EKG QRS DURATION: 70 MS
EKG QTC CALCULATION (BAZETT): 481 MS
EKG R AXIS: 39 DEGREES
EKG T AXIS: 2 DEGREES
EKG VENTRICULAR RATE: 119 BPM
EOSINOPHILS ABSOLUTE: 0.24 E9/L (ref 0.05–0.5)
EOSINOPHILS RELATIVE PERCENT: 0.9 % (ref 0–6)
FIO2 ARTERIAL: 100
GFR AFRICAN AMERICAN: 46
GFR AFRICAN AMERICAN: >60
GFR NON-AFRICAN AMERICAN: 46 ML/MIN/1.73
GFR NON-AFRICAN AMERICAN: >60 ML/MIN/1.73
GLUCOSE BLD-MCNC: 113 MG/DL (ref 74–99)
GLUCOSE BLD-MCNC: 144 MG/DL (ref 74–99)
HCO3 ARTERIAL: 24.1 MMOL/L (ref 22–26)
HCO3: 25.2 MMOL/L (ref 22–26)
HCT (EST): 18 % (ref 37–54)
HCT VFR BLD CALC: 25.4 % (ref 37–54)
HEMOGLOBIN: 8 G/DL (ref 12.5–16.5)
HGB, (EST): 6 G/DL (ref 12.5–15.5)
HHB: 73.1 % (ref 0–5)
HYPOCHROMIA: ABNORMAL
LAB: ABNORMAL
LV EF: 60 %
LVEF MODALITY: NORMAL
LYMPHOCYTES ABSOLUTE: 3.14 E9/L (ref 1.5–4)
LYMPHOCYTES RELATIVE PERCENT: 12.2 % (ref 20–42)
Lab: ABNORMAL
MCH RBC QN AUTO: 28.8 PG (ref 26–35)
MCHC RBC AUTO-ENTMCNC: 31.5 % (ref 32–34.5)
MCV RBC AUTO: 91.4 FL (ref 80–99.9)
METER GLUCOSE: 109 MG/DL (ref 74–99)
METER GLUCOSE: 111 MG/DL (ref 74–99)
METER GLUCOSE: 146 MG/DL (ref 74–99)
METER GLUCOSE: 152 MG/DL (ref 74–99)
METHB: 0.9 % (ref 0–1.5)
MODE: ABNORMAL
MODE: AC
MONOCYTES ABSOLUTE: 3.14 E9/L (ref 0.1–0.95)
MONOCYTES RELATIVE PERCENT: 12.2 % (ref 2–12)
NEUTROPHILS ABSOLUTE: 19.39 E9/L (ref 1.8–7.3)
NEUTROPHILS RELATIVE PERCENT: 73.9 % (ref 43–80)
NUCLEATED RED BLOOD CELLS: 0.9 /100 WBC
O2 CONTENT: 2.9 ML/DL
O2 SATURATION: 26 % (ref 92–98.5)
O2 SATURATION: 99.8 % (ref 92–98.5)
O2HB: 25.7 % (ref 94–97)
OPERATOR ID: 1874
OPERATOR ID: 2577
PATIENT TEMP: 37 C
PCO2 ARTERIAL: 23.1 MMHG (ref 35–45)
PCO2: 167.8 MMHG (ref 35–45)
PDW BLD-RTO: 15.8 FL (ref 11.5–15)
PH BLOOD GAS: 6.79 (ref 7.35–7.45)
PH BLOOD GAS: 7.63 (ref 7.35–7.45)
PLATELET # BLD: 192 E9/L (ref 130–450)
PMV BLD AUTO: 12.2 FL (ref 7–12)
PO2 ARTERIAL: 183.7 MMHG (ref 60–80)
PO2: 31.8 MMHG (ref 75–100)
POLYCHROMASIA: ABNORMAL
POSITIVE END EXP PRESS: 5 CMH2O
POTASSIUM SERPL-SCNC: 3.3 MMOL/L (ref 3.5–5)
POTASSIUM SERPL-SCNC: 3.9 MMOL/L (ref 3.5–5)
POTASSIUM SERPL-SCNC: 5.24 MMOL/L (ref 3.5–5)
RBC # BLD: 2.78 E12/L (ref 3.8–5.8)
RESPIRATORY RATE: 16 B/MIN
SODIUM BLD-SCNC: 141 MMOL/L (ref 132–146)
SODIUM BLD-SCNC: 143 MMOL/L (ref 132–146)
SOURCE, BLOOD GAS: ABNORMAL
SOURCE, BLOOD GAS: ABNORMAL
THB: 7.7 G/DL (ref 11.5–16.5)
TIDAL VOLUME: 500 ML
TIME ANALYZED: 552
TROPONIN: 0.13 NG/ML (ref 0–0.03)
TROPONIN: 0.13 NG/ML (ref 0–0.03)
TROPONIN: 0.15 NG/ML (ref 0–0.03)
WBC # BLD: 26.2 E9/L (ref 4.5–11.5)

## 2020-02-16 PROCEDURE — 82803 BLOOD GASES ANY COMBINATION: CPT

## 2020-02-16 PROCEDURE — 2580000003 HC RX 258: Performed by: STUDENT IN AN ORGANIZED HEALTH CARE EDUCATION/TRAINING PROGRAM

## 2020-02-16 PROCEDURE — 84484 ASSAY OF TROPONIN QUANT: CPT

## 2020-02-16 PROCEDURE — 99291 CRITICAL CARE FIRST HOUR: CPT | Performed by: SURGERY

## 2020-02-16 PROCEDURE — 5A1955Z RESPIRATORY VENTILATION, GREATER THAN 96 CONSECUTIVE HOURS: ICD-10-PCS | Performed by: STUDENT IN AN ORGANIZED HEALTH CARE EDUCATION/TRAINING PROGRAM

## 2020-02-16 PROCEDURE — 6370000000 HC RX 637 (ALT 250 FOR IP): Performed by: STUDENT IN AN ORGANIZED HEALTH CARE EDUCATION/TRAINING PROGRAM

## 2020-02-16 PROCEDURE — 36415 COLL VENOUS BLD VENIPUNCTURE: CPT

## 2020-02-16 PROCEDURE — 6370000000 HC RX 637 (ALT 250 FOR IP): Performed by: INTERNAL MEDICINE

## 2020-02-16 PROCEDURE — 71045 X-RAY EXAM CHEST 1 VIEW: CPT

## 2020-02-16 PROCEDURE — 82962 GLUCOSE BLOOD TEST: CPT

## 2020-02-16 PROCEDURE — 93005 ELECTROCARDIOGRAM TRACING: CPT | Performed by: SURGERY

## 2020-02-16 PROCEDURE — 82805 BLOOD GASES W/O2 SATURATION: CPT

## 2020-02-16 PROCEDURE — 2700000000 HC OXYGEN THERAPY PER DAY

## 2020-02-16 PROCEDURE — 99232 SBSQ HOSP IP/OBS MODERATE 35: CPT | Performed by: INTERNAL MEDICINE

## 2020-02-16 PROCEDURE — 31500 INSERT EMERGENCY AIRWAY: CPT | Performed by: ANESTHESIOLOGY

## 2020-02-16 PROCEDURE — 94640 AIRWAY INHALATION TREATMENT: CPT

## 2020-02-16 PROCEDURE — 93010 ELECTROCARDIOGRAM REPORT: CPT | Performed by: INTERNAL MEDICINE

## 2020-02-16 PROCEDURE — 80048 BASIC METABOLIC PNL TOTAL CA: CPT

## 2020-02-16 PROCEDURE — 0BH18EZ INSERTION OF ENDOTRACHEAL AIRWAY INTO TRACHEA, VIA NATURAL OR ARTIFICIAL OPENING ENDOSCOPIC: ICD-10-PCS | Performed by: STUDENT IN AN ORGANIZED HEALTH CARE EDUCATION/TRAINING PROGRAM

## 2020-02-16 PROCEDURE — 94002 VENT MGMT INPAT INIT DAY: CPT

## 2020-02-16 PROCEDURE — 93306 TTE W/DOPPLER COMPLETE: CPT

## 2020-02-16 PROCEDURE — 2500000003 HC RX 250 WO HCPCS

## 2020-02-16 PROCEDURE — 2000000000 HC ICU R&B

## 2020-02-16 PROCEDURE — 84132 ASSAY OF SERUM POTASSIUM: CPT

## 2020-02-16 PROCEDURE — 85025 COMPLETE CBC W/AUTO DIFF WBC: CPT

## 2020-02-16 RX ORDER — DEXTROSE AND SODIUM CHLORIDE 5; .9 G/100ML; G/100ML
INJECTION, SOLUTION INTRAVENOUS CONTINUOUS
Status: DISCONTINUED | OUTPATIENT
Start: 2020-02-16 | End: 2020-02-17

## 2020-02-16 RX ORDER — CHLORHEXIDINE GLUCONATE 0.12 MG/ML
15 RINSE ORAL 2 TIMES DAILY
Status: DISCONTINUED | OUTPATIENT
Start: 2020-02-16 | End: 2020-02-29

## 2020-02-16 RX ORDER — METOPROLOL TARTRATE 5 MG/5ML
5 INJECTION INTRAVENOUS EVERY 5 MIN PRN
Status: COMPLETED | OUTPATIENT
Start: 2020-02-16 | End: 2020-02-18

## 2020-02-16 RX ORDER — METOPROLOL TARTRATE 5 MG/5ML
INJECTION INTRAVENOUS
Status: COMPLETED
Start: 2020-02-16 | End: 2020-02-16

## 2020-02-16 RX ADMIN — SODIUM CHLORIDE SOLN NEBU 3% 4 ML: 3 NEBU SOLN at 20:00

## 2020-02-16 RX ADMIN — DEXTROSE AND SODIUM CHLORIDE: 5; 900 INJECTION, SOLUTION INTRAVENOUS at 16:18

## 2020-02-16 RX ADMIN — SENNOSIDES 5 ML: 8.8 LIQUID ORAL at 20:33

## 2020-02-16 RX ADMIN — AMOXICILLIN AND CLAVULANATE POTASSIUM 500 MG: 250; 62.5 POWDER, FOR SUSPENSION ORAL at 00:33

## 2020-02-16 RX ADMIN — METOPROLOL TARTRATE 5 MG: 5 INJECTION INTRAVENOUS at 09:50

## 2020-02-16 RX ADMIN — Medication 10 ML: at 20:33

## 2020-02-16 RX ADMIN — POTASSIUM BICARBONATE 40 MEQ: 782 TABLET, EFFERVESCENT ORAL at 19:01

## 2020-02-16 RX ADMIN — HYDRALAZINE HYDROCHLORIDE 25 MG: 25 TABLET, FILM COATED ORAL at 22:00

## 2020-02-16 RX ADMIN — AMOXICILLIN AND CLAVULANATE POTASSIUM 500 MG: 250; 62.5 POWDER, FOR SUSPENSION ORAL at 16:07

## 2020-02-16 RX ADMIN — FOLIC ACID 1 MG: 1 TABLET ORAL at 08:52

## 2020-02-16 RX ADMIN — CHLORHEXIDINE GLUCONATE 0.12% ORAL RINSE 15 ML: 1.2 LIQUID ORAL at 20:33

## 2020-02-16 RX ADMIN — AMOXICILLIN AND CLAVULANATE POTASSIUM 500 MG: 250; 62.5 POWDER, FOR SUSPENSION ORAL at 08:56

## 2020-02-16 RX ADMIN — IPRATROPIUM BROMIDE AND ALBUTEROL SULFATE 1 AMPULE: 2.5; .5 SOLUTION RESPIRATORY (INHALATION) at 08:04

## 2020-02-16 RX ADMIN — IPRATROPIUM BROMIDE AND ALBUTEROL SULFATE 1 AMPULE: 2.5; .5 SOLUTION RESPIRATORY (INHALATION) at 20:00

## 2020-02-16 RX ADMIN — IPRATROPIUM BROMIDE AND ALBUTEROL SULFATE 1 AMPULE: 2.5; .5 SOLUTION RESPIRATORY (INHALATION) at 15:35

## 2020-02-16 RX ADMIN — SODIUM CHLORIDE SOLN NEBU 3% 4 ML: 3 NEBU SOLN at 08:11

## 2020-02-16 RX ADMIN — HYDRALAZINE HYDROCHLORIDE 25 MG: 25 TABLET, FILM COATED ORAL at 14:00

## 2020-02-16 RX ADMIN — IPRATROPIUM BROMIDE AND ALBUTEROL SULFATE 1 AMPULE: 2.5; .5 SOLUTION RESPIRATORY (INHALATION) at 11:32

## 2020-02-16 RX ADMIN — LANSOPRAZOLE 30 MG: KIT at 08:13

## 2020-02-16 RX ADMIN — Medication 10 ML: at 08:52

## 2020-02-16 RX ADMIN — METOPROLOL TARTRATE 50 MG: 50 TABLET, FILM COATED ORAL at 08:38

## 2020-02-16 RX ADMIN — METOPROLOL TARTRATE 50 MG: 50 TABLET, FILM COATED ORAL at 20:33

## 2020-02-16 RX ADMIN — Medication 100 MG: at 09:01

## 2020-02-16 ASSESSMENT — PULMONARY FUNCTION TESTS
PIF_VALUE: 21
PIF_VALUE: 25
PIF_VALUE: 21
PIF_VALUE: 26
PIF_VALUE: 20
PIF_VALUE: 36
PIF_VALUE: 23
PIF_VALUE: 20
PIF_VALUE: 25
PIF_VALUE: 25
PIF_VALUE: 29
PIF_VALUE: 21
PIF_VALUE: 34
PIF_VALUE: 23
PIF_VALUE: 29
PIF_VALUE: 26
PIF_VALUE: 22
PIF_VALUE: 19
PIF_VALUE: 33
PIF_VALUE: 26
PIF_VALUE: 38

## 2020-02-16 ASSESSMENT — PAIN SCALES - GENERAL
PAINLEVEL_OUTOF10: 0
PAINLEVEL_OUTOF10: 6
PAINLEVEL_OUTOF10: 1
PAINLEVEL_OUTOF10: 0
PAINLEVEL_OUTOF10: 0
PAINLEVEL_OUTOF10: 1

## 2020-02-16 NOTE — SIGNIFICANT EVENT
Code blue was called at 0533. On arrival, CPR was started. According to nurse, patient was sleeping well around 3:30. CMR notified nurse that patient went down to vera with HR of 20s. Nurse could not palpate pulse. Initially RRT was called and followed by code blue. Patient was found to be PEA arrest. After 2 rounds of epi, patient achieved ROSC. Rhythm was found to be sinus tachy with HR of 120. Short after, PEA arrest again. Second CPR was resumed. After 1 round of epi, patient got ROSC. Lost pulse with PEA. Third CPR was resumed. Received 1 amp of bicarb and 1 g of calcium chloride. Patient achieve ROSC again after 1 round of epi. Respiratory staff had hard time to intubate patient due to laryngeal mass. Anesthesiology was called and inserted ETT at 0548. BP was 146/64 with HR of 93. ABG revealed severe respiratory acidosis. (6.795/167.8/31.8/25.2). Patient received second amp of bicarb. We transferred patient to SICU. CPR was done for total 8 mins and was stopped at 0548. Given the fact that patient was not on DVD prophylaxis anticoagulation due to nose bleeding, I suspect patient has a high risk for PE in the setting of active cancer. I wanted to give tPA 50 mg however the nurse staffs did not feel comfortable to give tPA. I discussed with surgical resident regarding concern for PE. Family, PCP and Intensives were updated.     Ani Betancur MD PGY3  Internal Medicine resident  Electronically signed by Ani Betancur MD on 2/16/2020 at 7:02 AM

## 2020-02-16 NOTE — PROGRESS NOTES
Code Blue called [id number].  RRT staff already at bedside  Resident running code - Dr Rishabh Arceo RN  SICU Staff - Anne Ruiz RN / Paul Dunn RN  Anesthesia - [facility]    1956 Code Blue - PEA - Bagging/Chest Compressions  0535 1amp Epi  0536 1L NSS begun with pressure bag  0537 Pulse check PEA - resume compressions  0538 1amp Epi  0539 ROSC - Sinus Tach -  (unable to obtain BP)  0542 Pulse check PEA - resume compressions           1amp Epi  0544 ROSC- Sinus Tach -   0546 PEA - resume compressions           1amp Epi  0547 1amp Bicarb  0548 Ca Chloride           ROSC - Sinus Tach -    0549 Airway placed by anesthesia - 7.5 ETT / [email or web address]           ETCO2 73  0550 145/75    0553 146/84    0555 174/96    0559 196/109            1amp Bicarb  0600 Tx SICU [id number]

## 2020-02-16 NOTE — PROGRESS NOTES
Department of Internal Medicine  Nephrology Attending Progress Note    Events reviewed. SUBJECTIVE: We are following Mr. Henri Gabriel for YVETTE on CKD. Patient seen and examined bedside. Reports no complaints. PHYSICAL EXAM:      Vitals:    VITALS:  /86   Pulse 116   Temp 98.4 °F (36.9 °C) (Axillary)   Resp 26   Ht 5' 4\" (1.626 m)   Wt 130 lb 4.8 oz (59.1 kg)   SpO2 96%   BMI 22.37 kg/m²        Intake/Output Summary (Last 24 hours) at 2/16/2020 1603  Last data filed at 2/16/2020 1400  Gross per 24 hour   Intake 750 ml   Output 1055 ml   Net -305 ml         Constitutional:  Lethargic, laying in bed. Answer simple questions appropriately. HEENT:  Scarred sugical incision at neck; facial droop  Respiratory:  Clear, diminished bases bilaterally  Cardiovascular/Edema:  RRR, no edema noted  Gastrointestinal:  Abdomen soft and nontender, bowel sounds active. PEG.   Neurologic:  Contracted extremities, facial droop   Skin:  Dry,warm  flaky   Other:  No edema     Scheduled Meds:   chlorhexidine  15 mL Mouth/Throat BID    sennosides  5 mL Oral Nightly    docusate sodium  100 mg Oral Daily    sodium chloride  20 mL Intravenous Once    folic acid  1 mg PEG Tube Daily    lansoprazole  30 mg Per G Tube QAM AC    vitamin B-1  100 mg PEG Tube Daily    amoxicillin-clavulanate  500 mg Per G Tube Q8H    [Held by provider] aspirin  81 mg Per G Tube Daily    hydrALAZINE  25 mg PEG Tube 3 times per day    metoprolol tartrate  50 mg PEG Tube BID    ipratropium-albuterol  1 ampule Inhalation Q4H WA    sodium chloride (Inhalant)  4 mL Nebulization BID    sodium chloride flush  10 mL Intravenous 2 times per day    sodium chloride  1,000 mL Intravenous Once     Continuous Infusions:   dextrose 5 % and 0.9 % NaCl      dextrose       PRN Meds:.metoprolol, perflutren lipid microspheres, glucose, dextrose, glucagon (rDNA), dextrose, labetalol, sodium chloride flush, magnesium hydroxide      DATA:    CBC:   Lab tissue mass at the level the carotid bifurcation on the right   with outward mass effect measuring approximately 2.3 x 2.7 cm   suspicious for recurrent disease. This encases the carotid distally   and the distal carotid stenosis may represent invasion. Comparison   studies would be necessary to distinguish recurrent neoplasm from   previous radiation therapy. Tumor invasion versus osteonecrosis of the medial right mandible         Chest x-ray February 16, 2020       Endotracheal tube terminates above the jovita.       Stable opacities in the lower lobes. Consider infectious/inflammatory   etiologies or edema. Follow-up to resolution.                         BRIEF SUMMARY OF INITIAL CONSULT:    Briefly, Mr. 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 Patient was found to be extremely hypotensive at 50/palpated, tachycardic and fatigued in the ED. He was given fluid bolus with BP improvement, was found to have YVETTE (baseline unknown) with a creatinine of 4.1. Patients creatinine today is 3.0, reason for this consult. Problems resolved:  · Hypernatremia, with water deficit; calculated free water deficit is 1.8 L. Resolved. · Hypomagnesemia likely 2/2 poor oral intake  · YVETTE stage III on CKD;  volume responsive prerenal YVETTE due to poor oral intake in the setting of ACE inhibition, fraction excretion of sodium 0.7%, fractional secretion urea 20.9%. Resolved. · Hypernatremia, with water deficit, resolved   · Hypokalemia likely 2/2 to poor oral intake  · Severe thrombocytopenia, resolved     IMPRESSION/RECOMMENDATIONS:      1. CKD stage I-II, with minimal proteinuria (urine albumin/creatinine: 81 mg/gr).  Kidney ultrasound with increased renal cortical echogenicity. Probably due to nephrosclerosis. Renal function stable. 2. Status post PEA arrest early morning  3. Respiratory failure status post intubation  4. HTN, on hydralazine, metoprolol. 5. Group G Streptococcus bacteremia, on amoxicillin-clavulanate  6. Normocytic Anemia, Acute blood loss? .  Iron studies consistent with chronic inflammation, normal B12 and folate levels. S/p transfusion.    7. Nutrition, s/p PEG, npo      Plan:    · D5NS at 50 cc/hour   · Continue to monitor renal function  · Continue to monitor H&H

## 2020-02-16 NOTE — PROGRESS NOTES
Attending Physician Statement  I have discussed the case, including pertinent history and exam findings with the resident. I have seen and examined the patient and the key elements of the encounter have been performed by me. I agree with the assessment, plan and orders as documented by the resident. Pt developed bradycardia and code blue called on 4:54 am today, had problem with intubation due to upper air way stricture/obstruction x 3, pt regained the pulse and BP, showed maintenance of BP and normal heart rates noted. Pupils are constricted and unresponsive to stimulation, on AC mode with stable BP and HR. DW with team, it is unlikely a result of PE, pending cardiac work up. There was new developed deep mass on lower neck 2-3 cm in size and history of laryngeal Ca appeared to obstruct the airway, caused hypoxic bradycardia. DW family members and continue resp support at this time.   David Terrell

## 2020-02-16 NOTE — PROGRESS NOTES
[facility]  Internal Medicine Residency Program  Progress Note - House Team 1    Patient:  Karsten Viveros 59 y.o. male   MRN: [id number]       Date of Service: 2020    Allergy: Patient has no known allergies. Subjective     Patient was seen and examined in AM.  Patient currently intubated in the surgical ICU. 24 hour change: Had a PEA arrest at 5:33 AM.  After multiple rounds of ACLS, ROSC was achieved. Patient was intubated by anesthesia. Updated H+P:  Patient admitted on 2020 due to fatigue, unable to eat and had an episode of fall. Found to be hypotensive, thrombocytopenic (platelet 98,734) and YVETTE. During the course of his admission, he was found to have blood cultures positive for beta-hemolytic group B streptococcus (susceptible to ampicillin). His course was also complicated by alcohol withdrawal, YVETTE stage III on CKD, hypokalemia, and MSSA pneumonia. He, at 1 point, was on doxycycline and ceftriaxone. It was changed to piperacillin-tazobactam and eventually to nafcillin. Nafcillin was changed to ampicillin sulbactam on 2020. This was subsequently changed to amoxicillin- clavulanic acid per ID. He received 1 unit blood transfusion on 2020 due to decrease in hemoglobin from 8-6.7. His platelets recovered to >100,000 during the course of the admission. In the a.m. of 2020, he had a PEA arrest.  After multiple rounds of ACLS, ROSC was achieved. Patient was transferred to the surgical ICU the same day. Objective     TEMPERATURE:  Current - Temp: 98.2 °F (36.8 °C);  Max - Temp  Av.1 °F (36.7 °C)  Min: 97.7 °F (36.5 °C)  Max: 98.3 °F (36.8 °C)  RESPIRATIONS RANGE: Resp  Av  Min: 20  Max: 20  PULSE RANGE: Pulse  Av  Min: 105  Max: 112  BLOOD PRESSURE RANGE:  Systolic (51ZLV), CJB:593 , Min:156 , TQM:381   ; Diastolic (79TZH), WUL:59, Min:79, Max:99    PULSE OXIMETRY RANGE: SpO2  Av.3 %  Min: 98 %  Max: 100 %    I & O - 24hr:    Intake/Output Summary (Last 24 hours) at 2/16/2020 0710  Last data filed at 2/16/2020 8520  Gross per 24 hour   Intake 1354 ml   Output 850 ml   Net 504 ml     I/O last 3 completed shifts: In: 1880 [NG/GT:1354]  Out: 850 [Urine:850] No intake/output data recorded. Weight change:     Physical Exam  Vitals signs and nursing note reviewed. Constitutional:       Appearance: He is normal weight. He is ill-appearing. Interventions: He is intubated. HENT:      Head: Normocephalic and atraumatic. Right Ear: External ear normal.      Left Ear: External ear normal.      Nose: Nose normal.      Mouth/Throat:      Mouth: Mucous membranes are moist.   Eyes:      General: Lids are normal. No scleral icterus. Right eye: No discharge. Left eye: No discharge. Conjunctiva/sclera: Conjunctivae normal.      Pupils: Pupils are equal.      Right eye: Pupil is not reactive (Pinpoint pupil). Pupil is not sluggish. Left eye: Pupil is not reactive (Pinpoint pupil). Pupil is not sluggish. Cardiovascular:      Rate and Rhythm: Regular rhythm. Tachycardia present. Pulses: Normal pulses. Carotid pulses are 2+ on the right side and 2+ on the left side. Radial pulses are 2+ on the right side and 2+ on the left side. Dorsalis pedis pulses are 2+ on the right side and 2+ on the left side. Heart sounds: S1 normal and S2 normal. Gallop present. S4 sounds present. No S3 sounds. Pulmonary:      Effort: Pulmonary effort is normal. He is intubated. Breath sounds: Normal air entry. Transmitted upper airway sounds present. Examination of the right-upper field reveals rhonchi. Examination of the left-upper field reveals rhonchi. Examination of the right-middle field reveals rhonchi. Examination of the left-middle field reveals rhonchi. Examination of the right-lower field reveals rhonchi. Examination of the left-lower field reveals rhonchi. Rhonchi present.  No decreased breath sounds, wheezing or rales. Neurological:      Comments: Cough and gag reflex present. Labs and Imaging Studies     CBC:   Recent Labs     20  0439 02/15/20  0419 02/15/20  0811 02/15/20  1936 20  0429   WBC 14.4* 14.3*  --   --  26.2*   HGB 6.7* 6.9* 7.5* 6.7* 8.0*   HCT 20.7* 20.8* 23.3* 20.3* 25.4*   MCV 85.9 87.0  --   --  91.4   * 118*  --   --  192       BMP:    Recent Labs     02/15/20  0419 02/15/20  1552 20  0429 20  0552   * 144 141  --    K 3.5 3.4* 3.9 5.24*   * 105 101  --    CO2 27 28 29  --    BUN 24* 23 23  --    CREATININE 1.2 1.1 1.2  --    GLUCOSE 114* 106* 144*  --        LIVER PROFILE:   No results for input(s): AST, ALT, LIPASE, BILIDIR, BILITOT, ALKPHOS in the last 72 hours. Invalid input(s): AMYLASE,  ALB    PT/INR:   No results for input(s): PROTIME, INR in the last 72 hours. APTT:   No results for input(s): APTT in the last 72 hours. Fasting Lipid Panel:    No results found for: CHOL, TRIG, HDL    Notable Cultures:      Blood cultures   Blood Culture, Routine   Date Value Ref Range Status   02/10/2020 5 Days- no growth  Final     Respiratory cultures No results found for: RESPCULTURE No results found for: LABGRAM  Urine   Urine Culture, Routine   Date Value Ref Range Status   2020 <10,000 CFU/mL  Mixed gram positive organisms    Final     Legionella No results found for: LABLEGI  C Diff PCR No results found for: CDIFPCR  Wound culture/abscess: No results for input(s): WNDABS in the last 72 hours. Tip culture:No results for input(s): CXCATHTIP in the last 72 hours. Xr Chest Standard (2 Vw)    Result Date: 2020  Patient MRN: [id number] : 1955 Age:  59 years Gender: Male Order Date: 2020 10:15 AM Exam: XR CHEST (2 VW) Number of Images: 2 views Indication:   Sepsis, sob Sepsis, sob Comparison: None. Findings:  The heart is unremarkable The lung fields demonstrate no significant pulmonary vascular congestion and edema. The aorta is tortuous ectatic There are findings throughout the lung fields which are likely chronic     Findings compatible with atherosclerotic disease No acute infiltrate     Xr Foot Left (min 3 Views)    Result Date: 2020  Patient MRN:  [id number] : 1955 Age: 59 years Gender: Male Order Date:  2020 2:45 PM EXAM: XR FOOT LEFT (MIN 3 VIEWS) NUMBER OF IMAGES:  3 views INDICATION:  pain, swelling pain, swelling COMPARISON: None . The bones appear to be in anatomic alignment. No foreign body is identified No fracture is identified  There is moderate joint space loss The are moderate degenerative changes present     Moderate degenerative changes    Ct Soft Tissue Neck W Wo Contrast    Result Date: 2020  Patient MRN:  [id number] : 1955 Age: 59 years Gender: Male Order Date:  2020 3:45 PM EXAM: CT SOFT TISSUE NECK W WO CONTRAST NUMBER OF IMAGES \ views:  36 INDICATION:  h/o R mandible/base of tongue ca, sp resection and chemorads, concern for recurrence Per nephro team ok to do scan with contrast h/o R mandible/base of tongue ca, sp resection and chemorads, concern for recurrence COMPARISON: None Technique: Low-dose CT  acquisition technique included one of following options; 1 . Automated exposure control, 2. Adjustment of MA and or KV according to patient's size or 3. Use of iterative reconstruction. The larynx appears unremarkable The soft tissues appear abnormal there is sequela of extensive prior surgery. No convincing abscess is seen. There is distortion of the oropharynx and the posterior superior hypopharynx there is significant distortion of the right sternocleidomastoid and a pattern compatible with previous radical neck dissection. There are findings to suggest previous therapy. The right jugular vein is not seen likely status post resection. There is been resection at the level of the tongue base and floor the mouth.  There is sequela of previous years Gender: Male Order Date:  2020 2:15 PM Exam: US HEAD NECK SOFT TISSUE THYROID Number of images:  26 Indication:  right carotid bifurcation mass With special focus to soft tissue mass at the right carotid bifurcation. right carotid bifurcation mass Comparison: Correlation with CT dated 2020. Technique: Grayscale and color Doppler ultrasound images of the region of interest were obtained by the sonographic technologist. FINDINGS: The study is limited by technically suboptimal image quality. There is abnormal heterogeneous soft tissue in the region of interest.     Ill-defined, heterogeneous, abnormal appearing soft tissue in the region of interest. No specific clinical question is asked. Ultrasound is not the appropriate modality for this evaluation. Contrast-enhanced MRI may provide more information. Correlation with clinical and surgical history is needed. Xr Chest Portable    Result Date: 2020  Patient MRN:  [id number] : 1955 Age: 59 years Gender: Male Order Date:  2020 6:00 AM EXAM: XR CHEST PORTABLE COMPARISON: February 10, 2020 INDICATION:  PNA PNA FINDINGS: There are opacities at the left lung base silhouetting left hemidiaphragm which appears similar since prior. The heart is normal size. No pneumothorax. Stable opacities at left lung base. Continued follow-up could be helpful for further evaluation. Xr Chest Portable    Result Date: 2/10/2020  Patient MRN: [id number] : 1955 Age:  59 years Gender: Male Order Date: 2/10/2020 3:00 PM Exam: XR CHEST PORTABLE Number of Images: 1 view Indication:   SOB SOB Comparison: 2020 Findings: The heart is enlarged. The lung fields demonstrate evidence for airspace disease. The aorta is tortuous and ectatic.      Tortuous ectatic aorta Cardiomegaly Airspace disease compatible with atelectasis or pneumonia, worse at the left lung base as compared to the right The chest appears to be worse in the interval     Xr Chest 1 Yes Retention in piriform sinuses, None. Pharyngeal delay, Yes Laryngeal elevation, reduced. Study is remarkable for silent aspiration with nectar consistency. This procedure was performed and dictated by Misha Ribera PA-C with indirect supervision, and Fahad Gunter. Ever Greer MD reviewed and concurred with the findings. Fl Modified Barium Swallow W Video    Result Date: 2020  Patient MRN: [id number] : 1955 Age:  59 years Gender: Male Order Date: 2020 7:45 AM Exam: FL MODIFIED BARIUM SWALLOW W VIDEO Number of Images: views Indication:   dysphagia dysphagia Comparison: None. Findings: Patient was given nectar and pudding patient is unable to swallow with no gag reflex. Therefore patient was suctioned and the study was canceled.      Patient unable to swallow nectar and pudding which was suctioned         Medications     Current Meds:  Current Facility-Administered Medications   Medication Dose Route Frequency Provider Last Rate Last Dose    sennosides (SENOKOT) 8.8 MG/5ML syrup 5 mL  5 mL Oral Nightly Jeb Botello., DO   5 mL at 02/15/20 2148    docusate sodium (COLACE) 150 MG/15ML liquid 100 mg  100 mg Oral Daily Clemente Hayes Jr., DO   100 mg at 02/15/20 1232    0.9 % sodium chloride bolus  20 mL Intravenous Once Vince Huang MD        folic acid (FOLVITE) tablet 1 mg  1 mg PEG Tube Daily Russvioleta Khan, DO   1 mg at 02/15/20 6984    lansoprazole suspension SUSP 30 mg  30 mg Per G Tube QAWell[facility] Ephr[facility] Community Hospitaledmundo Ramirez Jr., DO   30 mg at 02/15/20 7150    vitamin B-1 (THIAMINE) tablet 100 mg  100 mg PEG Tube Daily Clemente Hayes Jr., DO   100 mg at 02/15/20 5481    amoxicillin-clavulanate (AUGMENTIN) 250-62.5 MG/5ML suspension 500 mg  500 mg Per G Tube Q8H [facility] Carissa Cloud MD   500 mg at 20 1348   [facility] by provider] aspirin chewable tablet 81 mg  81 mg Per G Tube Daily Harry Stanford MD   81 mg at 20 1009    hydrALAZINE (APRESOLINE) tablet 25 mg  25 mg PEG Tube 3 times per day Tiffanie Garrison MD   25 mg at 02/15/20 2148    metoprolol tartrate (LOPRESSOR) tablet 50 mg  50 mg PEG Tube BID Saroj Schumacher MD   50 mg at 02/15/20 2148    perflutren lipid microspheres (DEFINITY) injection 1.65 mg  1.5 mL Intravenous ONCE PRN Saqib B Capal, DO        glucose (GLUTOSE) 40 % oral gel 15 g  15 g Oral PRN Saqib B Capal, DO        dextrose 50 % IV solution  12.5 g Intravenous PRN Edwige Halei, DO        glucagon (rDNA) injection 1 mg  1 mg Intramuscular PRN Lockie Copier B Capal, DO        dextrose 5 % solution  100 mL/hr Intravenous PRN Edwige Henry, DO        ipratropium-albuterol (DUONEB) nebulizer solution 1 ampule  1 ampule Inhalation Q4H WA Saqib B Capal, DO   1 ampule at 02/15/20 1957    sodium chloride (Inhalant) 3 % nebulizer solution 4 mL  4 mL Nebulization BID Saqib B Capal, DO   4 mL at 02/15/20 1957    labetalol (NORMODYNE;TRANDATE) injection 10 mg  10 mg Intravenous Q6H PRN Saqib B Capal, DO   10 mg at 02/15/20 1748    sodium chloride flush 0.9 % injection 10 mL  10 mL Intravenous 2 times per day Lockie Copier B Capal, DO   10 mL at 02/15/20 2148    sodium chloride flush 0.9 % injection 10 mL  10 mL Intravenous PRN Saqib B Capal, DO   10 mL at 02/15/20 0249    0.9 % sodium chloride bolus  1,000 mL Intravenous Once Edwige Henry, DO        magnesium hydroxide (MILK OF MAGNESIA) 400 MG/5ML suspension 30 mL  30 mL Oral Daily PRN Saqib B Capal, DO           Continuous Infusions:   dextrose       Scheduled Meds:   sennosides  5 mL Oral Nightly    docusate sodium  100 mg Oral Daily    sodium chloride  20 mL Intravenous Once    folic acid  1 mg PEG Tube Daily    lansoprazole  30 mg Per G Tube QAM AC    vitamin B-1  100 mg PEG Tube Daily    amoxicillin-clavulanate  500 mg Per G Tube Q8H    [Held by provider] aspirin  81 mg Per G Tube Daily    hydrALAZINE  25 mg PEG Tube 3 times per day    metoprolol tartrate  50 mg PEG Tube BID    327YQ OD and folic acid 1mg OD. # Peptic ulcer prophylaxis: Lansoprazole. # DVT Prophylaxis: PCD  # Disposition: Cont current care.        Jeannette Harvey MD  PGY-1    Internal medicine resident    Attending Physician: Dr. Carmen Wills MD

## 2020-02-16 NOTE — CONSULTS
sennosides (SENOKOT) 8.8 MG/5ML syrup 5 mL  5 mL Oral Nightly Akanksha Mow Jr., DO   5 mL at 02/15/20 2148    docusate sodium (COLACE) 150 MG/15ML liquid 100 mg  100 mg Oral Daily Balko Brady., DO   Stopped at 02/16/20 5354    0.9 % sodium chloride bolus  20 mL Intravenous Once Luis Manuel Newman MD        folic acid (FOLVITE) tablet 1 mg  1 mg PEG Tube Daily Balko Brady., DO   1 mg at 02/16/20 5938    lansoprazole suspension SUSP 30 mg  30 mg Per G Tube QAM AC Akanksha Mow Jr., DO   30 mg at 02/16/20 0813    vitamin B-1 (THIAMINE) tablet 100 mg  100 mg PEG Tube Daily Vardomenico Sakshi Loera., DO   100 mg at 02/16/20 0901    amoxicillin-clavulanate (AUGMENTIN) 250-62.5 MG/5ML suspension 500 mg  500 mg Per G Tube Q8H Karen Watters MD   500 mg at 02/16/20 7654    [Held by provider] aspirin chewable tablet 81 mg  81 mg Per G Tube Daily Patricia Roman MD   81 mg at 02/14/20 1009    hydrALAZINE (APRESOLINE) tablet 25 mg  25 mg PEG Tube 3 times per day Tiffanie Garrison MD   25 mg at 02/16/20 1400    metoprolol tartrate (LOPRESSOR) tablet 50 mg  50 mg PEG Tube BID Saroj Schumacher MD   50 mg at 02/16/20 0838    glucose (GLUTOSE) 40 % oral gel 15 g  15 g Oral PRN Lockie Copier B Capal, DO        dextrose 50 % IV solution  12.5 g Intravenous PRN Edwige Henry DO        glucagon (rDNA) injection 1 mg  1 mg Intramuscular PRN Lockie Copier B Capal, DO        dextrose 5 % solution  100 mL/hr Intravenous PRN Edwige Henry, DO        ipratropium-albuterol (DUONEB) nebulizer solution 1 ampule  1 ampule Inhalation Q4H WA Saqib B Capal, DO   1 ampule at 02/16/20 1132    sodium chloride (Inhalant) 3 % nebulizer solution 4 mL  4 mL Nebulization BID Saqib B Capal, DO   4 mL at 02/16/20 0811    labetalol (NORMODYNE;TRANDATE) injection 10 mg  10 mg Intravenous Q6H PRN Saqib CASTILLO Capal, DO   10 mg at 02/15/20 1748    sodium chloride flush 0.9 % injection 10 mL  10 mL Intravenous 2 times per day Saqib CASTILLO Capal, DO   10 mL at 02/16/20 9282    sodium chloride flush 0.9 % injection 10 mL  10 mL Intravenous PRN Tristen CASTILLO Capal, DO   10 mL at 02/15/20 0249    0.9 % sodium chloride bolus  1,000 mL Intravenous Once [employer], DO        magnesium hydroxide (MILK OF MAGNESIA) 400 MG/5ML suspension 30 mL  30 mL Oral Daily PRN [employer], DO           Social History     Tobacco Use    Smoking status: Current Every Day Smoker     Packs/day: 0.25     Years: 40.00     Pack years: 10.00     Types: Cigarettes     Start date: 2/6/1970    Smokeless tobacco: Current User   Substance Use Topics    Alcohol use: Yes     Alcohol/week: 14.0 standard drinks     Types: 6 Glasses of wine, 8 Cans of beer per week     Frequency: 4 or more times a week     Drinks per session: 10 or more     Binge frequency: Weekly          Review of Systems:  Intubated, sedated    New Imaging Reviewed:   Lines, tubes, and devices:  Endotracheal tube terminates above the   jovita.       Lungs and pleura:  Scattered opacities in the lower lobes, similar to   prior exam. No substantial pleural effusion. No pneumothorax.       Cardiomediastinal silhouette:  Normal cardiomediastinal silhouette. Physical Exam:  Physical Exam  Constitutional:       Appearance: He is ill-appearing. Comments: On no sedation   HENT:      Head: Atraumatic. Nose:      Comments: No blood from nares  Eyes:      Pupils: Pupils are equal, round, and reactive to light. Cardiovascular:      Comments: Tachycardic, regular rate  Pulmonary:      Comments: Intbuated, ACVC, clear bilaterally  Abdominal:      Comments: Soft, nondistended, no apparent tenderness. PEG in place, clamped   Genitourinary:     Comments: Sanchez light clear yellow urine  Skin:     General: Skin is warm and dry.    Neurological:      Comments: Pupils equal. Moves with painful stimuli but does not clearly localize or follow commands         Assessment   Principal Problem:

## 2020-02-17 ENCOUNTER — APPOINTMENT (OUTPATIENT)
Dept: NEUROLOGY | Age: 65
DRG: 870 | End: 2020-02-17
Payer: MEDICARE

## 2020-02-17 ENCOUNTER — APPOINTMENT (OUTPATIENT)
Dept: GENERAL RADIOLOGY | Age: 65
DRG: 870 | End: 2020-02-17
Payer: MEDICARE

## 2020-02-17 ENCOUNTER — APPOINTMENT (OUTPATIENT)
Dept: CT IMAGING | Age: 65
DRG: 870 | End: 2020-02-17
Payer: MEDICARE

## 2020-02-17 LAB
AADO2: 113.3 MMHG
ALBUMIN SERPL-MCNC: 1.6 G/DL (ref 3.5–5.2)
ALP BLD-CCNC: 55 U/L (ref 40–129)
ALT SERPL-CCNC: 25 U/L (ref 0–40)
ANION GAP SERPL CALCULATED.3IONS-SCNC: 12 MMOL/L (ref 7–16)
ANION GAP SERPL CALCULATED.3IONS-SCNC: 15 MMOL/L (ref 7–16)
ANION GAP SERPL CALCULATED.3IONS-SCNC: 9 MMOL/L (ref 7–16)
ANISOCYTOSIS: ABNORMAL
AST SERPL-CCNC: 49 U/L (ref 0–39)
B.E.: 3.2 MMOL/L (ref -3–3)
BASOPHILS ABSOLUTE: 0.02 E9/L (ref 0–0.2)
BASOPHILS RELATIVE PERCENT: 0.1 % (ref 0–2)
BILIRUB SERPL-MCNC: 0.6 MG/DL (ref 0–1.2)
BUN BLDV-MCNC: 30 MG/DL (ref 8–23)
BUN BLDV-MCNC: 31 MG/DL (ref 8–23)
BUN BLDV-MCNC: 33 MG/DL (ref 8–23)
CALCIUM IONIZED: 1.01 MMOL/L (ref 1.15–1.33)
CALCIUM SERPL-MCNC: 6.6 MG/DL (ref 8.6–10.2)
CALCIUM SERPL-MCNC: 7.1 MG/DL (ref 8.6–10.2)
CALCIUM SERPL-MCNC: 7.6 MG/DL (ref 8.6–10.2)
CHLORIDE BLD-SCNC: 104 MMOL/L (ref 98–107)
CHLORIDE BLD-SCNC: 106 MMOL/L (ref 98–107)
CHLORIDE BLD-SCNC: 109 MMOL/L (ref 98–107)
CHLORIDE URINE RANDOM: 41 MMOL/L
CO2: 22 MMOL/L (ref 22–29)
CO2: 26 MMOL/L (ref 22–29)
CO2: 30 MMOL/L (ref 22–29)
COHB: 0.9 % (ref 0–1.5)
CREAT SERPL-MCNC: 1.8 MG/DL (ref 0.7–1.2)
CREAT SERPL-MCNC: 1.8 MG/DL (ref 0.7–1.2)
CREAT SERPL-MCNC: 2 MG/DL (ref 0.7–1.2)
CREATININE URINE: 78 MG/DL (ref 40–278)
CRITICAL: ABNORMAL
DATE ANALYZED: ABNORMAL
DATE OF COLLECTION: ABNORMAL
EOSINOPHILS ABSOLUTE: 0 E9/L (ref 0.05–0.5)
EOSINOPHILS RELATIVE PERCENT: 0 % (ref 0–6)
FIO2: 40 %
GFR AFRICAN AMERICAN: 41
GFR AFRICAN AMERICAN: 46
GFR AFRICAN AMERICAN: 46
GFR NON-AFRICAN AMERICAN: 41 ML/MIN/1.73
GFR NON-AFRICAN AMERICAN: 46 ML/MIN/1.73
GFR NON-AFRICAN AMERICAN: 46 ML/MIN/1.73
GLUCOSE BLD-MCNC: 133 MG/DL (ref 74–99)
GLUCOSE BLD-MCNC: 157 MG/DL (ref 74–99)
GLUCOSE BLD-MCNC: 637 MG/DL (ref 74–99)
HCO3: 26.8 MMOL/L (ref 22–26)
HCT VFR BLD CALC: 23.5 % (ref 37–54)
HEMOGLOBIN: 7.7 G/DL (ref 12.5–16.5)
HHB: 2.1 % (ref 0–5)
IMMATURE GRANULOCYTES #: 0.11 E9/L
IMMATURE GRANULOCYTES %: 0.8 % (ref 0–5)
LAB: ABNORMAL
LYMPHOCYTES ABSOLUTE: 0.71 E9/L (ref 1.5–4)
LYMPHOCYTES RELATIVE PERCENT: 4.9 % (ref 20–42)
Lab: ABNORMAL
MAGNESIUM: 1.1 MG/DL (ref 1.6–2.6)
MAGNESIUM: 1.1 MG/DL (ref 1.6–2.6)
MCH RBC QN AUTO: 29.2 PG (ref 26–35)
MCHC RBC AUTO-ENTMCNC: 32.8 % (ref 32–34.5)
MCV RBC AUTO: 89 FL (ref 80–99.9)
METER GLUCOSE: 130 MG/DL (ref 74–99)
METER GLUCOSE: 143 MG/DL (ref 74–99)
METHB: 0.2 % (ref 0–1.5)
MODE: AC
MONOCYTES ABSOLUTE: 0.77 E9/L (ref 0.1–0.95)
MONOCYTES RELATIVE PERCENT: 5.3 % (ref 2–12)
NEUTROPHILS ABSOLUTE: 12.97 E9/L (ref 1.8–7.3)
NEUTROPHILS RELATIVE PERCENT: 88.9 % (ref 43–80)
O2 CONTENT: 11 ML/DL
O2 SATURATION: 97.9 % (ref 92–98.5)
O2HB: 96.8 % (ref 94–97)
OPERATOR ID: 2577
OVALOCYTES: ABNORMAL
PATIENT TEMP: 37 C
PCO2: 36 MMHG (ref 35–45)
PDW BLD-RTO: 15.8 FL (ref 11.5–15)
PEEP/CPAP: 5 CMH2O
PFO2: 3.01 MMHG/%
PH BLOOD GAS: 7.49 (ref 7.35–7.45)
PHOSPHORUS: 3.1 MG/DL (ref 2.5–4.5)
PHOSPHORUS: 3.2 MG/DL (ref 2.5–4.5)
PLATELET # BLD: 106 E9/L (ref 130–450)
PMV BLD AUTO: 13 FL (ref 7–12)
PO2: 120.5 MMHG (ref 75–100)
POIKILOCYTES: ABNORMAL
POTASSIUM SERPL-SCNC: 3 MMOL/L (ref 3.5–5)
POTASSIUM SERPL-SCNC: 3.2 MMOL/L (ref 3.5–5)
POTASSIUM SERPL-SCNC: 3.6 MMOL/L (ref 3.5–5)
POTASSIUM, UR: 74.4 MMOL/L
PRO-BNP: ABNORMAL PG/ML (ref 0–125)
RBC # BLD: 2.64 E12/L (ref 3.8–5.8)
RI(T): 0.94
RR MECHANICAL: 16 B/MIN
SODIUM BLD-SCNC: 142 MMOL/L (ref 132–146)
SODIUM BLD-SCNC: 145 MMOL/L (ref 132–146)
SODIUM BLD-SCNC: 146 MMOL/L (ref 132–146)
SODIUM URINE: 38 MMOL/L
SOURCE, BLOOD GAS: ABNORMAL
THB: 7.9 G/DL (ref 11.5–16.5)
TIME ANALYZED: 544
TOTAL PROTEIN: 5.4 G/DL (ref 6.4–8.3)
VT MECHANICAL: 400 ML
WBC # BLD: 14.6 E9/L (ref 4.5–11.5)

## 2020-02-17 PROCEDURE — 2000000000 HC ICU R&B

## 2020-02-17 PROCEDURE — 36569 INSJ PICC 5 YR+ W/O IMAGING: CPT

## 2020-02-17 PROCEDURE — 95822 EEG COMA OR SLEEP ONLY: CPT

## 2020-02-17 PROCEDURE — 84133 ASSAY OF URINE POTASSIUM: CPT

## 2020-02-17 PROCEDURE — 82570 ASSAY OF URINE CREATININE: CPT

## 2020-02-17 PROCEDURE — 2500000003 HC RX 250 WO HCPCS: Performed by: STUDENT IN AN ORGANIZED HEALTH CARE EDUCATION/TRAINING PROGRAM

## 2020-02-17 PROCEDURE — 94003 VENT MGMT INPAT SUBQ DAY: CPT

## 2020-02-17 PROCEDURE — 6370000000 HC RX 637 (ALT 250 FOR IP): Performed by: INTERNAL MEDICINE

## 2020-02-17 PROCEDURE — 36415 COLL VENOUS BLD VENIPUNCTURE: CPT

## 2020-02-17 PROCEDURE — 80048 BASIC METABOLIC PNL TOTAL CA: CPT

## 2020-02-17 PROCEDURE — 83735 ASSAY OF MAGNESIUM: CPT

## 2020-02-17 PROCEDURE — 82805 BLOOD GASES W/O2 SATURATION: CPT

## 2020-02-17 PROCEDURE — 2580000003 HC RX 258: Performed by: STUDENT IN AN ORGANIZED HEALTH CARE EDUCATION/TRAINING PROGRAM

## 2020-02-17 PROCEDURE — 02HV33Z INSERTION OF INFUSION DEVICE INTO SUPERIOR VENA CAVA, PERCUTANEOUS APPROACH: ICD-10-PCS | Performed by: INTERNAL MEDICINE

## 2020-02-17 PROCEDURE — 94640 AIRWAY INHALATION TREATMENT: CPT

## 2020-02-17 PROCEDURE — 71045 X-RAY EXAM CHEST 1 VIEW: CPT

## 2020-02-17 PROCEDURE — 85025 COMPLETE CBC W/AUTO DIFF WBC: CPT

## 2020-02-17 PROCEDURE — 83880 ASSAY OF NATRIURETIC PEPTIDE: CPT

## 2020-02-17 PROCEDURE — 99231 SBSQ HOSP IP/OBS SF/LOW 25: CPT | Performed by: INTERNAL MEDICINE

## 2020-02-17 PROCEDURE — 76937 US GUIDE VASCULAR ACCESS: CPT

## 2020-02-17 PROCEDURE — 6370000000 HC RX 637 (ALT 250 FOR IP): Performed by: STUDENT IN AN ORGANIZED HEALTH CARE EDUCATION/TRAINING PROGRAM

## 2020-02-17 PROCEDURE — 6360000002 HC RX W HCPCS: Performed by: STUDENT IN AN ORGANIZED HEALTH CARE EDUCATION/TRAINING PROGRAM

## 2020-02-17 PROCEDURE — 82962 GLUCOSE BLOOD TEST: CPT

## 2020-02-17 PROCEDURE — 84300 ASSAY OF URINE SODIUM: CPT

## 2020-02-17 PROCEDURE — 99291 CRITICAL CARE FIRST HOUR: CPT | Performed by: PSYCHIATRY & NEUROLOGY

## 2020-02-17 PROCEDURE — 82436 ASSAY OF URINE CHLORIDE: CPT

## 2020-02-17 PROCEDURE — 82330 ASSAY OF CALCIUM: CPT

## 2020-02-17 PROCEDURE — 99291 CRITICAL CARE FIRST HOUR: CPT | Performed by: SURGERY

## 2020-02-17 PROCEDURE — C1751 CATH, INF, PER/CENT/MIDLINE: HCPCS

## 2020-02-17 PROCEDURE — 80053 COMPREHEN METABOLIC PANEL: CPT

## 2020-02-17 PROCEDURE — 99223 1ST HOSP IP/OBS HIGH 75: CPT | Performed by: NURSE PRACTITIONER

## 2020-02-17 PROCEDURE — 84100 ASSAY OF PHOSPHORUS: CPT

## 2020-02-17 PROCEDURE — 70450 CT HEAD/BRAIN W/O DYE: CPT

## 2020-02-17 RX ORDER — POTASSIUM CHLORIDE 7.45 MG/ML
10 INJECTION INTRAVENOUS
Status: ACTIVE | OUTPATIENT
Start: 2020-02-17 | End: 2020-02-17

## 2020-02-17 RX ORDER — HEPARIN SODIUM (PORCINE) LOCK FLUSH IV SOLN 100 UNIT/ML 100 UNIT/ML
3 SOLUTION INTRAVENOUS PRN
Status: DISCONTINUED | OUTPATIENT
Start: 2020-02-17 | End: 2020-03-03 | Stop reason: HOSPADM

## 2020-02-17 RX ORDER — MAGNESIUM SULFATE IN WATER 40 MG/ML
4 INJECTION, SOLUTION INTRAVENOUS ONCE
Status: DISCONTINUED | OUTPATIENT
Start: 2020-02-17 | End: 2020-02-19

## 2020-02-17 RX ORDER — HEPARIN SODIUM 10000 [USP'U]/ML
5000 INJECTION, SOLUTION INTRAVENOUS; SUBCUTANEOUS EVERY 8 HOURS SCHEDULED
Status: DISCONTINUED | OUTPATIENT
Start: 2020-02-17 | End: 2020-02-29

## 2020-02-17 RX ORDER — SODIUM CHLORIDE 0.9 % (FLUSH) 0.9 %
10 SYRINGE (ML) INJECTION PRN
Status: DISCONTINUED | OUTPATIENT
Start: 2020-02-17 | End: 2020-02-29

## 2020-02-17 RX ORDER — POTASSIUM CHLORIDE 29.8 MG/ML
20 INJECTION INTRAVENOUS ONCE
Status: COMPLETED | OUTPATIENT
Start: 2020-02-17 | End: 2020-02-17

## 2020-02-17 RX ORDER — ACETAMINOPHEN 160 MG/5ML
650 SOLUTION ORAL EVERY 4 HOURS PRN
Status: DISCONTINUED | OUTPATIENT
Start: 2020-02-17 | End: 2020-02-29

## 2020-02-17 RX ORDER — HEPARIN SODIUM (PORCINE) LOCK FLUSH IV SOLN 100 UNIT/ML 100 UNIT/ML
3 SOLUTION INTRAVENOUS EVERY 12 HOURS SCHEDULED
Status: DISCONTINUED | OUTPATIENT
Start: 2020-02-17 | End: 2020-02-29

## 2020-02-17 RX ORDER — POTASSIUM CHLORIDE 7.45 MG/ML
10 INJECTION INTRAVENOUS
Status: DISCONTINUED | OUTPATIENT
Start: 2020-02-17 | End: 2020-02-17

## 2020-02-17 RX ORDER — LIDOCAINE HYDROCHLORIDE 10 MG/ML
5 INJECTION, SOLUTION EPIDURAL; INFILTRATION; INTRACAUDAL; PERINEURAL ONCE
Status: COMPLETED | OUTPATIENT
Start: 2020-02-17 | End: 2020-02-17

## 2020-02-17 RX ADMIN — HYDRALAZINE HYDROCHLORIDE 25 MG: 25 TABLET, FILM COATED ORAL at 22:18

## 2020-02-17 RX ADMIN — Medication 10 ML: at 19:44

## 2020-02-17 RX ADMIN — SODIUM CHLORIDE, PRESERVATIVE FREE 300 UNITS: 5 INJECTION INTRAVENOUS at 13:00

## 2020-02-17 RX ADMIN — LANSOPRAZOLE 30 MG: KIT at 05:32

## 2020-02-17 RX ADMIN — METOPROLOL TARTRATE 5 MG: 5 INJECTION INTRAVENOUS at 17:49

## 2020-02-17 RX ADMIN — LIDOCAINE HYDROCHLORIDE 5 ML: 10 INJECTION, SOLUTION EPIDURAL; INFILTRATION; INTRACAUDAL; PERINEURAL at 13:15

## 2020-02-17 RX ADMIN — FOLIC ACID 1 MG: 1 TABLET ORAL at 08:45

## 2020-02-17 RX ADMIN — METOPROLOL TARTRATE 50 MG: 50 TABLET, FILM COATED ORAL at 08:45

## 2020-02-17 RX ADMIN — SODIUM CHLORIDE, PRESERVATIVE FREE 300 UNITS: 5 INJECTION INTRAVENOUS at 19:45

## 2020-02-17 RX ADMIN — CALCIUM GLUCONATE 2 G: 98 INJECTION, SOLUTION INTRAVENOUS at 10:10

## 2020-02-17 RX ADMIN — AMOXICILLIN AND CLAVULANATE POTASSIUM 500 MG: 250; 62.5 POWDER, FOR SUSPENSION ORAL at 15:44

## 2020-02-17 RX ADMIN — CHLORHEXIDINE GLUCONATE 0.12% ORAL RINSE 15 ML: 1.2 LIQUID ORAL at 19:44

## 2020-02-17 RX ADMIN — CHLORHEXIDINE GLUCONATE 0.12% ORAL RINSE 15 ML: 1.2 LIQUID ORAL at 08:45

## 2020-02-17 RX ADMIN — IPRATROPIUM BROMIDE AND ALBUTEROL SULFATE 1 AMPULE: 2.5; .5 SOLUTION RESPIRATORY (INHALATION) at 21:33

## 2020-02-17 RX ADMIN — Medication 100 MG: at 08:45

## 2020-02-17 RX ADMIN — IPRATROPIUM BROMIDE AND ALBUTEROL SULFATE 1 AMPULE: 2.5; .5 SOLUTION RESPIRATORY (INHALATION) at 05:53

## 2020-02-17 RX ADMIN — HYDRALAZINE HYDROCHLORIDE 25 MG: 25 TABLET, FILM COATED ORAL at 05:34

## 2020-02-17 RX ADMIN — POTASSIUM CHLORIDE 20 MEQ: 29.8 INJECTION, SOLUTION INTRAVENOUS at 17:19

## 2020-02-17 RX ADMIN — IPRATROPIUM BROMIDE AND ALBUTEROL SULFATE 1 AMPULE: 2.5; .5 SOLUTION RESPIRATORY (INHALATION) at 09:27

## 2020-02-17 RX ADMIN — AMOXICILLIN AND CLAVULANATE POTASSIUM 500 MG: 250; 62.5 POWDER, FOR SUSPENSION ORAL at 08:44

## 2020-02-17 RX ADMIN — DOCUSATE SODIUM 100 MG: 50 LIQUID ORAL at 08:47

## 2020-02-17 RX ADMIN — SODIUM CHLORIDE SOLN NEBU 3% 4 ML: 3 NEBU SOLN at 21:33

## 2020-02-17 RX ADMIN — HEPARIN SODIUM 5000 UNITS: 10000 INJECTION INTRAVENOUS; SUBCUTANEOUS at 14:01

## 2020-02-17 RX ADMIN — MAGNESIUM SULFATE HEPTAHYDRATE 6 G: 500 INJECTION, SOLUTION INTRAMUSCULAR; INTRAVENOUS at 10:13

## 2020-02-17 RX ADMIN — HEPARIN SODIUM 5000 UNITS: 10000 INJECTION INTRAVENOUS; SUBCUTANEOUS at 22:18

## 2020-02-17 RX ADMIN — SODIUM CHLORIDE SOLN NEBU 3% 4 ML: 3 NEBU SOLN at 05:53

## 2020-02-17 RX ADMIN — IPRATROPIUM BROMIDE AND ALBUTEROL SULFATE 1 AMPULE: 2.5; .5 SOLUTION RESPIRATORY (INHALATION) at 11:56

## 2020-02-17 RX ADMIN — SENNOSIDES 5 ML: 8.8 LIQUID ORAL at 19:44

## 2020-02-17 RX ADMIN — IPRATROPIUM BROMIDE AND ALBUTEROL SULFATE 1 AMPULE: 2.5; .5 SOLUTION RESPIRATORY (INHALATION) at 17:15

## 2020-02-17 RX ADMIN — Medication 10 ML: at 08:45

## 2020-02-17 RX ADMIN — POTASSIUM BICARBONATE 20 MEQ: 782 TABLET, EFFERVESCENT ORAL at 09:42

## 2020-02-17 RX ADMIN — METOPROLOL TARTRATE 50 MG: 50 TABLET, FILM COATED ORAL at 19:44

## 2020-02-17 RX ADMIN — HYDRALAZINE HYDROCHLORIDE 25 MG: 25 TABLET, FILM COATED ORAL at 14:01

## 2020-02-17 RX ADMIN — AMOXICILLIN AND CLAVULANATE POTASSIUM 500 MG: 250; 62.5 POWDER, FOR SUSPENSION ORAL at 00:00

## 2020-02-17 ASSESSMENT — PULMONARY FUNCTION TESTS
PIF_VALUE: 30
PIF_VALUE: 21
PIF_VALUE: 24
PIF_VALUE: 22
PIF_VALUE: 24
PIF_VALUE: 20
PIF_VALUE: 26
PIF_VALUE: 30
PIF_VALUE: 26
PIF_VALUE: 19
PIF_VALUE: 21
PIF_VALUE: 20
PIF_VALUE: 22
PIF_VALUE: 31
PIF_VALUE: 23
PIF_VALUE: 23
PIF_VALUE: 26
PIF_VALUE: 19
PIF_VALUE: 20
PIF_VALUE: 16
PIF_VALUE: 26
PIF_VALUE: 18
PIF_VALUE: 21
PIF_VALUE: 26
PIF_VALUE: 20
PIF_VALUE: 24

## 2020-02-17 ASSESSMENT — PAIN SCALES - GENERAL
PAINLEVEL_OUTOF10: 0

## 2020-02-17 NOTE — CONSULTS
Vanessa Blue is a 59 y.o. male       Chief Complaint   Patient presents with    Fatigue     for a couple of days       HPI:     Patient is a 68-year-old male with a past medical history of HTN, CVA with residual left-sided weakness, multiple head and neck cancers s/p chemotherapy/radiation s/p mandibular resection, esophageal cancer and right base tongue cancer s/p hemiglossectomy initially admitted on 2/5/2020 for generalized fatigue. Patient was admitted and treated for MSSA pneumonia, group G Streptococcus bacteremia. Has failed multiple swallow test and subsequently underwent for PEG placement. On 2/16/2020 patient noted to be bradycardic HR of 20s followed by multiple episodes of PEA arrest with a total CPR of 8 minutes. During which had encountered difficulty intubating the patient. Initial ABG showed severe respiratory acidosis with a pH of 6.795/167.8/31.8/25. 2. He was noted to be encephalopathic, unresponsive to painful stimuli after, hence neurology consult. Prior to Visit Medications    Medication Sig Taking? Authorizing Provider   amoxicillin-clavulanate (AUGMENTIN) 250-62.5 MG/5ML suspension Take 10 mLs by mouth 2 times daily for 7 days Yes Rayo Wadsworth MD   metoprolol tartrate (LOPRESSOR) 25 MG tablet Take 25 mg by mouth 2 times daily Yes Historical Provider, MD   lisinopril (PRINIVIL;ZESTRIL) 10 MG tablet Take 10 mg by mouth daily Yes Historical Provider, MD   famotidine (PEPCID) 20 MG tablet Take 20 mg by mouth 2 times daily Yes Historical Provider, MD   aspirin 81 MG tablet Take 81 mg by mouth daily Yes Historical Provider, MD     Social History     Tobacco Use    Smoking status: Current Every Day Smoker     Packs/day: 0.25     Years: 40.00     Pack years: 10.00     Types: Cigarettes     Start date: 2/6/1970    Smokeless tobacco: Current User   Substance Use Topics    Alcohol use:  Yes     Alcohol/week: 14.0 standard drinks     Types: 6 Glasses of wine, 8 Cans of beer per week     Frequency: 4 or more times a week     Drinks per session: 10 or more     Binge frequency: Weekly    Drug use: Not Currently     No family history on file. Past Surgical History:   Procedure Laterality Date    GASTROSTOMY TUBE PLACEMENT N/A 2/12/2020    EGD PEG TUBE PLACEMENT performed by Mariela Sher MD at [facility]     Past Medical History:   Diagnosis Date    Cancer [facility])     mouth    Hip fracture ([address]     Hypertension      Review of Systems unable to assess due to patient's condition      Objective:   BP (!) 142/91   Pulse 111   Temp 99.2 °F (37.3 °C) (Oral)   Resp 16   Ht 5' 4\" (1.626 m)   Wt 132 lb 9.6 oz (60.1 kg)   SpO2 100%   BMI 22.76 kg/m²     Physical Exam  Constitutional:       Comments: Unresponsive   HENT:      Head: Normocephalic and atraumatic. Eyes:      Comments: Left eye deviated to the right, not reactive to light  Right eye reactive to light   Neck:      Musculoskeletal: Normal range of motion and neck supple. Cardiovascular:      Rate and Rhythm: Normal rate and regular rhythm. Pulses: Normal pulses. Pulmonary:      Breath sounds: Wheezing and rales present. Comments: Intubated,  Abdominal:      General: Abdomen is flat. Bowel sounds are normal.      Comments: + PEG   Musculoskeletal:         General: No swelling, tenderness, deformity or signs of injury. Skin:     General: Skin is warm and dry. Neurological:      Deep Tendon Reflexes:      Reflex Scores:       Patellar reflexes are 2+ on the right side and 2+ on the left side. Neurological Exam  Mental Status  Unresponsive to painful stimuli. Speech: Intubated. Cranial Nerves  CN II: Vision test: Left eye deviated to the right, not reactive to light  Right eye reactive to light. CN III, IV, VI:   Right pupil: 2 mm. Left pupil: Deviated to the right. Abnormal reactivity:  CN IX, X:  Right: Gag is intact. Left: Gag is intact. Motor  Decreased muscle bulk throughout.  No minutes of PEA arrest.  The patient has triple flexion and sundowning of both eyes with introversion of the left eye. No brain imaging was done so far. Will request head CT. Will request EEG to rule out any potential treatable epileptiform activities. All questions were answered to the patient's/family's satisfaction when they are available. I personally reviewed the history, exam findings, labs, imagings and other diagnostic tests/conveyed these findings and my assessment of these aforementioned items and also treatment plan, risks/benefits of treatment recommended and prognosis/goals of treatment to patient/family/staff. I spent 50 minutes of critical time personally with the patient/family/staff/resident(s) in examination, chart and imaging review, discussing natural history and prognosis, differential diagnosis, risks and benefits of treatment and instructions of which more than 50% of the time was spent for counseling and coordinating care.

## 2020-02-17 NOTE — PROGRESS NOTES
675 ml   Net 1326 ml     I/O last 3 completed shifts: In: 2001 [I.V.:1000; NG/GT:1001]  Out: 675 [Urine:675] No intake/output data recorded. Weight change:     Physical Exam  Vitals signs and nursing note reviewed. Constitutional:       Appearance: He is normal weight. He is ill-appearing. Interventions: He is intubated. HENT:      Head: Normocephalic and atraumatic. Right Ear: External ear normal.      Left Ear: External ear normal.      Nose: Nose normal.      Mouth/Throat:      Mouth: Mucous membranes are moist.   Eyes:      General: Lids are normal. No scleral icterus. Right eye: No discharge. Left eye: No discharge. Conjunctiva/sclera: Conjunctivae normal.      Pupils: Pupils are equal.      Right eye: Pupil is not reactive (Pinpoint pupil). Pupil is not sluggish. Left eye: Pupil is not reactive (Pinpoint pupil). Pupil is not sluggish. Cardiovascular:      Rate and Rhythm: Regular rhythm. Tachycardia present. Pulses: Normal pulses. Carotid pulses are 2+ on the right side and 2+ on the left side. Radial pulses are 2+ on the right side and 2+ on the left side. Dorsalis pedis pulses are 2+ on the right side and 2+ on the left side. Heart sounds: S1 normal and S2 normal. No gallop. No S3 sounds. Pulmonary:      Effort: Pulmonary effort is normal. He is intubated. Breath sounds: Normal air entry. Transmitted upper airway sounds present. No decreased breath sounds, wheezing, rhonchi or rales. Musculoskeletal:      Right lower leg: Edema present. Left lower leg: Edema present. Neurological:      Comments: Cough and gag reflex present.        Labs and Imaging Studies     CBC:   Recent Labs     02/15/20  0419  02/15/20  1936 02/16/20  0429 02/17/20  0500   WBC 14.3*  --   --  26.2* 14.6*   HGB 6.9*   < > 6.7* 8.0* 7.7*   HCT 20.8*   < > 20.3* 25.4* 23.5*   MCV 87.0  --   --  91.4 89.0   *  --   --  192 106*    < > = recurrent neoplasm. This measures approximately 2.7 x 2.3 cm. This could also be related to previous therapy. Comparison studies would be necessary. There is a mottled eaten appearance to the medial aspect of the right mandible. This could be related to tumor invasion or osteonecrosis. Scattered lymph nodes are visualized which do not meet the CT criteria for adenopathy. There is mixed density in the right thyroid possibly representing a thyroid nodule measuring approximately 1 cm. There is density scattered throughout the sinuses possibly related to mild chronic sinusitis. There is likely a right sphenoid retention cyst     Probable prior right radical neck dissection with sequela of prior surgery and radiation therapy. There is extensive soft tissue distortion which is suggestive of previous therapy Finds compatible with a approximately 1 cm right thyroid nodule There is evidence to suggest severe stenosis of the right distal internal carotid artery Soft tissue mass at the level the carotid bifurcation on the right with outward mass effect measuring approximately 2.3 x 2.7 cm suspicious for recurrent disease. This encases the carotid distally and the distal carotid stenosis may represent invasion. Comparison studies would be necessary to distinguish recurrent neoplasm from previous radiation therapy. Tumor invasion versus osteonecrosis of the medial right mandible ALERT:  THIS IS AN ABNORMAL REPORT      Us Head Neck Soft Tissue Thyroid    Result Date: 2020  Patient MRN:  [id number] : 1955 Age: 59 years Gender: Male Order Date:  2020 2:15 PM Exam: US HEAD NECK SOFT TISSUE THYROID Number of images:  26 Indication:  right carotid bifurcation mass With special focus to soft tissue mass at the right carotid bifurcation. right carotid bifurcation mass Comparison: Correlation with CT dated 2020.  Technique: Grayscale and color Doppler ultrasound images of the region of interest were obtained by the sonographic technologist. FINDINGS: The study is limited by technically suboptimal image quality. There is abnormal heterogeneous soft tissue in the region of interest.     Ill-defined, heterogeneous, abnormal appearing soft tissue in the region of interest. No specific clinical question is asked. Ultrasound is not the appropriate modality for this evaluation. Contrast-enhanced MRI may provide more information. Correlation with clinical and surgical history is needed. Xr Chest Portable    Result Date: 2020  Patient MRN:  [id number] : 1955 Age: 59 years Gender: Male Order Date:  2020 6:00 AM EXAM: XR CHEST PORTABLE COMPARISON: February 10, 2020 INDICATION:  PNA PNA FINDINGS: There are opacities at the left lung base silhouetting left hemidiaphragm which appears similar since prior. The heart is normal size. No pneumothorax. Stable opacities at left lung base. Continued follow-up could be helpful for further evaluation. Xr Chest Portable    Result Date: 2/10/2020  Patient MRN: [id number] : 1955 Age:  59 years Gender: Male Order Date: 2/10/2020 3:00 PM Exam: XR CHEST PORTABLE Number of Images: 1 view Indication:   SOB SOB Comparison: 2020 Findings: The heart is enlarged. The lung fields demonstrate evidence for airspace disease. The aorta is tortuous and ectatic. Tortuous ectatic aorta Cardiomegaly Airspace disease compatible with atelectasis or pneumonia, worse at the left lung base as compared to the right The chest appears to be worse in the interval     Xr Chest 1 Vw    Result Date: 2020  Patient MRN: [id number] : 1955 Age:  59 years Gender: Male Order Date: 2020 6:00 AM Exam: XR CHEST 1 VIEW Number of Images: 1 view Indication: Acute weakness. Comparison: None. FINDINGS: Heart and pulmonary vascularity normal. Lungs clear. Costophrenic angles sharp. Normal aorta. No acute cardiopulmonary findings.      Us Retroperitoneal Complete    Result Date: hydrALAZINE (APRESOLINE) tablet 25 mg  25 mg PEG Tube 3 times per day Gavin Guerrero MD   25 mg at 02/17/20 0534    metoprolol tartrate (LOPRESSOR) tablet 50 mg  50 mg PEG Tube BID Saroj Lassiter MD   50 mg at 02/16/20 2033    glucose (GLUTOSE) 40 % oral gel 15 g  15 g Oral PRN Saqib B Capal, DO        dextrose 50 % IV solution  12.5 g Intravenous PRN [place], DO        glucagon (rDNA) injection 1 mg  1 mg Intramuscular PRN Charlyne Leaven B Capal, DO        dextrose 5 % solution  100 mL/hr Intravenous PRN [place], DO        ipratropium-albuterol (DUONEB) nebulizer solution 1 ampule  1 ampule Inhalation Q4H WA Saqib B Capal, DO   1 ampule at 02/17/20 0553    sodium chloride (Inhalant) 3 % nebulizer solution 4 mL  4 mL Nebulization BID Saqib B Capal, DO   4 mL at 02/17/20 0553    labetalol (NORMODYNE;TRANDATE) injection 10 mg  10 mg Intravenous Q6H PRN Saqib B Capal, DO   10 mg at 02/15/20 1748    sodium chloride flush 0.9 % injection 10 mL  10 mL Intravenous 2 times per day Charlyne Leaven B Capal, DO   10 mL at 02/16/20 2033    sodium chloride flush 0.9 % injection 10 mL  10 mL Intravenous PRN Saqib B Capal, DO   10 mL at 02/15/20 0249    0.9 % sodium chloride bolus  1,000 mL Intravenous Once [place], DO        magnesium hydroxide (MILK OF MAGNESIA) 400 MG/5ML suspension 30 mL  30 mL Oral Daily PRN Charlyne Leaven B Capal, DO           Continuous Infusions:   dextrose 5 % and 0.9 % NaCl 100 mL/hr at 02/16/20 1912    dextrose       Scheduled Meds:   chlorhexidine  15 mL Mouth/Throat BID    sennosides  5 mL Oral Nightly    docusate sodium  100 mg Oral Daily    sodium chloride  20 mL Intravenous Once    folic acid  1 mg PEG Tube Daily    lansoprazole  30 mg Per G Tube QAM AC    vitamin B-1  100 mg PEG Tube Daily    amoxicillin-clavulanate  500 mg Per G Tube Q8H    [Held by provider] aspirin  81 mg Per G Tube Daily    hydrALAZINE  25 mg PEG Tube 3 times per day    worsening infiltrates  ID on board. Chest vest BID. Nebulization with ipratropium-albuterol Q4H and 3% NaCl nebulization BID.     <Gastrointestinal>  Dysphagia s/p PEG placement (2/12/2020)  2/2 Neck mass   Currently on tube feeds  CBC daily. Severe Malnutrition  2/2 Laryngeal Ca, dysphagia  Per dietitian, pt. Meets criteria for severe malnutrition. Currently on tube feeds    <Infectious Disease>  Group G Streptococcus Bacteremia - Resolved  Echo showed no vegetations. Repeat culture -ve. ID on board. <Genitourinary/Renal>  YVETTE Stage II on CKD   FeNa 0.7%, FeUrea 20.9%. initially. Baseline Cr. Unknown. Cr. 2.0 today, hemodynamically medicated after PEA arrest    Nephrology on board. Electrolyte abnormalities    Hypokalemia, Hypomagnesemia corrected by ICU team.     <Hematology/Oncology>  Acute Normocytic Anemia  2/2 ? Blood loss  Has been transfused 1 unit since admission. Has had an erratic hemoglobin level for the past 2-3 days. Stable Hb  Currently on thiamine 201PR OD and folic acid 1mg OD. Soft Tissue Mass  2/2 possible recurrence of laryngeal Ca. CT and USG suggestive of mass at Rt. Carotic bifurcation - 2.3 x 2.7 cm. Thrombocytopenia -resolved  2/2 Sepsis vs alcohol use      <Dermatologic/Musculoskeletal>  Pressure ulcer  Lt. Elbow present. Wound care on board. <Psychiatry>  H/O Alcohol Abuse - Stable  Currently on thiamine 381BE OD and folic acid 1mg OD. # Peptic ulcer prophylaxis: Lansoprazole. # DVT Prophylaxis: PCD  # Disposition: Cont current care.        Joel Rogers MD  PGY-1    Internal medicine resident    Attending Physician: Dr. Paulina Hardy MD

## 2020-02-17 NOTE — PROGRESS NOTES
Department of Internal Medicine  Nephrology Attending Progress Note    Events reviewed. SUBJECTIVE: We are following Mr. Melida Stapleton for YVETTE on CKD. Patient seen and examined bedside, remains intubated and sedated. PHYSICAL EXAM:      Vitals:    VITALS:  BP (!) 151/87   Pulse 111   Temp 99.2 °F (37.3 °C) (Oral)   Resp 18   Ht 5' 4\" (1.626 m)   Wt 132 lb 9.6 oz (60.1 kg)   SpO2 100%   BMI 22.76 kg/m²        Intake/Output Summary (Last 24 hours) at 2/17/2020 1222  Last data filed at 2/17/2020 1200  Gross per 24 hour   Intake 2001 ml   Output 730 ml   Net 1271 ml         Constitutional:  Intubated and sedated. HEENT:  Scarred sugical incision at neck; facial droop  Respiratory:  Clear, diminished bases bilaterally  Cardiovascular/Edema:  RRR, no edema noted  Gastrointestinal:  Abdomen soft and nontender, bowel sounds active. PEG.   Neurologic:  Contracted extremities, facial droop   Skin:  Dry,warm  flaky   Other:  No edema     Scheduled Meds:   lidocaine PF  5 mL Intradermal Once    heparin flush  3 mL Intravenous 2 times per day    potassium chloride  10 mEq Intravenous Q1H    magnesium sulfate  4 g Intravenous Once    heparin (porcine)  5,000 Units Subcutaneous 3 times per day    chlorhexidine  15 mL Mouth/Throat BID    sennosides  5 mL Oral Nightly    docusate sodium  100 mg Oral Daily    sodium chloride  20 mL Intravenous Once    folic acid  1 mg PEG Tube Daily    lansoprazole  30 mg Per G Tube QAM AC    vitamin B-1  100 mg PEG Tube Daily    amoxicillin-clavulanate  500 mg Per G Tube Q8H    [Held by provider] aspirin  81 mg Per G Tube Daily    hydrALAZINE  25 mg PEG Tube 3 times per day    metoprolol tartrate  50 mg PEG Tube BID    ipratropium-albuterol  1 ampule Inhalation Q4H WA    sodium chloride (Inhalant)  4 mL Nebulization BID    sodium chloride flush  10 mL Intravenous 2 times per day    sodium chloride  1,000 mL Intravenous Once     Continuous Infusions:   dextrose PRN Meds:.sodium chloride flush, heparin flush, metoprolol, perflutren lipid microspheres, glucose, dextrose, glucagon (rDNA), dextrose, labetalol, sodium chloride flush, magnesium hydroxide      DATA:    CBC:   Lab Results   Component Value Date    WBC 14.6 02/17/2020    RBC 2.64 02/17/2020    HGB 7.7 02/17/2020    HCT 23.5 02/17/2020    MCV 89.0 02/17/2020    MCH 29.2 02/17/2020    MCHC 32.8 02/17/2020    RDW 15.8 02/17/2020     02/17/2020    MPV 13.0 02/17/2020     CMP:    Lab Results   Component Value Date     02/17/2020    K 3.6 02/17/2020    K 2.9 02/11/2020     02/17/2020    CO2 30 02/17/2020    BUN 33 02/17/2020    CREATININE 2.0 02/17/2020    GFRAA 41 02/17/2020    LABGLOM 41 02/17/2020    GLUCOSE 157 02/17/2020    PROT 5.4 02/17/2020    LABALBU 1.6 02/17/2020    CALCIUM 7.6 02/17/2020    BILITOT 0.6 02/17/2020    ALKPHOS 55 02/17/2020    AST 49 02/17/2020    ALT 25 02/17/2020     Magnesium:    Lab Results   Component Value Date    MG 1.1 02/17/2020     Recent Labs     02/17/20  0544   PH 7.489*   PO2 120.5*   PCO2 36.0   HCO3 26.8*   BE 3.2*   O2SAT 97.9   FIO2 40.0         Urine chemistry:  Urine creatinine = 69  Urine Microalbumin = 55.9  Urine Microalbumin/Cr ratio = 81  Urine osmolality = 393  Urine protein = 35  Urine protein/Cr ratio = 0.5    FENa: 0.7%  FEUrea: 20.9%     Ferritin: 1019  Iron: 20  Iron saturation: 20%  Folate: 14.2  B12: 383    Radiology Review:        US Retroperitoneum 2/7/20   Probable bilateral medical renal disease with some perinephric fluid   bilaterally that could indicate inflammation.       There is no evidence of calcification or obstruction in either kidney.       The bladder was not evaluated due to a decompressing Sanchez catheter. CT Soft Tissue Neck W WO Contrast 2/9/20   Probable prior right radical neck dissection with sequela   of prior surgery and radiation therapy.  There is extensive soft tissue   distortion which is suggestive of likely ischemic ATN s/p PEA cardiac arrest on 2/15/20, Cr increased to 1.8. Non oliguric. · Will obtain urine electrolytes, urine creatinine. 2. CKD stage I-II, with minimal proteinuria (urine albumin/creatinine: 81 mg/gr). Kidney ultrasound with increased renal cortical echogenicity. Probably due to nephrosclerosis. Renal function slightly worse s/p cardiac arrest.  3. Hypomagnesemia likely 2/2 poor oral intake  4. Hypokalemia likely 2/2 to poor oral intake  5. Status post PEA arrest 2/15/20  6. Respiratory failure status post intubation  7. Alkalemia (pH: 7.489) with respiratory alkalosis and metabolic alkalosis  8. HTN, on hydralazine, metoprolol. 9. Group G Streptococcus bacteremia, on amoxicillin-clavulanate  10. Normocytic Anemia, Acute blood loss? .  Iron studies consistent with chronic inflammation, normal B12 and folate levels. S/p transfusion.    11. Nutrition, s/p PEG, TF at 50cc/hr with FW 30cc/4hrs      Plan:    · Discontinue IVF  · Obtain urine electrolytes and creatinine  · Increase FW to 75cc/hr  · Replace Mg 4g IV x once  · Replace K 10mEq IV x 2  · Replace calcium   · Continue to monitor renal function  · Continue to monitor H&H

## 2020-02-17 NOTE — PROGRESS NOTES
[facility]  SURGICAL INTENSIVE CARE UNIT (SICU)  ATTENDING PHYSICIAN CRITICAL CARE PROGRESS NOTE     I have examined the patient, reviewed the record,and discussed the case with the APN/  Resident. I have reviewed all relevant labs and imaging data. The following summarizes my clinical findings and independent assessment. 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 PEA arrest likely from aspiration ( Barium sitting in the back of the through when PEG was placed)  leading to hypoxia or Head and neck cancer obstruction. 2/17 No overnight events     New Imaging Reviewed:   Chest Xray ETT in good position , right upper lobe infiltrate      Physical Exam:  Physical Exam  HENT:      Head: Normocephalic. Eyes:      Pupils: Pupils are equal, round, and reactive to light. Comments: Veered to the left    Pulmonary:      Effort: Pulmonary effort is normal.   Abdominal:      General: Abdomen is flat. There is no distension. Musculoskeletal:      Comments: No purposeful movements on my exam, triple flexion    Neurological:      General: No focal deficit present. Assessment   Principal Problem:    Bacteremia  Active Problems:    Acute renal failure (ARF) ([address]    Other dysphagia    Severe protein-calorie malnutrition ([address]  Resolved Problems:    * No resolved hospital problems. *    Plan   GI:  tube feeds , Senna Colace  Dulcolax   Neuro: prn Tylenol   likely hypoxic brain injury , CT shows subacute stroke, neurology following , EEG read pending .  Will discuss agressiveness of care with Neurology  Regarding Sub acute stroke considering Anoxic brain injury would recommend against CTA as YVETTE worsening   Renal:

## 2020-02-17 NOTE — PROGRESS NOTES
Surgical Intensive Care Unit  Daily Progress Note  Date of admission:  2/5/2020  Reason for ICU transfer:  Hypoxic bradycardic arrest    Subjective: Lateral transfer to CVICU overnight. Marginal UOP - had been NPO except for free water for most of yesterday; improved with IVF. No sedation give overnight, withdraws from pain but no purposeful movement. Physical Exam:  BP (!) 150/90   Pulse 105   Temp 99.2 °F (37.3 °C) (Oral)   Resp 20   Ht 5' 4\" (1.626 m)   Wt 132 lb 9.6 oz (60.1 kg)   SpO2 100%   BMI 22.76 kg/m²   General appearance: intubated, chronically ill appearing  Lungs: clear to auscultation bilaterally, ACVC 16/400/40%/5, breathing over vent  Heart: borderline tachycardia  Abdomen: soft, no apparent tenderness, non distended. PEG in place  : islas yellow urine  Skin: No skin abnormalities  Neurologic: withdraws from painful stimuli in lower extremities, no purposeful movement    Intake: 1L IV, 1L NG  Output: 675 UOP    Lab Results   Component Value Date    WBC 14.6 (H) 02/17/2020    HGB 7.7 (L) 02/17/2020    HCT 23.5 (L) 02/17/2020    MCV 89.0 02/17/2020     (L) 02/17/2020     Lab Results   Component Value Date     02/17/2020    K 3.6 02/17/2020     02/17/2020    CO2 30 (H) 02/17/2020    BUN 33 (H) 02/17/2020    CREATININE 2.0 (H) 02/17/2020    GLUCOSE 157 (H) 02/17/2020    CALCIUM 7.6 (L) 02/17/2020    PROT 5.4 (L) 02/17/2020    LABALBU 1.6 (L) 02/17/2020    BILITOT 0.6 02/17/2020    ALKPHOS 55 02/17/2020    AST 49 (H) 02/17/2020    ALT 25 02/17/2020    LABGLOM 41 02/17/2020    GFRAA 41 02/17/2020     Lab Results   Component Value Date    CKTOTAL 138 02/06/2020    CKMB 3.6 02/05/2020    TROPONINI 0.13 (H) 02/16/2020     Lab Results   Component Value Date    LACTA 1.6 02/06/2020     ASSESSMENT / PLAN:  · Neuro:  Likely hypoxic brain injury during code blue difficult intubation; minimally responsive off sedation. Consult Neurology.   · History of ETOH abuse, previously on CIWA  · PMH CVA  · CV: Bradycardic PEA arrest 2/16, likely hypoxia -  Monitor hemodynamics. Repeat echo, troponin trend 0.15 to 0.13  · Home metoprolol 50 BID. Aspirin held due to nasopharyngeal bleeding  · Pulm: Hypercarbic respiratory failure; possibly component of obstruction from ENT mass s/p intubation 2/16. Wean vent support as able. Continue duonebs, 3% saline nebs -  Monitor RR, SpO2  · CXR, ABG whiel intubated  · GI:  S/p PEG 2/12 for failure to thrive. Advance TF to goal  · Renal: YVETTE, renal following. IVF D5 /hr -  Monitor UOP. Replete MG > 2 K > 4  · ID:  augmentin per ID for streptococcus bacteremia and MSSA pneumonia-  Monitor for SIRS  · Endo: No acute issues -  Monitor glucose  · MSK: Turn and reposition. Local wound care for elbow pressure wounds  · Heme: Acute blood loss anemia and nasopharyngeal bleedin gfrom ENT mass, stable. Start SQH    Bowel regimen: Colace, senna  DVT proph:  SCDs, start SQH  GI proph:  PPI   Glucose protocol: monitor  Mouth/eye care: Peridex, drops  Sanchez:   keep in place for critical care monitoring of fluid balance.   CVC sites:  Place PICC  Ancillary consults: IM (admitting), renal, ENT, General Surgery, ID, Neurology  Family Update: Family updated yesterday at bedside     Electronically signed by Roxanne Shields MD on 2/17/2020 at 11:46 AM

## 2020-02-17 NOTE — PROCEDURES
EEG Report  Bret Riojas is a 59 y.o. male      Appointment Date 2/17/2020   Appointment Time 1;00PM   Facility Location [facility] EEG Number [id number]   Type of Study Routine Portable  Floor 3814     Technical Specifications  Technician [facility] of consciousness Unresponsive    Sleep deprived? no   Hyperventilation tested? no   Photic stim tested? no   EEG recording Standard 10-20 electrode placement    Duration of recording 30 mins   EEG complete?  Yes         Clinical History   AMS    Medications    Current Facility-Administered Medications:     sodium chloride flush 0.9 % injection 10 mL, 10 mL, Intravenous, PRN, Solitario Kovacs MD    heparin flush 100 UNIT/ML injection 300 Units, 3 mL, Intravenous, 2 times per day, Solitario Kovacs MD, 300 Units at 02/17/20 1300    heparin flush 100 UNIT/ML injection 300 Units, 3 mL, Intracatheter, PRN, Solitario Kovacs MD    magnesium sulfate 4 g in 100 mL IVPB premix, 4 g, Intravenous, Once, Carol Persaud MD, Last Rate: 25 mL/hr at 02/17/20 1117    heparin (porcine) injection 5,000 Units, 5,000 Units, Subcutaneous, 3 times per day, Solitario Kovacs MD, 5,000 Units at 02/17/20 1401    metoprolol (LOPRESSOR) injection 5 mg, 5 mg, Intravenous, Q5 Min PRN, Solitario Kovacs MD    perflutren lipid microspheres (DEFINITY) injection 1.65 mg, 1.5 mL, Intravenous, ONCE PRN, Serenity Min MD    chlorhexidine (PERIDEX) 0.12 % solution 15 mL, 15 mL, Mouth/Throat, BID, Solitario Kovacs MD, 15 mL at 02/17/20 0845    sennosides (SENOKOT) 8.8 MG/5ML syrup 5 mL, 5 mL, Oral, Nightly, Ramiro Goyal, DO, 5 mL at 02/16/20 2033    docusate sodium (COLACE) 150 MG/15ML liquid 100 mg, 100 mg, Oral, Daily, Oumou Pierce., DO, 100 mg at 02/17/20 0847    0.9 % sodium chloride bolus, 20 mL, Intravenous, Once, Nadine Simpson MD    folic acid (FOLVITE) tablet 1 mg, 1 mg, PEG Tube, Daily, Oumou Martin DO, 1 mg at 02/17/20 0845    lansoprazole suspension SUSP 30 mg, 30 mg, Per G Tube, QAM AC, Edmundo Fry, DO, 30 mg at 02/17/20 0532    vitamin B-1 (THIAMINE) tablet 100 mg, 100 mg, PEG Tube, Daily, Edmundo Fry, DO, 100 mg at 02/17/20 0845    amoxicillin-clavulanate (AUGMENTIN) 250-62.5 MG/5ML suspension 500 mg, 500 mg, Per G Tube, Q8H, Naresh Kirk MD, 500 mg at 02/17/20 7838    [Held by provider] aspirin chewable tablet 81 mg, 81 mg, Per G Tube, Daily, Katty Guan MD, 81 mg at 02/14/20 1009    hydrALAZINE (APRESOLINE) tablet 25 mg, 25 mg, PEG Tube, 3 times per day, Fernando Ayers MD, 25 mg at 02/17/20 1401    metoprolol tartrate (LOPRESSOR) tablet 50 mg, 50 mg, PEG Tube, BID, Saroj Schumacher MD, 50 mg at 02/17/20 0845    glucose (GLUTOSE) 40 % oral gel 15 g, 15 g, Oral, PRN, Evertt Carrera B Capal, DO    dextrose 50 % IV solution, 12.5 g, Intravenous, PRN, Stephenie Nicole, DO    glucagon (rDNA) injection 1 mg, 1 mg, Intramuscular, PRN, Evertt Carrera B Capal, DO    dextrose 5 % solution, 100 mL/hr, Intravenous, PRN, Stephenie Nicole, DO    ipratropium-albuterol (DUONEB) nebulizer solution 1 ampule, 1 ampule, Inhalation, Q4H WA, Saqib B Capal, DO, 1 ampule at 02/17/20 1156    sodium chloride (Inhalant) 3 % nebulizer solution 4 mL, 4 mL, Nebulization, BID, Saqib B Capal, DO, 4 mL at 02/17/20 0553    labetalol (NORMODYNE;TRANDATE) injection 10 mg, 10 mg, Intravenous, Q6H PRN, Evertt Carrera B Capal, DO, 10 mg at 02/15/20 1748    sodium chloride flush 0.9 % injection 10 mL, 10 mL, Intravenous, 2 times per day, Saqib B Capal, DO, 10 mL at 02/17/20 0845    sodium chloride flush 0.9 % injection 10 mL, 10 mL, Intravenous, PRN, Roge CASTILLO Capal, DO, 10 mL at 02/15/20 0249    0.9 % sodium chloride bolus, 1,000 mL, Intravenous, Once, Stephenie Nicole, DO    magnesium hydroxide (MILK OF MAGNESIA) 400 MG/5ML suspension 30 mL, 30 mL, Oral, Daily PRN, Stephenie Nicole, DO        Physician Interpretation    General EEG Report        Epileptiform Discharge   None    Non-Epileptiform Abnormality  Continuous slowing, generalized, low amplitude and mostly slow delta waves      Ictal  None    General Impression  This EEG shows severe diffuse encephalopathy. No epileptiform activity or lateralizing signs are seen. ;

## 2020-02-17 NOTE — PROGRESS NOTES
Nutrition Assessment (Enteral Nutrition)    Type and Reason for Visit: Consult(TF recs now intubated )    Nutrition Recommendations: Continue current Tube Feeding  Regimen remains appropriate meeting 100% est needs  Consider decreasing water flushes now that hypernatremia improved     Nutrition Needs:  · Estimated Daily Total Kcal: 2781-4026 (PS3B: MV 8.71, Tmax 37.4; RMR 1470)  · Estimated Daily Protein (g): 75-90(1.5-1.8 g/kg)  · Estimated Daily Fluid (ml/day): per critical care     Objective Information:  · Current Nutrition Therapies:  · Oral Diet Orders: NPO   · Tube Feeding (TF) Orders:   · Feeding Route: Gastrostomy  · Formula: Standard w/Fiber  · Rate (ml/hr):50 ml/hr    · Volume (ml/day): 1200 ml tv   · Duration: Continuous  · Additives/Modulars: Protein(= 26 mg)  · Water Flushes: 75 q 1 hr= 1800 ml water  · Current TF & Flush Orders Provides: 1440 kcals, 67 gm pro, (1540 kcals & 93 gm pro w/ mod), 968 ml free water, 3000 ml total water w/ flushes  · Goal TF & Flush Orders Provides: at goal   · Additional Calories: none      Electronically signed by Jessenia Lima RD, LD on 2/17/20 at 1:32 PM    Contact Number: [phone]

## 2020-02-17 NOTE — CONSULTS
Palliative Care Department  Palliative Care Initial Consult  [facility]    [facility]: [facility]    Referring Provider:  Sixto Mathew MD    Reason for Consult:  [x]  Code status Discussion  [x]  Assist with goals of care  [x]  Psychosocial support  []  Symptom Management  []  Advanced Care Planning    Chief Complaint: Faye Handley is a 59 y.o. male with chief complaint of fatigue, productive cough, chills, myalgias      Active Hospital Problems    Diagnosis Date Noted    Severe protein-calorie malnutrition ([facility] [address] [E43] 02/11/2020    Bacteremia [R78.81] 02/08/2020    Other dysphagia [R13.19] 02/08/2020    Acute renal failure (ARF) ([facility] [address] [N17.9] 02/05/2020           Palliative Care Encounter/Recommendations:      - Goals of care: support for family/caregiver, continue current management and to be determined     - Code Status: full code      Subjective:     HPI:  Faye Handley is a 59 y.o. male with significant past medical history of laryngeal cancer, chemotherapy/radiation, current tobacco and alcohol use, HTN, hip fracture history of CVA, seizure history, fall history who presented to the ED on 2/5/2020 with fatigue, productive cough, myalgias. The patient lives in [place] and came up to [place] in November 2019 with his nephew to visit his sister. He was independent prior to admission and lived independently in [place]. The patient was hypotensive and tachycardic per EMS, given fluid resuscitation with improvement in blood pressure and no improvement in heart rate. Patient continued to smoke and use ETOH. His sister reported that the patient had increased weakness and gait disturbance over the past couple of days. Patient had left sided weakness and slurred speech per baseline s/p CVA history. The patient had a laryngectomy in 2005 with chemotherapy and radiation.   At his sister's residence, his appetite began to decline and he also had a fall on 2/4/2020 ETT  Lungs:  Diminished bilaterally, +rhonchi or wheezes noted  Heart[de-identified]  Tachycardic, no murmur, rub, or gallop noted during exam  Abd:  PEG, Soft, non tender, non distended, BS+  :  Sanchez catheter  Ext:  No purposeful movement extremities, BUE edema, pulses present  Skin:  Warm and dry  Neuro:  PERRL, unable to follow commands, responsive only to pain    Imaging    EEG 2/6/2020  General Impression  This EEG shows a mild diffuse encephalopathy.  No epileptiform   activities or lateralizing signs are seen. US Retroperitoneal 2/7/2020  Probable bilateral medical renal disease with some perinephric fluid   bilaterally that could indicate inflammation.       There is no evidence of calcification or obstruction in either kidney.       The bladder was not evaluated due to a decompressing Sanchez catheter. CT soft tissue neck W/WO contrast 2/9/2020  Probable prior right radical neck dissection with sequela   of prior surgery and radiation therapy. There is extensive soft tissue   distortion which is suggestive of previous therapy   Finds compatible with a approximately 1 cm right thyroid nodule   There is evidence to suggest severe stenosis of the right distal   internal carotid artery   Soft tissue mass at the level the carotid bifurcation on the right   with outward mass effect measuring approximately 2.3 x 2.7 cm   suspicious for recurrent disease. This encases the carotid distally   and the distal carotid stenosis may represent invasion. Comparison   studies would be necessary to distinguish recurrent neoplasm from   previous radiation therapy. Tumor invasion versus osteonecrosis of the medial right mandible         US Head neck soft tissue thyroid 2/14/2020  Ill-defined, heterogeneous, abnormal appearing soft tissue in the   region of interest.   No specific clinical question is asked. Ultrasound is not the   appropriate modality for this evaluation. Contrast-enhanced MRI may provide more information. Correlation with clinical and surgical history is needed. Echocardiogram 2/16/2020  Summary   Trileaflet aortic valve. Mild leaflet calcification with more pronounced   calcification on the non coronary cusp. Normal leaflet mobility. No aortic stenosis. No aortic regurgitation. Concentric hypertrophy though in some views the walls are up to 1.4 cm   thick. Normal left ventricular systolic function. Visually estimated LVEF   is 60 %. Normal diastolic function. Normal left atrial pressure. Mildly dilated right ventricle. Difficult to visualize the free wall in all views. Small anterior and apical effusion. No signs of tamponade. 2/17/2020 CXR  Since the prior study, there is developed significant left lower lobe   infiltrate and patchy infiltrates in the right hemithorax. ET tube is   appropriate. There may be a small left effusion. Current Medications:  Inpatient medications reviewed: yes  Home Medications reviewed: yes    Results/Verification of Data Review  Objective data reviewed: labs, images, records, medication use, vitals and chart      - Advanced Directives: Sister Ishaan Schwartz makes patient's medical decisions   -  - Spiritual assessment: No spiritual distress identified     - Bereavement and grief: to be determined    - Discharge planning: to be determined    - Prognosis: Poor    - Referrals to: none today    Time/Communication  Greater than 51% of time spent, total 70 minutes in counseling and coordination of care at the bedside regarding goals of care and diagnosis and prognosis. Assessment/Plan   1. PT treatment for mobility, safety, breathing techniques, transfers, and gait, on hold  2. OT to increase safety and independence with completion of ADL/IADL, on hold  3. ST for dysphagia management, on hold  4.  Nephrology- uncontrolled HTN; monitor blood pressures, YVETTE stage III, proteinuria, monitor creatinine, Potassium and magnesium replacement,

## 2020-02-17 NOTE — PROGRESS NOTES
[facility] PROGRESS NOTE      Chief complaint: Follow-up of group G Streptococcus bacteremia    The patient is a 59 y.o. 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 On admission, he was afebrile with no leukocytosis. Respiratory pathogen PCR panel, urine Streptococcus pneumoniae and Legionella antigens were negative. Chest x-ray showed no acute infiltrate. CT of the neck and soft tissues showed sequela of extensive prior surgery, distortion of oropharynx and posterior superior hypopharynx and of the right sternocleidomastoid with pattern compatible with previous radical neck dissection, resection at the level of the tongue base and floor of the mouth, soft tissue mass-effect suspicious for recurrent neoplasm at the level of the carotid bifurcation on the right measuring 2.7 x 2.3 cm, moth-eaten appearance to the medial aspect of the right mandible probably tumor invasion or osteonecrosis, no evident abscess. Blood cultures grew beta-hemolytic group G Streptococcus (susceptible to ampicillin, resistant to clindamycin). Repeat blood cultures from 02/08 and from 02/10 showed no growth to date. Sputum Gram stain and culture showed less than 25 PMNs per LPF and less than 25 epithelial cells per LPF, light growth of MSSA, moderate growth of Candida albicans, absent oral pharyngeal talib. He received doxycycline on 02/05, ceftriaxone on 02/05 and from 02/08-02/10, piperacillin-tazobactam since 02/05 then switched to nafcillin on 02/12. Nafcillin was switched to ampicillin-sulbactam on 02/12.  He underwent PEG tube

## 2020-02-17 NOTE — PLAN OF CARE
Problem: Restraint Use - Nonviolent/Non-Self-Destructive Behavior:  Goal: Absence of restraint indications  Description  Absence of restraint indications  2/16/2020 2132 by Michael Larios RN  Outcome: Not Met This Shift  2/16/2020 1845 by Vignesh Lees RN  Outcome: Not Met This Shift  2/16/2020 1220 by Kevin Faria RN  Outcome: Not Met This Shift  Goal: Absence of restraint-related injury  Description  Absence of restraint-related injury  2/16/2020 2132 by Michael Larios RN  Outcome: Met This Shift  2/16/2020 1845 by Vignesh Lees RN  Outcome: Met This Shift  2/16/2020 1220 by Kevin Faria RN  Outcome: Met This Shift

## 2020-02-17 NOTE — CARE COORDINATION
Transferred to ICU on 2/16 after code blue. Currently intubated on vent. Plan at this time is for [facility]. [facility] had already obtained ins auth , which is good through 2/25. Kurt , ambulance form in soft chart.

## 2020-02-18 ENCOUNTER — APPOINTMENT (OUTPATIENT)
Dept: GENERAL RADIOLOGY | Age: 65
DRG: 870 | End: 2020-02-18
Payer: MEDICARE

## 2020-02-18 LAB
AADO2: 106.2 MMHG
ABO/RH: NORMAL
ALBUMIN SERPL-MCNC: 2 G/DL (ref 3.5–5.2)
ALP BLD-CCNC: 62 U/L (ref 40–129)
ALT SERPL-CCNC: 20 U/L (ref 0–40)
ANION GAP SERPL CALCULATED.3IONS-SCNC: 13 MMOL/L (ref 7–16)
ANION GAP SERPL CALCULATED.3IONS-SCNC: 9 MMOL/L (ref 7–16)
ANTIBODY SCREEN: NORMAL
AST SERPL-CCNC: 33 U/L (ref 0–39)
B.E.: 5.8 MMOL/L (ref -3–3)
BASOPHILS ABSOLUTE: 0.02 E9/L (ref 0–0.2)
BASOPHILS RELATIVE PERCENT: 0.1 % (ref 0–2)
BILIRUB SERPL-MCNC: 0.5 MG/DL (ref 0–1.2)
BLOOD BANK DISPENSE STATUS: NORMAL
BLOOD BANK DISPENSE STATUS: NORMAL
BLOOD BANK PRODUCT CODE: NORMAL
BLOOD BANK PRODUCT CODE: NORMAL
BPU ID: NORMAL
BPU ID: NORMAL
BUN BLDV-MCNC: 36 MG/DL (ref 8–23)
BUN BLDV-MCNC: 37 MG/DL (ref 8–23)
CALCIUM IONIZED: 1.14 MMOL/L (ref 1.15–1.33)
CALCIUM SERPL-MCNC: 7.5 MG/DL (ref 8.6–10.2)
CALCIUM SERPL-MCNC: 7.8 MG/DL (ref 8.6–10.2)
CHLORIDE BLD-SCNC: 101 MMOL/L (ref 98–107)
CHLORIDE BLD-SCNC: 101 MMOL/L (ref 98–107)
CHOLESTEROL, FASTING: 71 MG/DL (ref 0–199)
CO2: 26 MMOL/L (ref 22–29)
CO2: 31 MMOL/L (ref 22–29)
COHB: 0.6 % (ref 0–1.5)
CREAT SERPL-MCNC: 1.8 MG/DL (ref 0.7–1.2)
CREAT SERPL-MCNC: 2 MG/DL (ref 0.7–1.2)
CRITICAL: ABNORMAL
DATE ANALYZED: ABNORMAL
DATE OF COLLECTION: ABNORMAL
DESCRIPTION BLOOD BANK: NORMAL
DESCRIPTION BLOOD BANK: NORMAL
EOSINOPHILS ABSOLUTE: 0 E9/L (ref 0.05–0.5)
EOSINOPHILS RELATIVE PERCENT: 0 % (ref 0–6)
FIO2: 40 %
GFR AFRICAN AMERICAN: 41
GFR AFRICAN AMERICAN: 46
GFR NON-AFRICAN AMERICAN: 41 ML/MIN/1.73
GFR NON-AFRICAN AMERICAN: 46 ML/MIN/1.73
GLUCOSE BLD-MCNC: 126 MG/DL (ref 74–99)
GLUCOSE BLD-MCNC: 131 MG/DL (ref 74–99)
HBA1C MFR BLD: 5.2 % (ref 4–5.6)
HCO3: 29.9 MMOL/L (ref 22–26)
HCT VFR BLD CALC: 16.2 % (ref 37–54)
HCT VFR BLD CALC: 22 % (ref 37–54)
HDLC SERPL-MCNC: 24 MG/DL
HEMOGLOBIN: 5.2 G/DL (ref 12.5–16.5)
HEMOGLOBIN: 7.5 G/DL (ref 12.5–16.5)
HHB: 2.3 % (ref 0–5)
IMMATURE GRANULOCYTES #: 0.27 E9/L
IMMATURE GRANULOCYTES %: 1.7 % (ref 0–5)
LAB: ABNORMAL
LDL CHOLESTEROL CALCULATED: 29 MG/DL (ref 0–99)
LYMPHOCYTES ABSOLUTE: 1.01 E9/L (ref 1.5–4)
LYMPHOCYTES RELATIVE PERCENT: 6.4 % (ref 20–42)
Lab: ABNORMAL
MAGNESIUM: 1.8 MG/DL (ref 1.6–2.6)
MCH RBC QN AUTO: 28.9 PG (ref 26–35)
MCHC RBC AUTO-ENTMCNC: 32.1 % (ref 32–34.5)
MCV RBC AUTO: 90 FL (ref 80–99.9)
METER GLUCOSE: 130 MG/DL (ref 74–99)
METER GLUCOSE: 133 MG/DL (ref 74–99)
METER GLUCOSE: 139 MG/DL (ref 74–99)
METER GLUCOSE: 142 MG/DL (ref 74–99)
METER GLUCOSE: 146 MG/DL (ref 74–99)
METER GLUCOSE: 148 MG/DL (ref 74–99)
METHB: 0.6 % (ref 0–1.5)
MODE: AC
MONOCYTES ABSOLUTE: 1.3 E9/L (ref 0.1–0.95)
MONOCYTES RELATIVE PERCENT: 8.2 % (ref 2–12)
NEUTROPHILS ABSOLUTE: 13.26 E9/L (ref 1.8–7.3)
NEUTROPHILS RELATIVE PERCENT: 83.6 % (ref 43–80)
O2 CONTENT: 9.6 ML/DL
O2 SATURATION: 97.7 % (ref 92–98.5)
O2HB: 96.5 % (ref 94–97)
OPERATOR ID: 2325
PATIENT TEMP: 37 C
PCO2: 41.5 MMHG (ref 35–45)
PDW BLD-RTO: 15.8 FL (ref 11.5–15)
PEEP/CPAP: 5 CMH2O
PFO2: 3.03 MMHG/%
PH BLOOD GAS: 7.48 (ref 7.35–7.45)
PHOSPHORUS: 4.2 MG/DL (ref 2.5–4.5)
PLATELET # BLD: 132 E9/L (ref 130–450)
PMV BLD AUTO: 12.3 FL (ref 7–12)
PO2: 121.3 MMHG (ref 75–100)
POTASSIUM SERPL-SCNC: 3.6 MMOL/L (ref 3.5–5)
POTASSIUM SERPL-SCNC: 4 MMOL/L (ref 3.5–5)
PROCALCITONIN: 31.82 NG/ML (ref 0–0.08)
RBC # BLD: 1.8 E12/L (ref 3.8–5.8)
REASON FOR REJECTION: NORMAL
REJECTED TEST: NORMAL
RI(T): 0.88
RR MECHANICAL: 16 B/MIN
SODIUM BLD-SCNC: 140 MMOL/L (ref 132–146)
SODIUM BLD-SCNC: 141 MMOL/L (ref 132–146)
SOURCE, BLOOD GAS: ABNORMAL
THB: 6.9 G/DL (ref 11.5–16.5)
TIME ANALYZED: 802
TOTAL PROTEIN: 6 G/DL (ref 6.4–8.3)
TRIGLYCERIDE, FASTING: 88 MG/DL (ref 0–149)
TSH SERPL DL<=0.05 MIU/L-ACNC: 7.15 UIU/ML (ref 0.27–4.2)
VLDLC SERPL CALC-MCNC: 18 MG/DL
VT MECHANICAL: 400 ML
WBC # BLD: 15.9 E9/L (ref 4.5–11.5)

## 2020-02-18 PROCEDURE — 71045 X-RAY EXAM CHEST 1 VIEW: CPT

## 2020-02-18 PROCEDURE — 84100 ASSAY OF PHOSPHORUS: CPT

## 2020-02-18 PROCEDURE — 6360000002 HC RX W HCPCS: Performed by: STUDENT IN AN ORGANIZED HEALTH CARE EDUCATION/TRAINING PROGRAM

## 2020-02-18 PROCEDURE — 86850 RBC ANTIBODY SCREEN: CPT

## 2020-02-18 PROCEDURE — 99233 SBSQ HOSP IP/OBS HIGH 50: CPT | Performed by: NURSE PRACTITIONER

## 2020-02-18 PROCEDURE — 87040 BLOOD CULTURE FOR BACTERIA: CPT

## 2020-02-18 PROCEDURE — 85025 COMPLETE CBC W/AUTO DIFF WBC: CPT

## 2020-02-18 PROCEDURE — 99291 CRITICAL CARE FIRST HOUR: CPT | Performed by: SURGERY

## 2020-02-18 PROCEDURE — 80061 LIPID PANEL: CPT

## 2020-02-18 PROCEDURE — 6370000000 HC RX 637 (ALT 250 FOR IP): Performed by: STUDENT IN AN ORGANIZED HEALTH CARE EDUCATION/TRAINING PROGRAM

## 2020-02-18 PROCEDURE — 84443 ASSAY THYROID STIM HORMONE: CPT

## 2020-02-18 PROCEDURE — P9040 RBC LEUKOREDUCED IRRADIATED: HCPCS

## 2020-02-18 PROCEDURE — P9016 RBC LEUKOCYTES REDUCED: HCPCS

## 2020-02-18 PROCEDURE — 99231 SBSQ HOSP IP/OBS SF/LOW 25: CPT | Performed by: INTERNAL MEDICINE

## 2020-02-18 PROCEDURE — 6370000000 HC RX 637 (ALT 250 FOR IP): Performed by: INTERNAL MEDICINE

## 2020-02-18 PROCEDURE — 82330 ASSAY OF CALCIUM: CPT

## 2020-02-18 PROCEDURE — 2580000003 HC RX 258: Performed by: STUDENT IN AN ORGANIZED HEALTH CARE EDUCATION/TRAINING PROGRAM

## 2020-02-18 PROCEDURE — 84145 PROCALCITONIN (PCT): CPT

## 2020-02-18 PROCEDURE — 36430 TRANSFUSION BLD/BLD COMPNT: CPT

## 2020-02-18 PROCEDURE — 94003 VENT MGMT INPAT SUBQ DAY: CPT

## 2020-02-18 PROCEDURE — 83735 ASSAY OF MAGNESIUM: CPT

## 2020-02-18 PROCEDURE — 82962 GLUCOSE BLOOD TEST: CPT

## 2020-02-18 PROCEDURE — 95822 EEG COMA OR SLEEP ONLY: CPT | Performed by: PSYCHIATRY & NEUROLOGY

## 2020-02-18 PROCEDURE — 83036 HEMOGLOBIN GLYCOSYLATED A1C: CPT

## 2020-02-18 PROCEDURE — 94669 MECHANICAL CHEST WALL OSCILL: CPT

## 2020-02-18 PROCEDURE — 2500000003 HC RX 250 WO HCPCS: Performed by: STUDENT IN AN ORGANIZED HEALTH CARE EDUCATION/TRAINING PROGRAM

## 2020-02-18 PROCEDURE — 86900 BLOOD TYPING SEROLOGIC ABO: CPT

## 2020-02-18 PROCEDURE — 85018 HEMOGLOBIN: CPT

## 2020-02-18 PROCEDURE — 85014 HEMATOCRIT: CPT

## 2020-02-18 PROCEDURE — 86901 BLOOD TYPING SEROLOGIC RH(D): CPT

## 2020-02-18 PROCEDURE — 86923 COMPATIBILITY TEST ELECTRIC: CPT

## 2020-02-18 PROCEDURE — 80053 COMPREHEN METABOLIC PANEL: CPT

## 2020-02-18 PROCEDURE — 82805 BLOOD GASES W/O2 SATURATION: CPT

## 2020-02-18 PROCEDURE — 36415 COLL VENOUS BLD VENIPUNCTURE: CPT

## 2020-02-18 PROCEDURE — 2000000000 HC ICU R&B

## 2020-02-18 PROCEDURE — 6370000000 HC RX 637 (ALT 250 FOR IP): Performed by: SURGERY

## 2020-02-18 PROCEDURE — 94640 AIRWAY INHALATION TREATMENT: CPT

## 2020-02-18 PROCEDURE — 80048 BASIC METABOLIC PNL TOTAL CA: CPT

## 2020-02-18 RX ORDER — 0.9 % SODIUM CHLORIDE 0.9 %
20 INTRAVENOUS SOLUTION INTRAVENOUS ONCE
Status: DISCONTINUED | OUTPATIENT
Start: 2020-02-18 | End: 2020-02-19

## 2020-02-18 RX ORDER — AMOXICILLIN AND CLAVULANATE POTASSIUM 400; 57 MG/5ML; MG/5ML
500 POWDER, FOR SUSPENSION ORAL EVERY 8 HOURS
Status: DISCONTINUED | OUTPATIENT
Start: 2020-02-18 | End: 2020-02-19

## 2020-02-18 RX ADMIN — IPRATROPIUM BROMIDE AND ALBUTEROL SULFATE 1 AMPULE: 2.5; .5 SOLUTION RESPIRATORY (INHALATION) at 07:38

## 2020-02-18 RX ADMIN — LANSOPRAZOLE 30 MG: KIT at 10:03

## 2020-02-18 RX ADMIN — IPRATROPIUM BROMIDE AND ALBUTEROL SULFATE 1 AMPULE: 2.5; .5 SOLUTION RESPIRATORY (INHALATION) at 11:53

## 2020-02-18 RX ADMIN — ACETAMINOPHEN ORAL SOLUTION 650 MG: 650 SOLUTION ORAL at 23:03

## 2020-02-18 RX ADMIN — SODIUM CHLORIDE SOLN NEBU 3% 4 ML: 3 NEBU SOLN at 19:05

## 2020-02-18 RX ADMIN — SODIUM CHLORIDE, PRESERVATIVE FREE 300 UNITS: 5 INJECTION INTRAVENOUS at 10:04

## 2020-02-18 RX ADMIN — SODIUM CHLORIDE SOLN NEBU 3% 4 ML: 3 NEBU SOLN at 07:37

## 2020-02-18 RX ADMIN — AMOXICILLIN AND CLAVULANATE POTASSIUM 500 MG: 400; 57 POWDER, FOR SUSPENSION ORAL at 09:09

## 2020-02-18 RX ADMIN — SODIUM CHLORIDE, PRESERVATIVE FREE 300 UNITS: 5 INJECTION INTRAVENOUS at 20:56

## 2020-02-18 RX ADMIN — DOCUSATE SODIUM 100 MG: 50 LIQUID ORAL at 08:55

## 2020-02-18 RX ADMIN — SODIUM CHLORIDE, PRESERVATIVE FREE 10 ML: 5 INJECTION INTRAVENOUS at 14:54

## 2020-02-18 RX ADMIN — HYDRALAZINE HYDROCHLORIDE 25 MG: 25 TABLET, FILM COATED ORAL at 20:56

## 2020-02-18 RX ADMIN — CHLORHEXIDINE GLUCONATE 0.12% ORAL RINSE 15 ML: 1.2 LIQUID ORAL at 08:55

## 2020-02-18 RX ADMIN — SODIUM CHLORIDE, PRESERVATIVE FREE 10 ML: 5 INJECTION INTRAVENOUS at 16:55

## 2020-02-18 RX ADMIN — LABETALOL HYDROCHLORIDE 10 MG: 5 INJECTION INTRAVENOUS at 14:41

## 2020-02-18 RX ADMIN — METOPROLOL TARTRATE 50 MG: 50 TABLET, FILM COATED ORAL at 08:55

## 2020-02-18 RX ADMIN — FOLIC ACID 1 MG: 1 TABLET ORAL at 09:11

## 2020-02-18 RX ADMIN — Medication 100 MG: at 08:55

## 2020-02-18 RX ADMIN — SODIUM CHLORIDE, PRESERVATIVE FREE 10 ML: 5 INJECTION INTRAVENOUS at 11:25

## 2020-02-18 RX ADMIN — HYDRALAZINE HYDROCHLORIDE 25 MG: 25 TABLET, FILM COATED ORAL at 06:37

## 2020-02-18 RX ADMIN — SODIUM CHLORIDE, PRESERVATIVE FREE 10 ML: 5 INJECTION INTRAVENOUS at 14:43

## 2020-02-18 RX ADMIN — Medication 10 ML: at 10:04

## 2020-02-18 RX ADMIN — Medication 10 ML: at 20:55

## 2020-02-18 RX ADMIN — HYDRALAZINE HYDROCHLORIDE 25 MG: 25 TABLET, FILM COATED ORAL at 13:09

## 2020-02-18 RX ADMIN — AMOXICILLIN AND CLAVULANATE POTASSIUM 500 MG: 400; 57 POWDER, FOR SUSPENSION ORAL at 17:30

## 2020-02-18 RX ADMIN — CHLORHEXIDINE GLUCONATE 0.12% ORAL RINSE 15 ML: 1.2 LIQUID ORAL at 20:56

## 2020-02-18 RX ADMIN — METOPROLOL TARTRATE 50 MG: 50 TABLET, FILM COATED ORAL at 20:56

## 2020-02-18 RX ADMIN — AMOXICILLIN AND CLAVULANATE POTASSIUM 500 MG: 400; 57 POWDER, FOR SUSPENSION ORAL at 01:00

## 2020-02-18 RX ADMIN — IPRATROPIUM BROMIDE AND ALBUTEROL SULFATE 1 AMPULE: 2.5; .5 SOLUTION RESPIRATORY (INHALATION) at 15:52

## 2020-02-18 RX ADMIN — SODIUM CHLORIDE, PRESERVATIVE FREE 10 ML: 5 INJECTION INTRAVENOUS at 12:53

## 2020-02-18 RX ADMIN — IPRATROPIUM BROMIDE AND ALBUTEROL SULFATE 1 AMPULE: 2.5; .5 SOLUTION RESPIRATORY (INHALATION) at 19:05

## 2020-02-18 RX ADMIN — METOPROLOL TARTRATE 5 MG: 5 INJECTION INTRAVENOUS at 15:46

## 2020-02-18 RX ADMIN — SODIUM CHLORIDE, PRESERVATIVE FREE 10 ML: 5 INJECTION INTRAVENOUS at 15:49

## 2020-02-18 ASSESSMENT — PULMONARY FUNCTION TESTS
PIF_VALUE: 20
PIF_VALUE: 21
PIF_VALUE: 20
PIF_VALUE: 28
PIF_VALUE: 23
PIF_VALUE: 27
PIF_VALUE: 24
PIF_VALUE: 27
PIF_VALUE: 35
PIF_VALUE: 26
PIF_VALUE: 23
PIF_VALUE: 23
PIF_VALUE: 22
PIF_VALUE: 19
PIF_VALUE: 30
PIF_VALUE: 23
PIF_VALUE: 23
PIF_VALUE: 21
PIF_VALUE: 21
PIF_VALUE: 22
PIF_VALUE: 19
PIF_VALUE: 26
PIF_VALUE: 22
PIF_VALUE: 23
PIF_VALUE: 26
PIF_VALUE: 25
PIF_VALUE: 24

## 2020-02-18 ASSESSMENT — PAIN SCALES - GENERAL
PAINLEVEL_OUTOF10: 0

## 2020-02-18 NOTE — PROGRESS NOTES
[facility] PROGRESS NOTE      Chief complaint: Follow-up of group G Streptococcus bacteremia    The patient is a 59 y.o. 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 On admission, he was afebrile with no leukocytosis. Respiratory pathogen PCR panel, urine Streptococcus pneumoniae and Legionella antigens were negative. Chest x-ray showed no acute infiltrate. CT of the neck and soft tissues showed sequela of extensive prior surgery, distortion of oropharynx and posterior superior hypopharynx and of the right sternocleidomastoid with pattern compatible with previous radical neck dissection, resection at the level of the tongue base and floor of the mouth, soft tissue mass-effect suspicious for recurrent neoplasm at the level of the carotid bifurcation on the right measuring 2.7 x 2.3 cm, moth-eaten appearance to the medial aspect of the right mandible probably tumor invasion or osteonecrosis, no evident abscess. Blood cultures grew beta-hemolytic group G Streptococcus (susceptible to ampicillin, resistant to clindamycin). Repeat blood cultures from 02/08 and from 02/10 showed no growth to date. Sputum Gram stain and culture showed less than 25 PMNs per LPF and less than 25 epithelial cells per LPF, light growth of MSSA, moderate growth of Candida albicans, absent oral pharyngeal talib. He received doxycycline on 02/05, ceftriaxone on 02/05 and from 02/08-02/10, piperacillin-tazobactam since 02/05 then switched to nafcillin on 02/12. Nafcillin was switched to ampicillin-sulbactam on 02/12.  He underwent PEG tube placement on 02/12. He went into PEA arrest with ROSC after about 13 minutes of resuscitation and was intubated and transferred to SICU. CXR showed stable opacities in the lower lobes. Subjective: Patient was seen and examined. He is intubated, awake but not responding to questions and not following commands. He had febrile episodes since 02/16 up to 101.4F today. Objective:  BP (!) 157/95   Pulse 105   Temp 100 °F (37.8 °C)   Resp 17   Ht 5' 4\" (1.626 m)   Wt 144 lb 1.6 oz (65.4 kg)   SpO2 100%   BMI 24.73 kg/m²   Constitutional: Intubated, no MV dyssynchrony  Respiratory: Clear breath sounds, no crackles, no wheezes  Cardiovascular: Regular rate and rhythm, no murmurs  Gastrointestinal: Bowel sounds present, soft, nontender  Skin: Warm and dry, no active dermatoses  Musculoskeletal: No joint swelling, no joint erythema    Labs, imaging, and medical records/notes were personally reviewed. Assessment:  Fever, multifactorial, suspect sepsis vs central fever vs transfusion reaction  Leukocytosis  Cardiac arrest  Anoxic encephalopathy  Group G Streptococcus bacteremia, etiology unclear, suspect oropharyngeal in etiology with possible aspiration pneumonia  MSSA pneumonia  History of head and neck cancer, s/p resection and chemoradiation therapy (details unclear)    Recommendations:  Continue amoxicillin-clavulanate 500-125 mg q8h per PEG to finish 14 days from 02/08-02/21. Check blood cultures. Check procalcitonin. Observe aspiration precautions and oral hygiene.     Thank you for involving me in the care of Toya Herzog. I will continue to follow. Please do not hesitate to call for any questions or concerns.     Electronically signed by Leda Willis MD on 2/18/2020 at 10:21 AM

## 2020-02-18 NOTE — PROGRESS NOTES
Department of Internal Medicine  Nephrology Attending Progress Note    Events reviewed. SUBJECTIVE: We are following Mr. Violette Valentin for YVETTE on CKD. Patient seen and examined bedside, remains intubated. PHYSICAL EXAM:      Vitals:    VITALS:  BP (!) 167/121   Pulse 120   Temp 99.8 °F (37.7 °C) (Temporal)   Resp 23   Ht 5' 4\" (1.626 m)   Wt 144 lb 1.6 oz (65.4 kg)   SpO2 99%   BMI 24.73 kg/m²        Intake/Output Summary (Last 24 hours) at 2/18/2020 1327  Last data filed at 2/18/2020 1300  Gross per 24 hour   Intake 3415 ml   Output 1235 ml   Net 2180 ml         Constitutional:  Intubated  HEENT:  Scarred sugical incision at neck; facial droop  Respiratory:  Clear, diminished bases bilaterally  Cardiovascular/Edema:  RRR, no edema noted  Gastrointestinal:  Abdomen soft and nontender, bowel sounds active. PEG.   Neurologic:  Contracted extremities, facial droop   Skin:  Dry,warm  flaky   Other:  No edema     Scheduled Meds:   amoxicillin-clavulanate  500 mg Per G Tube Q8H    sodium chloride  20 mL Intravenous Once    heparin flush  3 mL Intravenous 2 times per day    magnesium sulfate  4 g Intravenous Once    [Held by provider] heparin (porcine)  5,000 Units Subcutaneous 3 times per day    chlorhexidine  15 mL Mouth/Throat BID    sennosides  5 mL Oral Nightly    docusate sodium  100 mg Oral Daily    sodium chloride  20 mL Intravenous Once    folic acid  1 mg PEG Tube Daily    lansoprazole  30 mg Per G Tube QAM AC    vitamin B-1  100 mg PEG Tube Daily    [Held by provider] aspirin  81 mg Per G Tube Daily    hydrALAZINE  25 mg PEG Tube 3 times per day    metoprolol tartrate  50 mg PEG Tube BID    ipratropium-albuterol  1 ampule Inhalation Q4H WA    sodium chloride (Inhalant)  4 mL Nebulization BID    sodium chloride flush  10 mL Intravenous 2 times per day    sodium chloride  1,000 mL Intravenous Once     Continuous Infusions:   dextrose       PRN Meds:.sodium chloride flush, heparin

## 2020-02-18 NOTE — PROGRESS NOTES
[address] [facility]  Internal Medicine Residency / [address]    Attending Physician Statement  I have discussed the case, including pertinent history and exam findings with the resident and the team.  I have seen and examined the patient and the key elements of the encounter have been performed by me. I agree with the assessment, plan and orders as documented by the resident. Hb has dropped <6  Sinus tachycardia at 110  H&L clear  On Augmentin  Hx of Code Blue  Question of PE vs anoxia with lost airway  Will check US to R/O DVT as cause  Imagery shows new occipital CVA post code  Impression; S/P Code Blue  Plan; R/O PE            Transfuse blood and determine source of blood loss    Remainder of medical problems as per resident note.       Agustín Cao  Internal Medicine Residency Faculty

## 2020-02-18 NOTE — PLAN OF CARE
intactness and normal physiological function of skin and  mucous membranes. 2/18/2020 0721 by Laxmi Paredes RN  Outcome: Met This Shift  2/18/2020 0049 by Hortensia Pena RN  Outcome: Met This Shift     Problem: Skin Integrity:  Goal: Will show no infection signs and symptoms  Description  Will show no infection signs and symptoms  2/18/2020 0721 by Laxmi Paredes RN  Outcome: Met This Shift  2/18/2020 0049 by Hortensia Pena RN  Outcome: Met This Shift  Goal: Absence of new skin breakdown  Description  Absence of new skin breakdown  2/18/2020 0721 by Laxmi Paredes RN  Outcome: Met This Shift  2/18/2020 0049 by Hortensia Pena RN  Outcome: Met This Shift     Problem: Tobacco Use:  Goal: Inpatient tobacco use cessation counseling participation  Description  Inpatient tobacco use cessation counseling participation  Outcome: Met This Shift     Problem: Falls - Risk of:  Goal: Will remain free from falls  Description  Will remain free from falls  2/18/2020 0721 by Laxmi Paredes RN  Outcome: Met This Shift  2/18/2020 0049 by Hortensia Pena RN  Outcome: Met This Shift  Goal: Absence of physical injury  Description  Absence of physical injury  2/18/2020 0721 by Laxmi Paredes RN  Outcome: Met This Shift  2/18/2020 0049 by Hortensia Pena RN  Outcome: Met This Shift     Problem: Risk for Impaired Skin Integrity  Goal: Tissue integrity - skin and mucous membranes  Description  Structural intactness and normal physiological function of skin and  mucous membranes.   2/18/2020 0721 by Laxmi Paredes RN  Outcome: Met This Shift  2/18/2020 0049 by Hortensia Pena RN  Outcome: Met This Shift     Problem: Skin Integrity:  Goal: Will show no infection signs and symptoms  Description  Will show no infection signs and symptoms  2/18/2020 0721 by Laxmi Paredes RN  Outcome: Met This Shift  2/18/2020 0049 by Hortensia Pena RN  Outcome: Met This Shift  Goal: Absence of new skin breakdown  Description  Absence of new skin breakdown  2/18/2020 0721 by Araceli Alexander RN  Outcome: Met This Shift  2/18/2020 0049 by Sylvie Dyer RN  Outcome: Met This Shift     Problem: Tobacco Use:  Goal: Inpatient tobacco use cessation counseling participation  Description  Inpatient tobacco use cessation counseling participation  Outcome: Met This Shift

## 2020-02-18 NOTE — PROGRESS NOTES
[facility]  Internal Medicine Residency Program  Progress Note - House Team 1    Patient:  Willian Spaulding 59 y.o. male   MRN: [id number]       Date of Service: 2020    Allergy: Patient has no known allergies. Subjective      I saw and examined the patient in the AM today. Patient was intubated. Updated H+P:  Patient admitted on 2020 due to fatigue, unable to eat and had an episode of fall. Found to be hypotensive, thrombocytopenic (platelet 17,547) and YVETTE. During the course of his admission, he was found to have blood cultures positive for beta-hemolytic group B streptococcus (susceptible to ampicillin). His course was also complicated by alcohol withdrawal, YVETTE stage III on CKD, hypokalemia, and MSSA pneumonia. He, at 1 point, was on doxycycline and ceftriaxone. It was changed to piperacillin-tazobactam and eventually to nafcillin. Nafcillin was changed to ampicillin sulbactam on 2020. This was subsequently changed to amoxicillin- clavulanic acid per ID. He received 1 unit blood transfusion on 2020 due to decrease in hemoglobin from 8-6.7. His platelets recovered to >100,000 during the course of the admission. In the a.m. of 2020, he had a PEA arrest.  After multiple rounds of ACLS, ROSC was achieved. Patient was transferred to the surgical ICU the same day. Objective     TEMPERATURE:  Current - Temp: 99.5 °F (37.5 °C);  Max - Temp  Av.1 °F (37.8 °C)  Min: 99.3 °F (37.4 °C)  Max: 101.4 °F (38.6 °C)  RESPIRATIONS RANGE: Resp  Av.3  Min: 13  Max: 26  PULSE RANGE: Pulse  Av.3  Min: 103  Max: 122  BLOOD PRESSURE RANGE:  Systolic (43BEW), JFA:817 , Min:116 , XEV:658   ; Diastolic (08IDE), WVU:50, Min:68, Max:100    PULSE OXIMETRY RANGE: SpO2  Av.9 %  Min: 99 %  Max: 100 %    I & O - 24hr:    Intake/Output Summary (Last 24 hours) at 2020 1133  Last data filed at 2020 1125  Gross per 24 hour   Intake 3385 ml   Output 1177 None . The bones appear to be in anatomic alignment. No foreign body is identified No fracture is identified  There is moderate joint space loss The are moderate degenerative changes present     Moderate degenerative changes    Ct Soft Tissue Neck W Wo Contrast    Result Date: 2020  Patient MRN:  [id number] : 1955 Age: 59 years Gender: Male Order Date:  2020 3:45 PM EXAM: CT SOFT TISSUE NECK W WO CONTRAST NUMBER OF IMAGES \ views:  36 INDICATION:  h/o R mandible/base of tongue ca, sp resection and chemorads, concern for recurrence Per nephro team ok to do scan with contrast h/o R mandible/base of tongue ca, sp resection and chemorads, concern for recurrence COMPARISON: None Technique: Low-dose CT  acquisition technique included one of following options; 1 . Automated exposure control, 2. Adjustment of MA and or KV according to patient's size or 3. Use of iterative reconstruction. The larynx appears unremarkable The soft tissues appear abnormal there is sequela of extensive prior surgery. No convincing abscess is seen. There is distortion of the oropharynx and the posterior superior hypopharynx there is significant distortion of the right sternocleidomastoid and a pattern compatible with previous radical neck dissection. There are findings to suggest previous therapy. The right jugular vein is not seen likely status post resection. There is been resection at the level of the tongue base and floor the mouth. There is sequela of previous resection. . Atherosclerotic disease of both proximal internal carotid arteries is identified without proximal internal carotid artery hemodynamically significant stenosis. At the level the distal right internal carotid artery at the level the cervical component there is evidence to suggest severe stenosis. Also the level of the carotid bifurcation on the right there is a soft tissue mass effect suspicious for recurrent neoplasm. This measures approximately 2.7 x 2.3 cm. Daily Lauryn Bless., DO   100 mg at 02/18/20 0855    0.9 % sodium chloride bolus  20 mL Intravenous Once Celestino Mcmahon MD        folic acid (FOLVITE) tablet 1 mg  1 mg PEG Tube Daily Lauryn Bless., DO   1 mg at 02/18/20 9762    lansoprazole suspension SUSP 30 mg  30 mg Per G Tube QAM AC Paty Antis Jr., DO   30 mg at 02/18/20 1003    vitamin B-1 (THIAMINE) tablet 100 mg  100 mg PEG Tube Daily Paty Antis Jr., DO   100 mg at 02/18/20 8663    [Held by provider] aspirin chewable tablet 81 mg  81 mg Per G Tube Daily Dannielle Mcgee MD   81 mg at 02/14/20 1009    hydrALAZINE (APRESOLINE) tablet 25 mg  25 mg PEG Tube 3 times per day Eder Billy MD   25 mg at 02/18/20 8065    metoprolol tartrate (LOPRESSOR) tablet 50 mg  50 mg PEG Tube BID Saroj Schumacher MD   50 mg at 02/18/20 0855    glucose (GLUTOSE) 40 % oral gel 15 g  15 g Oral PRN Ephriam Reveal B Capal, DO        dextrose 50 % IV solution  12.5 g Intravenous PRN Luke Goodness, DO        glucagon (rDNA) injection 1 mg  1 mg Intramuscular PRN Ephriam Reveal B Capal, DO        dextrose 5 % solution  100 mL/hr Intravenous PRN Luke Goodness, DO        ipratropium-albuterol (DUONEB) nebulizer solution 1 ampule  1 ampule Inhalation Q4H WA Saqib B Capal, DO   1 ampule at 02/18/20 0738    sodium chloride (Inhalant) 3 % nebulizer solution 4 mL  4 mL Nebulization BID Saqib B Capal, DO   4 mL at 02/18/20 0737    labetalol (NORMODYNE;TRANDATE) injection 10 mg  10 mg Intravenous Q6H PRN Saqib B Capal, DO   10 mg at 02/15/20 1748    sodium chloride flush 0.9 % injection 10 mL  10 mL Intravenous 2 times per day Ephriam Reveal B Capal, DO   10 mL at 02/18/20 1004    sodium chloride flush 0.9 % injection 10 mL  10 mL Intravenous PRN Saqib B Capal, DO   10 mL at 02/18/20 1125    0.9 % sodium chloride bolus  1,000 mL Intravenous Once Luke Goodness, DO        magnesium hydroxide (MILK OF MAGNESIA) 400 MG/5ML suspension 30 mL  30 mL Oral Daily PRN Chadwick B Capal, DO           Continuous Infusions:   dextrose       Scheduled Meds:   amoxicillin-clavulanate  500 mg Per G Tube Q8H    sodium chloride  20 mL Intravenous Once    heparin flush  3 mL Intravenous 2 times per day    magnesium sulfate  4 g Intravenous Once    [Held by provider] heparin (porcine)  5,000 Units Subcutaneous 3 times per day    chlorhexidine  15 mL Mouth/Throat BID    sennosides  5 mL Oral Nightly    docusate sodium  100 mg Oral Daily    sodium chloride  20 mL Intravenous Once    folic acid  1 mg PEG Tube Daily    lansoprazole  30 mg Per G Tube QAM AC    vitamin B-1  100 mg PEG Tube Daily    [Held by provider] aspirin  81 mg Per G Tube Daily    hydrALAZINE  25 mg PEG Tube 3 times per day    metoprolol tartrate  50 mg PEG Tube BID    ipratropium-albuterol  1 ampule Inhalation Q4H WA    sodium chloride (Inhalant)  4 mL Nebulization BID    sodium chloride flush  10 mL Intravenous 2 times per day    sodium chloride  1,000 mL Intravenous Once     PRN Meds: sodium chloride flush, heparin flush, acetaminophen, metoprolol, perflutren lipid microspheres, glucose, dextrose, glucagon (rDNA), dextrose, labetalol, sodium chloride flush, magnesium hydroxide      Resident's Assessment and Plan     Jerome Blount is 59 y.o. male was admitted on 2/5/2020 due to fatigue, unable to eat and had an episode of a fall. He was found to be hypotensive, thrombocytopenic (platelet 90,867) and in YVETTE. He has baseline Lt. Sided weakness and slurred speech. Had a PEA arrest on 2/16/2020. Was intubated following ROSC and transferred to the SICU. Jerome Blount has a PH of laryngeal cancer s/p laryngectomy (2005) with chemo and radio, HTN, H/O CVA with residual Lt. Weakness and slurred speech, smoker and active alcohol abuser. Diagnosis Date    Cancer [facility])     mouth    Hip fracture ([facility] [address]     Hypertension      <Cardiovascular>  PEA arrest s/p ROSC  2/2 ?  Hypoxia 2/2 neck mass <Dermatologic/Musculoskeletal>  Pressure ulcer  Lt. Elbow present. Wound care on board. <Psychiatry>  H/O Alcohol Abuse - Stable  Currently on thiamine 955TH OD and folic acid 1mg OD. # Peptic ulcer prophylaxis: Lansoprazole. # DVT Prophylaxis: PCD  # Disposition: Cont current care.        Cassie Isbell MD  PGY-1    Internal medicine resident    Attending Physician: Dr. Zulema Lloyd MD

## 2020-02-18 NOTE — PROGRESS NOTES
[facility]  SURGICAL INTENSIVE CARE UNIT (SICU)  ATTENDING PHYSICIAN CRITICAL CARE PROGRESS NOTE     I have examined the patient, reviewed the record,and discussed the case with the APN/  Resident. I have reviewed all relevant labs and imaging data. The following summarizes my clinical findings and independent assessment. Date of admission:  2/5/2020    CC: s/p cardiac arrest     HOSPITAL COURSE:   2/16 3yo male with history of smoking, ETOH abuse, multiple head and neck cancers s/p partial mandibular resection ([id number]), esophageal cancer, and R base of tongue cancer s/p hemiglossectomy and chemoradiation with a new Head and Neck cancer. PEA arrest likely from aspiration ( Barium sitting in the back of the through when PEG was placed)  leading to hypoxia or Head and neck cancer obstruction. 2/17 No overnight events   2/18  Hb dropped 5.2 received 2 units PRBC      New Imaging Reviewed:   Chest Xray ETT in good position , right upper lobe infiltrate    improving     Physical Exam:  Physical Exam  HENT:      Head: Normocephalic. Eyes:      Pupils: Pupils are equal, round, and reactive to light. Comments: Left eye veered medially, no blink to threat    Pulmonary:      Effort: Pulmonary effort is normal.   Abdominal:      General: Abdomen is flat. There is no distension. Musculoskeletal:      Comments: No purposeful movements on my exam, triple flexion    Neurological:      General: No focal deficit present. Assessment   Principal Problem:    Bacteremia  Active Problems:    Acute renal failure (ARF) (HCC)    Other dysphagia    Severe protein-calorie malnutrition (HCC)    Acute upper respiratory infection    Elevated troponin    Acute ischemic stroke ([facility] [address]  Resolved Problems:    * No resolved hospital problems.  *    Plan   GI:  tube feeds , Senna Colace  Dulcolax   Neuro: prn Tylenol   likely hypoxic brain injury , CT shows subacute stroke, neurology following , EEG read pending . Recommend against CTA secondary to YVETTE, Neurology following, Possible MRI if needed following neuro exam tomorrow   Renal: Will change D5W NS as patient is on tube feeds, Electrolytes per Nephrology , YVETTE from hypoperfusion during cardia arrest, Monitor Urine Output, Daily CBC,BMP, Mg,Phos, ionized Ca,   Musculoskeletal: WBAT all extremities if/when able , Spines Clear AM-PAC Score when able currently non participatory   Pulmonary: Aggressive pulmonary hygiene , Chest Xray Daily ABG Daily  Mechanical Ventilation  Mode: AC   VT:400   Rate:16  PEEP:5  FiO2: 40 PF ratio 303 , Monitor RR and Maintain SpO2 > 92% , Duonebs, Metanebs likely aspiration causing hypoxic arrest   ID: Augmentin for PNA Blood cultures pending   Heme: Transfused 2 units , will monitor response and preform fast if continue to downtrend or no appropriate response may require CT scan of the abdomen and pelvis  , Monitor Hb   Cardiac: Monitor Hemodynamics PEA arrest likely from hypoxia,  Echo - 60%  Cardiology following  , Troponin was downtrending ( stopped)  hydralazine, metoprolol. Held on cooling as I did not believe he would benefit PEA arrest with extensive code and extensive medical history   Endocrine: No Glycemic Issues     DVT Prophylaxis: PCDs, Antiocoagulation when OK with neurology   Ulcer Prophylaxis: PPI Intubated for >48 hours   Tubes and Lines: Continue PIV, Place [employer], Continue Sanchez for Strict input and output, Continue ETT, and Continue NGT/OGT   Seizure proph:     No Indication  Ancillary consults:   Internal Medicine, ID , Nephrology , Palliative care and ENT   Family Update:         Family meeting today, Neurology to re-examen tomorrow afternoon to determine prognosis.  Family will start to discuss agressiveness of care considering extensive medical history    CODE Status:       Full Code     Dispo: RADHA Marroquin MD        Critical Care: 35 minutes evaluating and managing patient Post cardiac

## 2020-02-18 NOTE — PROGRESS NOTES
time  Intact fundus of knowledge--unable to assess at present time  Intact memories--unable to assess at present time    Speech/Language: --unable to assess at present time     Cranial Nerves: CN 2-12 exam limited d/t pt's condition--able to follow commands  I: smell NA   II: visual acuity  NA   II: visual fields Full to confrontation   II: pupils 2-3 mm, reactive   III,VII: ptosis    III,IV,VI: extraocular muscles  L gaze preference noted, no dolls sign for me; will not track voice of family to overcome    V: mastication    V: facial light touch sensation  No response to LT   V,VII: corneal reflex  Present   VII: facial muscle function - upper  Normal   VII: facial muscle function - lower L lower facial droop-- could be positional    VIII: hearing No response to loud    IX: soft palate elevation     IX,X: gag reflex Present   XI: trapezius strength     XI: sternocleidomastoid strength    XI: neck extension strength     XII: tongue strength       Motor:  Moves BLE to pain; no withdrawal to pain in BUE   Increased tone, decreased muscle bulk     Sensory:  Withdraws to pain in BLE    Coordination:   unable to complete at present time    Gait:  unable to complete at present time    DTR:   Right Brachioradialis reflex 2+  Left Brachioradialis reflex 2+  Right Biceps reflex 2+  Left Biceps reflex 2+  Right Triceps reflex 2+  Left Triceps reflex 2+  Right Quadriceps reflex 2+  Left Quadriceps reflex 2+  Right Achilles reflex 2+  Left Achilles reflex 2+    B/l Babinskis  No Monson's    No pathological reflexes    Laboratory/Radiology:     CBC with Differential:    Lab Results   Component Value Date    WBC 15.9 02/18/2020    RBC 1.80 02/18/2020    HGB 5.2 02/18/2020    HCT 16.2 02/18/2020     02/18/2020    MCV 90.0 02/18/2020    MCH 28.9 02/18/2020    MCHC 32.1 02/18/2020    RDW 15.8 02/18/2020    NRBC 0.9 02/16/2020    METASPCT 0.9 02/09/2020    LYMPHOPCT 6.4 02/18/2020    MONOPCT 8.2 02/18/2020    MYELOPCT 1.7

## 2020-02-18 NOTE — PROGRESS NOTES
Physical Therapy    Facility/Department: [facility]    NAME: Willow Schilling  : 1955  MRN: [id number]    Date of Service: 2020  Chart reviewed. Will hold per nursing as pt does not follow commands. Will re-attempt as able.     Travis Marte, PT, DPT  [id number]

## 2020-02-18 NOTE — CARE COORDINATION
Remains intubated on vent. Unresponsive, no purposeful movements.  Will follow up with family once neurological prognosis determined and agressiveness of care is decided by family

## 2020-02-19 ENCOUNTER — APPOINTMENT (OUTPATIENT)
Dept: ULTRASOUND IMAGING | Age: 65
DRG: 870 | End: 2020-02-19
Payer: MEDICARE

## 2020-02-19 ENCOUNTER — APPOINTMENT (OUTPATIENT)
Dept: GENERAL RADIOLOGY | Age: 65
DRG: 870 | End: 2020-02-19
Payer: MEDICARE

## 2020-02-19 ENCOUNTER — APPOINTMENT (OUTPATIENT)
Dept: NEUROLOGY | Age: 65
DRG: 870 | End: 2020-02-19
Payer: MEDICARE

## 2020-02-19 LAB
AADO2: 92.7 MMHG
ALBUMIN SERPL-MCNC: 2 G/DL (ref 3.5–5.2)
ALP BLD-CCNC: 61 U/L (ref 40–129)
ALT SERPL-CCNC: 18 U/L (ref 0–40)
ANION GAP SERPL CALCULATED.3IONS-SCNC: 12 MMOL/L (ref 7–16)
AST SERPL-CCNC: 32 U/L (ref 0–39)
B.E.: 5.5 MMOL/L (ref -3–3)
BASOPHILS ABSOLUTE: 0.03 E9/L (ref 0–0.2)
BASOPHILS RELATIVE PERCENT: 0.3 % (ref 0–2)
BILIRUB SERPL-MCNC: 0.8 MG/DL (ref 0–1.2)
BUN BLDV-MCNC: 37 MG/DL (ref 8–23)
CALCIUM IONIZED: 1.16 MMOL/L (ref 1.15–1.33)
CALCIUM SERPL-MCNC: 8 MG/DL (ref 8.6–10.2)
CHLORIDE BLD-SCNC: 101 MMOL/L (ref 98–107)
CO2: 27 MMOL/L (ref 22–29)
COHB: 0.2 % (ref 0–1.5)
CREAT SERPL-MCNC: 1.6 MG/DL (ref 0.7–1.2)
CRITICAL: ABNORMAL
DATE ANALYZED: ABNORMAL
DATE OF COLLECTION: ABNORMAL
EOSINOPHILS ABSOLUTE: 0.02 E9/L (ref 0.05–0.5)
EOSINOPHILS RELATIVE PERCENT: 0.2 % (ref 0–6)
FIO2: 40 %
GFR AFRICAN AMERICAN: 53
GFR NON-AFRICAN AMERICAN: 53 ML/MIN/1.73
GLUCOSE BLD-MCNC: 126 MG/DL (ref 74–99)
HCO3: 28.8 MMOL/L (ref 22–26)
HCT VFR BLD CALC: 21.3 % (ref 37–54)
HEMOGLOBIN: 7.1 G/DL (ref 12.5–16.5)
HHB: 1.3 % (ref 0–5)
IMMATURE GRANULOCYTES #: 0.12 E9/L
IMMATURE GRANULOCYTES %: 1 % (ref 0–5)
LAB: ABNORMAL
LYMPHOCYTES ABSOLUTE: 0.91 E9/L (ref 1.5–4)
LYMPHOCYTES RELATIVE PERCENT: 7.8 % (ref 20–42)
Lab: ABNORMAL
MAGNESIUM: 1.5 MG/DL (ref 1.6–2.6)
MCH RBC QN AUTO: 30.2 PG (ref 26–35)
MCHC RBC AUTO-ENTMCNC: 33.3 % (ref 32–34.5)
MCV RBC AUTO: 90.6 FL (ref 80–99.9)
METER GLUCOSE: 134 MG/DL (ref 74–99)
METER GLUCOSE: 143 MG/DL (ref 74–99)
METHB: 0.4 % (ref 0–1.5)
MODE: AC
MONOCYTES ABSOLUTE: 1.16 E9/L (ref 0.1–0.95)
MONOCYTES RELATIVE PERCENT: 9.9 % (ref 2–12)
NEUTROPHILS ABSOLUTE: 9.48 E9/L (ref 1.8–7.3)
NEUTROPHILS RELATIVE PERCENT: 80.8 % (ref 43–80)
O2 CONTENT: 11.8 ML/DL
O2 SATURATION: 98.7 % (ref 92–98.5)
O2HB: 98.1 % (ref 94–97)
OPERATOR ID: ABNORMAL
PATIENT TEMP: 37 C
PCO2: 36.8 MMHG (ref 35–45)
PDW BLD-RTO: 14.6 FL (ref 11.5–15)
PEEP/CPAP: 5 CMH2O
PFO2: 3.5 MMHG/%
PH BLOOD GAS: 7.51 (ref 7.35–7.45)
PHOSPHORUS: 3.8 MG/DL (ref 2.5–4.5)
PLATELET # BLD: 110 E9/L (ref 130–450)
PMV BLD AUTO: 12.4 FL (ref 7–12)
PO2: 140.2 MMHG (ref 75–100)
POTASSIUM SERPL-SCNC: 3.3 MMOL/L (ref 3.5–5)
RBC # BLD: 2.35 E12/L (ref 3.8–5.8)
RI(T): 66 %
RR MECHANICAL: 16 B/MIN
SODIUM BLD-SCNC: 140 MMOL/L (ref 132–146)
SOURCE, BLOOD GAS: ABNORMAL
THB: 8.3 G/DL (ref 11.5–16.5)
TIME ANALYZED: 449
TOTAL PROTEIN: 6.1 G/DL (ref 6.4–8.3)
VT MECHANICAL: 400 ML
WBC # BLD: 11.7 E9/L (ref 4.5–11.5)

## 2020-02-19 PROCEDURE — 99233 SBSQ HOSP IP/OBS HIGH 50: CPT | Performed by: NURSE PRACTITIONER

## 2020-02-19 PROCEDURE — 71045 X-RAY EXAM CHEST 1 VIEW: CPT

## 2020-02-19 PROCEDURE — 85025 COMPLETE CBC W/AUTO DIFF WBC: CPT

## 2020-02-19 PROCEDURE — 6360000002 HC RX W HCPCS: Performed by: STUDENT IN AN ORGANIZED HEALTH CARE EDUCATION/TRAINING PROGRAM

## 2020-02-19 PROCEDURE — 94640 AIRWAY INHALATION TREATMENT: CPT

## 2020-02-19 PROCEDURE — 82805 BLOOD GASES W/O2 SATURATION: CPT

## 2020-02-19 PROCEDURE — 36415 COLL VENOUS BLD VENIPUNCTURE: CPT

## 2020-02-19 PROCEDURE — 2580000003 HC RX 258: Performed by: STUDENT IN AN ORGANIZED HEALTH CARE EDUCATION/TRAINING PROGRAM

## 2020-02-19 PROCEDURE — 82962 GLUCOSE BLOOD TEST: CPT

## 2020-02-19 PROCEDURE — 99291 CRITICAL CARE FIRST HOUR: CPT | Performed by: SURGERY

## 2020-02-19 PROCEDURE — 6370000000 HC RX 637 (ALT 250 FOR IP): Performed by: INTERNAL MEDICINE

## 2020-02-19 PROCEDURE — 2000000000 HC ICU R&B

## 2020-02-19 PROCEDURE — 2500000003 HC RX 250 WO HCPCS: Performed by: STUDENT IN AN ORGANIZED HEALTH CARE EDUCATION/TRAINING PROGRAM

## 2020-02-19 PROCEDURE — 95816 EEG AWAKE AND DROWSY: CPT | Performed by: PSYCHIATRY & NEUROLOGY

## 2020-02-19 PROCEDURE — 80053 COMPREHEN METABOLIC PANEL: CPT

## 2020-02-19 PROCEDURE — 94669 MECHANICAL CHEST WALL OSCILL: CPT

## 2020-02-19 PROCEDURE — 84100 ASSAY OF PHOSPHORUS: CPT

## 2020-02-19 PROCEDURE — 6370000000 HC RX 637 (ALT 250 FOR IP): Performed by: STUDENT IN AN ORGANIZED HEALTH CARE EDUCATION/TRAINING PROGRAM

## 2020-02-19 PROCEDURE — 6370000000 HC RX 637 (ALT 250 FOR IP): Performed by: SURGERY

## 2020-02-19 PROCEDURE — 94003 VENT MGMT INPAT SUBQ DAY: CPT

## 2020-02-19 PROCEDURE — 6360000002 HC RX W HCPCS: Performed by: INTERNAL MEDICINE

## 2020-02-19 PROCEDURE — 95816 EEG AWAKE AND DROWSY: CPT

## 2020-02-19 PROCEDURE — 83735 ASSAY OF MAGNESIUM: CPT

## 2020-02-19 PROCEDURE — 82330 ASSAY OF CALCIUM: CPT

## 2020-02-19 PROCEDURE — 99231 SBSQ HOSP IP/OBS SF/LOW 25: CPT | Performed by: INTERNAL MEDICINE

## 2020-02-19 PROCEDURE — 2580000003 HC RX 258: Performed by: INTERNAL MEDICINE

## 2020-02-19 RX ORDER — ALBUTEROL SULFATE 2.5 MG/3ML
2.5 SOLUTION RESPIRATORY (INHALATION)
Status: DISCONTINUED | OUTPATIENT
Start: 2020-02-19 | End: 2020-02-29

## 2020-02-19 RX ORDER — AMLODIPINE BESYLATE 5 MG/1
5 TABLET ORAL DAILY
Status: DISCONTINUED | OUTPATIENT
Start: 2020-02-20 | End: 2020-02-21

## 2020-02-19 RX ORDER — AMLODIPINE BESYLATE 5 MG/1
5 TABLET ORAL DAILY
Status: DISCONTINUED | OUTPATIENT
Start: 2020-02-19 | End: 2020-02-19

## 2020-02-19 RX ORDER — MAGNESIUM SULFATE IN WATER 40 MG/ML
4 INJECTION, SOLUTION INTRAVENOUS ONCE
Status: COMPLETED | OUTPATIENT
Start: 2020-02-19 | End: 2020-02-19

## 2020-02-19 RX ORDER — DOCUSATE SODIUM 50 MG/5ML
100 LIQUID ORAL DAILY
Status: DISCONTINUED | OUTPATIENT
Start: 2020-02-20 | End: 2020-02-27

## 2020-02-19 RX ADMIN — SODIUM CHLORIDE, PRESERVATIVE FREE 300 UNITS: 5 INJECTION INTRAVENOUS at 20:40

## 2020-02-19 RX ADMIN — AMLODIPINE BESYLATE 5 MG: 5 TABLET ORAL at 10:35

## 2020-02-19 RX ADMIN — POTASSIUM BICARBONATE 40 MEQ: 782 TABLET, EFFERVESCENT ORAL at 12:37

## 2020-02-19 RX ADMIN — Medication 10 ML: at 09:20

## 2020-02-19 RX ADMIN — CHLORHEXIDINE GLUCONATE 0.12% ORAL RINSE 15 ML: 1.2 LIQUID ORAL at 09:19

## 2020-02-19 RX ADMIN — PIPERACILLIN AND TAZOBACTAM 3.38 G: 3; .375 INJECTION, POWDER, LYOPHILIZED, FOR SOLUTION INTRAVENOUS at 10:35

## 2020-02-19 RX ADMIN — LANSOPRAZOLE 30 MG: KIT at 05:16

## 2020-02-19 RX ADMIN — ALBUTEROL SULFATE 2.5 MG: 2.5 SOLUTION RESPIRATORY (INHALATION) at 08:47

## 2020-02-19 RX ADMIN — METOPROLOL TARTRATE 50 MG: 50 TABLET, FILM COATED ORAL at 20:39

## 2020-02-19 RX ADMIN — PIPERACILLIN AND TAZOBACTAM 3.38 G: 3; .375 INJECTION, POWDER, LYOPHILIZED, FOR SOLUTION INTRAVENOUS at 17:32

## 2020-02-19 RX ADMIN — Medication 10 ML: at 20:39

## 2020-02-19 RX ADMIN — HYDRALAZINE HYDROCHLORIDE 25 MG: 25 TABLET, FILM COATED ORAL at 05:15

## 2020-02-19 RX ADMIN — METOPROLOL TARTRATE 50 MG: 50 TABLET, FILM COATED ORAL at 09:19

## 2020-02-19 RX ADMIN — HYDRALAZINE HYDROCHLORIDE 25 MG: 25 TABLET, FILM COATED ORAL at 15:52

## 2020-02-19 RX ADMIN — HYDRALAZINE HYDROCHLORIDE 25 MG: 25 TABLET, FILM COATED ORAL at 21:32

## 2020-02-19 RX ADMIN — AMOXICILLIN AND CLAVULANATE POTASSIUM 500 MG: 400; 57 POWDER, FOR SUSPENSION ORAL at 01:23

## 2020-02-19 RX ADMIN — MAGNESIUM SULFATE 4 G: 4 INJECTION INTRAVENOUS at 12:54

## 2020-02-19 RX ADMIN — ALBUTEROL SULFATE 2.5 MG: 2.5 SOLUTION RESPIRATORY (INHALATION) at 17:51

## 2020-02-19 RX ADMIN — DOCUSATE SODIUM 100 MG: 50 LIQUID ORAL at 09:20

## 2020-02-19 RX ADMIN — LABETALOL HYDROCHLORIDE 10 MG: 5 INJECTION INTRAVENOUS at 05:16

## 2020-02-19 RX ADMIN — SENNOSIDES 5 ML: 8.8 LIQUID ORAL at 20:40

## 2020-02-19 RX ADMIN — SODIUM CHLORIDE SOLN NEBU 3% 4 ML: 3 NEBU SOLN at 20:27

## 2020-02-19 RX ADMIN — AMOXICILLIN AND CLAVULANATE POTASSIUM 500 MG: 400; 57 POWDER, FOR SUSPENSION ORAL at 09:25

## 2020-02-19 RX ADMIN — ACETAMINOPHEN ORAL SOLUTION 650 MG: 650 SOLUTION ORAL at 16:43

## 2020-02-19 RX ADMIN — SODIUM CHLORIDE SOLN NEBU 3% 4 ML: 3 NEBU SOLN at 08:48

## 2020-02-19 RX ADMIN — ACETAMINOPHEN ORAL SOLUTION 650 MG: 650 SOLUTION ORAL at 12:41

## 2020-02-19 RX ADMIN — FOLIC ACID 1 MG: 1 TABLET ORAL at 09:19

## 2020-02-19 RX ADMIN — ALBUTEROL SULFATE 2.5 MG: 2.5 SOLUTION RESPIRATORY (INHALATION) at 14:15

## 2020-02-19 RX ADMIN — Medication 100 MG: at 09:19

## 2020-02-19 RX ADMIN — SODIUM CHLORIDE, PRESERVATIVE FREE 300 UNITS: 5 INJECTION INTRAVENOUS at 09:19

## 2020-02-19 RX ADMIN — ALBUTEROL SULFATE 2.5 MG: 2.5 SOLUTION RESPIRATORY (INHALATION) at 20:27

## 2020-02-19 ASSESSMENT — PULMONARY FUNCTION TESTS
PIF_VALUE: 14
PIF_VALUE: 13
PIF_VALUE: 27
PIF_VALUE: 30
PIF_VALUE: 21
PIF_VALUE: 26
PIF_VALUE: 14
PIF_VALUE: 25
PIF_VALUE: 14
PIF_VALUE: 14
PIF_VALUE: 20
PIF_VALUE: 14
PIF_VALUE: 13
PIF_VALUE: 14
PIF_VALUE: 20
PIF_VALUE: 14
PIF_VALUE: 25
PIF_VALUE: 29
PIF_VALUE: 13
PIF_VALUE: 21
PIF_VALUE: 14

## 2020-02-19 NOTE — PROGRESS NOTES
time  Intact fundus of knowledge--unable to assess at present time  Intact memories--unable to assess at present time    Speech/Language: --unable to assess at present time     Cranial Nerves: CN 2-12 exam limited d/t pt's condition--able to follow commands  I: smell NA   II: visual acuity  NA   II: visual fields Full to confrontation   II: pupils 2-3 mm, reactive   III,VII: ptosis    III,IV,VI: extraocular muscles  R gaze preference noted, no dolls sign for me; will not track voice to overcome    V: mastication    V: facial light touch sensation  No response to LT   V,VII: corneal reflex  Present   VII: facial muscle function - upper  Normal   VII: facial muscle function - lower L lower facial droop-- could be positional    VIII: hearing No response to loud    IX: soft palate elevation     IX,X: gag reflex Present   XI: trapezius strength     XI: sternocleidomastoid strength    XI: neck extension strength     XII: tongue strength       Motor:  Moves BLE and LUE to pain; no withdrawal to pain in RUE   Mildly increased tone, decreased muscle bulk     Sensory:  Withdraws to pain in BLE and LUE    Coordination:   unable to complete at present time    Gait:  unable to complete at present time    DTR:   Right Brachioradialis reflex 2+  Left Brachioradialis reflex 2+  Right Biceps reflex 2+  Left Biceps reflex 2+  Right Triceps reflex 2+  Left Triceps reflex 2+  Right Quadriceps reflex 2+  Left Quadriceps reflex 2+  Right Achilles reflex 2+  Left Achilles reflex 2+    B/l Babinskis  No Monson's    No pathological reflexes    Laboratory/Radiology:     CBC with Differential:    Lab Results   Component Value Date    WBC 11.7 02/19/2020    RBC 2.35 02/19/2020    HGB 7.1 02/19/2020    HCT 21.3 02/19/2020     02/19/2020    MCV 90.6 02/19/2020    MCH 30.2 02/19/2020    MCHC 33.3 02/19/2020    RDW 14.6 02/19/2020    NRBC 0.9 02/16/2020    METASPCT 0.9 02/09/2020    LYMPHOPCT 7.8 02/19/2020    MONOPCT 9.9 02/19/2020 MYELOPCT 1.7 02/11/2020    BASOPCT 0.3 02/19/2020    MONOSABS 1.16 02/19/2020    LYMPHSABS 0.91 02/19/2020    EOSABS 0.02 02/19/2020    BASOSABS 0.03 02/19/2020     CMP:    Lab Results   Component Value Date     02/19/2020    K 3.3 02/19/2020    K 2.9 02/11/2020     02/19/2020    CO2 27 02/19/2020    BUN 37 02/19/2020    CREATININE 1.6 02/19/2020    GFRAA 53 02/19/2020    LABGLOM 53 02/19/2020    GLUCOSE 126 02/19/2020    PROT 6.1 02/19/2020    LABALBU 2.0 02/19/2020    CALCIUM 8.0 02/19/2020    BILITOT 0.8 02/19/2020    ALKPHOS 61 02/19/2020    AST 32 02/19/2020    ALT 18 02/19/2020     Hepatic Function Panel:    Lab Results   Component Value Date    ALKPHOS 61 02/19/2020    ALT 18 02/19/2020    AST 32 02/19/2020    PROT 6.1 02/19/2020    BILITOT 0.8 02/19/2020    BILIDIR 1.1 02/06/2020    LABALBU 2.0 02/19/2020     Troponin:    Lab Results   Component Value Date    TROPONINI 0.13 02/16/2020     U/A:    Lab Results   Component Value Date    COLORU Yellow 02/05/2020    PROTEINU 100 02/05/2020    PHUR 5.0 02/05/2020    WBCUA 1-3 02/05/2020    RBCUA 2-5 02/05/2020    BACTERIA MANY 02/05/2020    CLARITYU SL CLOUDY 02/05/2020    SPECGRAV 1.025 02/05/2020    LEUKOCYTESUR Negative 02/05/2020    UROBILINOGEN 1.0 02/05/2020    BILIRUBINUR Negative 02/05/2020    BLOODU LARGE 02/05/2020    GLUCOSEU Negative 02/05/2020   TSH:    Lab Results   Component Value Date    TSH 7.150 02/18/2020   FOLATE:    Lab Results   Component Value Date    FOLATE 14.2 02/15/2020     IRON:    Lab Results   Component Value Date    IRON 20 02/15/2020     XR CHEST PORTABLE   Final Result   Stable CHF. XR CHEST PORTABLE   Final Result   Improving CHF with decreasing edema. CT HEAD WO CONTRAST   Final Result    Low-attenuation focus in the left occipital lobe which most likely   represents a subacute infarct. See above. XR CHEST 1 VW   Final Result      1.  New left PICC line catheter tip at the brachycephalic vein, no   pneumothorax. 2. Slight worsening of dense consolidating bilateral pneumonias. XR CHEST PORTABLE   Final Result   Development of significant bilateral pulmonary infiltrates               XR CHEST PORTABLE   Final Result      Endotracheal tube terminates above the jovita. Stable opacities in the lower lobes. Consider infectious/inflammatory   etiologies or edema. Follow-up to resolution. US HEAD NECK SOFT TISSUE THYROID   Final Result   Ill-defined, heterogeneous, abnormal appearing soft tissue in the   region of interest.   No specific clinical question is asked. Ultrasound is not the   appropriate modality for this evaluation. Contrast-enhanced MRI may provide more information. Correlation with clinical and surgical history is needed. XR CHEST PORTABLE   Final Result      Stable opacities at left lung base. Continued follow-up could be   helpful for further evaluation. FL MODIFIED BARIUM SWALLOW W VIDEO   Final Result   Study is remarkable for silent aspiration with nectar   consistency. This procedure was performed and dictated by Juan Pena PA-C with   indirect supervision, and Gabriela Wilkerson. [facility] MD reviewed and concurred with   the findings. XR CHEST PORTABLE   Final Result   Tortuous ectatic aorta   Cardiomegaly    Airspace disease compatible with atelectasis or pneumonia, worse at   the left lung base as compared to the right   The chest appears to be worse in the interval                  CT SOFT TISSUE NECK W WO CONTRAST   Final Result   Probable prior right radical neck dissection with sequela   of prior surgery and radiation therapy.  There is extensive soft tissue   distortion which is suggestive of previous therapy   Finds compatible with a approximately 1 cm right thyroid nodule   There is evidence to suggest severe stenosis of the right distal   internal carotid artery   Soft tissue mass at the level the carotid bifurcation on the right   with outward mass effect measuring approximately 2.3 x 2.7 cm   suspicious for recurrent disease. This encases the carotid distally   and the distal carotid stenosis may represent invasion. Comparison   studies would be necessary to distinguish recurrent neoplasm from   previous radiation therapy. Tumor invasion versus osteonecrosis of the medial right mandible      ALERT:  THIS IS AN ABNORMAL REPORT                FL MODIFIED BARIUM SWALLOW W VIDEO   Final Result   Patient unable to swallow nectar and pudding which was   suctioned            US RETROPERITONEAL COMPLETE   Final Result   Probable bilateral medical renal disease with some perinephric fluid   bilaterally that could indicate inflammation. There is no evidence of calcification or obstruction in either kidney. The bladder was not evaluated due to a decompressing Sanchez catheter. XR CHEST 1 VW   Final Result   No acute cardiopulmonary findings. XR FOOT LEFT (MIN 3 VIEWS)   Final Result   Moderate degenerative changes      XR CHEST STANDARD (2 VW)   Final Result   Findings compatible with atherosclerotic disease    No acute infiltrate                     XR CHEST PORTABLE    (Results Pending)   US DUP LOWER EXTREMITIES BILATERAL VENOUS    (Results Pending)     Echo 2/16:   Trileaflet aortic valve. Mild leaflet calcification with more pronounced  calcification on the non coronary cusp. Normal leaflet mobility. No aortic stenosis. No aortic regurgitation. Concentric hypertrophy though in some views the walls are up to 1.4 cm thick. Normal left ventricular systolic function. Visually estimated LVEF is 60 %. Normal diastolic function. Normal left atrial pressure. Mildly dilated right ventricle. Difficult to visualize the free wall in all views. Small anterior and apical effusion. No signs of tamponade. EEG 2/19: This EEG shows moderate diffuse encephalopathy.   No epileptiform activities or lateralizing signs are seen. All labs and imaging reviewed independently today. Assessment:     Pt was initially admitted on 2/5 for generalized fatigue,  productive cough, and myalgias   Pt was found to have MSSA pneumonia and group G Streptococcus bacteremia and was subsequently treated   After multiple failed swallow tests pt had a PEG placed    On 2/16 pt was bradycardic down to the 20s and had several episodes of PEA arrest with CPR for 8 minutes total in which he had to be intubated     Initial ABG showed severe respiratory acidosis with a pH of 6.795/167.8/31.8/25.2--- encephalopathic and unresponsive to pain afterwards   CT head completed 2/17 showed L occipital lobe hypo attentuation likely subacute stroke   MRI brain with MRAs will be decided after family meeting   EEG is consistent with moderate diffuse encephalopathy; no seizure activity noted. Acute metabolic encephalopathy due to PEA arrest and hypoxia, YVETTE, hypocalcemia, hypokalemia, leukocytosis, severe anemia, elevated BNP [id number] and elevated troponins. At present pt is intubated, off sedation, not breathing over the vent much and not following commands. Called and spoke to patient's sister Kym Rae about EEG findings and to update them as per critical care team request for me to call. Patient's sister would like to discuss with family and update us on their decision to proceed with further testing. They are hopeful that patient will improve and want to give him time.     Plan:     Continue heavy doses of supportive care  Continue antibiotics per ID   Continue to treat other metabolic derangements  MRI Brain/MRAs can be decided after the meeting  A1c 5.2, LDL 29  Continue aspirin   No statin as low LDL without statin at home  Continue PT/OT as able  SCDs  Continue telemetry     TAI Rose NP-C   2:39 PM  2/19/2020

## 2020-02-19 NOTE — PROCEDURES
SUSP 30 mg, 30 mg, Per G Tube, QAM AC, Neela Maria Del Rosario., DO, 30 mg at 02/19/20 4424    vitamin B-1 (THIAMINE) tablet 100 mg, 100 mg, PEG Tube, Daily, Neela Maria Del Rosario., DO, 100 mg at 02/19/20 0919    [Held by provider] aspirin chewable tablet 81 mg, 81 mg, Per G Tube, Daily, Susie Washington MD, 81 mg at 02/14/20 1009    hydrALAZINE (APRESOLINE) tablet 25 mg, 25 mg, PEG Tube, 3 times per day, Shu Echeverria MD, 25 mg at 02/19/20 0515    metoprolol tartrate (LOPRESSOR) tablet 50 mg, 50 mg, PEG Tube, BID, Saroj Schumacher MD, 50 mg at 02/19/20 0919    glucose (GLUTOSE) 40 % oral gel 15 g, 15 g, Oral, PRN, Darwin Budd B Capal, DO    dextrose 50 % IV solution, 12.5 g, Intravenous, PRN, Mavis Robert, DO    glucagon (rDNA) injection 1 mg, 1 mg, Intramuscular, PRN, Darwin Budd B Capal, DO    dextrose 5 % solution, 100 mL/hr, Intravenous, PRN, Mavis Robert, DO    sodium chloride (Inhalant) 3 % nebulizer solution 4 mL, 4 mL, Nebulization, BID, Saqib B Capal, DO, 4 mL at 02/19/20 0848    labetalol (NORMODYNE;TRANDATE) injection 10 mg, 10 mg, Intravenous, Q6H PRN, Darwin Budd B Capal, DO, 10 mg at 02/19/20 0516    sodium chloride flush 0.9 % injection 10 mL, 10 mL, Intravenous, 2 times per day, Saqib B Capal, DO, 10 mL at 02/19/20 0920    magnesium hydroxide (MILK OF MAGNESIA) 400 MG/5ML suspension 30 mL, 30 mL, Oral, Daily PRN, Mavis Allison, DO        Physician Interpretation    General EEG Report        Epileptiform Discharge   None    Non-Epileptiform Abnormality  Continuous slowing with eye blinking artifacts        Ictal  None    General Impression  This EEG shows moderate diffuse encephalopathy. No epileptiform activities or lateralizing signs are seen.

## 2020-02-19 NOTE — PROGRESS NOTES
Department of Internal Medicine  Nephrology Attending Progress Note    Events reviewed. SUBJECTIVE: We are following Mr. Sergey Heath for YVETTE on CKD. Not much change    PHYSICAL EXAM:      Vitals:    VITALS:  BP (!) 165/101   Pulse 108   Temp 101.3 °F (38.5 °C) (Axillary)   Resp 18   Ht 5' 4\" (1.626 m)   Wt 141 lb 12.8 oz (64.3 kg)   SpO2 100%   BMI 24.34 kg/m²        Intake/Output Summary (Last 24 hours) at 2/19/2020 1247  Last data filed at 2/19/2020 1200  Gross per 24 hour   Intake 2285 ml   Output 1535 ml   Net 750 ml         Constitutional:  Intubated  HEENT:  Scarred sugical incision at neck; facial droop  Respiratory:  Clear, diminished bases bilaterally  Cardiovascular/Edema:  RRR, no edema noted  Gastrointestinal:  Abdomen soft and nontender, bowel sounds active. PEG.   Neurologic:  Contracted extremities, facial droop   Skin:  Dry,warm  flaky   Other:  No edema     Scheduled Meds:   albuterol  2.5 mg Nebulization Q4H While awake    amLODIPine  5 mg Oral Daily    piperacillin-tazobactam  3.375 g Intravenous Q8H    magnesium sulfate  4 g Intravenous Once    heparin flush  3 mL Intravenous 2 times per day    [Held by provider] heparin (porcine)  5,000 Units Subcutaneous 3 times per day    chlorhexidine  15 mL Mouth/Throat BID    sennosides  5 mL Oral Nightly    docusate sodium  100 mg Oral Daily    folic acid  1 mg PEG Tube Daily    lansoprazole  30 mg Per G Tube QAM AC    vitamin B-1  100 mg PEG Tube Daily    [Held by provider] aspirin  81 mg Per G Tube Daily    hydrALAZINE  25 mg PEG Tube 3 times per day    metoprolol tartrate  50 mg PEG Tube BID    sodium chloride (Inhalant)  4 mL Nebulization BID    sodium chloride flush  10 mL Intravenous 2 times per day     Continuous Infusions:   dextrose       PRN Meds:.sodium chloride flush, heparin flush, acetaminophen, glucose, dextrose, glucagon (rDNA), dextrose, labetalol, magnesium hydroxide      DATA:    CBC:   Lab Results Component Value Date    WBC 11.7 02/19/2020    RBC 2.35 02/19/2020    HGB 7.1 02/19/2020    HCT 21.3 02/19/2020    MCV 90.6 02/19/2020    MCH 30.2 02/19/2020    MCHC 33.3 02/19/2020    RDW 14.6 02/19/2020     02/19/2020    MPV 12.4 02/19/2020     CMP:    Lab Results   Component Value Date     02/19/2020    K 3.3 02/19/2020    K 2.9 02/11/2020     02/19/2020    CO2 27 02/19/2020    BUN 37 02/19/2020    CREATININE 1.6 02/19/2020    GFRAA 53 02/19/2020    LABGLOM 53 02/19/2020    GLUCOSE 126 02/19/2020    PROT 6.1 02/19/2020    LABALBU 2.0 02/19/2020    CALCIUM 8.0 02/19/2020    BILITOT 0.8 02/19/2020    ALKPHOS 61 02/19/2020    AST 32 02/19/2020    ALT 18 02/19/2020     Magnesium:    Lab Results   Component Value Date    MG 1.5 02/19/2020     Recent Labs     02/19/20  0449   PH 7.512*   PO2 140.2*   PCO2 36.8   HCO3 28.8*   BE 5.5*   O2SAT 98.7*   FIO2 40.0         Urine chemistry:  Urine creatinine = 69  Urine Microalbumin = 55.9  Urine Microalbumin/Cr ratio = 81  Urine osmolality = 393  Urine protein = 35  Urine protein/Cr ratio = 0.5    FENa: 0.7%  FEUrea: 20.9%     Ferritin: 1019  Iron: 20  Iron saturation: 20%  Folate: 14.2  B12: 383    Radiology Review:        US Retroperitoneum 2/7/20   Probable bilateral medical renal disease with some perinephric fluid   bilaterally that could indicate inflammation.       There is no evidence of calcification or obstruction in either kidney.       The bladder was not evaluated due to a decompressing Sanchez catheter. CT Soft Tissue Neck W WO Contrast 2/9/20   Probable prior right radical neck dissection with sequela   of prior surgery and radiation therapy.  There is extensive soft tissue   distortion which is suggestive of previous therapy   Finds compatible with a approximately 1 cm right thyroid nodule   There is evidence to suggest severe stenosis of the right distal   internal carotid artery   Soft tissue mass at the level the carotid bifurcation on the right   with outward mass effect measuring approximately 2.3 x 2.7 cm   suspicious for recurrent disease. This encases the carotid distally   and the distal carotid stenosis may represent invasion. Comparison   studies would be necessary to distinguish recurrent neoplasm from   previous radiation therapy. Tumor invasion versus osteonecrosis of the medial right mandible         Chest x-ray February 16, 2020       Endotracheal tube terminates above the jovita.       Stable opacities in the lower lobes. Consider infectious/inflammatory   etiologies or edema. Follow-up to resolution.                         BRIEF SUMMARY OF INITIAL CONSULT:    Briefly, Mr. Vanessa Blue is a 68-year-old gentleman with a significant past medical history of hypertension, laryngeal cancer s/p laryngectomy in 2005 and chemo and radiation, history of CVA with baseline left sided weakness and some slurred speech, current smoker, and history of alcohol abuse who presented to the ED on 2/5/20 for fatigue. Patient was found to be extremely hypotensive at 50/palpated, tachycardic and fatigued in the ED. He was given fluid bolus with BP improvement, was found to have YVETTE (baseline unknown) with a creatinine of 4.1. Patients creatinine today is 3.0, reason for this consult. Problems resolved:  · Hypernatremia, with water deficit; calculated free water deficit is 1.8 L. Resolved. · YVETTE stage III on CKD;  volume responsive prerenal YVETTE due to poor oral intake in the setting of ACE inhibition, fraction excretion of sodium 0.7%, fractional secretion urea 20.9%. Resolved. · Hypernatremia, with water deficit, resolved   · Severe thrombocytopenia, resolved     IMPRESSION/RECOMMENDATIONS:      1. YVETTE on CKD, likely ischemic ATN s/p PEA cardiac arrest on 2/15/20, FeNa 0.62% however renal function did not improve with volume repletion. ·  Cr increased to 2.0 mg/dL today, remains non oliguric.        2. CKD stage I-II, with

## 2020-02-19 NOTE — PROGRESS NOTES
[facility]  Internal Medicine Residency Program  Progress Note - House Team 1    Patient:  Samuel Kate 59 y.o. male   MRN: [id number]       Date of Service: 2020    Allergy: Patient has no known allergies. Subjective      I saw and examined the patient in the AM today. Patient was intubated. He is responding to commands on the LLE. Updated H+P:  Patient admitted on 2020 due to fatigue, unable to eat and had an episode of fall. Found to be hypotensive, thrombocytopenic (platelet 32,938) and YVETTE. During the course of his admission, he was found to have blood cultures positive for beta-hemolytic group B streptococcus (susceptible to ampicillin). His course was also complicated by alcohol withdrawal, YVETTE stage III on CKD, hypokalemia, and MSSA pneumonia. He, at 1 point, was on doxycycline and ceftriaxone. It was changed to piperacillin-tazobactam and eventually to nafcillin. Nafcillin was changed to ampicillin sulbactam on 2020. This was subsequently changed to amoxicillin- clavulanic acid per ID. He received 1 unit blood transfusion on 2020 due to decrease in hemoglobin from 8-6.7. His platelets recovered to >100,000 during the course of the admission. In the a.m. of 2020, he had a PEA arrest.  After multiple rounds of ACLS, ROSC was achieved. Patient was transferred to the surgical ICU the same day. Objective     TEMPERATURE:  Current - Temp: 100.1 °F (37.8 °C);  Max - Temp  Av.1 °F (37.8 °C)  Min: 99.5 °F (37.5 °C)  Max: 101.3 °F (38.5 °C)  RESPIRATIONS RANGE: Resp  Av.5  Min: 15  Max: 34  PULSE RANGE: Pulse  Av.1  Min: 99  Max: 120  BLOOD PRESSURE RANGE:  Systolic (53LZU), FZO:665 , Min:135 , TSJ:847   ; Diastolic (37CCI), TOBIN:322, Min:80, Max:121    PULSE OXIMETRY RANGE: SpO2  Av.6 %  Min: 96 %  Max: 100 %    I & O - 24hr:    Intake/Output Summary (Last 24 hours) at 2020 8965  Last data filed at 2020 0800  Gross --  90.6   * 132  --  110*       BMP:    Recent Labs     20  0520 20  1655 20  0435    141 140   K 4.0 3.6 3.3*    101 101   CO2 26 31* 27   BUN 36* 37* 37*   CREATININE 2.0* 1.8* 1.6*   GLUCOSE 131* 126* 126*       LIVER PROFILE:   Recent Labs     20  0500 20  0520 20  0435   AST 49* 33 32   ALT 25 20 18   BILITOT 0.6 0.5 0.8   ALKPHOS 55 62 61       PT/INR:   No results for input(s): PROTIME, INR in the last 72 hours. APTT:   No results for input(s): APTT in the last 72 hours. Fasting Lipid Panel:    Lab Results   Component Value Date    HDL 24 2020       Notable Cultures:      Blood cultures   Blood Culture, Routine   Date Value Ref Range Status   02/10/2020 5 Days- no growth  Final     Respiratory cultures No results found for: RESPCULTURE No results found for: LABGRAM  Urine   Urine Culture, Routine   Date Value Ref Range Status   2020 <10,000 CFU/mL  Mixed gram positive organisms    Final     Legionella No results found for: LABLEGI  C Diff PCR No results found for: CDIFPCR  Wound culture/abscess: No results for input(s): WNDABS in the last 72 hours. Tip culture:No results for input(s): CXCATHTIP in the last 72 hours. Xr Chest Standard (2 Vw)    Result Date: 2020  Patient MRN: [id number] : 1955 Age:  59 years Gender: Male Order Date: 2020 10:15 AM Exam: XR CHEST (2 VW) Number of Images: 2 views Indication:   Sepsis, sob Sepsis, sob Comparison: None. Findings: The heart is unremarkable The lung fields demonstrate no significant pulmonary vascular congestion and edema.  The aorta is tortuous ectatic There are findings throughout the lung fields which are likely chronic     Findings compatible with atherosclerotic disease No acute infiltrate     Xr Foot Left (min 3 Views)    Result Date: 2020  Patient MRN:  [id number] : 1955 Age: 59 years Gender: Male Order Date:  2020 2:45 PM EXAM: XR FOOT LEFT (MIN 3 VIEWS) effect suspicious for recurrent neoplasm. This measures approximately 2.7 x 2.3 cm. This could also be related to previous therapy. Comparison studies would be necessary. There is a mottled eaten appearance to the medial aspect of the right mandible. This could be related to tumor invasion or osteonecrosis. Scattered lymph nodes are visualized which do not meet the CT criteria for adenopathy. There is mixed density in the right thyroid possibly representing a thyroid nodule measuring approximately 1 cm. There is density scattered throughout the sinuses possibly related to mild chronic sinusitis. There is likely a right sphenoid retention cyst     Probable prior right radical neck dissection with sequela of prior surgery and radiation therapy. There is extensive soft tissue distortion which is suggestive of previous therapy Finds compatible with a approximately 1 cm right thyroid nodule There is evidence to suggest severe stenosis of the right distal internal carotid artery Soft tissue mass at the level the carotid bifurcation on the right with outward mass effect measuring approximately 2.3 x 2.7 cm suspicious for recurrent disease. This encases the carotid distally and the distal carotid stenosis may represent invasion. Comparison studies would be necessary to distinguish recurrent neoplasm from previous radiation therapy. Tumor invasion versus osteonecrosis of the medial right mandible ALERT:  THIS IS AN ABNORMAL REPORT      Us Head Neck Soft Tissue Thyroid    Result Date: 2020  Patient MRN:  [id number] : 1955 Age: 59 years Gender: Male Order Date:  2020 2:15 PM Exam: US HEAD NECK SOFT TISSUE THYROID Number of images:  26 Indication:  right carotid bifurcation mass With special focus to soft tissue mass at the right carotid bifurcation. right carotid bifurcation mass Comparison: Correlation with CT dated 2020.  Technique: Grayscale and color Doppler ultrasound images of the region of interest were obtained by the sonographic technologist. FINDINGS: The study is limited by technically suboptimal image quality. There is abnormal heterogeneous soft tissue in the region of interest.     Ill-defined, heterogeneous, abnormal appearing soft tissue in the region of interest. No specific clinical question is asked. Ultrasound is not the appropriate modality for this evaluation. Contrast-enhanced MRI may provide more information. Correlation with clinical and surgical history is needed. Xr Chest Portable    Result Date: 2020  Patient MRN:  [id number] : 1955 Age: 59 years Gender: Male Order Date:  2020 6:00 AM EXAM: XR CHEST PORTABLE COMPARISON: February 10, 2020 INDICATION:  PNA PNA FINDINGS: There are opacities at the left lung base silhouetting left hemidiaphragm which appears similar since prior. The heart is normal size. No pneumothorax. Stable opacities at left lung base. Continued follow-up could be helpful for further evaluation. Xr Chest Portable    Result Date: 2/10/2020  Patient MRN: [id number] : 1955 Age:  59 years Gender: Male Order Date: 2/10/2020 3:00 PM Exam: XR CHEST PORTABLE Number of Images: 1 view Indication:   SOB SOB Comparison: 2020 Findings: The heart is enlarged. The lung fields demonstrate evidence for airspace disease. The aorta is tortuous and ectatic. Tortuous ectatic aorta Cardiomegaly Airspace disease compatible with atelectasis or pneumonia, worse at the left lung base as compared to the right The chest appears to be worse in the interval     Xr Chest 1 Vw    Result Date: 2020  Patient MRN: [id number] : 1955 Age:  59 years Gender: Male Order Date: 2020 6:00 AM Exam: XR CHEST 1 VIEW Number of Images: 1 view Indication: Acute weakness. Comparison: None. FINDINGS: Heart and pulmonary vascularity normal. Lungs clear. Costophrenic angles sharp. Normal aorta. No acute cardiopulmonary findings.      Us Retroperitoneal Complete    Result Date: 2020  Patient Mrn: [id number] : 1955 Age:  59 years Gender: Male Order Date: 2020 7:45 PM Exam: US RETROPERITONEAL COMPLETE Number Of Images: 54 Views Indication:  Acute renal insufficiency Comparison: None. TECHNIQUE: 2-D grayscale and color Doppler imaging were utilized for evaluation of the kidneys and bladder. Findings: The right kidney measures 11.8 x 5.5 x 6.3 cm, and the left kidney measures 11.5 x 5.6 x 5.9 cm. There is no evidence of collecting system calcification, obstructive dilation, or perinephric inflammatory change. Cortical echogenicity is increased bilaterally,. Suggesting the spectrum of medical renal disease. Central perfusion is intact bilaterally by color Doppler imaging, however. There is some perinephric fluid bilaterally. The bladder is decompressed with Sanchez catheter, and is not further characterized. Probable bilateral medical renal disease with some perinephric fluid bilaterally that could indicate inflammation. There is no evidence of calcification or obstruction in either kidney. The bladder was not evaluated due to a decompressing Sanchez catheter. Fl Modified Barium Swallow W Video    Result Date: 2020  Patient MRN: [id number] : 1955 Age:  59 years Gender: Male Order Date: 2/10/2020 1:45 PM Exam: FL MODIFIED BARIUM SWALLOW W VIDEO Number of Images: 1 Indication:   dysphagia Comparison: None. Fluoroscopy time: 1.1 minute Findings: Textures given, nectar Aspiration, nectar Laryngeal penetration, nectar Cough, None Oral delay, Yes Retention in vallecula, Yes Retention in piriform sinuses, None. Pharyngeal delay, Yes Laryngeal elevation, reduced. Study is remarkable for silent aspiration with nectar consistency. This procedure was performed and dictated by George Ocasio PA-C with indirect supervision, and Lucinda Linares. Cheo HANCOCK reviewed and concurred with the findings.      Fl Modified Barium Swallow W Video    Result Date: 2020  Patient MRN: [id number] : 1955 Age:  59 years Gender: Male Order Date: 2020 7:45 AM Exam: FL MODIFIED BARIUM SWALLOW W VIDEO Number of Images: views Indication:   dysphagia dysphagia Comparison: None. Findings: Patient was given nectar and pudding patient is unable to swallow with no gag reflex. Therefore patient was suctioned and the study was canceled.      Patient unable to swallow nectar and pudding which was suctioned         Medications     Current Meds:  Current Facility-Administered Medications   Medication Dose Route Frequency Provider Last Rate Last Dose    albuterol (PROVENTIL) nebulizer solution 2.5 mg  2.5 mg Nebulization Q4H While awake Zoltan Duenas MD   2.5 mg at 20 0847    amoxicillin-clavulanate (AUGMENTIN) 400-57 MG/5ML suspension 500 mg  500 mg Per G Tube Q8H Adi Velasco MD   500 mg at 20 0123    0.9 % sodium chloride bolus  20 mL Intravenous Once Sis Bui MD        sodium chloride flush 0.9 % injection 10 mL  10 mL Intravenous PRN Zoltan Duenas MD   10 mL at 20 1549    heparin flush 100 UNIT/ML injection 300 Units  3 mL Intravenous 2 times per day Zoltan Duenas MD   300 Units at 20 0919    heparin flush 100 UNIT/ML injection 300 Units  3 mL Intracatheter PRN Zoltan Duenas MD        magnesium sulfate 4 g in 100 mL IVPB premix  4 g Intravenous Once Gavin Guerrero MD 25 mL/hr at 20 1117      [Held by provider] heparin (porcine) injection 5,000 Units  5,000 Units Subcutaneous 3 times per day Zoltan Duenas MD   Stopped at 20 0808    acetaminophen (TYLENOL) 160 MG/5ML solution 650 mg  650 mg PEG Tube Q4H PRN Sis Bui MD   650 mg at 20 2303    perflutren lipid microspheres (DEFINITY) injection 1.65 mg  1.5 mL Intravenous ONCE PRN Sweetie White MD        chlorhexidine (PERIDEX) 0.12 % solution 15 mL  15 mL Mouth/Throat BID Zoltan Duenas MD   15 mL at 20 0919    sennosides ([place] [address]) 8.8 MG/5ML syrup 5 mL  5 mL Oral Nightly Quiroz Amas., DO   5 mL at 02/17/20 1944    docusate sodium (COLACE) 150 MG/15ML liquid 100 mg  100 mg Oral Daily Artist Justin Layne., DO   100 mg at 02/19/20 0920    0.9 % sodium chloride bolus  20 mL Intravenous Once Doris West MD        folic acid (FOLVITE) tablet 1 mg  1 mg PEG Tube Daily Quiroz Amas., DO   1 mg at 02/19/20 0919    lansoprazole suspension SUSP 30 mg  30 mg Per G Tube QAUNM [facility] Canva Jr., DO   30 mg at 02/19/20 6259    vitamin B-1 (THIAMINE) tablet 100 mg  100 mg PEG Tube Daily Bannery People Jr., DO   100 mg at 02/19/20 0919    [Held by provider] aspirin chewable tablet 81 mg  81 mg Per G Tube Daily Kalpana Waggoner MD   81 mg at 02/14/20 1009    hydrALAZINE (APRESOLINE) tablet 25 mg  25 mg PEG Tube 3 times per day Tj Brennan MD   25 mg at 02/19/20 0515    metoprolol tartrate (LOPRESSOR) tablet 50 mg  50 mg PEG Tube BID Saroj Schumacher MD   50 mg at 02/19/20 0919    glucose (GLUTOSE) 40 % oral gel 15 g  15 g Oral PRN Saqib B Capal, DO        dextrose 50 % IV solution  12.5 g Intravenous PRN Delphine Arguetaer, DO        glucagon (rDNA) injection 1 mg  1 mg Intramuscular PRN Jose Sidles B Capal, DO        dextrose 5 % solution  100 mL/hr Intravenous PRN Shermax Arguetaer, DO        sodium chloride (Inhalant) 3 % nebulizer solution 4 mL  4 mL Nebulization BID Saqib B Capal, DO   4 mL at 02/19/20 0848    labetalol (NORMODYNE;TRANDATE) injection 10 mg  10 mg Intravenous Q6H PRN Saqib B Capal, DO   10 mg at 02/19/20 0516    sodium chloride flush 0.9 % injection 10 mL  10 mL Intravenous 2 times per day Jose Sidles B Capal, DO   10 mL at 02/19/20 0920    sodium chloride flush 0.9 % injection 10 mL  10 mL Intravenous PRN Saqib B Capal, DO   10 mL at 02/18/20 3743    0.9 % sodium chloride bolus  1,000 mL Intravenous Once Delphine Mathew DO        magnesium hydroxide (MILK OF MAGNESIA) 400 MG/5ML suspension 30 mL  30 mL Oral Daily PRN Saqib CASTILLO Capal, DO           Continuous Infusions:   dextrose       Scheduled Meds:   albuterol  2.5 mg Nebulization Q4H While awake    amoxicillin-clavulanate  500 mg Per G Tube Q8H    sodium chloride  20 mL Intravenous Once    heparin flush  3 mL Intravenous 2 times per day    magnesium sulfate  4 g Intravenous Once    [Held by provider] heparin (porcine)  5,000 Units Subcutaneous 3 times per day    chlorhexidine  15 mL Mouth/Throat BID    sennosides  5 mL Oral Nightly    docusate sodium  100 mg Oral Daily    sodium chloride  20 mL Intravenous Once    folic acid  1 mg PEG Tube Daily    lansoprazole  30 mg Per G Tube QAM AC    vitamin B-1  100 mg PEG Tube Daily    [Held by provider] aspirin  81 mg Per G Tube Daily    hydrALAZINE  25 mg PEG Tube 3 times per day    metoprolol tartrate  50 mg PEG Tube BID    sodium chloride (Inhalant)  4 mL Nebulization BID    sodium chloride flush  10 mL Intravenous 2 times per day    sodium chloride  1,000 mL Intravenous Once     PRN Meds: sodium chloride flush, heparin flush, acetaminophen, perflutren lipid microspheres, glucose, dextrose, glucagon (rDNA), dextrose, labetalol, sodium chloride flush, magnesium hydroxide      Resident's Assessment and Plan     Violette Valentin is 59 y.o. male was admitted on 2/5/2020 due to fatigue, unable to eat and had an episode of a fall. He was found to be hypotensive, thrombocytopenic (platelet 41,434) and in YVETTE. He has baseline Lt. Sided weakness and slurred speech. Had a PEA arrest on 2/16/2020. Was intubated following ROSC and transferred to the SICU. Violette Valentin has a PH of laryngeal cancer s/p laryngectomy (2005) with chemo and radio, HTN, H/O CVA with residual Lt. Weakness and slurred speech, smoker and active alcohol abuser. Diagnosis Date    Cancer [facility])     mouth    Hip fracture ([facility] [address]     Hypertension      <Cardiovascular>  PEA arrest s/p ROSC  2/2 ?  Hypoxia 2/2 neck mass Thrombocytopenia -resolved  2/2 Sepsis vs alcohol use      <Dermatologic/Musculoskeletal>  Pressure ulcer  Lt. Elbow present. Wound care on board. <Psychiatry>  H/O Alcohol Abuse - Stable  Currently on thiamine 756TU OD and folic acid 1mg OD. # Peptic ulcer prophylaxis: Lansoprazole. # DVT Prophylaxis: PCD  # Disposition: Cont current care.        Silva Rubin MD  PGY-1    Internal medicine resident    Attending Physician: Dr. Rodrigo Glass MD

## 2020-02-19 NOTE — PROGRESS NOTES
Senmalachi Shields  Dulcolax   Neuro: prn Tylenol   likely hypoxic brain injury , CT shows subacute stroke, neurology following , EEG no seizures, diffuse ecepholopathy  . Recommend against CTA secondary to YVETTE, Neurology following, Possible MRI if needed following neuro exam tomorrow   Renal: Continue NS for YVETTE , Electrolytes per Nephrology , YVETTE from hypoperfusion during cardia arrest, Monitor Urine Output, Daily CBC,BMP, Mg,Phos, ionized Ca,   Musculoskeletal: WBAT all extremities if/when able , Spines Clear AM-PAC Score when able currently non participatory   Pulmonary: Aggressive pulmonary hygiene , Chest Xray Daily ABG Daily  Mechanical Ventilation  Mode: AC   VT:400   Rate:16  PEEP:5  FiO2: 40 PF ratio 350 , Monitor RR and Maintain SpO2 > 92% , Duonebs, Metanebs likely aspiration causing hypoxic arrest   ID: Zosyn for PNA Blood cultures pending   Heme: Transfused 2 units 2 days ago  , Monitor Hb   Cardiac: Monitor Hemodynamics PEA arrest likely from hypoxia,  Echo - 60%  Cardiology following  , Troponin was downtrending ( stopped)  hydralazine, metoprolol.  Held on cooling as I did not believe he would benefit PEA arrest with extensive code and extensive medical history   Endocrine: No Glycemic Issues     DVT Prophylaxis: PCDs, Held Heparin secondary to Anemia and transfusion yesterday   Ulcer Prophylaxis: PPI Intubated for >48 hours   Tubes and Lines: Continue PIV,  Central Line, Continue Sanchez for Strict input and output, Continue ETT, and Continue NGT/OGT   Seizure proph:     No Indication  Ancillary consults:   Internal Medicine, ID , Nephrology ,  Neurology, Palliative care and ENT   Family Update:         Family to discuss further care after Neurology exam today    CODE Status:       Full Code     Dispo: SICU     Sumit Stone MD        Critical Care: 35 minutes evaluating and managing patient Post cardiac arrest , respiratory failure and At risk for further deterioration and death.

## 2020-02-20 ENCOUNTER — APPOINTMENT (OUTPATIENT)
Dept: GENERAL RADIOLOGY | Age: 65
DRG: 870 | End: 2020-02-20
Payer: MEDICARE

## 2020-02-20 ENCOUNTER — APPOINTMENT (OUTPATIENT)
Dept: MRI IMAGING | Age: 65
DRG: 870 | End: 2020-02-20
Payer: MEDICARE

## 2020-02-20 ENCOUNTER — APPOINTMENT (OUTPATIENT)
Dept: ULTRASOUND IMAGING | Age: 65
DRG: 870 | End: 2020-02-20
Payer: MEDICARE

## 2020-02-20 LAB
ALBUMIN SERPL-MCNC: 1.8 G/DL (ref 3.5–5.2)
ALP BLD-CCNC: 61 U/L (ref 40–129)
ALT SERPL-CCNC: 15 U/L (ref 0–40)
ANION GAP SERPL CALCULATED.3IONS-SCNC: 12 MMOL/L (ref 7–16)
AST SERPL-CCNC: 31 U/L (ref 0–39)
BASOPHILS ABSOLUTE: 0.03 E9/L (ref 0–0.2)
BASOPHILS RELATIVE PERCENT: 0.3 % (ref 0–2)
BILIRUB SERPL-MCNC: 0.8 MG/DL (ref 0–1.2)
BLOOD BANK DISPENSE STATUS: NORMAL
BLOOD BANK PRODUCT CODE: NORMAL
BPU ID: NORMAL
BUN BLDV-MCNC: 35 MG/DL (ref 8–23)
CALCIUM IONIZED: 1.16 MMOL/L (ref 1.15–1.33)
CALCIUM SERPL-MCNC: 7.9 MG/DL (ref 8.6–10.2)
CHLORIDE BLD-SCNC: 100 MMOL/L (ref 98–107)
CO2: 28 MMOL/L (ref 22–29)
CREAT SERPL-MCNC: 1.6 MG/DL (ref 0.7–1.2)
DESCRIPTION BLOOD BANK: NORMAL
EOSINOPHILS ABSOLUTE: 0.03 E9/L (ref 0.05–0.5)
EOSINOPHILS RELATIVE PERCENT: 0.3 % (ref 0–6)
GFR AFRICAN AMERICAN: 53
GFR NON-AFRICAN AMERICAN: 53 ML/MIN/1.73
GLUCOSE BLD-MCNC: 130 MG/DL (ref 74–99)
HCT VFR BLD CALC: 20.5 % (ref 37–54)
HEMOGLOBIN: 6.8 G/DL (ref 12.5–16.5)
IMMATURE GRANULOCYTES #: 0.06 E9/L
IMMATURE GRANULOCYTES %: 0.6 % (ref 0–5)
LYMPHOCYTES ABSOLUTE: 0.93 E9/L (ref 1.5–4)
LYMPHOCYTES RELATIVE PERCENT: 9.5 % (ref 20–42)
MAGNESIUM: 2 MG/DL (ref 1.6–2.6)
MCH RBC QN AUTO: 30.8 PG (ref 26–35)
MCHC RBC AUTO-ENTMCNC: 33.2 % (ref 32–34.5)
MCV RBC AUTO: 92.8 FL (ref 80–99.9)
METER GLUCOSE: 138 MG/DL (ref 74–99)
MONOCYTES ABSOLUTE: 1.05 E9/L (ref 0.1–0.95)
MONOCYTES RELATIVE PERCENT: 10.7 % (ref 2–12)
NEUTROPHILS ABSOLUTE: 7.69 E9/L (ref 1.8–7.3)
NEUTROPHILS RELATIVE PERCENT: 78.6 % (ref 43–80)
PDW BLD-RTO: 14.8 FL (ref 11.5–15)
PHOSPHORUS: 3.8 MG/DL (ref 2.5–4.5)
PLATELET # BLD: 114 E9/L (ref 130–450)
PMV BLD AUTO: 12.6 FL (ref 7–12)
POTASSIUM SERPL-SCNC: 3.2 MMOL/L (ref 3.5–5)
PROCALCITONIN: 13.2 NG/ML (ref 0–0.08)
RBC # BLD: 2.21 E12/L (ref 3.8–5.8)
SODIUM BLD-SCNC: 140 MMOL/L (ref 132–146)
TOTAL PROTEIN: 6.1 G/DL (ref 6.4–8.3)
WBC # BLD: 9.8 E9/L (ref 4.5–11.5)

## 2020-02-20 PROCEDURE — 6360000002 HC RX W HCPCS: Performed by: STUDENT IN AN ORGANIZED HEALTH CARE EDUCATION/TRAINING PROGRAM

## 2020-02-20 PROCEDURE — 6370000000 HC RX 637 (ALT 250 FOR IP): Performed by: STUDENT IN AN ORGANIZED HEALTH CARE EDUCATION/TRAINING PROGRAM

## 2020-02-20 PROCEDURE — 2500000003 HC RX 250 WO HCPCS: Performed by: STUDENT IN AN ORGANIZED HEALTH CARE EDUCATION/TRAINING PROGRAM

## 2020-02-20 PROCEDURE — 94669 MECHANICAL CHEST WALL OSCILL: CPT

## 2020-02-20 PROCEDURE — 99231 SBSQ HOSP IP/OBS SF/LOW 25: CPT | Performed by: INTERNAL MEDICINE

## 2020-02-20 PROCEDURE — 6360000002 HC RX W HCPCS: Performed by: INTERNAL MEDICINE

## 2020-02-20 PROCEDURE — 80053 COMPREHEN METABOLIC PANEL: CPT

## 2020-02-20 PROCEDURE — 6370000000 HC RX 637 (ALT 250 FOR IP): Performed by: INTERNAL MEDICINE

## 2020-02-20 PROCEDURE — 85025 COMPLETE CBC W/AUTO DIFF WBC: CPT

## 2020-02-20 PROCEDURE — 84145 PROCALCITONIN (PCT): CPT

## 2020-02-20 PROCEDURE — 94003 VENT MGMT INPAT SUBQ DAY: CPT

## 2020-02-20 PROCEDURE — 2500000003 HC RX 250 WO HCPCS: Performed by: SURGERY

## 2020-02-20 PROCEDURE — 70544 MR ANGIOGRAPHY HEAD W/O DYE: CPT

## 2020-02-20 PROCEDURE — 36415 COLL VENOUS BLD VENIPUNCTURE: CPT

## 2020-02-20 PROCEDURE — 82330 ASSAY OF CALCIUM: CPT

## 2020-02-20 PROCEDURE — 84100 ASSAY OF PHOSPHORUS: CPT

## 2020-02-20 PROCEDURE — 99232 SBSQ HOSP IP/OBS MODERATE 35: CPT | Performed by: PHYSICIAN ASSISTANT

## 2020-02-20 PROCEDURE — 70551 MRI BRAIN STEM W/O DYE: CPT

## 2020-02-20 PROCEDURE — 2580000003 HC RX 258: Performed by: INTERNAL MEDICINE

## 2020-02-20 PROCEDURE — 93970 EXTREMITY STUDY: CPT

## 2020-02-20 PROCEDURE — 2000000000 HC ICU R&B

## 2020-02-20 PROCEDURE — 2580000003 HC RX 258: Performed by: SURGERY

## 2020-02-20 PROCEDURE — 99291 CRITICAL CARE FIRST HOUR: CPT | Performed by: SURGERY

## 2020-02-20 PROCEDURE — 2500000003 HC RX 250 WO HCPCS: Performed by: INTERNAL MEDICINE

## 2020-02-20 PROCEDURE — 2580000003 HC RX 258: Performed by: STUDENT IN AN ORGANIZED HEALTH CARE EDUCATION/TRAINING PROGRAM

## 2020-02-20 PROCEDURE — 71045 X-RAY EXAM CHEST 1 VIEW: CPT

## 2020-02-20 PROCEDURE — 94640 AIRWAY INHALATION TREATMENT: CPT

## 2020-02-20 PROCEDURE — 82962 GLUCOSE BLOOD TEST: CPT

## 2020-02-20 PROCEDURE — 83735 ASSAY OF MAGNESIUM: CPT

## 2020-02-20 RX ORDER — LABETALOL HYDROCHLORIDE 5 MG/ML
10 INJECTION, SOLUTION INTRAVENOUS ONCE
Status: COMPLETED | OUTPATIENT
Start: 2020-02-20 | End: 2020-02-20

## 2020-02-20 RX ORDER — POTASSIUM CHLORIDE 29.8 MG/ML
20 INJECTION INTRAVENOUS
Status: COMPLETED | OUTPATIENT
Start: 2020-02-20 | End: 2020-02-20

## 2020-02-20 RX ORDER — 0.9 % SODIUM CHLORIDE 0.9 %
20 INTRAVENOUS SOLUTION INTRAVENOUS ONCE
Status: COMPLETED | OUTPATIENT
Start: 2020-02-20 | End: 2020-02-20

## 2020-02-20 RX ORDER — BUMETANIDE 0.25 MG/ML
0.5 INJECTION, SOLUTION INTRAMUSCULAR; INTRAVENOUS ONCE
Status: COMPLETED | OUTPATIENT
Start: 2020-02-20 | End: 2020-02-20

## 2020-02-20 RX ADMIN — CHLORHEXIDINE GLUCONATE 0.12% ORAL RINSE 15 ML: 1.2 LIQUID ORAL at 20:43

## 2020-02-20 RX ADMIN — AMLODIPINE BESYLATE 5 MG: 5 TABLET ORAL at 09:42

## 2020-02-20 RX ADMIN — DOCUSATE SODIUM 100 MG: 50 LIQUID ORAL at 09:42

## 2020-02-20 RX ADMIN — ALBUTEROL SULFATE 2.5 MG: 2.5 SOLUTION RESPIRATORY (INHALATION) at 20:00

## 2020-02-20 RX ADMIN — HYDRALAZINE HYDROCHLORIDE 25 MG: 25 TABLET, FILM COATED ORAL at 14:52

## 2020-02-20 RX ADMIN — SODIUM CHLORIDE, PRESERVATIVE FREE 300 UNITS: 5 INJECTION INTRAVENOUS at 20:43

## 2020-02-20 RX ADMIN — FOLIC ACID 1 MG: 1 TABLET ORAL at 09:42

## 2020-02-20 RX ADMIN — POTASSIUM CHLORIDE 20 MEQ: 29.8 INJECTION, SOLUTION INTRAVENOUS at 12:46

## 2020-02-20 RX ADMIN — PIPERACILLIN AND TAZOBACTAM 3.38 G: 3; .375 INJECTION, POWDER, LYOPHILIZED, FOR SOLUTION INTRAVENOUS at 11:47

## 2020-02-20 RX ADMIN — LABETALOL HYDROCHLORIDE 10 MG: 5 INJECTION INTRAVENOUS at 18:46

## 2020-02-20 RX ADMIN — PIPERACILLIN AND TAZOBACTAM 3.38 G: 3; .375 INJECTION, POWDER, LYOPHILIZED, FOR SOLUTION INTRAVENOUS at 20:30

## 2020-02-20 RX ADMIN — CHLORHEXIDINE GLUCONATE 0.12% ORAL RINSE 15 ML: 1.2 LIQUID ORAL at 09:42

## 2020-02-20 RX ADMIN — Medication 10 ML: at 09:42

## 2020-02-20 RX ADMIN — ALBUTEROL SULFATE 2.5 MG: 2.5 SOLUTION RESPIRATORY (INHALATION) at 12:18

## 2020-02-20 RX ADMIN — LABETALOL HYDROCHLORIDE 10 MG: 5 INJECTION INTRAVENOUS at 20:57

## 2020-02-20 RX ADMIN — ALBUTEROL SULFATE 2.5 MG: 2.5 SOLUTION RESPIRATORY (INHALATION) at 15:50

## 2020-02-20 RX ADMIN — ALBUTEROL SULFATE 2.5 MG: 2.5 SOLUTION RESPIRATORY (INHALATION) at 08:43

## 2020-02-20 RX ADMIN — SODIUM CHLORIDE 20 ML: 9 INJECTION, SOLUTION INTRAVENOUS at 12:46

## 2020-02-20 RX ADMIN — Medication 10 ML: at 20:47

## 2020-02-20 RX ADMIN — METOPROLOL TARTRATE 50 MG: 50 TABLET, FILM COATED ORAL at 09:42

## 2020-02-20 RX ADMIN — HYDRALAZINE HYDROCHLORIDE 25 MG: 25 TABLET, FILM COATED ORAL at 22:35

## 2020-02-20 RX ADMIN — POTASSIUM CHLORIDE 20 MEQ: 29.8 INJECTION, SOLUTION INTRAVENOUS at 11:45

## 2020-02-20 RX ADMIN — SODIUM CHLORIDE, PRESERVATIVE FREE 300 UNITS: 5 INJECTION INTRAVENOUS at 09:41

## 2020-02-20 RX ADMIN — HYDRALAZINE HYDROCHLORIDE 25 MG: 25 TABLET, FILM COATED ORAL at 07:08

## 2020-02-20 RX ADMIN — PIPERACILLIN AND TAZOBACTAM 3.38 G: 3; .375 INJECTION, POWDER, LYOPHILIZED, FOR SOLUTION INTRAVENOUS at 02:38

## 2020-02-20 RX ADMIN — LANSOPRAZOLE 30 MG: KIT at 07:09

## 2020-02-20 RX ADMIN — METOPROLOL TARTRATE 50 MG: 50 TABLET, FILM COATED ORAL at 20:42

## 2020-02-20 RX ADMIN — BUMETANIDE 0.5 MG: 0.25 INJECTION INTRAMUSCULAR; INTRAVENOUS at 11:45

## 2020-02-20 RX ADMIN — LABETALOL HYDROCHLORIDE 10 MG: 5 INJECTION INTRAVENOUS at 09:50

## 2020-02-20 RX ADMIN — SENNOSIDES 5 ML: 8.8 LIQUID ORAL at 20:47

## 2020-02-20 RX ADMIN — SODIUM CHLORIDE SOLN NEBU 3% 4 ML: 3 NEBU SOLN at 08:43

## 2020-02-20 RX ADMIN — LABETALOL HYDROCHLORIDE 10 MG: 5 INJECTION INTRAVENOUS at 13:02

## 2020-02-20 RX ADMIN — SODIUM CHLORIDE SOLN NEBU 3% 4 ML: 3 NEBU SOLN at 20:00

## 2020-02-20 RX ADMIN — Medication 100 MG: at 09:43

## 2020-02-20 ASSESSMENT — PAIN SCALES - GENERAL
PAINLEVEL_OUTOF10: 0

## 2020-02-20 ASSESSMENT — PULMONARY FUNCTION TESTS
PIF_VALUE: 18
PIF_VALUE: 17
PIF_VALUE: 18
PIF_VALUE: 17
PIF_VALUE: 18
PIF_VALUE: 14
PIF_VALUE: 18
PIF_VALUE: 14
PIF_VALUE: 18
PIF_VALUE: 15
PIF_VALUE: 17
PIF_VALUE: 17
PIF_VALUE: 18
PIF_VALUE: 18
PIF_VALUE: 14
PIF_VALUE: 14
PIF_VALUE: 18
PIF_VALUE: 18
PIF_VALUE: 13
PIF_VALUE: 17

## 2020-02-20 NOTE — PROGRESS NOTES
[facility] PROGRESS NOTE      Chief complaint: Follow-up of group G Streptococcus bacteremia    The patient is a 59 y.o. 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 On admission, he was afebrile with no leukocytosis. Respiratory pathogen PCR panel, urine Streptococcus pneumoniae and Legionella antigens were negative. Chest x-ray showed no acute infiltrate. CT of the neck and soft tissues showed sequela of extensive prior surgery, distortion of oropharynx and posterior superior hypopharynx and of the right sternocleidomastoid with pattern compatible with previous radical neck dissection, resection at the level of the tongue base and floor of the mouth, soft tissue mass-effect suspicious for recurrent neoplasm at the level of the carotid bifurcation on the right measuring 2.7 x 2.3 cm, moth-eaten appearance to the medial aspect of the right mandible probably tumor invasion or osteonecrosis, no evident abscess. Blood cultures grew beta-hemolytic group G Streptococcus (susceptible to ampicillin, resistant to clindamycin). Repeat blood cultures from 02/08 and from 02/10 showed no growth to date. Sputum Gram stain and culture showed less than 25 PMNs per LPF and less than 25 epithelial cells per LPF, light growth of MSSA, moderate growth of Candida albicans, absent oral pharyngeal talib. He received doxycycline on 02/05, ceftriaxone on 02/05 and from 02/08-02/10, piperacillin-tazobactam since 02/05 then switched to nafcillin on 02/12. Nafcillin was switched to ampicillin-sulbactam on 02/12.  He underwent PEG tube placement on 02/12. He went into PEA arrest with ROSC after about 13 minutes of resuscitation and was intubated and transferred to SICU. CXR showed stable opacities in the lower lobes. CT head without contrast showed left occipital lobe subacute infarct. Procalcitonin level was elevated at 31 ng/mL. Subjective: Patient was seen and examined. He is intubated, awake, gives no meaningful response to questions or commands. Afebrile for the past 24 hours. Objective:  BP (!) 178/118   Pulse 108   Temp 99 °F (37.2 °C) (Axillary)   Resp 21   Ht 5' 4\" (1.626 m)   Wt 143 lb 4.8 oz (65 kg)   SpO2 100%   BMI 24.60 kg/m²   Constitutional: Intubated, no MV dyssynchrony  Respiratory: Clear breath sounds, no crackles, no wheezes  Cardiovascular: Regular rate and rhythm, no murmurs  Gastrointestinal: Bowel sounds present, soft, nontender  Skin: Warm and dry, no active dermatoses  Musculoskeletal: No joint swelling, no joint erythema    Labs, imaging, and medical records/notes were personally reviewed. Assessment:  Fever, multifactorial, suspect sepsis vs central fever vs transfusion reaction  Leukocytosis  Cardiac arrest  Anoxic encephalopathy  Left occipital lobe subacute infarct  Group G Streptococcus bacteremia, etiology unclear, suspect oropharyngeal in etiology with possible aspiration pneumonia  MSSA pneumonia  History of head and neck cancer, s/p resection and chemoradiation therapy (details unclear)    Recommendations:  Continue piperacillin-tazobactam for now. Follow up blood cultures. Trend procalcitonin. Observe aspiration precautions and oral hygiene.     Thank you for involving me in the care of Lobito Kraft. I will continue to follow. Please do not hesitate to call for any questions or concerns.     Electronically signed by Masood Thibodeaux MD on 2/20/2020 at 9:58 AM

## 2020-02-20 NOTE — PROGRESS NOTES
[facility]  Internal Medicine Residency Program  Progress Note - House Team 1    Patient:  Ekaterina Avila 59 y.o. male   MRN: [id number]       Date of Service: 2020    Allergy: Patient has no known allergies. Subjective      I saw and examined the patient in the AM today. Patient was intubated. Slowly responding to commands and opening eyes, for follow up examination by neurology and imaging, further discussion with family in terms of trach by ICU team and palliative care. Input appreciated     Updated H+P:  Patient admitted on 2020 due to fatigue, unable to eat and had an episode of fall. Found to be hypotensive, thrombocytopenic (platelet 55,837) and YVETTE. During the course of his admission, he was found to have blood cultures positive for beta-hemolytic group B streptococcus (susceptible to ampicillin). His course was also complicated by alcohol withdrawal, YVETTE stage III on CKD, hypokalemia, and MSSA pneumonia. He, at 1 point, was on doxycycline and ceftriaxone. It was changed to piperacillin-tazobactam and eventually to nafcillin. Nafcillin was changed to ampicillin sulbactam on 2020. This was subsequently changed to amoxicillin- clavulanic acid per ID. He received 1 unit blood transfusion on 2020 due to decrease in hemoglobin from 8-6.7. His platelets recovered to >100,000 during the course of the admission. In the a.m. of 2020, he had a PEA arrest.  After multiple rounds of ACLS, ROSC was achieved. Patient was transferred to the surgical ICU the same day. Objective     TEMPERATURE:  Current - Temp: 98.9 °F (37.2 °C);  Max - Temp  Av.5 °F (37.5 °C)  Min: 98.9 °F (37.2 °C)  Max: 100.3 °F (37.9 °C)  RESPIRATIONS RANGE: Resp  Av.5  Min: 1  Max: 29  PULSE RANGE: Pulse  Av.3  Min: 100  Max: 116  BLOOD PRESSURE RANGE:  Systolic (51NPL), CE , Min:116 , IGZ:196   ; Diastolic (18KRU), RFV:39, Min:69, Max:121    PULSE OXIMETRY RANGE: SpO2  Av.6 %  Min: 98 %  Max: 100 %    I & O - 24hr:    Intake/Output Summary (Last 24 hours) at 2020 1527  Last data filed at 2020 1500  Gross per 24 hour   Intake 1695 ml   Output 2220 ml   Net -525 ml     I/O last 3 completed shifts: In: 3791 [I.V.:381; NG/GT:1114; IV Piggyback:200]  Out: 2220 [Urine:2220] No intake/output data recorded. Weight change: 1 lb 8 oz (0.68 kg)    Physical Exam  Vitals signs and nursing note reviewed. Constitutional:       Appearance: He is normal weight. He is ill-appearing. Interventions: He is intubated. HENT:      Head: Normocephalic and atraumatic. Right Ear: External ear normal.      Left Ear: External ear normal.      Nose: Nose normal.      Mouth/Throat:      Mouth: Mucous membranes are moist.   Eyes:      General: Lids are normal. No scleral icterus. Right eye: No discharge. Left eye: No discharge. Conjunctiva/sclera: Conjunctivae normal.      Pupils: Pupils are equal.      Right eye: Pupil is not reactive (Pinpoint pupil). Pupil is not sluggish. Left eye: Pupil is not reactive (Pinpoint pupil). Pupil is not sluggish. Cardiovascular:      Rate and Rhythm: Regular rhythm. Tachycardia present. Pulses: Normal pulses. Carotid pulses are 2+ on the right side and 2+ on the left side. Radial pulses are 2+ on the right side and 2+ on the left side. Dorsalis pedis pulses are 2+ on the right side and 2+ on the left side. Heart sounds: S1 normal and S2 normal. No gallop. No S3 sounds. Pulmonary:      Effort: Pulmonary effort is normal. He is intubated. Breath sounds: Normal air entry. Transmitted upper airway sounds present. No decreased breath sounds, wheezing, rhonchi or rales. Musculoskeletal:      Right lower leg: No edema. Left lower leg: No edema. Neurological:      Comments: Cough and gag reflex present.        Labs and Imaging Studies     CBC:   Recent Labs 20  0520  1655 20  0420   WBC 15.9*  --  11.7* 9.8   HGB 5.2* 7.5* 7.1* 6.8*   HCT 16.2* 22.0* 21.3* 20.5*   MCV 90.0  --  90.6 92.8     --  110* 114*       BMP:    Recent Labs     20  0420    140 140   K 3.6 3.3* 3.2*    101 100   CO2 31* 27 28   BUN 37* 37* 35*   CREATININE 1.8* 1.6* 1.6*   GLUCOSE 126* 126* 130*       LIVER PROFILE:   Recent Labs     200   AST 33 32 31   ALT 20 18 15   BILITOT 0.5 0.8 0.8   ALKPHOS 62 61 61       PT/INR:   No results for input(s): PROTIME, INR in the last 72 hours. APTT:   No results for input(s): APTT in the last 72 hours. Fasting Lipid Panel:    Lab Results   Component Value Date    HDL 24 2020       Notable Cultures:      Blood cultures   Blood Culture, Routine   Date Value Ref Range Status   2020 24 Hours- no growth  Preliminary     Respiratory cultures No results found for: RESPCULTURE No results found for: LABGRAM  Urine   Urine Culture, Routine   Date Value Ref Range Status   2020 <10,000 CFU/mL  Mixed gram positive organisms    Final     Legionella No results found for: LABLEGI  C Diff PCR No results found for: CDIFPCR  Wound culture/abscess: No results for input(s): WNDABS in the last 72 hours. Tip culture:No results for input(s): CXCATHTIP in the last 72 hours. Xr Chest Standard (2 Vw)    Result Date: 2020  Patient MRN: [id number] : 1955 Age:  59 years Gender: Male Order Date: 2020 10:15 AM Exam: XR CHEST (2 VW) Number of Images: 2 views Indication:   Sepsis, sob Sepsis, sob Comparison: None. Findings: The heart is unremarkable The lung fields demonstrate no significant pulmonary vascular congestion and edema.  The aorta is tortuous ectatic There are findings throughout the lung fields which are likely chronic     Findings compatible with atherosclerotic disease No acute infiltrate     Xr Foot Left artery at the level the cervical component there is evidence to suggest severe stenosis. Also the level of the carotid bifurcation on the right there is a soft tissue mass effect suspicious for recurrent neoplasm. This measures approximately 2.7 x 2.3 cm. This could also be related to previous therapy. Comparison studies would be necessary. There is a mottled eaten appearance to the medial aspect of the right mandible. This could be related to tumor invasion or osteonecrosis. Scattered lymph nodes are visualized which do not meet the CT criteria for adenopathy. There is mixed density in the right thyroid possibly representing a thyroid nodule measuring approximately 1 cm. There is density scattered throughout the sinuses possibly related to mild chronic sinusitis. There is likely a right sphenoid retention cyst     Probable prior right radical neck dissection with sequela of prior surgery and radiation therapy. There is extensive soft tissue distortion which is suggestive of previous therapy Finds compatible with a approximately 1 cm right thyroid nodule There is evidence to suggest severe stenosis of the right distal internal carotid artery Soft tissue mass at the level the carotid bifurcation on the right with outward mass effect measuring approximately 2.3 x 2.7 cm suspicious for recurrent disease. This encases the carotid distally and the distal carotid stenosis may represent invasion. Comparison studies would be necessary to distinguish recurrent neoplasm from previous radiation therapy.  Tumor invasion versus osteonecrosis of the medial right mandible ALERT:  THIS IS AN ABNORMAL REPORT      Us Head Neck Soft Tissue Thyroid    Result Date: 2020  Patient MRN:  [id number] : 1955 Age: 59 years Gender: Male Order Date:  2020 2:15 PM Exam: US HEAD NECK SOFT TISSUE THYROID Number of images:  26 Indication:  right carotid bifurcation mass With special focus to soft tissue mass at the right carotid FINDINGS: Heart and pulmonary vascularity normal. Lungs clear. Costophrenic angles sharp. Normal aorta. No acute cardiopulmonary findings. Us Retroperitoneal Complete    Result Date: 2020  Patient Mrn: [id number] : 1955 Age:  59 years Gender: Male Order Date: 2020 7:45 PM Exam: US RETROPERITONEAL COMPLETE Number Of Images: 54 Views Indication:  Acute renal insufficiency Comparison: None. TECHNIQUE: 2-D grayscale and color Doppler imaging were utilized for evaluation of the kidneys and bladder. Findings: The right kidney measures 11.8 x 5.5 x 6.3 cm, and the left kidney measures 11.5 x 5.6 x 5.9 cm. There is no evidence of collecting system calcification, obstructive dilation, or perinephric inflammatory change. Cortical echogenicity is increased bilaterally,. Suggesting the spectrum of medical renal disease. Central perfusion is intact bilaterally by color Doppler imaging, however. There is some perinephric fluid bilaterally. The bladder is decompressed with Sanchez catheter, and is not further characterized. Probable bilateral medical renal disease with some perinephric fluid bilaterally that could indicate inflammation. There is no evidence of calcification or obstruction in either kidney. The bladder was not evaluated due to a decompressing Sanchez catheter. Fl Modified Barium Swallow W Video    Result Date: 2020  Patient MRN: [id number] : 1955 Age:  59 years Gender: Male Order Date: 2/10/2020 1:45 PM Exam: FL MODIFIED BARIUM SWALLOW W VIDEO Number of Images: 1 Indication:   dysphagia Comparison: None. Fluoroscopy time: 1.1 minute Findings: Textures given, nectar Aspiration, nectar Laryngeal penetration, nectar Cough, None Oral delay, Yes Retention in vallecula, Yes Retention in piriform sinuses, None. Pharyngeal delay, Yes Laryngeal elevation, reduced. Study is remarkable for silent aspiration with nectar consistency.  This procedure was performed and dictated by Jada Llamas PA-C with indirect supervision, and Marlon Sweeney MD reviewed and concurred with the findings. Fl Modified Barium Swallow W Video    Result Date: 2020  Patient MRN: [id number] : 1955 Age:  59 years Gender: Male Order Date: 2020 7:45 AM Exam: FL MODIFIED BARIUM SWALLOW W VIDEO Number of Images: views Indication:   dysphagia dysphagia Comparison: None. Findings: Patient was given nectar and pudding patient is unable to swallow with no gag reflex. Therefore patient was suctioned and the study was canceled.      Patient unable to swallow nectar and pudding which was suctioned         Medications     Current Meds:  Current Facility-Administered Medications   Medication Dose Route Frequency Provider Last Rate Last Dose    albuterol (PROVENTIL) nebulizer solution 2.5 mg  2.5 mg Nebulization Q4H While awake Sandhya Marmolejo MD   2.5 mg at 20 1218    piperacillin-tazobactam (ZOSYN) 3.375 g in dextrose 5 % 100 mL IVPB extended infusion (mini-bag)  3.375 g Intravenous Q8H Andrey Guerrero MD 25 mL/hr at 20 1147 3.375 g at 20 1147    amLODIPine (NORVASC) tablet 5 mg  5 mg PEG Tube Daily Sandhya Marmolejo MD   5 mg at 20 4642    docusate sodium (COLACE) 150 MG/15ML liquid 100 mg  100 mg Per G Tube Daily Sandhya Marmolejo MD   100 mg at 20 0942    sennosides (SENOKOT) 8.8 MG/5ML syrup 5 mL  5 mL Per G Tube Nightly Sandhya Marmolejo MD   5 mL at 20 2040    sodium chloride flush 0.9 % injection 10 mL  10 mL Intravenous PRN Sandhya Marmolejo MD   10 mL at 20 1549    heparin flush 100 UNIT/ML injection 300 Units  3 mL Intravenous 2 times per day Sandhya Marmolejo MD   300 Units at 20 0941    heparin flush 100 UNIT/ML injection 300 Units  3 mL Intracatheter PRN MD [facility] [place] by provider] heparin (porcine) injection 5,000 Units  5,000 Units Subcutaneous 3 times per day Sandhya Marmolejo MD   Stopped at 20 0895    acetaminophen (TYLENOL) 160 MG/5ML solution 650 mg  650 mg PEG Tube Q4H PRN Kenny Perkins MD   650 mg at 02/19/20 1643    chlorhexidine (PERIDEX) 0.12 % solution 15 mL  15 mL Mouth/Throat BID Brandon Tomlin MD   15 mL at 70/47/51 2007    folic acid (FOLVITE) tablet 1 mg  1 mg PEG Tube Daily Marco Antonio Hendrix., DO   1 mg at 02/20/20 2878    lansoprazole suspension SUSP 30 mg  30 mg Per G Tube QAM AC Yu Marks Jr., DO   30 mg at 02/20/20 5101    vitamin B-1 (THIAMINE) tablet 100 mg  100 mg PEG Tube Daily Balbina Heart Precious Mom., DO   100 mg at 02/20/20 2442    [Held by provider] aspirin chewable tablet 81 mg  81 mg Per G Tube Daily Amanda Sanchez MD   81 mg at 02/14/20 1009    hydrALAZINE (APRESOLINE) tablet 25 mg  25 mg PEG Tube 3 times per day Liana Burton MD   25 mg at 02/20/20 1452    metoprolol tartrate (LOPRESSOR) tablet 50 mg  50 mg PEG Tube BID Saroj Schumacher MD   50 mg at 02/20/20 0942    glucose (GLUTOSE) 40 % oral gel 15 g  15 g Oral PRN Roena Bookman B Capal, DO        dextrose 50 % IV solution  12.5 g Intravenous PRN Keith Toureau, DO        glucagon (rDNA) injection 1 mg  1 mg Intramuscular PRN Roena Bookman B Capal, DO        dextrose 5 % solution  100 mL/hr Intravenous PRN Keith Rogers, DO        sodium chloride (Inhalant) 3 % nebulizer solution 4 mL  4 mL Nebulization BID Saqib B Capal, DO   4 mL at 02/20/20 0843    labetalol (NORMODYNE;TRANDATE) injection 10 mg  10 mg Intravenous Q6H PRN Saqib B Capal, DO   10 mg at 02/20/20 1302    sodium chloride flush 0.9 % injection 10 mL  10 mL Intravenous 2 times per day Roena Bookman B Capal, DO   10 mL at 02/20/20 0942    magnesium hydroxide (MILK OF MAGNESIA) 400 MG/5ML suspension 30 mL  30 mL Oral Daily PRN Saqib B Capal, DO           Continuous Infusions:   dextrose       Scheduled Meds:   albuterol  2.5 mg Nebulization Q4H While awake    piperacillin-tazobactam  3.375 g Intravenous Q8H    amLODIPine  5 mg PEG Tube Daily    docusate Pupils: non reactive   Management per SICU  Neurology was consulted, EEG done, plan for MRI brain and MRA head w/o contrast per neuro  -new occipital lobe infarct likely 2/2 hypotension during arrest     H/O CVA with residual Lt. Weakness - Stable  Aspirin placed on hold due to decrease in hemoglobin. <Pulmonary>  Acute hypoxic hypercapnic respiratory failure  2/2 PEA arrest  Currently intubated and on the vent AC mode. Effie Coker discussion per ICU team and family decision     Pneumonia  2/2 MSSA  Continue Zosyn per ID recs   WBC trending down  CXR: stable   ID on board. Chest vest BID. Nebulization with ipratropium-albuterol Q4H and 3% NaCl nebulization BID.     <Gastrointestinal>  Dysphagia s/p PEG placement (2/12/2020)  2/2 Neck mass   Currently on tube feeds  CBC daily. Severe Malnutrition  2/2 Laryngeal Ca, dysphagia  Per dietitian, pt. Meets criteria for severe malnutrition. Currently on tube feeds    <Infectious Disease>  Group G Streptococcus Bacteremia - Resolved  Echo showed no vegetations. Repeat culture -ve. ID on board. <Genitourinary/Renal>  YVETTE Stage II on CKD   FeNa 0.7%, FeUrea 20.9%. initially. Baseline Cr. Unknown. Cr. 2.0 today, hemodynamically medicated after PEA arrest    Nephrology on board. Electrolyte abnormalities    Hypokalemia, Hypomagnesemia corrected by ICU team.     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 CT and USG suggestive of mass at Rt. Carotic bifurcation - 2.3 x 2.7 cm. Thrombocytopenia -resolved  2/2 Sepsis vs alcohol use      <Dermatologic/Musculoskeletal>  Pressure ulcer  Lt. Elbow present. Wound care on board. <Psychiatry>  H/O Alcohol Abuse - Stable  Currently on thiamine 193UT OD and folic acid 1mg OD. # Peptic ulcer prophylaxis: Lansoprazole. # DVT Prophylaxis: PCD  # Disposition: Cont current care.        Jeb Botello,   PGY-2    Internal

## 2020-02-20 NOTE — CARE COORDINATION
Met with sister Stephon Cazares at bedside. Plan is for MRA/MRI of brain today. She and other family members will then decide on further care /trach. Provided her with ltac list to review if needed Referral made to both ltacs. [facility] also following.

## 2020-02-20 NOTE — PROGRESS NOTES
[facility]  SURGICAL INTENSIVE CARE UNIT (SICU)  ATTENDING PHYSICIAN CRITICAL CARE PROGRESS NOTE     I have examined the patient, reviewed the record,and discussed the case with the APN/  Resident. I have reviewed all relevant labs and imaging data. The following summarizes my clinical findings and independent assessment. Date of admission:  2/5/2020    CC: s/p cardiac arrest     HOSPITAL COURSE:   [birth year]/16 5yo male with history of smoking, ETOH abuse, multiple head and neck cancers s/p partial mandibular resection ([id number]), esophageal cancer, and R base of tongue cancer s/p hemiglossectomy and chemoradiation with a new Head and Neck cancer. PEA arrest likely from aspiration ( Barium sitting in the back of the through when PEG was placed)  leading to hypoxia or Head and neck cancer obstruction. 2/17 No overnight events   2/18  Hb dropped 5.2 received 2 units PRBC    2/19 No acute overnight issues, patient withdrawing from pain this am and blinking to threat   2/20 No changes overnight     New Imaging Reviewed:   Chest Xray ETT in good position , right upper lobe infiltrate - stable     Physical Exam:  Physical Exam  HENT:      Head: Normocephalic. Eyes:      Pupils: Pupils are equal, round, and reactive to light. Comments: Left eye veered medially, blinking to threat    Cardiovascular:      Rate and Rhythm: Normal rate. Pulmonary:      Effort: Pulmonary effort is normal.   Abdominal:      General: Abdomen is flat. There is no distension. Musculoskeletal:      Comments: No purposeful movements on my exam, appears to withdraw from pain          Assessment   Principal Problem:    Bacteremia  Active Problems:    Acute renal failure (ARF) (HCC)    Other dysphagia    Severe protein-calorie malnutrition (HCC)    Acute upper respiratory infection    Elevated troponin    Acute ischemic stroke (HCC)    Anoxic brain damage (HCC)  Resolved Problems:    * No resolved hospital problems.

## 2020-02-20 NOTE — PLAN OF CARE
Problem: Falls - Risk of:  Goal: Will remain free from falls  Description  Will remain free from falls  2/20/2020 0029 by Ernesto Albright RN  Outcome: Met This Shift  2/19/2020 1651 by Amilcar Willis RN  Outcome: Met This Shift  Goal: Absence of physical injury  Description  Absence of physical injury  2/20/2020 0029 by Ernesto Albright RN  Outcome: Met This Shift  2/19/2020 1651 by Amilcar Willis RN  Outcome: Met This Shift     Problem: Risk for Impaired Skin Integrity  Goal: Tissue integrity - skin and mucous membranes  Description  Structural intactness and normal physiological function of skin and  mucous membranes.   Outcome: Met This Shift     Problem: Skin Integrity:  Goal: Will show no infection signs and symptoms  Description  Will show no infection signs and symptoms  2/20/2020 0029 by Ernesto Albright RN  Outcome: Met This Shift  2/19/2020 1651 by Amilcar Willis RN  Outcome: Met This Shift  Goal: Absence of new skin breakdown  Description  Absence of new skin breakdown  2/20/2020 0029 by Ernesto Albright RN  Outcome: Met This Shift  2/19/2020 1651 by Amilcar Willis RN  Outcome: Met This Shift

## 2020-02-20 NOTE — PROGRESS NOTES
Department of Internal Medicine  Nephrology Attending Progress Note    Events reviewed. SUBJECTIVE: We are following MrAury Stapleton for YVETTE on CKD. Urine output remains adequate, his blood pressure is high, FiO2 requirement is at 40%    PHYSICAL EXAM:      Vitals:    VITALS:  BP (!) 188/121   Pulse 112   Temp 99 °F (37.2 °C) (Axillary)   Resp 16   Ht 5' 4\" (1.626 m)   Wt 143 lb 4.8 oz (65 kg)   SpO2 98%   BMI 24.60 kg/m²        Intake/Output Summary (Last 24 hours) at 2/20/2020 1310  Last data filed at 2/20/2020 1200  Gross per 24 hour   Intake 2158 ml   Output 1870 ml   Net 288 ml         Constitutional:  Intubated, off of sedation, opens eyes, does not follow any commands, FiO2 requirement is at 40%  HEENT:  Scarred sugical incision at neck; facial droop  Respiratory: Fine rales present bilaterally  Cardiovascular/Edema:  RRR, no edema noted  Gastrointestinal:  Abdomen soft and nontender, bowel sounds active. PEG.   Neurologic:  Contracted extremities, facial droop   Skin:  Dry,warm  flaky   Other:  No edema     Scheduled Meds:   sodium chloride  20 mL Intravenous Once    albuterol  2.5 mg Nebulization Q4H While awake    piperacillin-tazobactam  3.375 g Intravenous Q8H    amLODIPine  5 mg PEG Tube Daily    docusate sodium  100 mg Per G Tube Daily    sennosides  5 mL Per G Tube Nightly    heparin flush  3 mL Intravenous 2 times per day    [Held by provider] heparin (porcine)  5,000 Units Subcutaneous 3 times per day    chlorhexidine  15 mL Mouth/Throat BID    folic acid  1 mg PEG Tube Daily    lansoprazole  30 mg Per G Tube QAM AC    vitamin B-1  100 mg PEG Tube Daily    [Held by provider] aspirin  81 mg Per G Tube Daily    hydrALAZINE  25 mg PEG Tube 3 times per day    metoprolol tartrate  50 mg PEG Tube BID    sodium chloride (Inhalant)  4 mL Nebulization BID    sodium chloride flush  10 mL Intravenous 2 times per day     Continuous Infusions:   dextrose       PRN Meds:.sodium nodule   There is evidence to suggest severe stenosis of the right distal   internal carotid artery   Soft tissue mass at the level the carotid bifurcation on the right   with outward mass effect measuring approximately 2.3 x 2.7 cm   suspicious for recurrent disease. This encases the carotid distally   and the distal carotid stenosis may represent invasion. Comparison   studies would be necessary to distinguish recurrent neoplasm from   previous radiation therapy. Tumor invasion versus osteonecrosis of the medial right mandible         Chest x-ray February 16, 2020       Endotracheal tube terminates above the jovita.       Stable opacities in the lower lobes. Consider infectious/inflammatory   etiologies or edema. Follow-up to resolution.                   Chest x-ray 2/20/2020: Impression:     Stable abnormal chest with the diffuse bilateral infiltrates and  pleural effusions likely diffuse edema or pneumonia. BRIEF SUMMARY OF INITIAL CONSULT:    Briefly, Mr. Sara Rodriguez is a 75-year-old gentleman with a significant past medical history of hypertension, laryngeal cancer s/p laryngectomy in 2005 and chemo and radiation, history of CVA with baseline left sided weakness and some slurred speech, current smoker, and history of alcohol abuse who presented to the ED on 2/5/20 for fatigue. Patient was found to be extremely hypotensive at 50/palpated, tachycardic and fatigued in the ED. He was given fluid bolus with BP improvement, was found to have YVETTE (baseline unknown) with a creatinine of 4.1. Patients creatinine today is 3.0, reason for this consult. Problems resolved:  · Hypernatremia, with water deficit; calculated free water deficit is 1.8 L. Resolved. · YVETTE stage III on CKD;  volume responsive prerenal YVETTE due to poor oral intake in the setting of ACE inhibition, fraction excretion of sodium 0.7%, fractional secretion urea 20.9%. Resolved.   · Hypernatremia, with water deficit, resolved

## 2020-02-20 NOTE — PROGRESS NOTES
mL  5 mL Per G Tube Nightly Roxanne Shields MD   5 mL at 02/19/20 2040    sodium chloride flush 0.9 % injection 10 mL  10 mL Intravenous PRN Roxanne Shields MD   10 mL at 02/18/20 1549    heparin flush 100 UNIT/ML injection 300 Units  3 mL Intravenous 2 times per day Roxanne Shields MD   300 Units at 02/20/20 0941    heparin flush 100 UNIT/ML injection 300 Units  3 mL Intracatheter PRN Roxanne Shields MD       [facility] [place] by provider] heparin (porcine) injection 5,000 Units  5,000 Units Subcutaneous 3 times per day Roxanne Shields MD   Stopped at 02/18/20 0808    acetaminophen (TYLENOL) 160 MG/5ML solution 650 mg  650 mg PEG Tube Q4H PRN Sharif Hsu MD   650 mg at 02/19/20 1643    chlorhexidine (PERIDEX) 0.12 % solution 15 mL  15 mL Mouth/Throat BID Roxanne Shields MD   15 mL at 67/78/75 7779    folic acid (FOLVITE) tablet 1 mg  1 mg PEG Tube Daily Janiya Stahl Jr., DO   1 mg at 02/20/20 6815    lansoprazole suspension SUSP 30 mg  30 mg Per G Tube QAM  Janiya Stahl Jr., DO   30 mg at 02/20/20 4271    vitamin B-1 (THIAMINE) tablet 100 mg  100 mg PEG Tube Daily [facility] DO   100 mg at 02/20/20 9794    [Held by provider] aspirin chewable tablet 81 mg  81 mg Per G Tube Daily Joel Freeman MD   81 mg at 02/14/20 1009    hydrALAZINE (APRESOLINE) tablet 25 mg  25 mg PEG Tube 3 times per day Florencia Johnson MD   25 mg at 02/20/20 1452    metoprolol tartrate (LOPRESSOR) tablet 50 mg  50 mg PEG Tube BID Saroj Schumacher MD   50 mg at 02/20/20 0942    glucose (GLUTOSE) 40 % oral gel 15 g  15 g Oral PRN Sally Menezes B Capal, DO        dextrose 50 % IV solution  12.5 g Intravenous PRN Liu Rhodes, DO        glucagon (rDNA) injection 1 mg  1 mg Intramuscular PRN Sally Menezes B Capal, DO        dextrose 5 % solution  100 mL/hr Intravenous PRN Liu Mew, DO        sodium chloride (Inhalant) 3 % nebulizer solution 4 mL  4 mL Nebulization BID Saqib CASTILLO Capal, DO   4 mL at 02/20/20 0896    labetalol (NORMODYNE;TRANDATE) injection 10 mg  10 mg Intravenous Q6H PRN Desirae CASTILLO Capal, DO   10 mg at 02/20/20 1302    sodium chloride flush 0.9 % injection 10 mL  10 mL Intravenous 2 times per day Desirae CASTILLO Capal, DO   10 mL at 02/20/20 1541    magnesium hydroxide (MILK OF MAGNESIA) 400 MG/5ML suspension 30 mL  30 mL Oral Daily PRN Saqib CASTILLO Capal, DO           Objective:     BP (!) 166/107   Pulse 108   Temp 98.9 °F (37.2 °C) (Axillary)   Resp 27   Ht 5' 4\" (1.626 m)   Wt 143 lb 4.8 oz (65 kg)   SpO2 100%   BMI 24.60 kg/m²     General appearance: opens eyes to pain and voice, appears stated age, and no obvious distress  Head: normocephalic, without obvious abnormality, atraumatic  Eyes: conjunctivae/corneas clear.  .  Neck:  symmetrical, trachea midline  Lungs: Clear to auscultation bilaterally, unlabored on vent  Heart: regular rate and rhythm, SR-ST on tele   Extremities: normal, atraumatic, no cyanosis, generalized edema  Pulses: 2+ and symmetric  Skin: color, texture, turgor normal---no rashes or lesions      Mental Status: Intubated, not on sedation, not following commands     Exam limited due to current condition   I: smell    II: visual acuity     II: visual fields Blink to threat on L; not on R   II: pupils 2-3 mm, reactive   III,VII: ptosis    III,IV,VI: extraocular muscles  L gaze preference noted, Does not track    V: mastication    V: facial light touch sensation  No response to LT   V,VII: corneal reflex  Present   VII: facial muscle function - upper     VII: facial muscle function - lower L lower facial droop-- could be positional    VIII: hearing Opens eyes to loud voice   IX: soft palate elevation     IX,X: gag reflex Present   XI: trapezius strength     XI: sternocleidomastoid strength    XI: neck extension strength     XII: tongue strength       Motor/Sensory:  Withdraws to pain in BLE and RUE on exam for me today     DTR:   +2 throughout     B/l Babinskis    No pathological IRON 20 02/15/2020     US DUP LOWER EXTREMITIES BILATERAL VENOUS   Final Result   Negative for deep venous thrombosis bilateral lower extremity. XR CHEST PORTABLE   Final Result   Stable abnormal chest with the diffuse bilateral infiltrates and   pleural effusions likely diffuse edema or pneumonia. XR CHEST PORTABLE   Final Result   Stable CHF. XR CHEST PORTABLE   Final Result   Improving CHF with decreasing edema. CT HEAD WO CONTRAST   Final Result    Low-attenuation focus in the left occipital lobe which most likely   represents a subacute infarct. See above. XR CHEST 1 VW   Final Result      1. New left PICC line catheter tip at the brachycephalic vein, no   pneumothorax. 2. Slight worsening of dense consolidating bilateral pneumonias. XR CHEST PORTABLE   Final Result   Development of significant bilateral pulmonary infiltrates               XR CHEST PORTABLE   Final Result      Endotracheal tube terminates above the jovita. Stable opacities in the lower lobes. Consider infectious/inflammatory   etiologies or edema. Follow-up to resolution. US HEAD NECK SOFT TISSUE THYROID   Final Result   Ill-defined, heterogeneous, abnormal appearing soft tissue in the   region of interest.   No specific clinical question is asked. Ultrasound is not the   appropriate modality for this evaluation. Contrast-enhanced MRI may provide more information. Correlation with clinical and surgical history is needed. XR CHEST PORTABLE   Final Result      Stable opacities at left lung base. Continued follow-up could be   helpful for further evaluation. FL MODIFIED BARIUM SWALLOW W VIDEO   Final Result   Study is remarkable for silent aspiration with nectar   consistency. This procedure was performed and dictated by Ana Conte PA-C with   indirect supervision, and Ijeoma Hanna  [facility] MD reviewed and concurred with   the BRAIN WO CONTRAST    (Results Pending)   MRA HEAD WO CONTRAST    (Results Pending)     Echo 2/16:   Trileaflet aortic valve. Mild leaflet calcification with more pronounced  calcification on the non coronary cusp. Normal leaflet mobility. No aortic stenosis. No aortic regurgitation. Concentric hypertrophy though in some views the walls are up to 1.4 cm thick. Normal left ventricular systolic function. Visually estimated LVEF is 60 %. Normal diastolic function. Normal left atrial pressure. Mildly dilated right ventricle. Difficult to visualize the free wall in all views. Small anterior and apical effusion. No signs of tamponade. EEG 2/19: This EEG shows moderate diffuse encephalopathy. No epileptiform activities or lateralizing signs are seen. All labs and imaging reviewed independently today. Assessment:     Anoxic and metabolic encephalopathy--PEA arrest on 2/16   CT head completed 2/17 showed L occipital  lobe hypo attentuation likely subacute stroke   EEG is consistent with moderate diffuse  encephalopathy; no seizure activity noted. Acute metabolic encephalopathy due to PEA arrest and hypoxia, YVETTE, hypocalcemia, hypokalemia, leukocytosis, severe anemia, elevated BNP 10890 and elevated troponins. His examination remains unchanged.    Plan:     MRI Brain/MRAs     Continue supportive care     Continue aspirin     No statin as low LDL without statin at home    Continue PT/OT as able    SCDs    Continue telemetry     Franca Fairchild PA-C  4:17 PM  2/20/2020

## 2020-02-20 NOTE — PROGRESS NOTES
Physical Therapy    Facility/Department: [address]    NAME: Toya Herzog  : 1955  MRN: [id number]    Date of Service: 2020  Chart reviewed. Spoke with RN regarding pt's status. Pt does not follow commands nor has followed commands for multiple days and attempts. Will discontinue skilled PT at this time.      Mi Fields, PT, DPT  [id number]

## 2020-02-21 ENCOUNTER — APPOINTMENT (OUTPATIENT)
Dept: GENERAL RADIOLOGY | Age: 65
DRG: 870 | End: 2020-02-21
Payer: MEDICARE

## 2020-02-21 LAB
AADO2: 109.8 MMHG
ALBUMIN SERPL-MCNC: 2.1 G/DL (ref 3.5–5.2)
ALP BLD-CCNC: 65 U/L (ref 40–129)
ALT SERPL-CCNC: 12 U/L (ref 0–40)
ANION GAP SERPL CALCULATED.3IONS-SCNC: 8 MMOL/L (ref 7–16)
AST SERPL-CCNC: 33 U/L (ref 0–39)
B.E.: 7.6 MMOL/L (ref -3–3)
BASOPHILS ABSOLUTE: 0.04 E9/L (ref 0–0.2)
BASOPHILS RELATIVE PERCENT: 0.4 % (ref 0–2)
BILIRUB SERPL-MCNC: 0.7 MG/DL (ref 0–1.2)
BUN BLDV-MCNC: 31 MG/DL (ref 8–23)
CALCIUM IONIZED: 1.17 MMOL/L (ref 1.15–1.33)
CALCIUM SERPL-MCNC: 7.9 MG/DL (ref 8.6–10.2)
CHLORIDE BLD-SCNC: 98 MMOL/L (ref 98–107)
CO2: 32 MMOL/L (ref 22–29)
COHB: 0.6 % (ref 0–1.5)
CREAT SERPL-MCNC: 1.4 MG/DL (ref 0.7–1.2)
CRITICAL: ABNORMAL
DATE ANALYZED: ABNORMAL
DATE OF COLLECTION: ABNORMAL
EOSINOPHILS ABSOLUTE: 0.04 E9/L (ref 0.05–0.5)
EOSINOPHILS RELATIVE PERCENT: 0.4 % (ref 0–6)
FIO2: 40 %
GFR AFRICAN AMERICAN: >60
GFR NON-AFRICAN AMERICAN: >60 ML/MIN/1.73
GLUCOSE BLD-MCNC: 124 MG/DL (ref 74–99)
HCO3: 31.1 MMOL/L (ref 22–26)
HCT VFR BLD CALC: 24.9 % (ref 37–54)
HEMOGLOBIN: 8.1 G/DL (ref 12.5–16.5)
HHB: 2 % (ref 0–5)
IMMATURE GRANULOCYTES #: 0.1 E9/L
IMMATURE GRANULOCYTES %: 1 % (ref 0–5)
LAB: ABNORMAL
LYMPHOCYTES ABSOLUTE: 0.9 E9/L (ref 1.5–4)
LYMPHOCYTES RELATIVE PERCENT: 8.8 % (ref 20–42)
Lab: ABNORMAL
MAGNESIUM: 1.6 MG/DL (ref 1.6–2.6)
MCH RBC QN AUTO: 29.9 PG (ref 26–35)
MCHC RBC AUTO-ENTMCNC: 32.5 % (ref 32–34.5)
MCV RBC AUTO: 91.9 FL (ref 80–99.9)
METER GLUCOSE: 119 MG/DL (ref 74–99)
METER GLUCOSE: 125 MG/DL (ref 74–99)
METER GLUCOSE: 136 MG/DL (ref 74–99)
METHB: 0.2 % (ref 0–1.5)
MODE: ABNORMAL
MONOCYTES ABSOLUTE: 1.22 E9/L (ref 0.1–0.95)
MONOCYTES RELATIVE PERCENT: 11.9 % (ref 2–12)
NEUTROPHILS ABSOLUTE: 7.94 E9/L (ref 1.8–7.3)
NEUTROPHILS RELATIVE PERCENT: 77.5 % (ref 43–80)
O2 CONTENT: 12.7 ML/DL
O2 SATURATION: 98 % (ref 92–98.5)
O2HB: 97.2 % (ref 94–97)
OPERATOR ID: ABNORMAL
PATIENT TEMP: 37 C
PCO2: 39.1 MMHG (ref 35–45)
PDW BLD-RTO: 15.1 FL (ref 11.5–15)
PEEP/CPAP: 5 CMH2O
PFO2: 3.01 MMHG/%
PH BLOOD GAS: 7.52 (ref 7.35–7.45)
PHOSPHORUS: 3.6 MG/DL (ref 2.5–4.5)
PLATELET # BLD: 118 E9/L (ref 130–450)
PMV BLD AUTO: 11.5 FL (ref 7–12)
PO2: 120.4 MMHG (ref 75–100)
POTASSIUM SERPL-SCNC: 3.3 MMOL/L (ref 3.5–5)
PROCALCITONIN: 6.53 NG/ML (ref 0–0.08)
PS: 12 CMH20
RBC # BLD: 2.71 E12/L (ref 3.8–5.8)
RI(T): 0.91
SODIUM BLD-SCNC: 138 MMOL/L (ref 132–146)
SOURCE, BLOOD GAS: ABNORMAL
THB: 9.1 G/DL (ref 11.5–16.5)
TIME ANALYZED: 516
TOTAL PROTEIN: 6.2 G/DL (ref 6.4–8.3)
WBC # BLD: 10.2 E9/L (ref 4.5–11.5)

## 2020-02-21 PROCEDURE — 94640 AIRWAY INHALATION TREATMENT: CPT

## 2020-02-21 PROCEDURE — 6360000002 HC RX W HCPCS: Performed by: STUDENT IN AN ORGANIZED HEALTH CARE EDUCATION/TRAINING PROGRAM

## 2020-02-21 PROCEDURE — 99291 CRITICAL CARE FIRST HOUR: CPT | Performed by: SURGERY

## 2020-02-21 PROCEDURE — 71045 X-RAY EXAM CHEST 1 VIEW: CPT

## 2020-02-21 PROCEDURE — 94669 MECHANICAL CHEST WALL OSCILL: CPT

## 2020-02-21 PROCEDURE — 2580000003 HC RX 258: Performed by: INTERNAL MEDICINE

## 2020-02-21 PROCEDURE — 80053 COMPREHEN METABOLIC PANEL: CPT

## 2020-02-21 PROCEDURE — 36415 COLL VENOUS BLD VENIPUNCTURE: CPT

## 2020-02-21 PROCEDURE — 84100 ASSAY OF PHOSPHORUS: CPT

## 2020-02-21 PROCEDURE — 2580000003 HC RX 258: Performed by: STUDENT IN AN ORGANIZED HEALTH CARE EDUCATION/TRAINING PROGRAM

## 2020-02-21 PROCEDURE — 85025 COMPLETE CBC W/AUTO DIFF WBC: CPT

## 2020-02-21 PROCEDURE — 83735 ASSAY OF MAGNESIUM: CPT

## 2020-02-21 PROCEDURE — 6360000002 HC RX W HCPCS: Performed by: INTERNAL MEDICINE

## 2020-02-21 PROCEDURE — 99233 SBSQ HOSP IP/OBS HIGH 50: CPT | Performed by: NURSE PRACTITIONER

## 2020-02-21 PROCEDURE — 99232 SBSQ HOSP IP/OBS MODERATE 35: CPT | Performed by: NURSE PRACTITIONER

## 2020-02-21 PROCEDURE — 6370000000 HC RX 637 (ALT 250 FOR IP): Performed by: INTERNAL MEDICINE

## 2020-02-21 PROCEDURE — 94003 VENT MGMT INPAT SUBQ DAY: CPT

## 2020-02-21 PROCEDURE — 2000000000 HC ICU R&B

## 2020-02-21 PROCEDURE — 82330 ASSAY OF CALCIUM: CPT

## 2020-02-21 PROCEDURE — 99231 SBSQ HOSP IP/OBS SF/LOW 25: CPT | Performed by: INTERNAL MEDICINE

## 2020-02-21 PROCEDURE — 82805 BLOOD GASES W/O2 SATURATION: CPT

## 2020-02-21 PROCEDURE — 82962 GLUCOSE BLOOD TEST: CPT

## 2020-02-21 PROCEDURE — 6370000000 HC RX 637 (ALT 250 FOR IP): Performed by: STUDENT IN AN ORGANIZED HEALTH CARE EDUCATION/TRAINING PROGRAM

## 2020-02-21 PROCEDURE — 84145 PROCALCITONIN (PCT): CPT

## 2020-02-21 RX ORDER — AMLODIPINE BESYLATE 10 MG/1
10 TABLET ORAL DAILY
Status: DISCONTINUED | OUTPATIENT
Start: 2020-02-22 | End: 2020-02-29

## 2020-02-21 RX ORDER — MAGNESIUM SULFATE IN WATER 40 MG/ML
4 INJECTION, SOLUTION INTRAVENOUS ONCE
Status: COMPLETED | OUTPATIENT
Start: 2020-02-21 | End: 2020-02-21

## 2020-02-21 RX ADMIN — CHLORHEXIDINE GLUCONATE 0.12% ORAL RINSE 15 ML: 1.2 LIQUID ORAL at 20:15

## 2020-02-21 RX ADMIN — POTASSIUM BICARBONATE 40 MEQ: 782 TABLET, EFFERVESCENT ORAL at 09:07

## 2020-02-21 RX ADMIN — METOPROLOL TARTRATE 50 MG: 50 TABLET, FILM COATED ORAL at 20:14

## 2020-02-21 RX ADMIN — PIPERACILLIN AND TAZOBACTAM 3.38 G: 3; .375 INJECTION, POWDER, LYOPHILIZED, FOR SOLUTION INTRAVENOUS at 13:58

## 2020-02-21 RX ADMIN — Medication 10 ML: at 20:14

## 2020-02-21 RX ADMIN — MAGNESIUM SULFATE 4 G: 4 INJECTION INTRAVENOUS at 09:07

## 2020-02-21 RX ADMIN — HYDRALAZINE HYDROCHLORIDE 25 MG: 25 TABLET, FILM COATED ORAL at 06:51

## 2020-02-21 RX ADMIN — SENNOSIDES 5 ML: 8.8 LIQUID ORAL at 20:15

## 2020-02-21 RX ADMIN — ALBUTEROL SULFATE 2.5 MG: 2.5 SOLUTION RESPIRATORY (INHALATION) at 05:45

## 2020-02-21 RX ADMIN — PIPERACILLIN AND TAZOBACTAM 3.38 G: 3; .375 INJECTION, POWDER, LYOPHILIZED, FOR SOLUTION INTRAVENOUS at 19:14

## 2020-02-21 RX ADMIN — FOLIC ACID 1 MG: 1 TABLET ORAL at 09:14

## 2020-02-21 RX ADMIN — Medication 10 ML: at 09:15

## 2020-02-21 RX ADMIN — ALBUTEROL SULFATE 2.5 MG: 2.5 SOLUTION RESPIRATORY (INHALATION) at 19:44

## 2020-02-21 RX ADMIN — Medication 100 MG: at 09:07

## 2020-02-21 RX ADMIN — PIPERACILLIN AND TAZOBACTAM 3.38 G: 3; .375 INJECTION, POWDER, LYOPHILIZED, FOR SOLUTION INTRAVENOUS at 02:40

## 2020-02-21 RX ADMIN — CHLORHEXIDINE GLUCONATE 0.12% ORAL RINSE 15 ML: 1.2 LIQUID ORAL at 20:14

## 2020-02-21 RX ADMIN — SODIUM CHLORIDE SOLN NEBU 3% 4 ML: 3 NEBU SOLN at 05:45

## 2020-02-21 RX ADMIN — ALBUTEROL SULFATE 2.5 MG: 2.5 SOLUTION RESPIRATORY (INHALATION) at 11:32

## 2020-02-21 RX ADMIN — SODIUM CHLORIDE, PRESERVATIVE FREE 300 UNITS: 5 INJECTION INTRAVENOUS at 09:14

## 2020-02-21 RX ADMIN — ALBUTEROL SULFATE 2.5 MG: 2.5 SOLUTION RESPIRATORY (INHALATION) at 16:07

## 2020-02-21 RX ADMIN — CHLORHEXIDINE GLUCONATE 0.12% ORAL RINSE 15 ML: 1.2 LIQUID ORAL at 09:14

## 2020-02-21 RX ADMIN — SODIUM CHLORIDE SOLN NEBU 3% 4 ML: 3 NEBU SOLN at 20:05

## 2020-02-21 RX ADMIN — HYDRALAZINE HYDROCHLORIDE 25 MG: 25 TABLET, FILM COATED ORAL at 23:10

## 2020-02-21 RX ADMIN — AMLODIPINE BESYLATE 5 MG: 5 TABLET ORAL at 09:10

## 2020-02-21 RX ADMIN — METOPROLOL TARTRATE 50 MG: 50 TABLET, FILM COATED ORAL at 09:07

## 2020-02-21 RX ADMIN — SODIUM CHLORIDE, PRESERVATIVE FREE 300 UNITS: 5 INJECTION INTRAVENOUS at 20:15

## 2020-02-21 RX ADMIN — DOCUSATE SODIUM 100 MG: 50 LIQUID ORAL at 09:14

## 2020-02-21 RX ADMIN — HYDRALAZINE HYDROCHLORIDE 25 MG: 25 TABLET, FILM COATED ORAL at 14:01

## 2020-02-21 ASSESSMENT — PULMONARY FUNCTION TESTS
PIF_VALUE: 50
PIF_VALUE: 18
PIF_VALUE: 18
PIF_VALUE: 28
PIF_VALUE: 21
PIF_VALUE: 19
PIF_VALUE: 21
PIF_VALUE: 23
PIF_VALUE: 17
PIF_VALUE: 23
PIF_VALUE: 18
PIF_VALUE: 21
PIF_VALUE: 18
PIF_VALUE: 18
PIF_VALUE: 17
PIF_VALUE: 23
PIF_VALUE: 17
PIF_VALUE: 44
PIF_VALUE: 26
PIF_VALUE: 24
PIF_VALUE: 28
PIF_VALUE: 18
PIF_VALUE: 17
PIF_VALUE: 18
PIF_VALUE: 23

## 2020-02-21 ASSESSMENT — PAIN SCALES - GENERAL
PAINLEVEL_OUTOF10: 0

## 2020-02-21 NOTE — PROGRESS NOTES
Palliative Care Department  Palliative Care Progress Note  Provider: Allen LUNSFORD    Hospital Day: 16    Referring Provider:  Sulema Ashraf MD    Reason for Consult:  [x]  Code status Discussion  [x]  Assist with goals of care  [x]  Psychosocial support  []  Symptom Management  []  Advanced Care Planning    Chief Complaint: Betzaida Willis is a 59 y.o. male with chief complaint of fatigue, productive cough, chills, myalgias      Active Hospital Problems    Diagnosis Date Noted    Acute upper respiratory infection [J06.9]     Elevated troponin [R79.89]     Acute ischemic stroke ([facility] [I63.9]     Anoxic brain damage ([facility] [facility] [G93.1]     Severe protein-calorie malnutrition ([facility] [facility] [E43] 02/11/2020    Bacteremia [R78.81] 02/08/2020    Other dysphagia [R13.19] 02/08/2020    Acute renal failure (ARF) ([facility] [address] [N17.9] 02/05/2020           Palliative Care Encounter/Recommendations:      - Goals of care: support for family/caregiver, continue current management and to be determined     - Code Status: full code      Subjective:     HPI:  Betzaida Willis is a 59 y.o. male with significant past medical history of laryngeal cancer, chemotherapy/radiation, current tobacco and alcohol use, HTN, hip fracture history of CVA, seizure history, fall history who presented to the ED on 2/5/2020 with fatigue, productive cough, myalgias. The patient lives in [place] and came up to [place] in November 2019 with his nephew to visit his sister. He was independent prior to admission and lived independently in [place]. The patient was hypotensive and tachycardic per EMS, given fluid resuscitation with improvement in blood pressure and no improvement in heart rate. Patient continued to smoke and use ETOH. His sister reported that the patient had increased weakness and gait disturbance over the past couple of days. Patient had left sided weakness and slurred speech per baseline s/p CVA history.   The patient had a laryngectomy in 2005 with chemotherapy and radiation. At his sister's residence, his appetite began to decline and he also had a fall on 2/4/2020 prior to admission. His last drink of alcohol was reported to be on 2/3/2020. He was admitted with YVETTE, SIRS, hypomagnesemia, oral cancer, and elevated troponin level. Patient was started on IVATB, IV hydration, and seizure precautions. Further imaging of the head and neck indicated a soft tissue mass, questionable recurrence of disease. PT/OT/ST were consulted to evaluate and treat. Patient was found to have severe oropharyngeal dysphagia, absent swallow with pudding consistency and it was recommended that patient be strictly NPO. On 2/12/2020 a PEG was placed and patient was started on enteral feedings. On the morning of 2/16/2020 patient was found to be unresponsive and bradycardic in the 20's and went into PEA x 3 with ROSC x 3. He received multiple doses of epinephrine and an 1 amp bicarb. Patient was a difficult intubation due and anesthesia was consulted. Subjective/Events/Discussions:  2/17/2020  Met with patient at the bedside who is on mechanical ventilation status post PEA on 2/16/2020. He is unable to make his needs known, is unresponsive other than to painful stimuli. He is not on sedation. Met with his sister aTnia Hinton in the waiting area and Palliative Medicine introduced. Discussed goals of care and code status; FULL, DNR-CCA, and DNR-CC. Reviewed patient's current medical status with Tania Hinton. She is aware that the patient is unresponsive, other than to painful stimuli, on mechanical ventilation without sedation. Tania Hinton would like to delay further decision making until she can speak with Neurology and the other siblings. Patient has never been  and does not have any children per his sister. There are not any advanced directive documents.   Tania Hinton states that even when her brother was going through his cancer treatment, she was 2/20/2020  Within the posterior medial left occipital lobe, in the location where   a low-density lesion was seen on the recent CT scans, a region of   markedly increased signal intensity on the diffusion weighted sequence   measures approximately 2.7 x 2.7 cm. This is compatible with an acute   to subacute infarct.       There is enlargement of the ventricles, sulci and cisterns   bilaterally. There is patchy T2 hyperintensity in the periventricular   deep white matter bilaterally. There is diffuse severe mucosal   thickening within the paranasal sinuses but no fluid levels are   evident.       Otherwise the brain parenchyma, CSF spaces, paranasal sinuses and   mastoid air cells and surrounding soft tissue and osseous structures   have a satisfactory appearance.           Impression       2.7 cm focus of acute subacute ischemia within the posterior medial   left occipital lobe as demonstrated on CT scans of February 17, 2020.       Underlying cortical atrophy and chronic periventricular   microangiopathy. EEG 2/6/2020  General Impression  This EEG shows a mild diffuse encephalopathy.  No epileptiform   activities or lateralizing signs are seen. US Retroperitoneal 2/7/2020  Probable bilateral medical renal disease with some perinephric fluid   bilaterally that could indicate inflammation.       There is no evidence of calcification or obstruction in either kidney.       The bladder was not evaluated due to a decompressing Sanchez catheter. CT soft tissue neck W/WO contrast 2/9/2020  Probable prior right radical neck dissection with sequela   of prior surgery and radiation therapy.  There is extensive soft tissue   distortion which is suggestive of previous therapy   Finds compatible with a approximately 1 cm right thyroid nodule   There is evidence to suggest severe stenosis of the right distal   internal carotid artery   Soft tissue mass at the level the carotid bifurcation on the right   with outward ensure accuracy; however, inadvertent computerized transcription errors may be present.

## 2020-02-21 NOTE — PROGRESS NOTES
with water deficit, resolved   · Severe thrombocytopenia, resolved     IMPRESSION/RECOMMENDATIONS:      1. YVETTE on CKD, likely ischemic ATN s/p PEA cardiac arrest on 2/15/20, FeNa 0.62% however renal function did not improve with volume repletion. ·  Cr increased to 2.0 mg/dL today, remains non oliguric. 2. CKD stage I-II, with minimal proteinuria (urine albumin/creatinine: 81 mg/gr). Kidney ultrasound with increased renal cortical echogenicity. Probably due to nephrosclerosis. Renal function worse s/p cardiac arrest.    3. Hypomagnesemia likely 2/2 poor oral intake, improved  4. Hypokalemia likely 2/2 to poor oral intake, improved    5. Status post PEA arrest 2/15/20  6. Respiratory failure status post intubation  7. Alkalemia (pH: 4.814) with metabolic alkalosis  8. HTN, on hydralazine, metoprolol. 9. Group G Streptococcus bacteremia, on amoxicillin-clavulanate  10. Normocytic Anemia, Acute blood loss? .  Iron studies consistent with chronic inflammation, normal B12 and folate levels. S/p 2 units PRBCs today. 11. Nutrition, s/p PEG, TF at 50cc/hr with FW 75 mL/hr      Plan:    1. Renal function remains stable with adequate urine output  2. Chest x-ray improved with one dose of Bumex yesterday  3. Continue with tube feed and free water flushes  4. K and magnesium replaced  5.  For possible trach

## 2020-02-21 NOTE — PROGRESS NOTES
[facility]  Internal Medicine Residency Program  Progress Note - House Team 1    Patient:  Daniela Gold 59 y.o. male   MRN: [id number]       Date of Service: 2020    Allergy: Patient has no known allergies. Subjective      I saw and examined the patient in the AM today. Patient was intubated, he is withdrawing to painful stimuli, opens his eyes to voice. Updated H+P:  Patient admitted on 2020 due to fatigue, unable to eat and had an episode of fall. Found to be hypotensive, thrombocytopenic (platelet 84,095) and YVETTE. During the course of his admission, he was found to have blood cultures positive for beta-hemolytic group B streptococcus (susceptible to ampicillin). His course was also complicated by alcohol withdrawal, YVETTE stage III on CKD, hypokalemia, and MSSA pneumonia. He, at 1 point, was on doxycycline and ceftriaxone. It was changed to piperacillin-tazobactam and eventually to nafcillin. Nafcillin was changed to ampicillin sulbactam on 2020. This was subsequently changed to amoxicillin- clavulanic acid per ID. He received 1 unit blood transfusion on 2020 due to decrease in hemoglobin from 8-6.7. His platelets recovered to >100,000 during the course of the admission. In the a.m. of 2020, he had a PEA arrest.  After multiple rounds of ACLS, ROSC was achieved. Patient was transferred to the surgical ICU the same day. Objective     TEMPERATURE:  Current - Temp: 99.5 °F (37.5 °C);  Max - Temp  Av.1 °F (37.3 °C)  Min: 98.9 °F (37.2 °C)  Max: 99.5 °F (37.5 °C)  RESPIRATIONS RANGE: Resp  Av.4  Min: 15  Max: 29  PULSE RANGE: Pulse  Av.7  Min: 93  Max: 112  BLOOD PRESSURE RANGE:  Systolic (85UQK), YT , Min:148 , MBZ:232   ; Diastolic (52KRO), YRB:874, Min:96, Max:121    PULSE OXIMETRY RANGE: SpO2  Av.4 %  Min: 98 %  Max: 100 %    I & O - 24hr:    Intake/Output Summary (Last 24 hours) at 2020 6148  Last data filed at BMP:    Recent Labs     20  0435 20  0420 20  0500    140 138   K 3.3* 3.2* 3.3*    100 98   CO2 27 28 32*   BUN 37* 35* 31*   CREATININE 1.6* 1.6* 1.4*   GLUCOSE 126* 130* 124*       LIVER PROFILE:   Recent Labs     20  0435 20  0420 20  0500   AST 32 31 33   ALT 18 15 12   BILITOT 0.8 0.8 0.7   ALKPHOS 61 61 65       PT/INR:   No results for input(s): PROTIME, INR in the last 72 hours. APTT:   No results for input(s): APTT in the last 72 hours. Fasting Lipid Panel:    Lab Results   Component Value Date    HDL 24 2020       Notable Cultures:      Blood cultures   Blood Culture, Routine   Date Value Ref Range Status   2020 24 Hours- no growth  Preliminary     Respiratory cultures No results found for: RESPCULTURE No results found for: LABGRAM  Urine   Urine Culture, Routine   Date Value Ref Range Status   2020 <10,000 CFU/mL  Mixed gram positive organisms    Final     Legionella No results found for: LABLEGI  C Diff PCR No results found for: CDIFPCR  Wound culture/abscess: No results for input(s): WNDABS in the last 72 hours. Tip culture:No results for input(s): CXCATHTIP in the last 72 hours. Xr Chest Standard (2 Vw)    Result Date: 2020  Patient MRN: [id number] : 1955 Age:  59 years Gender: Male Order Date: 2020 10:15 AM Exam: XR CHEST (2 VW) Number of Images: 2 views Indication:   Sepsis, sob Sepsis, sob Comparison: None. Findings: The heart is unremarkable The lung fields demonstrate no significant pulmonary vascular congestion and edema.  The aorta is tortuous ectatic There are findings throughout the lung fields which are likely chronic     Findings compatible with atherosclerotic disease No acute infiltrate     Xr Foot Left (min 3 Views)    Result Date: 2020  Patient MRN:  [id number] : 1955 Age: 59 years Gender: Male Order Date:  2020 2:45 PM EXAM: XR FOOT LEFT (MIN 3 VIEWS) NUMBER OF IMAGES:  3 recurrent neoplasm. This measures approximately 2.7 x 2.3 cm. This could also be related to previous therapy. Comparison studies would be necessary. There is a mottled eaten appearance to the medial aspect of the right mandible. This could be related to tumor invasion or osteonecrosis. Scattered lymph nodes are visualized which do not meet the CT criteria for adenopathy. There is mixed density in the right thyroid possibly representing a thyroid nodule measuring approximately 1 cm. There is density scattered throughout the sinuses possibly related to mild chronic sinusitis. There is likely a right sphenoid retention cyst     Probable prior right radical neck dissection with sequela of prior surgery and radiation therapy. There is extensive soft tissue distortion which is suggestive of previous therapy Finds compatible with a approximately 1 cm right thyroid nodule There is evidence to suggest severe stenosis of the right distal internal carotid artery Soft tissue mass at the level the carotid bifurcation on the right with outward mass effect measuring approximately 2.3 x 2.7 cm suspicious for recurrent disease. This encases the carotid distally and the distal carotid stenosis may represent invasion. Comparison studies would be necessary to distinguish recurrent neoplasm from previous radiation therapy. Tumor invasion versus osteonecrosis of the medial right mandible ALERT:  THIS IS AN ABNORMAL REPORT      Us Head Neck Soft Tissue Thyroid    Result Date: 2020  Patient MRN:  [id number] : 1955 Age: 59 years Gender: Male Order Date:  2020 2:15 PM Exam: US HEAD NECK SOFT TISSUE THYROID Number of images:  26 Indication:  right carotid bifurcation mass With special focus to soft tissue mass at the right carotid bifurcation. right carotid bifurcation mass Comparison: Correlation with CT dated 2020.  Technique: Grayscale and color Doppler ultrasound images of the region of interest were obtained by the : 1955 Age:  59 years Gender: Male Order Date: 2020 7:45 AM Exam: FL MODIFIED BARIUM SWALLOW W VIDEO Number of Images: views Indication:   dysphagia dysphagia Comparison: None. Findings: Patient was given nectar and pudding patient is unable to swallow with no gag reflex. Therefore patient was suctioned and the study was canceled.      Patient unable to swallow nectar and pudding which was suctioned         Medications     Current Meds:  Current Facility-Administered Medications   Medication Dose Route Frequency Provider Last Rate Last Dose    magnesium sulfate 4 g in 100 mL IVPB premix  4 g Intravenous Once Alex Garcia MD 25 mL/hr at 20 0907 4 g at 20 0907    albuterol (PROVENTIL) nebulizer solution 2.5 mg  2.5 mg Nebulization Q4H While awake Alex Garcia MD   2.5 mg at 20 0545    piperacillin-tazobactam (ZOSYN) 3.375 g in dextrose 5 % 100 mL IVPB extended infusion (mini-bag)  3.375 g Intravenous Q8H Bernadine Soria MD   Stopped at 20 0640    amLODIPine (NORVASC) tablet 5 mg  5 mg PEG Tube Daily Alex Garcia MD   5 mg at 20 4639    docusate sodium (COLACE) 150 MG/15ML liquid 100 mg  100 mg Per G Tube Daily Alex Garcia MD   100 mg at 20 0942    sennosides (SENOKOT) 8.8 MG/5ML syrup 5 mL  5 mL Per G Tube Nightly Alex Garcia MD   5 mL at 20 2047    sodium chloride flush 0.9 % injection 10 mL  10 mL Intravenous PRN Alex Garcia MD   10 mL at 20 1549    heparin flush 100 UNIT/ML injection 300 Units  3 mL Intravenous 2 times per day Alex Garcia MD   300 Units at 20 2043    heparin flush 100 UNIT/ML injection 300 Units  3 mL Intracatheter PRN Alex Garcia MD       [facility] [place] by provider] heparin (porcine) injection 5,000 Units  5,000 Units Subcutaneous 3 times per day Alex Garcia MD   Stopped at 20 0808    acetaminophen (TYLENOL) 160 MG/5ML solution 650 mg  650 mg PEG Tube Q4H PRN Leda Jarvis MD   650 mg at 02/19/20 1643    chlorhexidine (PERIDEX) 0.12 % solution 15 mL  15 mL Mouth/Throat BID Alex Garcia MD   15 mL at 62/20/01 6605    folic acid (FOLVITE) tablet 1 mg  1 mg PEG Tube Daily Sophia Dave., DO   1 mg at 02/20/20 3374    lansoprazole suspension SUSP 30 mg  30 mg Per G Tube QAM AC Sophia Dave., DO   30 mg at 02/20/20 1806    vitamin B-1 (THIAMINE) tablet 100 mg  100 mg PEG Tube Daily Brayden Hamilton Jr., DO   100 mg at 02/21/20 3723    [Held by provider] aspirin chewable tablet 81 mg  81 mg Per G Tube Daily Aguilar Paz MD   81 mg at 02/14/20 1009    hydrALAZINE (APRESOLINE) tablet 25 mg  25 mg PEG Tube 3 times per day Leydi Soriano MD   25 mg at 02/21/20 0651    metoprolol tartrate (LOPRESSOR) tablet 50 mg  50 mg PEG Tube BID Saroj Schumacher MD   50 mg at 02/21/20 0907    glucose (GLUTOSE) 40 % oral gel 15 g  15 g Oral PRN Geovanna Osier B Capal, DO        dextrose 50 % IV solution  12.5 g Intravenous PRN Minayne Anderson, DO        glucagon (rDNA) injection 1 mg  1 mg Intramuscular PRN Geovanna Osier B Capal, DO        dextrose 5 % solution  100 mL/hr Intravenous PRN Allayne Anderson, DO        sodium chloride (Inhalant) 3 % nebulizer solution 4 mL  4 mL Nebulization BID Saqib B Capal, DO   4 mL at 02/21/20 0545    labetalol (NORMODYNE;TRANDATE) injection 10 mg  10 mg Intravenous Q6H PRN Saqib B Capal, DO   10 mg at 02/20/20 1846    sodium chloride flush 0.9 % injection 10 mL  10 mL Intravenous 2 times per day Geovanna Osier B Capal, DO   10 mL at 02/20/20 2047    magnesium hydroxide (MILK OF MAGNESIA) 400 MG/5ML suspension 30 mL  30 mL Oral Daily PRN Saqib B Capal, DO           Continuous Infusions:   dextrose       Scheduled Meds:   magnesium sulfate  4 g Intravenous Once    albuterol  2.5 mg Nebulization Q4H While awake    piperacillin-tazobactam  3.375 g Intravenous Q8H    amLODIPine  5 mg PEG Tube Daily    docusate sodium  100 mg Per G Tube Daily    sennosides  5 mL Per G Tube Nightly    heparin flush  3 mL Intravenous 2 times per day    [Held by provider] heparin (porcine)  5,000 Units Subcutaneous 3 times per day    chlorhexidine  15 mL Mouth/Throat BID    folic acid  1 mg PEG Tube Daily    lansoprazole  30 mg Per G Tube QAM AC    vitamin B-1  100 mg PEG Tube Daily    [Held by provider] aspirin  81 mg Per G Tube Daily    hydrALAZINE  25 mg PEG Tube 3 times per day    metoprolol tartrate  50 mg PEG Tube BID    sodium chloride (Inhalant)  4 mL Nebulization BID    sodium chloride flush  10 mL Intravenous 2 times per day     PRN Meds: sodium chloride flush, heparin flush, acetaminophen, glucose, dextrose, glucagon (rDNA), dextrose, labetalol, magnesium hydroxide      Resident's Assessment and Plan     Tha Arita is 59 y.o. male was admitted on 2/5/2020 due to fatigue, unable to eat and had an episode of a fall. He was found to be hypotensive, thrombocytopenic (platelet 03,055) and in YVETTE. He has baseline Lt. Sided weakness and slurred speech. Had a PEA arrest on 2/16/2020. Was intubated following ROSC and transferred to the SICU. Tha Arita has a PH of laryngeal cancer s/p laryngectomy (2005) with chemo and radio, HTN, H/O CVA with residual Lt. Weakness and slurred speech, smoker and active alcohol abuser. Diagnosis Date    Cancer [facility])     mouth    Hip fracture ([facility] [address]     Hypertension      <Cardiovascular>  PEA arrest s/p ROSC  2/2 ? Hypoxia 2/2 neck mass vs upper airway collapse vs acute anemia   Management per surgical ICU. EEG >> diffuse encephalopathy   MRI brain left occipital lesion (previously Treated in CT scan, no changes)  MRA head was unremarkable.  -No DVT on US BL LE     Hypertension and Tachycardia  Currently on hydralazine 25mg Q8H, metoprolol 50mg BID. HR is in 105-115, sinus tachycardia, Hypertensive.     <Neurologic>  Acute Encephalopathy  2/2 metabolic (alcohol withdrawal, bacteremia, benzo) versus hypoxia  Cough and gag reflex present. Pupils: non reactive, withdrawing to pain in 4 extremities. Management per SICU  MRI brain left occipital lesion (previously Treated in CT scan, no changes)  MRA head was unremarkable. H/O CVA with residual Lt. Weakness - Stable  Aspirin placed on hold due to decrease in hemoglobin. <Pulmonary>  Acute hypoxic hypercapnic respiratory failure  2/2 PEA arrest  Currently intubated and on the vent AC mode. LTAC (tracheostomy and PEG tube), versus terminal extubation, family will decide. Pneumonia  2/2 MSSA  CXR: stable   ID on board. Chest vest BID. Nebulization with ipratropium-albuterol Q4H and 3% NaCl nebulization BID.     <Gastrointestinal>  Dysphagia s/p PEG placement (2/12/2020)  2/2 Neck mass   Currently on tube feeds  CBC daily. Severe Malnutrition  2/2 Laryngeal Ca, dysphagia  Per dietitian, pt. Meets criteria for severe malnutrition. Currently on tube feeds    <Infectious Disease>  Group G Streptococcus Bacteremia - Resolved  Echo showed no vegetations. Repeat culture -ve. ID on board. <Genitourinary/Renal>  YVETTE Stage II on CKD   FeNa 0.7%, FeUrea 20.9%. initially. Baseline Cr. Unknown.   hemodynamically medicated after PEA arrest    Nephrology on board. Electrolyte abnormalities    Hypokalemia, Hypomagnesemia corrected by ICU team.     <Hematology/Oncology>  Acute Normocytic Anemia  S/P 4 U PRBC transfused 1 unit yesterday. Soft Tissue Mass  2/2 possible recurrence of laryngeal Ca. CT and USG suggestive of mass at Rt. Carotic bifurcation - 2.3 x 2.7 cm. Thrombocytopenia -resolved  2/2 Sepsis vs alcohol use      <Dermatologic/Musculoskeletal>  Pressure ulcer  Lt. Elbow present. Wound care on board. <Psychiatry>  H/O Alcohol Abuse - Stable  Currently on thiamine 560YJ OD and folic acid 1mg OD. # Peptic ulcer prophylaxis: Lansoprazole. # DVT Prophylaxis: PCD  # Disposition: Cont current care.        Roscoe Jolley MD  PGY-1    Internal medicine

## 2020-02-21 NOTE — PROGRESS NOTES
placement on 02/12. He went into PEA arrest with ROSC after about 13 minutes of resuscitation and was intubated and transferred to SICU. CXR showed stable opacities in the lower lobes. CT head without contrast showed left occipital lobe subacute infarct. Procalcitonin level was elevated at 31 ng/mL. Amoxicillin-clavulanate was switched to piperacillin-tazobactam on 02/19. Procalcitonin trended down to 13 ng/mL as of 02/21. Subjective: Patient was seen and examined. He is intubated, gives no meaningful response to questions or commands. Afebrile. Objective:  BP (!) 168/106   Pulse 95   Temp 98.5 °F (36.9 °C)   Resp 22   Ht 5' 4\" (1.626 m)   Wt 143 lb 4.8 oz (65 kg)   SpO2 98%   BMI 24.60 kg/m²   Constitutional: Intubated, no MV dyssynchrony  Respiratory: Clear breath sounds, no crackles, no wheezes  Cardiovascular: Regular rate and rhythm, no murmurs  Gastrointestinal: Bowel sounds present, soft, nontender  Skin: Warm and dry, no active dermatoses  Musculoskeletal: No joint swelling, no joint erythema    Labs, imaging, and medical records/notes were personally reviewed. Assessment:  Fever, resolved, multifactorial, suspect sepsis vs central fever   Leukocytosis  Cardiac arrest  Anoxic encephalopathy  Left occipital lobe subacute infarct  Group G Streptococcus bacteremia, etiology unclear, suspect oropharyngeal in etiology with possible aspiration pneumonia  MSSA pneumonia  History of head and neck cancer, s/p resection and chemoradiation therapy (details unclear)    Recommendations:  Continue piperacillin-tazobactam to complete 14 days of antibiotic therapy from 02/12-02/25. Follow up blood cultures. Observe aspiration precautions and oral hygiene. Follow up goals of care.     Thank you for involving me in the care of Pavan Castrejon. I will continue to follow. Please do not hesitate to call for any questions or concerns.     Electronically signed by Ayad Uribe MD on 2/21/2020 at 10:44 AM

## 2020-02-21 NOTE — PROGRESS NOTES
Nutrition Assessment (Enteral Nutrition)    Type and Reason for Visit: Reassess    Nutrition Recommendations: Continue current Tube Feeding  Current EN regimen meeting 100% estimated needs at this time. Nutrition Assessment: Pt with continued malnutrition 2/2 H/o CVA w/ L sided weakness, noted multiple head/neck ca s/p chemotherapy/radiation, s/p mandibular resection, esophageal ca and right base tongue ca s/p hemiglossectomy. S/p PEG placement. Pending possible trach placement. Malnutrition Assessment:  · Malnutrition Status: Meets the criteria for severe malnutrition  · Context: Chronic illness  · Findings of the 6 clinical characteristics of malnutrition (Minimum of 2 out of 6 clinical characteristics is required to make the diagnosis of moderate or severe Protein Calorie Malnutrition based on AND/ASPEN Guidelines):  1. Energy Intake-Less than or equal to 50% of estimated energy requirement, Greater than or equal to 3 months    2. Weight Loss-Unable to assess(no hx on file), unable to assess  3. Fat Loss-Severe subcutaneous fat loss, Orbital, Triceps  4. Muscle Loss-Severe muscle mass loss, Temples (temporalis muscle), Clavicles (pectoralis and deltoids), Thigh (quadriceps), Calf (gastrocnemius)  5. Fluid Accumulation-No significant fluid accumulation,    6.   Strength-Not measured    Nutrition Risk Level: High    Nutrition Needs:  · Estimated Daily Total Kcal: (PS3B Tmax 37.3, MV 6.27; 1397)  · Estimated Daily Protein (g): 75-90(1.5-1.8gm/kg admit wt)  · Estimated Daily Fluid (ml/day): per critical care     Nutrition Diagnosis:   · Problem: Severe malnutrition, In context of chronic illness  · Etiology: related to Catabolic illness(head/neck CA)     Signs and symptoms:  as evidenced by Diet history of poor intake, Severe loss of subcutaneous fat, Severe muscle loss    Objective Information:  · Nutrition-Focused Physical Findings: vent, PEG LUQ, active BS, soft abd, cachetic appearing,

## 2020-02-21 NOTE — PROGRESS NOTES
Surgical Intensive Care Unit  Daily Progress Note  Date of admission:  2/5/2020  Reason for ICU transfer:  Hypoxic bradycardic arrest    Subjective: No new issues. Afebrile. Physical Exam:  /75   Pulse 90   Temp 98.8 °F (37.1 °C) (Axillary)   Resp 16   Ht 5' 4\" (1.626 m)   Wt 143 lb 4.8 oz (65 kg)   SpO2 98%   BMI 24.60 kg/m²   General appearance: intubated, chronically ill appearing  Lungs: clear to auscultation bilaterally, PSV 12/5 40%  Heart: regulra rate and rhythm. Abdomen: soft, no apparent tenderness, non distended. PEG in place  : islas yellow urine  Skin: No skin abnormalities  Neurologic: Off sedation. Withdraws from painful stimuli but does not localize or follow commands. Pupils equal. Opens eyes to voice and blinks to threat, wandering gaze      Lab Results   Component Value Date    WBC 10.2 02/21/2020    HGB 8.1 (L) 02/21/2020    HCT 24.9 (L) 02/21/2020    MCV 91.9 02/21/2020     (L) 02/21/2020     Lab Results   Component Value Date     02/21/2020    K 3.3 (L) 02/21/2020    CL 98 02/21/2020    CO2 32 (H) 02/21/2020    BUN 31 (H) 02/21/2020    CREATININE 1.4 (H) 02/21/2020    GLUCOSE 124 (H) 02/21/2020    CALCIUM 7.9 (L) 02/21/2020    PROT 6.2 (L) 02/21/2020    LABALBU 2.1 (L) 02/21/2020    BILITOT 0.7 02/21/2020    ALKPHOS 65 02/21/2020    AST 33 02/21/2020    ALT 12 02/21/2020    LABGLOM >60 02/21/2020    GFRAA >60 02/21/2020     Lab Results   Component Value Date    CKTOTAL 138 02/06/2020    CKMB 3.6 02/05/2020    TROPONINI 0.13 (H) 02/16/2020     Lab Results   Component Value Date    LACTA 1.6 02/06/2020     ASSESSMENT / PLAN:  · Neuro:  Likely hypoxic brain injury during code blue difficult intubation; minimally responsive off sedation. Neurology following for encephalopathy and prognosis. EEG showed diffuse encephalopathy without seizures. MRI with CVA.   · History of ETOH abuse, previously on CIWA  · PMH CVA  · CV: Bradycardic PEA arrest 2/16, likely hypoxia -

## 2020-02-21 NOTE — PROGRESS NOTES
ADDIS with   indirect supervision, and Gabriela Wilkerson. Bora HANCOCK reviewed and concurred with   the findings. XR CHEST PORTABLE   Final Result   Tortuous ectatic aorta   Cardiomegaly    Airspace disease compatible with atelectasis or pneumonia, worse at   the left lung base as compared to the right   The chest appears to be worse in the interval                  CT SOFT TISSUE NECK W WO CONTRAST   Final Result   Probable prior right radical neck dissection with sequela   of prior surgery and radiation therapy. There is extensive soft tissue   distortion which is suggestive of previous therapy   Finds compatible with a approximately 1 cm right thyroid nodule   There is evidence to suggest severe stenosis of the right distal   internal carotid artery   Soft tissue mass at the level the carotid bifurcation on the right   with outward mass effect measuring approximately 2.3 x 2.7 cm   suspicious for recurrent disease. This encases the carotid distally   and the distal carotid stenosis may represent invasion. Comparison   studies would be necessary to distinguish recurrent neoplasm from   previous radiation therapy. Tumor invasion versus osteonecrosis of the medial right mandible      ALERT:  THIS IS AN ABNORMAL REPORT                FL MODIFIED BARIUM SWALLOW W VIDEO   Final Result   Patient unable to swallow nectar and pudding which was   suctioned            US RETROPERITONEAL COMPLETE   Final Result   Probable bilateral medical renal disease with some perinephric fluid   bilaterally that could indicate inflammation. There is no evidence of calcification or obstruction in either kidney. The bladder was not evaluated due to a decompressing Sanchez catheter. XR CHEST 1 VW   Final Result   No acute cardiopulmonary findings.             XR FOOT LEFT (MIN 3 VIEWS)   Final Result   Moderate degenerative changes      XR CHEST STANDARD (2 VW)   Final Result   Findings compatible with atherosclerotic disease

## 2020-02-21 NOTE — PROGRESS NOTES
[facility]  SURGICAL INTENSIVE CARE UNIT (SICU)  ATTENDING PHYSICIAN CRITICAL CARE PROGRESS NOTE     I have examined the patient, reviewed the record, and discussed the case with the APN/ resident. Please refer to the APN/ resident's note. I agree with the assessment and plan. I have reviewed all relevant labs and imaging data. The following summarizes my clinical findings and independent assessment. CC:  Critical care management after cardiac arrest    Hospital Course/Overnight Events:  2/16 5yo male with history of smoking, ETOH abuse, multiple head and neck cancers s/p partial mandibular resection ([id number]), esophageal cancer, and R base of tongue cancer s/p hemiglossectomy and chemoradiation with a new Head and Neck cancer. PEA arrest likely from aspiration ( Barium sitting in the back of the through when PEG was placed)  leading to hypoxia or Head and neck cancer obstruction.    2/17 No overnight events   2/18  Hb dropped 5.2 received 2 units PRBC    2/19 No acute overnight issues, patient withdrawing from pain this am and blinking to threat   2/20 No changes overnight   2/21--nothing new overnight    Intubated  Eyes to voice  Minimal response to pain  Disconjugate gaze  Hrt:  Regular  Lungs:  Fairly clear bilaterally  Abd:  Soft; BS active; PEG in place  Skin:  Warm/dry    Patient Active Problem List    Diagnosis Date Noted    Acute upper respiratory infection     Elevated troponin     Acute ischemic stroke ([address]     Anoxic brain damage (HCC)     Severe protein-calorie malnutrition ([address] 02/11/2020    Bacteremia 02/08/2020    Other dysphagia 02/08/2020    Acute renal failure (ARF) ([address] 02/05/2020     S/p cardiac arrest  Acute resp failure--wean mech vent support; likely will need trach  Left occipital lobe ischemic stroke--monitor neuro exam  Hypoxic encephalopathy--monitor neuro exam  Hx of head/neck cancer  Dysphagia/hypoalbuminemia--cont TF  Renal insuff--monitor BUN/Cr/UO  Electrolyte imbalance (hypokalemia)--correct as able  Acute blood loss anemia--monitor H/H  Thrombocytopenia--likely consumptive--cont to monitor  Zosyn empirically per ID  DVT risk--PCDs    Pt is at risk for neurologic/metabolic/respiratory/hemodynamic deterioration and requires ongoing ICU care    Phoebe Ware MD, [facility]  2/21/2020  10:48 AM      Critical care time exclusive of teaching and procedures = 38 minutes

## 2020-02-22 ENCOUNTER — APPOINTMENT (OUTPATIENT)
Dept: GENERAL RADIOLOGY | Age: 65
DRG: 870 | End: 2020-02-22
Payer: MEDICARE

## 2020-02-22 LAB
AADO2: 108.4 MMHG
ALBUMIN SERPL-MCNC: 2.1 G/DL (ref 3.5–5.2)
ALP BLD-CCNC: 70 U/L (ref 40–129)
ALT SERPL-CCNC: 12 U/L (ref 0–40)
ANION GAP SERPL CALCULATED.3IONS-SCNC: 15 MMOL/L (ref 7–16)
AST SERPL-CCNC: 32 U/L (ref 0–39)
B.E.: 7.3 MMOL/L (ref -3–3)
BASOPHILS ABSOLUTE: 0.04 E9/L (ref 0–0.2)
BASOPHILS RELATIVE PERCENT: 0.4 % (ref 0–2)
BILIRUB SERPL-MCNC: 0.7 MG/DL (ref 0–1.2)
BUN BLDV-MCNC: 31 MG/DL (ref 8–23)
CALCIUM IONIZED: 1.16 MMOL/L (ref 1.15–1.33)
CALCIUM SERPL-MCNC: 8.1 MG/DL (ref 8.6–10.2)
CHLORIDE BLD-SCNC: 99 MMOL/L (ref 98–107)
CO2: 27 MMOL/L (ref 22–29)
COHB: 0.3 % (ref 0–1.5)
CREAT SERPL-MCNC: 1.3 MG/DL (ref 0.7–1.2)
CRITICAL: ABNORMAL
DATE ANALYZED: ABNORMAL
DATE OF COLLECTION: ABNORMAL
EOSINOPHILS ABSOLUTE: 0.08 E9/L (ref 0.05–0.5)
EOSINOPHILS RELATIVE PERCENT: 0.9 % (ref 0–6)
FIO2: 40 %
GFR AFRICAN AMERICAN: >60
GFR NON-AFRICAN AMERICAN: >60 ML/MIN/1.73
GLUCOSE BLD-MCNC: 124 MG/DL (ref 74–99)
HCO3: 30.9 MMOL/L (ref 22–26)
HCT VFR BLD CALC: 24.8 % (ref 37–54)
HEMOGLOBIN: 8 G/DL (ref 12.5–16.5)
HHB: 2.2 % (ref 0–5)
IMMATURE GRANULOCYTES #: 0.08 E9/L
IMMATURE GRANULOCYTES %: 0.9 % (ref 0–5)
LAB: ABNORMAL
LYMPHOCYTES ABSOLUTE: 0.92 E9/L (ref 1.5–4)
LYMPHOCYTES RELATIVE PERCENT: 9.8 % (ref 20–42)
Lab: ABNORMAL
MAGNESIUM: 2.1 MG/DL (ref 1.6–2.6)
MCH RBC QN AUTO: 29.6 PG (ref 26–35)
MCHC RBC AUTO-ENTMCNC: 32.3 % (ref 32–34.5)
MCV RBC AUTO: 91.9 FL (ref 80–99.9)
METER GLUCOSE: 115 MG/DL (ref 74–99)
METER GLUCOSE: 123 MG/DL (ref 74–99)
METER GLUCOSE: 138 MG/DL (ref 74–99)
METER GLUCOSE: 208 MG/DL (ref 74–99)
METHB: 0.1 % (ref 0–1.5)
MODE: AC
MONOCYTES ABSOLUTE: 1.19 E9/L (ref 0.1–0.95)
MONOCYTES RELATIVE PERCENT: 12.7 % (ref 2–12)
NEUTROPHILS ABSOLUTE: 7.08 E9/L (ref 1.8–7.3)
NEUTROPHILS RELATIVE PERCENT: 75.3 % (ref 43–80)
O2 CONTENT: 13.8 ML/DL
O2 SATURATION: 97.8 % (ref 92–98.5)
O2HB: 97.4 % (ref 94–97)
OPERATOR ID: 2067
PATIENT TEMP: 37 C
PCO2: 40 MMHG (ref 35–45)
PDW BLD-RTO: 14.8 FL (ref 11.5–15)
PEEP/CPAP: 5 CMH2O
PFO2: 3.02 MMHG/%
PH BLOOD GAS: 7.51 (ref 7.35–7.45)
PHOSPHORUS: 3.8 MG/DL (ref 2.5–4.5)
PLATELET # BLD: 123 E9/L (ref 130–450)
PMV BLD AUTO: 11.8 FL (ref 7–12)
PO2: 120.8 MMHG (ref 75–100)
POTASSIUM SERPL-SCNC: 3.5 MMOL/L (ref 3.5–5)
RBC # BLD: 2.7 E12/L (ref 3.8–5.8)
RI(T): 0.9
RR MECHANICAL: 14 B/MIN
SODIUM BLD-SCNC: 141 MMOL/L (ref 132–146)
SOURCE, BLOOD GAS: ABNORMAL
T3 TOTAL: 67.77 NG/DL (ref 80–200)
T4 FREE: 1.06 NG/DL (ref 0.93–1.7)
THB: 9.9 G/DL (ref 11.5–16.5)
TIME ANALYZED: 546
TOTAL PROTEIN: 6.7 G/DL (ref 6.4–8.3)
VT MECHANICAL: 400 ML
WBC # BLD: 9.4 E9/L (ref 4.5–11.5)

## 2020-02-22 PROCEDURE — 6370000000 HC RX 637 (ALT 250 FOR IP): Performed by: INTERNAL MEDICINE

## 2020-02-22 PROCEDURE — 2580000003 HC RX 258: Performed by: INTERNAL MEDICINE

## 2020-02-22 PROCEDURE — 83735 ASSAY OF MAGNESIUM: CPT

## 2020-02-22 PROCEDURE — 84480 ASSAY TRIIODOTHYRONINE (T3): CPT

## 2020-02-22 PROCEDURE — 2500000003 HC RX 250 WO HCPCS: Performed by: INTERNAL MEDICINE

## 2020-02-22 PROCEDURE — 99231 SBSQ HOSP IP/OBS SF/LOW 25: CPT | Performed by: INTERNAL MEDICINE

## 2020-02-22 PROCEDURE — 99291 CRITICAL CARE FIRST HOUR: CPT | Performed by: SURGERY

## 2020-02-22 PROCEDURE — 71045 X-RAY EXAM CHEST 1 VIEW: CPT

## 2020-02-22 PROCEDURE — 82962 GLUCOSE BLOOD TEST: CPT

## 2020-02-22 PROCEDURE — 2580000003 HC RX 258: Performed by: STUDENT IN AN ORGANIZED HEALTH CARE EDUCATION/TRAINING PROGRAM

## 2020-02-22 PROCEDURE — 85025 COMPLETE CBC W/AUTO DIFF WBC: CPT

## 2020-02-22 PROCEDURE — 6360000002 HC RX W HCPCS: Performed by: INTERNAL MEDICINE

## 2020-02-22 PROCEDURE — 84439 ASSAY OF FREE THYROXINE: CPT

## 2020-02-22 PROCEDURE — 82805 BLOOD GASES W/O2 SATURATION: CPT

## 2020-02-22 PROCEDURE — 2500000003 HC RX 250 WO HCPCS: Performed by: STUDENT IN AN ORGANIZED HEALTH CARE EDUCATION/TRAINING PROGRAM

## 2020-02-22 PROCEDURE — 6360000002 HC RX W HCPCS: Performed by: STUDENT IN AN ORGANIZED HEALTH CARE EDUCATION/TRAINING PROGRAM

## 2020-02-22 PROCEDURE — 94003 VENT MGMT INPAT SUBQ DAY: CPT

## 2020-02-22 PROCEDURE — 80053 COMPREHEN METABOLIC PANEL: CPT

## 2020-02-22 PROCEDURE — 94669 MECHANICAL CHEST WALL OSCILL: CPT

## 2020-02-22 PROCEDURE — 84100 ASSAY OF PHOSPHORUS: CPT

## 2020-02-22 PROCEDURE — 36415 COLL VENOUS BLD VENIPUNCTURE: CPT

## 2020-02-22 PROCEDURE — 94640 AIRWAY INHALATION TREATMENT: CPT

## 2020-02-22 PROCEDURE — 6370000000 HC RX 637 (ALT 250 FOR IP): Performed by: STUDENT IN AN ORGANIZED HEALTH CARE EDUCATION/TRAINING PROGRAM

## 2020-02-22 PROCEDURE — 2000000000 HC ICU R&B

## 2020-02-22 PROCEDURE — 82330 ASSAY OF CALCIUM: CPT

## 2020-02-22 RX ORDER — BUMETANIDE 0.25 MG/ML
1 INJECTION, SOLUTION INTRAMUSCULAR; INTRAVENOUS 2 TIMES DAILY
Status: DISCONTINUED | OUTPATIENT
Start: 2020-02-22 | End: 2020-02-29

## 2020-02-22 RX ADMIN — PIPERACILLIN AND TAZOBACTAM 3.38 G: 3; .375 INJECTION, POWDER, LYOPHILIZED, FOR SOLUTION INTRAVENOUS at 02:06

## 2020-02-22 RX ADMIN — ALBUTEROL SULFATE 2.5 MG: 2.5 SOLUTION RESPIRATORY (INHALATION) at 17:02

## 2020-02-22 RX ADMIN — Medication 100 MG: at 08:13

## 2020-02-22 RX ADMIN — AMLODIPINE BESYLATE 10 MG: 10 TABLET ORAL at 08:13

## 2020-02-22 RX ADMIN — Medication 10 ML: at 20:01

## 2020-02-22 RX ADMIN — POTASSIUM BICARBONATE 40 MEQ: 782 TABLET, EFFERVESCENT ORAL at 14:10

## 2020-02-22 RX ADMIN — METOPROLOL TARTRATE 50 MG: 50 TABLET, FILM COATED ORAL at 08:13

## 2020-02-22 RX ADMIN — LABETALOL HYDROCHLORIDE 10 MG: 5 INJECTION INTRAVENOUS at 07:02

## 2020-02-22 RX ADMIN — SODIUM CHLORIDE, PRESERVATIVE FREE 300 UNITS: 5 INJECTION INTRAVENOUS at 20:01

## 2020-02-22 RX ADMIN — Medication 10 ML: at 08:13

## 2020-02-22 RX ADMIN — PIPERACILLIN AND TAZOBACTAM 3.38 G: 3; .375 INJECTION, POWDER, LYOPHILIZED, FOR SOLUTION INTRAVENOUS at 09:59

## 2020-02-22 RX ADMIN — ALBUTEROL SULFATE 2.5 MG: 2.5 SOLUTION RESPIRATORY (INHALATION) at 12:43

## 2020-02-22 RX ADMIN — HEPARIN SODIUM 5000 UNITS: 10000 INJECTION INTRAVENOUS; SUBCUTANEOUS at 14:11

## 2020-02-22 RX ADMIN — SODIUM CHLORIDE SOLN NEBU 3% 4 ML: 3 NEBU SOLN at 09:22

## 2020-02-22 RX ADMIN — FOLIC ACID 1 MG: 1 TABLET ORAL at 08:13

## 2020-02-22 RX ADMIN — ALBUTEROL SULFATE 2.5 MG: 2.5 SOLUTION RESPIRATORY (INHALATION) at 09:22

## 2020-02-22 RX ADMIN — HYDRALAZINE HYDROCHLORIDE 25 MG: 25 TABLET, FILM COATED ORAL at 06:37

## 2020-02-22 RX ADMIN — SENNOSIDES 5 ML: 8.8 LIQUID ORAL at 20:01

## 2020-02-22 RX ADMIN — DOCUSATE SODIUM 100 MG: 50 LIQUID ORAL at 08:13

## 2020-02-22 RX ADMIN — LABETALOL HYDROCHLORIDE 10 MG: 5 INJECTION INTRAVENOUS at 16:02

## 2020-02-22 RX ADMIN — METOPROLOL TARTRATE 50 MG: 50 TABLET, FILM COATED ORAL at 20:01

## 2020-02-22 RX ADMIN — SODIUM CHLORIDE SOLN NEBU 3% 4 ML: 3 NEBU SOLN at 20:46

## 2020-02-22 RX ADMIN — CHLORHEXIDINE GLUCONATE 0.12% ORAL RINSE 15 ML: 1.2 LIQUID ORAL at 08:13

## 2020-02-22 RX ADMIN — HYDRALAZINE HYDROCHLORIDE 25 MG: 25 TABLET, FILM COATED ORAL at 22:10

## 2020-02-22 RX ADMIN — HYDRALAZINE HYDROCHLORIDE 25 MG: 25 TABLET, FILM COATED ORAL at 14:11

## 2020-02-22 RX ADMIN — ALBUTEROL SULFATE 2.5 MG: 2.5 SOLUTION RESPIRATORY (INHALATION) at 20:46

## 2020-02-22 RX ADMIN — CHLORHEXIDINE GLUCONATE 0.12% ORAL RINSE 15 ML: 1.2 LIQUID ORAL at 20:01

## 2020-02-22 RX ADMIN — POTASSIUM BICARBONATE 40 MEQ: 782 TABLET, EFFERVESCENT ORAL at 20:01

## 2020-02-22 RX ADMIN — SODIUM CHLORIDE, PRESERVATIVE FREE 300 UNITS: 5 INJECTION INTRAVENOUS at 08:14

## 2020-02-22 RX ADMIN — BUMETANIDE 1 MG: 0.25 INJECTION INTRAMUSCULAR; INTRAVENOUS at 11:33

## 2020-02-22 RX ADMIN — PIPERACILLIN AND TAZOBACTAM 3.38 G: 3; .375 INJECTION, POWDER, LYOPHILIZED, FOR SOLUTION INTRAVENOUS at 18:47

## 2020-02-22 RX ADMIN — LANSOPRAZOLE 30 MG: KIT at 06:37

## 2020-02-22 RX ADMIN — BUMETANIDE 1 MG: 0.25 INJECTION INTRAMUSCULAR; INTRAVENOUS at 15:57

## 2020-02-22 RX ADMIN — HEPARIN SODIUM 5000 UNITS: 10000 INJECTION INTRAVENOUS; SUBCUTANEOUS at 22:10

## 2020-02-22 ASSESSMENT — PULMONARY FUNCTION TESTS
PIF_VALUE: 22
PIF_VALUE: 16
PIF_VALUE: 16
PIF_VALUE: 21
PIF_VALUE: 16
PIF_VALUE: 22
PIF_VALUE: 29
PIF_VALUE: 15
PIF_VALUE: 16
PIF_VALUE: 16
PIF_VALUE: 20
PIF_VALUE: 21
PIF_VALUE: 25
PIF_VALUE: 22
PIF_VALUE: 23
PIF_VALUE: 34
PIF_VALUE: 15
PIF_VALUE: 16
PIF_VALUE: 21
PIF_VALUE: 27
PIF_VALUE: 15
PIF_VALUE: 15
PIF_VALUE: 27
PIF_VALUE: 21
PIF_VALUE: 21
PIF_VALUE: 15
PIF_VALUE: 16
PIF_VALUE: 16

## 2020-02-22 ASSESSMENT — PAIN SCALES - GENERAL
PAINLEVEL_OUTOF10: 0

## 2020-02-22 NOTE — PROGRESS NOTES
24hr:    Intake/Output Summary (Last 24 hours) at 2/22/2020 1102  Last data filed at 2/22/2020 0800  Gross per 24 hour   Intake 1688 ml   Output 1470 ml   Net 218 ml     I/O last 3 completed shifts: In: 0619 [I.V.:279; NG/GT:1309; IV Piggyback:100]  Out: 0326 [Urine:1770] I/O this shift:  In: -   Out: 60 [Urine:60]   Weight change:     Physical Exam  Vitals signs and nursing note reviewed. Constitutional:       Appearance: He is normal weight. He is ill-appearing. Interventions: He is intubated. HENT:      Head: Normocephalic and atraumatic. Right Ear: External ear normal.      Left Ear: External ear normal.      Nose: Nose normal.      Mouth/Throat:      Mouth: Mucous membranes are moist.   Eyes:      General: Lids are normal. No scleral icterus. Right eye: No discharge. Left eye: No discharge. Conjunctiva/sclera: Conjunctivae normal.      Pupils: Pupils are equal.      Right eye: Pupil is not reactive (Pinpoint pupil). Pupil is not sluggish. Left eye: Pupil is not reactive (Pinpoint pupil). Pupil is not sluggish. Cardiovascular:      Rate and Rhythm: Normal rate and regular rhythm. Pulses: Normal pulses. Carotid pulses are 2+ on the right side and 2+ on the left side. Radial pulses are 2+ on the right side and 2+ on the left side. Dorsalis pedis pulses are 2+ on the right side and 2+ on the left side. Heart sounds: S1 normal and S2 normal. No gallop. No S3 sounds. Pulmonary:      Effort: Pulmonary effort is normal. He is intubated. Breath sounds: Normal air entry. Transmitted upper airway sounds present. No decreased breath sounds, wheezing, rhonchi or rales. Musculoskeletal:      Right lower leg: No edema. Left lower leg: No edema. Neurological:      Comments: Cough and gag reflex present.        Labs and Imaging Studies     CBC:   Recent Labs     02/20/20  0420 02/21/20  0500 02/22/20  0520   WBC 9.8 10.2 9.4   HGB 6.8* 8. 1* 8.0*   HCT 20.5* 24.9* 24.8*   MCV 92.8 91.9 91.9   * 118* 123*       BMP:    Recent Labs     20  0420 20  0500 20  0520    138 141   K 3.2* 3.3* 3.5    98 99   CO2 28 32* 27   BUN 35* 31* 31*   CREATININE 1.6* 1.4* 1.3*   GLUCOSE 130* 124* 124*       LIVER PROFILE:   Recent Labs     20  04220  0500 20  0520   AST 31 33 32   ALT 15 12 12   BILITOT 0.8 0.7 0.7   ALKPHOS 61 65 70       PT/INR:   No results for input(s): PROTIME, INR in the last 72 hours. APTT:   No results for input(s): APTT in the last 72 hours. Fasting Lipid Panel:    Lab Results   Component Value Date    HDL 24 2020       Notable Cultures:      Blood cultures   Blood Culture, Routine   Date Value Ref Range Status   2020 24 Hours- no growth  Preliminary     Respiratory cultures No results found for: RESPCULTURE No results found for: LABGRAM  Urine   Urine Culture, Routine   Date Value Ref Range Status   2020 <10,000 CFU/mL  Mixed gram positive organisms    Final     Legionella No results found for: LABLEGI  C Diff PCR No results found for: CDIFPCR  Wound culture/abscess: No results for input(s): WNDABS in the last 72 hours. Tip culture:No results for input(s): CXCATHTIP in the last 72 hours. Xr Chest Standard (2 Vw)    Result Date: 2020  Patient MRN: [id number] : 1955 Age:  59 years Gender: Male Order Date: 2020 10:15 AM Exam: XR CHEST (2 VW) Number of Images: 2 views Indication:   Sepsis, sob Sepsis, sob Comparison: None. Findings: The heart is unremarkable The lung fields demonstrate no significant pulmonary vascular congestion and edema.  The aorta is tortuous ectatic There are findings throughout the lung fields which are likely chronic     Findings compatible with atherosclerotic disease No acute infiltrate     Xr Foot Left (min 3 Views)    Result Date: 2020  Patient MRN:  [id number] : 1955 Age: 59 years Gender: Male Order Date: 2020 2:45 PM EXAM: XR FOOT LEFT (MIN 3 VIEWS) NUMBER OF IMAGES:  3 views INDICATION:  pain, swelling pain, swelling COMPARISON: None . The bones appear to be in anatomic alignment. No foreign body is identified No fracture is identified  There is moderate joint space loss The are moderate degenerative changes present     Moderate degenerative changes    Ct Soft Tissue Neck W Wo Contrast    Result Date: 2020  Patient MRN:  [id number] : 1955 Age: 59 years Gender: Male Order Date:  2020 3:45 PM EXAM: CT SOFT TISSUE NECK W WO CONTRAST NUMBER OF IMAGES \ views:  36 INDICATION:  h/o R mandible/base of tongue ca, sp resection and chemorads, concern for recurrence Per nephro team ok to do scan with contrast h/o R mandible/base of tongue ca, sp resection and chemorads, concern for recurrence COMPARISON: None Technique: Low-dose CT  acquisition technique included one of following options; 1 . Automated exposure control, 2. Adjustment of MA and or KV according to patient's size or 3. Use of iterative reconstruction. The larynx appears unremarkable The soft tissues appear abnormal there is sequela of extensive prior surgery. No convincing abscess is seen. There is distortion of the oropharynx and the posterior superior hypopharynx there is significant distortion of the right sternocleidomastoid and a pattern compatible with previous radical neck dissection. There are findings to suggest previous therapy. The right jugular vein is not seen likely status post resection. There is been resection at the level of the tongue base and floor the mouth. There is sequela of previous resection. . Atherosclerotic disease of both proximal internal carotid arteries is identified without proximal internal carotid artery hemodynamically significant stenosis. At the level the distal right internal carotid artery at the level the cervical component there is evidence to suggest severe stenosis.  Also the level of the carotid Doppler ultrasound images of the region of interest were obtained by the sonographic technologist. FINDINGS: The study is limited by technically suboptimal image quality. There is abnormal heterogeneous soft tissue in the region of interest.     Ill-defined, heterogeneous, abnormal appearing soft tissue in the region of interest. No specific clinical question is asked. Ultrasound is not the appropriate modality for this evaluation. Contrast-enhanced MRI may provide more information. Correlation with clinical and surgical history is needed. Xr Chest Portable    Result Date: 2020  Patient MRN:  [id number] : 1955 Age: 59 years Gender: Male Order Date:  2020 6:00 AM EXAM: XR CHEST PORTABLE COMPARISON: February 10, 2020 INDICATION:  PNA PNA FINDINGS: There are opacities at the left lung base silhouetting left hemidiaphragm which appears similar since prior. The heart is normal size. No pneumothorax. Stable opacities at left lung base. Continued follow-up could be helpful for further evaluation. Xr Chest Portable    Result Date: 2/10/2020  Patient MRN: [id number] : 1955 Age:  59 years Gender: Male Order Date: 2/10/2020 3:00 PM Exam: XR CHEST PORTABLE Number of Images: 1 view Indication:   SOB SOB Comparison: 2020 Findings: The heart is enlarged. The lung fields demonstrate evidence for airspace disease. The aorta is tortuous and ectatic. Tortuous ectatic aorta Cardiomegaly Airspace disease compatible with atelectasis or pneumonia, worse at the left lung base as compared to the right The chest appears to be worse in the interval     Xr Chest 1 Vw    Result Date: 2020  Patient MRN: [id number] : 1955 Age:  59 years Gender: Male Order Date: 2020 6:00 AM Exam: XR CHEST 1 VIEW Number of Images: 1 view Indication: Acute weakness. Comparison: None. FINDINGS: Heart and pulmonary vascularity normal. Lungs clear. Costophrenic angles sharp. Normal aorta.      No acute cardiopulmonary findings. Us Retroperitoneal Complete    Result Date: 2020  Patient Mrn: [id number] : 1955 Age:  59 years Gender: Male Order Date: 2020 7:45 PM Exam: US RETROPERITONEAL COMPLETE Number Of Images: 54 Views Indication:  Acute renal insufficiency Comparison: None. TECHNIQUE: 2-D grayscale and color Doppler imaging were utilized for evaluation of the kidneys and bladder. Findings: The right kidney measures 11.8 x 5.5 x 6.3 cm, and the left kidney measures 11.5 x 5.6 x 5.9 cm. There is no evidence of collecting system calcification, obstructive dilation, or perinephric inflammatory change. Cortical echogenicity is increased bilaterally,. Suggesting the spectrum of medical renal disease. Central perfusion is intact bilaterally by color Doppler imaging, however. There is some perinephric fluid bilaterally. The bladder is decompressed with Sanchez catheter, and is not further characterized. Probable bilateral medical renal disease with some perinephric fluid bilaterally that could indicate inflammation. There is no evidence of calcification or obstruction in either kidney. The bladder was not evaluated due to a decompressing Sanchez catheter. Fl Modified Barium Swallow W Video    Result Date: 2020  Patient MRN: [id number] : 1955 Age:  59 years Gender: Male Order Date: 2/10/2020 1:45 PM Exam: FL MODIFIED BARIUM SWALLOW W VIDEO Number of Images: 1 Indication:   dysphagia Comparison: None. Fluoroscopy time: 1.1 minute Findings: Textures given, nectar Aspiration, nectar Laryngeal penetration, nectar Cough, None Oral delay, Yes Retention in vallecula, Yes Retention in piriform sinuses, None. Pharyngeal delay, Yes Laryngeal elevation, reduced. Study is remarkable for silent aspiration with nectar consistency. This procedure was performed and dictated by Kim Castro PA-C with indirect supervision, and Carmen Bernal. [facility] MD reviewed and concurred with the findings.      Fl Modified  [Held by provider] heparin (porcine)  5,000 Units Subcutaneous 3 times per day    chlorhexidine  15 mL Mouth/Throat BID    folic acid  1 mg PEG Tube Daily    lansoprazole  30 mg Per G Tube QAM AC    vitamin B-1  100 mg PEG Tube Daily    [Held by provider] aspirin  81 mg Per G Tube Daily    hydrALAZINE  25 mg PEG Tube 3 times per day    metoprolol tartrate  50 mg PEG Tube BID    sodium chloride (Inhalant)  4 mL Nebulization BID    sodium chloride flush  10 mL Intravenous 2 times per day     PRN Meds: sodium chloride flush, heparin flush, acetaminophen, glucose, dextrose, glucagon (rDNA), dextrose, labetalol, magnesium hydroxide      Resident's Assessment and Plan     Samuel Kate is 59 y.o. male was admitted on 2/5/2020 due to fatigue, unable to eat and had an episode of a fall. He was found to be hypotensive, thrombocytopenic (platelet 07,670) and in YVETTE. He has baseline Lt. Sided weakness and slurred speech. Had a PEA arrest on 2/16/2020. Was intubated following ROSC and transferred to the SICU. Samuel Kate has a PH of laryngeal cancer s/p laryngectomy (2005) with chemo and radio, HTN, H/O CVA with residual Lt. Weakness and slurred speech, smoker and active alcohol abuser. Diagnosis Date    Cancer [facility])     mouth    Hip fracture ([facility] [address]     Hypertension      <Cardiovascular>  PEA arrest s/p ROSC  2/2 ? Hypoxia 2/2 neck mass vs upper airway collapse vs acute anemia   Management per surgical ICU. EEG >> diffuse encephalopathy   MRI brain left occipital lesion (previously Treated in CT scan, no changes)  MRA head was unremarkable. No DVT on US BL LE     Hypertension and Tachycardia  Currently on hydralazine 25mg Q8H, metoprolol 50mg BID. Not tachycardic today. <Neurologic>  Acute Encephalopathy  2/2 metabolic (alcohol withdrawal, bacteremia, benzo) versus hypoxia  Cough and gag reflex present. Pupils: non reactive, withdrawing to pain in 4 extremities.   Management per SICU  MRI

## 2020-02-22 NOTE — PROGRESS NOTES
[facility] PROGRESS NOTE      Chief complaint: Pneumonia ,  group G Streptococcus bacteremia    Pt is intubated, awake    Non communicating     Afebrile    Current Facility-Administered Medications   Medication Dose Route Frequency Provider Last Rate Last Dose    bumetanide (BUMEX) injection 1 mg  1 mg Intravenous BID Sunday MD Jeanne   1 mg at 02/22/20 1133    potassium bicarb-citric acid (EFFER-K) effervescent tablet 40 mEq  40 mEq Oral BID Saqib Solis DO        amLODIPine (NORVASC) tablet 10 mg  10 mg PEG Tube Daily Kenna Prescott MD   10 mg at 02/22/20 0813    albuterol (PROVENTIL) nebulizer solution 2.5 mg  2.5 mg Nebulization Q4H While awake Liliana Jarquin MD   2.5 mg at 02/22/20 1243    piperacillin-tazobactam (ZOSYN) 3.375 g in dextrose 5 % 100 mL IVPB extended infusion (mini-bag)  3.375 g Intravenous Q8H Buck Boles MD 25 mL/hr at 02/22/20 0959 3.375 g at 02/22/20 0959    docusate sodium (COLACE) 150 MG/15ML liquid 100 mg  100 mg Per G Tube Daily Liliana Jarquin MD   100 mg at 02/22/20 0813    sennosides (SENOKOT) 8.8 MG/5ML syrup 5 mL  5 mL Per G Tube Nightly Liliana Jarquin MD   5 mL at 02/21/20 2015    sodium chloride flush 0.9 % injection 10 mL  10 mL Intravenous PRN Liliana Jarquin MD   10 mL at 02/18/20 1549    heparin flush 100 UNIT/ML injection 300 Units  3 mL Intravenous 2 times per day Liliana Jarquin MD   300 Units at 02/22/20 0814    heparin flush 100 UNIT/ML injection 300 Units  3 mL Intracatheter PRN Liliana Jarquin MD        heparin (porcine) injection 5,000 Units  5,000 Units Subcutaneous 3 times per day Liliana Jarquin MD   Stopped at 02/18/20 0808    acetaminophen (TYLENOL) 160 MG/5ML solution 650 mg  650 mg PEG Tube Q4H PRN Tay Bolanos MD   650 mg at 02/19/20 1643    chlorhexidine (PERIDEX) 0.12 % solution 15 mL  15 mL Mouth/Throat BID Liliana Jarquin MD   15 mL at 27/54/39 1545    folic acid (FOLVITE) tablet 1 mg  1 mg PEG Tube Daily Ian Reed., DO   1 mg at 02/22/20 0813    lansoprazole suspension SUSP 30 mg  30 mg Per G Tube QAM  Kaitlin Rodriguez., DO   30 mg at 02/22/20 7258    vitamin B-1 (THIAMINE) tablet 100 mg  100 mg PEG Tube Daily Meghna Garcia Jr., DO   100 mg at 02/22/20 0813    [Held by provider] aspirin chewable tablet 81 mg  81 mg Per G Tube Daily Suri Lopez MD   81 mg at 02/14/20 1009    hydrALAZINE (APRESOLINE) tablet 25 mg  25 mg PEG Tube 3 times per day Jemima Salgado MD   25 mg at 02/22/20 2796    metoprolol tartrate (LOPRESSOR) tablet 50 mg  50 mg PEG Tube BID Saroj Schumacher MD   50 mg at 02/22/20 0813    glucose (GLUTOSE) 40 % oral gel 15 g  15 g Oral PRN Saqib B Capal, DO        dextrose 50 % IV solution  12.5 g Intravenous PRN Arlina Retort, DO        glucagon (rDNA) injection 1 mg  1 mg Intramuscular PRN Mayela Ratliffa B Capal, DO        dextrose 5 % solution  100 mL/hr Intravenous PRN Arlina Retort, DO        sodium chloride (Inhalant) 3 % nebulizer solution 4 mL  4 mL Nebulization BID Saqib B Capal, DO   4 mL at 02/22/20 3151    labetalol (NORMODYNE;TRANDATE) injection 10 mg  10 mg Intravenous Q6H PRN Saqib B Capal, DO   10 mg at 02/22/20 6121    sodium chloride flush 0.9 % injection 10 mL  10 mL Intravenous 2 times per day Mayela Aria B Capal, DO   10 mL at 02/22/20 0813    magnesium hydroxide (MILK OF MAGNESIA) 400 MG/5ML suspension 30 mL  30 mL Oral Daily PRN Saqib B Capal, DO         REVIEW OF SYSTEMS:       CONSTITUTIONAL:  No fever    RESPIRATORY: Intubated   GENITOURINARY: Sanchez catheter   INTEGUMENT:no rash   HEMATOLOGIC/LYMPHATIC:  Denies lymph node swelling, gum bleeding or easy bruising.   NEUROLOGICAL: Awake          Objective:  BP (!) 150/91   Pulse 100   Temp 98.4 °F (36.9 °C) (Axillary)   Resp 25   Ht 5' 4\" (1.626 m)   Wt 143 lb 4.8 oz (65 kg)   SpO2 97%   BMI 24.60 kg/m²   Constitutional: Intubated, awake   HEENT : pallor +, no NL   Respiratory: Bilateral rhonchi Moderate growth   Narrative:     Source: [address] [place] [address]       Site: Sputum&Sputum                     Radiology :    Chest X ray-  persistent perihilar and bibasilar infiltrates  and effusions      Assessment:    Pneumonia / respiratory failure   Group G Streptococcus bacteremia   Leukocytosis  S/P Cardiac arrest  Anoxic encephalopathy  MSSA pneumonia / Candida colonization   History of head and neck cancer, s/p resection and chemoradiation therapy (details unclear)    Recommendations:    Continue piperacillin-tazobactam 3.375 grams IV q 8 hrs  (stop 2/25)   Follow up blood cultures.       Electronically signed by Dorcas Frederick MD on 2/22/2020 at 2:02 PM

## 2020-02-22 NOTE — PROGRESS NOTES
Department of Internal Medicine  Nephrology Attending Progress Note    Events reviewed. SUBJECTIVE: We are following Mr. Daniela Gold for YVETTE on CKD. Clinically not much change, continues to be in ICU with support, labs stabilizing    PHYSICAL EXAM:      Vitals:    VITALS:  BP (!) 150/85   Pulse 96   Temp 98.2 °F (36.8 °C) (Axillary)   Resp 17   Ht 5' 4\" (1.626 m)   Wt 143 lb 4.8 oz (65 kg)   SpO2 99%   BMI 24.60 kg/m²        Intake/Output Summary (Last 24 hours) at 2/22/2020 1115  Last data filed at 2/22/2020 0800  Gross per 24 hour   Intake 1688 ml   Output 1470 ml   Net 218 ml         Constitutional:  Intubated, off of sedation, opens eyes, does not follow any commands, FiO2 requirement is at 40%  HEENT:  Scarred sugical incision at neck; facial droop  Respiratory: Fine rales present bilaterally  Cardiovascular/Edema:  RRR, no edema noted  Gastrointestinal:  Abdomen soft and nontender, bowel sounds active. PEG.   Neurologic:  Contracted extremities, facial droop   Skin:  Dry,warm  flaky   Other:  No edema     Scheduled Meds:   bumetanide  1 mg Intravenous BID    amLODIPine  10 mg PEG Tube Daily    albuterol  2.5 mg Nebulization Q4H While awake    piperacillin-tazobactam  3.375 g Intravenous Q8H    docusate sodium  100 mg Per G Tube Daily    sennosides  5 mL Per G Tube Nightly    heparin flush  3 mL Intravenous 2 times per day    [Held by provider] heparin (porcine)  5,000 Units Subcutaneous 3 times per day    chlorhexidine  15 mL Mouth/Throat BID    folic acid  1 mg PEG Tube Daily    lansoprazole  30 mg Per G Tube QAM AC    vitamin B-1  100 mg PEG Tube Daily    [Held by provider] aspirin  81 mg Per G Tube Daily    hydrALAZINE  25 mg PEG Tube 3 times per day    metoprolol tartrate  50 mg PEG Tube BID    sodium chloride (Inhalant)  4 mL Nebulization BID    sodium chloride flush  10 mL Intravenous 2 times per day     Continuous Infusions:   dextrose       PRN Meds:.sodium chloride flush, heparin flush, acetaminophen, glucose, dextrose, glucagon (rDNA), dextrose, labetalol, magnesium hydroxide      DATA:    CBC:   Lab Results   Component Value Date    WBC 9.4 02/22/2020    RBC 2.70 02/22/2020    HGB 8.0 02/22/2020    HCT 24.8 02/22/2020    MCV 91.9 02/22/2020    MCH 29.6 02/22/2020    MCHC 32.3 02/22/2020    RDW 14.8 02/22/2020     02/22/2020    MPV 11.8 02/22/2020     CMP:    Lab Results   Component Value Date     02/22/2020    K 3.5 02/22/2020    K 2.9 02/11/2020    CL 99 02/22/2020    CO2 27 02/22/2020    BUN 31 02/22/2020    CREATININE 1.3 02/22/2020    GFRAA >60 02/22/2020    LABGLOM >60 02/22/2020    GLUCOSE 124 02/22/2020    PROT 6.7 02/22/2020    LABALBU 2.1 02/22/2020    CALCIUM 8.1 02/22/2020    BILITOT 0.7 02/22/2020    ALKPHOS 70 02/22/2020    AST 32 02/22/2020    ALT 12 02/22/2020     Magnesium:    Lab Results   Component Value Date    MG 2.1 02/22/2020     Recent Labs     02/22/20  0546   PH 7.506*   PO2 120.8*   PCO2 40.0   HCO3 30.9*   BE 7.3*   O2SAT 97.8   FIO2 40.0         Urine chemistry:  Urine creatinine = 69  Urine Microalbumin = 55.9  Urine Microalbumin/Cr ratio = 81  Urine osmolality = 393  Urine protein = 35  Urine protein/Cr ratio = 0.5    FENa: 0.7%  FEUrea: 20.9%     Ferritin: 1019  Iron: 20  Iron saturation: 20%  Folate: 14.2  B12: 383    Radiology Review:        US Retroperitoneum 2/7/20   Probable bilateral medical renal disease with some perinephric fluid   bilaterally that could indicate inflammation.       There is no evidence of calcification or obstruction in either kidney.       The bladder was not evaluated due to a decompressing Sanchez catheter. CT Soft Tissue Neck W WO Contrast 2/9/20   Probable prior right radical neck dissection with sequela   of prior surgery and radiation therapy.  There is extensive soft tissue   distortion which is suggestive of previous therapy   Finds compatible with a approximately 1 cm right thyroid nodule There is evidence to suggest severe stenosis of the right distal   internal carotid artery   Soft tissue mass at the level the carotid bifurcation on the right   with outward mass effect measuring approximately 2.3 x 2.7 cm   suspicious for recurrent disease. This encases the carotid distally   and the distal carotid stenosis may represent invasion. Comparison   studies would be necessary to distinguish recurrent neoplasm from   previous radiation therapy. Tumor invasion versus osteonecrosis of the medial right mandible         Chest x-ray February 16, 2020       Endotracheal tube terminates above the jovita.       Stable opacities in the lower lobes. Consider infectious/inflammatory   etiologies or edema. Follow-up to resolution.                   Chest x-ray 2/20/2020: Impression:     Stable abnormal chest with the diffuse bilateral infiltrates and  pleural effusions likely diffuse edema or pneumonia. BRIEF SUMMARY OF INITIAL CONSULT:    Briefly, Mr. Maryan Cano is a 60-year-old gentleman with a significant past medical history of hypertension, laryngeal cancer s/p laryngectomy in 2005 and chemo and radiation, history of CVA with baseline left sided weakness and some slurred speech, current smoker, and history of alcohol abuse who presented to the ED on 2/5/20 for fatigue. Patient was found to be extremely hypotensive at 50/palpated, tachycardic and fatigued in the ED. He was given fluid bolus with BP improvement, was found to have YVETTE (baseline unknown) with a creatinine of 4.1. Patients creatinine today is 3.0, reason for this consult. Problems resolved:  · Hypernatremia, with water deficit; calculated free water deficit is 1.8 L. Resolved. · YVETTE stage III on CKD;  volume responsive prerenal YVETTE due to poor oral intake in the setting of ACE inhibition, fraction excretion of sodium 0.7%, fractional secretion urea 20.9%. Resolved.   · Hypernatremia, with water deficit, resolved   · Severe

## 2020-02-22 NOTE — PROGRESS NOTES
[facility]  SURGICAL INTENSIVE CARE UNIT (SICU)  ATTENDING PHYSICIAN CRITICAL CARE PROGRESS NOTE     I have examined the patient, reviewed the record, and discussed the case with the APN/ resident. Please refer to the APN/ resident's note. I agree with the assessment and plan. I have reviewed all relevant labs and imaging data. The following summarizes my clinical findings and independent assessment. CC:  Critical care management after cardiac arrest    Hospital Course/Overnight Events:  2/16 3yo male with history of smoking, ETOH abuse, multiple head and neck cancers s/p partial mandibular resection ([id number]), esophageal cancer, and R base of tongue cancer s/p hemiglossectomy and chemoradiation with a new Head and Neck cancer. PEA arrest likely from aspiration ( Barium sitting in the back of the through when PEG was placed)  leading to hypoxia or Head and neck cancer obstruction.    2/17 No overnight events   2/18  Hb dropped 5.2 received 2 units PRBC    2/19 No acute overnight issues, patient withdrawing from pain this am and blinking to threat   2/20 No changes overnight   2/21--nothing new overnight  2/22--no new issues    Intubated  Eyes to voice  Minimal response to pain  Disconjugate gaze  Hrt:  Regular  Lungs:  Fairly clear bilaterally  Abd:  Soft; BS active; PEG in place  Skin:  Warm/dry    Patient Active Problem List    Diagnosis Date Noted    Palliative care encounter     Goals of care, counseling/discussion     Acute upper respiratory infection     Elevated troponin     Acute ischemic stroke ([address]     Anoxic brain damage (HCC)     Severe protein-calorie malnutrition ([address] 02/11/2020    Bacteremia 02/08/2020    Other dysphagia 02/08/2020    Acute renal failure (ARF) ([address] 02/05/2020     S/p cardiac arrest  Acute resp failure--wean mech vent support; attempt spont breathing trial; likely will need trach  Left occipital lobe ischemic stroke--monitor neuro exam  Hypoxic

## 2020-02-22 NOTE — PLAN OF CARE
Problem: Falls - Risk of:  Goal: Will remain free from falls  Outcome: Met This Shift     Problem: Risk for Impaired Skin Integrity  Goal: Tissue integrity - skin and mucous membranes  Outcome: Met This Shift     Problem: Skin Integrity:  Goal: Will show no infection signs and symptoms  Outcome: Met This Shift     Problem: Tobacco Use:  Goal: Inpatient tobacco use cessation counseling participation  Outcome: Met This Shift     Problem: Confusion - Acute:  Goal: Absence of continued neurological deterioration signs and symptoms  Outcome: Met This Shift     Problem: Injury - Risk of, Physical Injury:  Goal: Will remain free from falls  Outcome: Met This Shift     Problem: Mood - Altered:  Goal: Mood stable  Outcome: Met This Shift     Problem: Psychomotor Activity - Altered:  Goal: Absence of psychomotor disturbance signs and symptoms  Outcome: Met This Shift     Problem: Sensory Perception - Impaired:  Goal: Demonstrations of improved sensory functioning will increase  Outcome: Met This Shift     Problem: Sleep Pattern Disturbance:  Goal: Appears well-rested  Outcome: Met This Shift

## 2020-02-23 ENCOUNTER — APPOINTMENT (OUTPATIENT)
Dept: GENERAL RADIOLOGY | Age: 65
DRG: 870 | End: 2020-02-23
Payer: MEDICARE

## 2020-02-23 LAB
AADO2: 83.4 MMHG
ALBUMIN SERPL-MCNC: 1.9 G/DL (ref 3.5–5.2)
ALP BLD-CCNC: 74 U/L (ref 40–129)
ALT SERPL-CCNC: 10 U/L (ref 0–40)
ANION GAP SERPL CALCULATED.3IONS-SCNC: 13 MMOL/L (ref 7–16)
AST SERPL-CCNC: 30 U/L (ref 0–39)
B.E.: 9.6 MMOL/L (ref -3–3)
BASOPHILS ABSOLUTE: 0.03 E9/L (ref 0–0.2)
BASOPHILS RELATIVE PERCENT: 0.4 % (ref 0–2)
BILIRUB SERPL-MCNC: 0.6 MG/DL (ref 0–1.2)
BLOOD CULTURE, ROUTINE: NORMAL
BUN BLDV-MCNC: 29 MG/DL (ref 8–23)
CALCIUM IONIZED: 1.16 MMOL/L (ref 1.15–1.33)
CALCIUM SERPL-MCNC: 8 MG/DL (ref 8.6–10.2)
CHLORIDE BLD-SCNC: 97 MMOL/L (ref 98–107)
CO2: 29 MMOL/L (ref 22–29)
COHB: 0.2 % (ref 0–1.5)
CREAT SERPL-MCNC: 1.2 MG/DL (ref 0.7–1.2)
CRITICAL: ABNORMAL
CULTURE, BLOOD 2: NORMAL
DATE ANALYZED: ABNORMAL
DATE OF COLLECTION: ABNORMAL
EOSINOPHILS ABSOLUTE: 0.09 E9/L (ref 0.05–0.5)
EOSINOPHILS RELATIVE PERCENT: 1.1 % (ref 0–6)
FIO2: 40 %
GFR AFRICAN AMERICAN: >60
GFR NON-AFRICAN AMERICAN: >60 ML/MIN/1.73
GLUCOSE BLD-MCNC: 124 MG/DL (ref 74–99)
HCO3: 33.5 MMOL/L (ref 22–26)
HCT VFR BLD CALC: 24.5 % (ref 37–54)
HEMOGLOBIN: 7.7 G/DL (ref 12.5–16.5)
HHB: 1.1 % (ref 0–5)
IMMATURE GRANULOCYTES #: 0.07 E9/L
IMMATURE GRANULOCYTES %: 0.8 % (ref 0–5)
LAB: ABNORMAL
LYMPHOCYTES ABSOLUTE: 0.73 E9/L (ref 1.5–4)
LYMPHOCYTES RELATIVE PERCENT: 8.8 % (ref 20–42)
Lab: ABNORMAL
MAGNESIUM: 1.5 MG/DL (ref 1.6–2.6)
MCH RBC QN AUTO: 29.4 PG (ref 26–35)
MCHC RBC AUTO-ENTMCNC: 31.4 % (ref 32–34.5)
MCV RBC AUTO: 93.5 FL (ref 80–99.9)
METER GLUCOSE: 102 MG/DL (ref 74–99)
METER GLUCOSE: 116 MG/DL (ref 74–99)
METER GLUCOSE: 119 MG/DL (ref 74–99)
METER GLUCOSE: 125 MG/DL (ref 74–99)
METHB: 0.3 % (ref 0–1.5)
MODE: ABNORMAL
MONOCYTES ABSOLUTE: 1.08 E9/L (ref 0.1–0.95)
MONOCYTES RELATIVE PERCENT: 13.1 % (ref 2–12)
NEUTROPHILS ABSOLUTE: 6.27 E9/L (ref 1.8–7.3)
NEUTROPHILS RELATIVE PERCENT: 75.8 % (ref 43–80)
O2 CONTENT: 11.7 ML/DL
O2 SATURATION: 98.9 % (ref 92–98.5)
O2HB: 98.4 % (ref 94–97)
OPERATOR ID: ABNORMAL
PATIENT TEMP: 37 C
PCO2: 43.1 MMHG (ref 35–45)
PDW BLD-RTO: 14.8 FL (ref 11.5–15)
PEEP/CPAP: 5 CMH2O
PFO2: 3.55 MMHG/%
PH BLOOD GAS: 7.51 (ref 7.35–7.45)
PHOSPHORUS: 4.3 MG/DL (ref 2.5–4.5)
PLATELET # BLD: 126 E9/L (ref 130–450)
PMV BLD AUTO: 12 FL (ref 7–12)
PO2: 142.2 MMHG (ref 75–100)
POTASSIUM SERPL-SCNC: 3.9 MMOL/L (ref 3.5–5)
PS: 10 CMH20
RBC # BLD: 2.62 E12/L (ref 3.8–5.8)
RI(T): 59 %
SODIUM BLD-SCNC: 139 MMOL/L (ref 132–146)
SOURCE, BLOOD GAS: ABNORMAL
THB: 8.2 G/DL (ref 11.5–16.5)
TIME ANALYZED: 527
TOTAL PROTEIN: 6.6 G/DL (ref 6.4–8.3)
WBC # BLD: 8.3 E9/L (ref 4.5–11.5)

## 2020-02-23 PROCEDURE — 6360000002 HC RX W HCPCS: Performed by: STUDENT IN AN ORGANIZED HEALTH CARE EDUCATION/TRAINING PROGRAM

## 2020-02-23 PROCEDURE — 6360000002 HC RX W HCPCS: Performed by: INTERNAL MEDICINE

## 2020-02-23 PROCEDURE — 94003 VENT MGMT INPAT SUBQ DAY: CPT

## 2020-02-23 PROCEDURE — 82962 GLUCOSE BLOOD TEST: CPT

## 2020-02-23 PROCEDURE — 2000000000 HC ICU R&B

## 2020-02-23 PROCEDURE — 82330 ASSAY OF CALCIUM: CPT

## 2020-02-23 PROCEDURE — 2580000003 HC RX 258: Performed by: STUDENT IN AN ORGANIZED HEALTH CARE EDUCATION/TRAINING PROGRAM

## 2020-02-23 PROCEDURE — 80053 COMPREHEN METABOLIC PANEL: CPT

## 2020-02-23 PROCEDURE — 82805 BLOOD GASES W/O2 SATURATION: CPT

## 2020-02-23 PROCEDURE — 6370000000 HC RX 637 (ALT 250 FOR IP): Performed by: INTERNAL MEDICINE

## 2020-02-23 PROCEDURE — 6370000000 HC RX 637 (ALT 250 FOR IP): Performed by: STUDENT IN AN ORGANIZED HEALTH CARE EDUCATION/TRAINING PROGRAM

## 2020-02-23 PROCEDURE — 2580000003 HC RX 258: Performed by: INTERNAL MEDICINE

## 2020-02-23 PROCEDURE — 36415 COLL VENOUS BLD VENIPUNCTURE: CPT

## 2020-02-23 PROCEDURE — 2500000003 HC RX 250 WO HCPCS: Performed by: INTERNAL MEDICINE

## 2020-02-23 PROCEDURE — 99231 SBSQ HOSP IP/OBS SF/LOW 25: CPT | Performed by: INTERNAL MEDICINE

## 2020-02-23 PROCEDURE — 94669 MECHANICAL CHEST WALL OSCILL: CPT

## 2020-02-23 PROCEDURE — 83735 ASSAY OF MAGNESIUM: CPT

## 2020-02-23 PROCEDURE — 85025 COMPLETE CBC W/AUTO DIFF WBC: CPT

## 2020-02-23 PROCEDURE — 94640 AIRWAY INHALATION TREATMENT: CPT

## 2020-02-23 PROCEDURE — 99291 CRITICAL CARE FIRST HOUR: CPT | Performed by: SURGERY

## 2020-02-23 PROCEDURE — 99233 SBSQ HOSP IP/OBS HIGH 50: CPT | Performed by: NURSE PRACTITIONER

## 2020-02-23 PROCEDURE — 84100 ASSAY OF PHOSPHORUS: CPT

## 2020-02-23 PROCEDURE — 71045 X-RAY EXAM CHEST 1 VIEW: CPT

## 2020-02-23 RX ORDER — MAGNESIUM SULFATE IN WATER 40 MG/ML
4 INJECTION, SOLUTION INTRAVENOUS ONCE
Status: COMPLETED | OUTPATIENT
Start: 2020-02-23 | End: 2020-02-23

## 2020-02-23 RX ADMIN — PIPERACILLIN AND TAZOBACTAM 3.38 G: 3; .375 INJECTION, POWDER, LYOPHILIZED, FOR SOLUTION INTRAVENOUS at 18:51

## 2020-02-23 RX ADMIN — METOPROLOL TARTRATE 50 MG: 50 TABLET, FILM COATED ORAL at 21:31

## 2020-02-23 RX ADMIN — HEPARIN SODIUM 5000 UNITS: 10000 INJECTION INTRAVENOUS; SUBCUTANEOUS at 05:25

## 2020-02-23 RX ADMIN — AMLODIPINE BESYLATE 10 MG: 10 TABLET ORAL at 08:23

## 2020-02-23 RX ADMIN — SODIUM CHLORIDE SOLN NEBU 3% 4 ML: 3 NEBU SOLN at 08:06

## 2020-02-23 RX ADMIN — PIPERACILLIN AND TAZOBACTAM 3.38 G: 3; .375 INJECTION, POWDER, LYOPHILIZED, FOR SOLUTION INTRAVENOUS at 11:07

## 2020-02-23 RX ADMIN — Medication 100 MG: at 08:23

## 2020-02-23 RX ADMIN — Medication 10 ML: at 21:28

## 2020-02-23 RX ADMIN — HEPARIN SODIUM 5000 UNITS: 10000 INJECTION INTRAVENOUS; SUBCUTANEOUS at 22:18

## 2020-02-23 RX ADMIN — SODIUM CHLORIDE, PRESERVATIVE FREE 300 UNITS: 5 INJECTION INTRAVENOUS at 08:28

## 2020-02-23 RX ADMIN — METOPROLOL TARTRATE 50 MG: 50 TABLET, FILM COATED ORAL at 08:27

## 2020-02-23 RX ADMIN — HEPARIN SODIUM 5000 UNITS: 10000 INJECTION INTRAVENOUS; SUBCUTANEOUS at 14:25

## 2020-02-23 RX ADMIN — PIPERACILLIN AND TAZOBACTAM 3.38 G: 3; .375 INJECTION, POWDER, LYOPHILIZED, FOR SOLUTION INTRAVENOUS at 03:15

## 2020-02-23 RX ADMIN — FOLIC ACID 1 MG: 1 TABLET ORAL at 08:32

## 2020-02-23 RX ADMIN — ALBUTEROL SULFATE 2.5 MG: 2.5 SOLUTION RESPIRATORY (INHALATION) at 15:58

## 2020-02-23 RX ADMIN — HYDRALAZINE HYDROCHLORIDE 25 MG: 25 TABLET, FILM COATED ORAL at 05:25

## 2020-02-23 RX ADMIN — LANSOPRAZOLE 30 MG: KIT at 05:23

## 2020-02-23 RX ADMIN — CHLORHEXIDINE GLUCONATE 0.12% ORAL RINSE 15 ML: 1.2 LIQUID ORAL at 21:28

## 2020-02-23 RX ADMIN — ALBUTEROL SULFATE 2.5 MG: 2.5 SOLUTION RESPIRATORY (INHALATION) at 08:06

## 2020-02-23 RX ADMIN — BUMETANIDE 1 MG: 0.25 INJECTION INTRAMUSCULAR; INTRAVENOUS at 16:51

## 2020-02-23 RX ADMIN — SODIUM CHLORIDE, PRESERVATIVE FREE 300 UNITS: 5 INJECTION INTRAVENOUS at 21:28

## 2020-02-23 RX ADMIN — HYDRALAZINE HYDROCHLORIDE 25 MG: 25 TABLET, FILM COATED ORAL at 22:18

## 2020-02-23 RX ADMIN — DOCUSATE SODIUM 100 MG: 50 LIQUID ORAL at 08:27

## 2020-02-23 RX ADMIN — CHLORHEXIDINE GLUCONATE 0.12% ORAL RINSE 15 ML: 1.2 LIQUID ORAL at 08:28

## 2020-02-23 RX ADMIN — ALBUTEROL SULFATE 2.5 MG: 2.5 SOLUTION RESPIRATORY (INHALATION) at 20:59

## 2020-02-23 RX ADMIN — BUMETANIDE 1 MG: 0.25 INJECTION INTRAMUSCULAR; INTRAVENOUS at 08:27

## 2020-02-23 RX ADMIN — Medication 10 ML: at 08:23

## 2020-02-23 RX ADMIN — ALBUTEROL SULFATE 2.5 MG: 2.5 SOLUTION RESPIRATORY (INHALATION) at 12:16

## 2020-02-23 RX ADMIN — HYDRALAZINE HYDROCHLORIDE 25 MG: 25 TABLET, FILM COATED ORAL at 14:25

## 2020-02-23 RX ADMIN — SENNOSIDES 5 ML: 8.8 LIQUID ORAL at 21:29

## 2020-02-23 RX ADMIN — MAGNESIUM SULFATE IN WATER 4 G: 40 INJECTION, SOLUTION INTRAVENOUS at 11:07

## 2020-02-23 RX ADMIN — SODIUM CHLORIDE SOLN NEBU 3% 4 ML: 3 NEBU SOLN at 20:59

## 2020-02-23 ASSESSMENT — PULMONARY FUNCTION TESTS
PIF_VALUE: 16
PIF_VALUE: 23
PIF_VALUE: 16
PIF_VALUE: 16
PIF_VALUE: 15
PIF_VALUE: 16
PIF_VALUE: 15
PIF_VALUE: 17
PIF_VALUE: 16
PIF_VALUE: 15
PIF_VALUE: 16
PIF_VALUE: 15
PIF_VALUE: 16
PIF_VALUE: 16
PIF_VALUE: 17
PIF_VALUE: 16
PIF_VALUE: 16
PIF_VALUE: 15

## 2020-02-23 ASSESSMENT — PAIN SCALES - GENERAL
PAINLEVEL_OUTOF10: 0

## 2020-02-23 NOTE — PLAN OF CARE
Problem: Falls - Risk of:  Goal: Will remain free from falls  2/23/2020 1004 by Edis Mars RN  Outcome: Met This Shift  2/23/2020 0144 by Michael Dunn RN  Outcome: Met This Shift  Goal: Absence of physical injury  2/23/2020 0144 by Michael Dunn RN  Outcome: Met This Shift     Problem: Risk for Impaired Skin Integrity  Goal: Tissue integrity - skin and mucous membranes  2/23/2020 1004 by Edis Mars RN  Outcome: Met This Shift  2/23/2020 0144 by Michael Dunn RN  Outcome: Met This Shift     Problem: Skin Integrity:  Goal: Will show no infection signs and symptoms  2/23/2020 1004 by Edis Mars RN  Outcome: Met This Shift  2/23/2020 0144 by Michael Dunn RN  Outcome: Not Met This Shift  Goal: Absence of new skin breakdown  2/23/2020 0144 by Michael Dunn RN  Outcome: Met This Shift     Problem: Tobacco Use:  Goal: Inpatient tobacco use cessation counseling participation  2/23/2020 1004 by Edis Mars RN  Outcome: Met This Shift  2/23/2020 0144 by Michael Dunn RN  Outcome: Met This Shift     Problem: Confusion - Acute:  Goal: Absence of continued neurological deterioration signs and symptoms  2/23/2020 1004 by Edis Mras RN  Outcome: Met This Shift  2/23/2020 0144 by Michael Dunn RN  Outcome: Met This Shift  Goal: Mental status will be restored to baseline  2/23/2020 0144 by Michael Dunn RN  Outcome: Not Met This Shift     Problem: Discharge Planning:  Goal: Ability to perform activities of daily living will improve  2/23/2020 0144 by Michael Dunn RN  Outcome: Not Met This Shift  Goal: Participates in care planning  2/23/2020 1004 by Edis Mars RN  Outcome: Met This Shift  2/23/2020 0144 by Michael Dunn RN  Outcome: Not Met This Shift     Problem: Injury - Risk of, Physical Injury:  Goal: Will remain free from falls  2/23/2020 1004 by Edis Mars RN  Outcome: Met This Shift  2/23/2020 0144 by Michael Dunn RN  Outcome: Met This Shift  Goal: Absence of physical injury  2/23/2020 0144 by Cristino Camacho RN  Outcome: Met This Shift     Problem: Mood - Altered:  Goal: Mood stable  2/23/2020 1004 by Tod Remy RN  Outcome: Met This Shift  2/23/2020 0144 by Cristino Camacho RN  Outcome: Met This Shift  Goal: Absence of abusive behavior  2/23/2020 0144 by Cristino Camacho RN  Outcome: Met This Shift  Goal: Verbalizations of feeling emotionally comfortable while being cared for will increase  2/23/2020 0144 by Cristino Camacho RN  Outcome: Not Met This Shift     Problem: Psychomotor Activity - Altered:  Goal: Absence of psychomotor disturbance signs and symptoms  2/23/2020 1004 by Tod Remy RN  Outcome: Met This Shift  2/23/2020 0144 by Cristino Camacho RN  Outcome: Met This Shift     Problem: Sensory Perception - Impaired:  Goal: Demonstrations of improved sensory functioning will increase  2/23/2020 1004 by Tod Remy RN  Outcome: Met This Shift  2/23/2020 0144 by Cristino Camacho RN  Outcome: Met This Shift  Goal: Decrease in sensory misperception frequency  2/23/2020 0144 by Cristino Camacho RN  Outcome: Met This Shift  Goal: Able to refrain from responding to false sensory perceptions  2/23/2020 0144 by Cristino Camacho RN  Outcome: Met This Shift  Goal: Demonstrates accurate environmental perceptions  2/23/2020 0144 by Cristino Camacho RN  Outcome: Met This Shift  Goal: Able to distinguish between reality-based and nonreality-based thinking  2/23/2020 0144 by Cristino Camacho RN  Outcome: Met This Shift  Goal: Able to interrupt nonreality-based thinking  2/23/2020 0144 by Cristino Camacho RN  Outcome: Met This Shift     Problem: Sleep Pattern Disturbance:  Goal: Appears well-rested  2/23/2020 1004 by Tod Remy RN  Outcome: Met This Shift  2/23/2020 0144 by Cristino Camacho RN  Outcome: Met This Shift

## 2020-02-23 NOTE — PROGRESS NOTES
[facility] Res[facility]  Progress Note - House Team 1    Patient:  Hannah Madrigal 59 y.o. male   MRN: [id number]       Date of Service: 2020    Allergy: Patient has no known allergies. Subjective      I saw and examined the patient in the AM today. Patient is intubated, no changes in his neurologic and mental status. Objective     TEMPERATURE:  Current - Temp: 99.3 °F (37.4 °C); Max - Temp  Av.1 °F (37.3 °C)  Min: 98 °F (36.7 °C)  Max: 100.3 °F (37.9 °C)  RESPIRATIONS RANGE: Resp  Av.6  Min: 10  Max: 28  PULSE RANGE: Pulse  Av.6  Min: 88  Max: 108  BLOOD PRESSURE RANGE:  Systolic (77YQJ), OFU:575 , Min:132 , QDU:940   ; Diastolic (20SBN), XXO:48, Min:73, Max:108    PULSE OXIMETRY RANGE: SpO2  Av.7 %  Min: 97 %  Max: 100 %    I & O - 24hr:    Intake/Output Summary (Last 24 hours) at 2020 0954  Last data filed at 2020 0831  Gross per 24 hour   Intake 1966 ml   Output 2100 ml   Net -134 ml     I/O last 3 completed shifts: In:  [I.V.:116; NG/GT:1580; IV Piggyback:270]  Out:  [Urine:] I/O this shift:  In: -   Out: 125 [Urine:125]   Weight change:     Physical Exam  Vitals signs and nursing note reviewed. Constitutional:       Appearance: He is normal weight. He is ill-appearing. Interventions: He is intubated. HENT:      Head: Normocephalic and atraumatic. Right Ear: External ear normal.      Left Ear: External ear normal.      Nose: Nose normal.      Mouth/Throat:      Mouth: Mucous membranes are moist.   Eyes:      General: Lids are normal. No scleral icterus. Right eye: No discharge. Left eye: No discharge. Conjunctiva/sclera: Conjunctivae normal.      Pupils: Pupils are equal.      Right eye: Pupil is not reactive (Pinpoint pupil). Pupil is not sluggish. Left eye: Pupil is not reactive (Pinpoint pupil). Pupil is not sluggish.    Cardiovascular:      Rate and Rhythm: Normal rate of extensive prior surgery. No convincing abscess is seen. There is distortion of the oropharynx and the posterior superior hypopharynx there is significant distortion of the right sternocleidomastoid and a pattern compatible with previous radical neck dissection. There are findings to suggest previous therapy. The right jugular vein is not seen likely status post resection. There is been resection at the level of the tongue base and floor the mouth. There is sequela of previous resection. . Atherosclerotic disease of both proximal internal carotid arteries is identified without proximal internal carotid artery hemodynamically significant stenosis. At the level the distal right internal carotid artery at the level the cervical component there is evidence to suggest severe stenosis. Also the level of the carotid bifurcation on the right there is a soft tissue mass effect suspicious for recurrent neoplasm. This measures approximately 2.7 x 2.3 cm. This could also be related to previous therapy. Comparison studies would be necessary. There is a mottled eaten appearance to the medial aspect of the right mandible. This could be related to tumor invasion or osteonecrosis. Scattered lymph nodes are visualized which do not meet the CT criteria for adenopathy. There is mixed density in the right thyroid possibly representing a thyroid nodule measuring approximately 1 cm. There is density scattered throughout the sinuses possibly related to mild chronic sinusitis. There is likely a right sphenoid retention cyst     Probable prior right radical neck dissection with sequela of prior surgery and radiation therapy.  There is extensive soft tissue distortion which is suggestive of previous therapy Finds compatible with a approximately 1 cm right thyroid nodule There is evidence to suggest severe stenosis of the right distal internal carotid artery Soft tissue mass at the level the carotid bifurcation on the right with outward mass effect measuring approximately 2.3 x 2.7 cm suspicious for recurrent disease. This encases the carotid distally and the distal carotid stenosis may represent invasion. Comparison studies would be necessary to distinguish recurrent neoplasm from previous radiation therapy. Tumor invasion versus osteonecrosis of the medial right mandible ALERT:  THIS IS AN ABNORMAL REPORT      Us Head Neck Soft Tissue Thyroid    Result Date: 2020  Patient MRN:  [id number] : 1955 Age: 59 years Gender: Male Order Date:  2020 2:15 PM Exam: US HEAD NECK SOFT TISSUE THYROID Number of images:  26 Indication:  right carotid bifurcation mass With special focus to soft tissue mass at the right carotid bifurcation. right carotid bifurcation mass Comparison: Correlation with CT dated 2020. Technique: Grayscale and color Doppler ultrasound images of the region of interest were obtained by the sonographic technologist. FINDINGS: The study is limited by technically suboptimal image quality. There is abnormal heterogeneous soft tissue in the region of interest.     Ill-defined, heterogeneous, abnormal appearing soft tissue in the region of interest. No specific clinical question is asked. Ultrasound is not the appropriate modality for this evaluation. Contrast-enhanced MRI may provide more information. Correlation with clinical and surgical history is needed. Xr Chest Portable    Result Date: 2020  Patient MRN:  [id number] : 1955 Age: 59 years Gender: Male Order Date:  2020 6:00 AM EXAM: XR CHEST PORTABLE COMPARISON: February 10, 2020 INDICATION:  PNA PNA FINDINGS: There are opacities at the left lung base silhouetting left hemidiaphragm which appears similar since prior. The heart is normal size. No pneumothorax. Stable opacities at left lung base. Continued follow-up could be helpful for further evaluation.     Xr Chest Portable    Result Date: 2/10/2020  Patient MRN: [id number] : 1955 Age:  59 years Gender: Male Order Date: 2/10/2020 3:00 PM Exam: XR CHEST PORTABLE Number of Images: 1 view Indication:   SOB SOB Comparison: 2020 Findings: The heart is enlarged. The lung fields demonstrate evidence for airspace disease. The aorta is tortuous and ectatic. Tortuous ectatic aorta Cardiomegaly Airspace disease compatible with atelectasis or pneumonia, worse at the left lung base as compared to the right The chest appears to be worse in the interval     Xr Chest 1 Vw    Result Date: 2020  Patient MRN: [id number] : 1955 Age:  59 years Gender: Male Order Date: 2020 6:00 AM Exam: XR CHEST 1 VIEW Number of Images: 1 view Indication: Acute weakness. Comparison: None. FINDINGS: Heart and pulmonary vascularity normal. Lungs clear. Costophrenic angles sharp. Normal aorta. No acute cardiopulmonary findings. Us Retroperitoneal Complete    Result Date: 2020  Patient Mrn: [id number] : 1955 Age:  59 years Gender: Male Order Date: 2020 7:45 PM Exam: US RETROPERITONEAL COMPLETE Number Of Images: 54 Views Indication:  Acute renal insufficiency Comparison: None. TECHNIQUE: 2-D grayscale and color Doppler imaging were utilized for evaluation of the kidneys and bladder. Findings: The right kidney measures 11.8 x 5.5 x 6.3 cm, and the left kidney measures 11.5 x 5.6 x 5.9 cm. There is no evidence of collecting system calcification, obstructive dilation, or perinephric inflammatory change. Cortical echogenicity is increased bilaterally,. Suggesting the spectrum of medical renal disease. Central perfusion is intact bilaterally by color Doppler imaging, however. There is some perinephric fluid bilaterally. The bladder is decompressed with Sanchez catheter, and is not further characterized. Probable bilateral medical renal disease with some perinephric fluid bilaterally that could indicate inflammation. There is no evidence of calcification or obstruction in either kidney.  The bladder was not evaluated due to a decompressing Sanchez catheter. Fl Modified Barium Swallow W Video    Result Date: 2020  Patient MRN: [id number] : 1955 Age:  59 years Gender: Male Order Date: 2/10/2020 1:45 PM Exam: FL MODIFIED BARIUM SWALLOW W VIDEO Number of Images: 1 Indication:   dysphagia Comparison: None. Fluoroscopy time: 1.1 minute Findings: Textures given, nectar Aspiration, nectar Laryngeal penetration, nectar Cough, None Oral delay, Yes Retention in vallecula, Yes Retention in piriform sinuses, None. Pharyngeal delay, Yes Laryngeal elevation, reduced. Study is remarkable for silent aspiration with nectar consistency. This procedure was performed and dictated by Danny Ratliff PA-C with indirect supervision, and Sofia Motta. [facility] MD reviewed and concurred with the findings. Fl Modified Barium Swallow W Video    Result Date: 2020  Patient MRN: [id number] : 1955 Age:  59 years Gender: Male Order Date: 2020 7:45 AM Exam: FL MODIFIED BARIUM SWALLOW W VIDEO Number of Images: views Indication:   dysphagia dysphagia Comparison: None. Findings: Patient was given nectar and pudding patient is unable to swallow with no gag reflex. Therefore patient was suctioned and the study was canceled.      Patient unable to swallow nectar and pudding which was suctioned         Medications     Current Meds:  Current Facility-Administered Medications   Medication Dose Route Frequency Provider Last Rate Last Dose    magnesium sulfate 4 g in 100 mL IVPB premix  4 g Intravenous Once Alex Garcia MD        [facility]) injection 1 mg  1 mg Intravenous BID Delphine Devine MD   1 mg at 20 0827    amLODIPine (NORVASC) tablet 10 mg  10 mg PEG Tube Daily Dylan Bourgeois MD   10 mg at 20 0823    albuterol (PROVENTIL) nebulizer solution 2.5 mg  2.5 mg Nebulization Q4H While awake Alex Garcia MD   2.5 mg at 20 0806    piperacillin-tazobactam (ZOSYN) 3.375 g in dextrose 5 % 100 mL IVPB extended infusion (mini-bag)  3.375 g Intravenous Q8H Vilma Calles MD   Stopped at 02/23/20 0720    docusate sodium (COLACE) 150 MG/15ML liquid 100 mg  100 mg Per G Tube Daily Beatriz Tang MD   100 mg at 02/23/20 0827    sennosides (SENOKOT) 8.8 MG/5ML syrup 5 mL  5 mL Per G Tube Nightly Beatriz Tang MD   5 mL at 02/22/20 2001    sodium chloride flush 0.9 % injection 10 mL  10 mL Intravenous PRN Beatriz Tang MD   10 mL at 02/18/20 1549    heparin flush 100 UNIT/ML injection 300 Units  3 mL Intravenous 2 times per day Beatriz Tang MD   300 Units at 02/23/20 0828    heparin flush 100 UNIT/ML injection 300 Units  3 mL Intracatheter PRN Beatriz Tang MD        heparin (porcine) injection 5,000 Units  5,000 Units Subcutaneous 3 times per day Beatriz Tang MD   5,000 Units at 02/23/20 0525    acetaminophen (TYLENOL) 160 MG/5ML solution 650 mg  650 mg PEG Tube Q4H PRN Michael Leon MD   650 mg at 02/19/20 1643    chlorhexidine (PERIDEX) 0.12 % solution 15 mL  15 mL Mouth/Throat BID Beatriz Tang MD   15 mL at 72/39/65 4204    folic acid (FOLVITE) tablet 1 mg  1 mg PEG Tube Daily Branden Ashley Jr., DO   1 mg at 02/23/20 1449    lansoprazole suspension SUSP 30 mg  30 mg Per G Tube QAM AC Branden Ashley Jr., DO   30 mg at 02/23/20 0341    vitamin B-1 (THIAMINE) tablet 100 mg  100 mg PEG Tube Daily Timmobraeden Hayes Jr., DO   100 mg at 02/23/20 4074    [Held by provider] aspirin chewable tablet 81 mg  81 mg Per G Tube Daily Perri Pulliam MD   81 mg at 02/14/20 1009    hydrALAZINE (APRESOLINE) tablet 25 mg  25 mg PEG Tube 3 times per day Otilio Velazco MD   25 mg at 02/23/20 0525    metoprolol tartrate (LOPRESSOR) tablet 50 mg  50 mg PEG Tube BID Saorj Schumacher MD   50 mg at 02/23/20 0827    glucose (GLUTOSE) 40 % oral gel 15 g  15 g Oral PRN Saqib Solis DO        dextrose 50 % IV solution  12.5 g Intravenous PRN [place],         glucagon (rDNA) injection 1 mg  1 mg Intramuscular PRN [place], DO        dextrose 5 % solution  100 mL/hr Intravenous PRN [place], DO        sodium chloride (Inhalant) 3 % nebulizer solution 4 mL  4 mL Nebulization BID Saqib B Capal, DO   4 mL at 02/23/20 0806    labetalol (NORMODYNE;TRANDATE) injection 10 mg  10 mg Intravenous Q6H PRN Saqib B Capal, DO   10 mg at 02/22/20 1602    sodium chloride flush 0.9 % injection 10 mL  10 mL Intravenous 2 times per day Charlyne Leaven B Capal, DO   10 mL at 02/23/20 0550    magnesium hydroxide (MILK OF MAGNESIA) 400 MG/5ML suspension 30 mL  30 mL Oral Daily PRN Saqib B Capal, DO           Continuous Infusions:   dextrose       Scheduled Meds:   magnesium sulfate  4 g Intravenous Once    bumetanide  1 mg Intravenous BID    amLODIPine  10 mg PEG Tube Daily    albuterol  2.5 mg Nebulization Q4H While awake    piperacillin-tazobactam  3.375 g Intravenous Q8H    docusate sodium  100 mg Per G Tube Daily    sennosides  5 mL Per G Tube Nightly    heparin flush  3 mL Intravenous 2 times per day    heparin (porcine)  5,000 Units Subcutaneous 3 times per day    chlorhexidine  15 mL Mouth/Throat BID    folic acid  1 mg PEG Tube Daily    lansoprazole  30 mg Per G Tube QAM AC    vitamin B-1  100 mg PEG Tube Daily    [Held by provider] aspirin  81 mg Per G Tube Daily    hydrALAZINE  25 mg PEG Tube 3 times per day    metoprolol tartrate  50 mg PEG Tube BID    sodium chloride (Inhalant)  4 mL Nebulization BID    sodium chloride flush  10 mL Intravenous 2 times per day     PRN Meds: sodium chloride flush, heparin flush, acetaminophen, glucose, dextrose, glucagon (rDNA), dextrose, labetalol, magnesium hydroxide      Resident's Assessment and Plan     Shirley Garcia is 59 y.o. male was admitted on 2/5/2020 due to fatigue, unable to eat and had an episode of a fall. He was found to be hypotensive, thrombocytopenic (platelet 47,133) and in YVETTE. He has baseline Lt.  Sided weakness

## 2020-02-23 NOTE — PROGRESS NOTES
Department of Internal Medicine  Nephrology Attending Progress Note    Events reviewed. SUBJECTIVE: We are following Mr. Jenkins Batch for YVETTE on CKD. Continues to be in ICU intubated sedated not much change clinically    PHYSICAL EXAM:      Vitals:    VITALS:  /73   Pulse 88   Temp 99.3 °F (37.4 °C) (Axillary)   Resp 19   Ht 5' 4\" (1.626 m)   Wt 139 lb 8 oz (63.3 kg)   SpO2 100%   BMI 23.95 kg/m²        Intake/Output Summary (Last 24 hours) at 2/23/2020 1022  Last data filed at 2/23/2020 0831  Gross per 24 hour   Intake 1966 ml   Output 2080 ml   Net -114 ml         Constitutional:  Intubated, off of sedation, opens eyes, does not follow any commands, FiO2 requirement is at 40%  HEENT:  Scarred sugical incision at neck; facial droop  Respiratory: Fine rales present bilaterally  Cardiovascular/Edema:  RRR, no edema noted  Gastrointestinal:  Abdomen soft and nontender, bowel sounds active. PEG.   Neurologic:  Contracted extremities, facial droop   Skin:  Dry,warm  flaky   Other:  No edema     Scheduled Meds:   magnesium sulfate  4 g Intravenous Once    bumetanide  1 mg Intravenous BID    amLODIPine  10 mg PEG Tube Daily    albuterol  2.5 mg Nebulization Q4H While awake    piperacillin-tazobactam  3.375 g Intravenous Q8H    docusate sodium  100 mg Per G Tube Daily    sennosides  5 mL Per G Tube Nightly    heparin flush  3 mL Intravenous 2 times per day    heparin (porcine)  5,000 Units Subcutaneous 3 times per day    chlorhexidine  15 mL Mouth/Throat BID    folic acid  1 mg PEG Tube Daily    lansoprazole  30 mg Per G Tube QAM AC    vitamin B-1  100 mg PEG Tube Daily    [Held by provider] aspirin  81 mg Per G Tube Daily    hydrALAZINE  25 mg PEG Tube 3 times per day    metoprolol tartrate  50 mg PEG Tube BID    sodium chloride (Inhalant)  4 mL Nebulization BID    sodium chloride flush  10 mL Intravenous 2 times per day     Continuous Infusions:   dextrose       PRN Meds:.sodium thyroid nodule   There is evidence to suggest severe stenosis of the right distal   internal carotid artery   Soft tissue mass at the level the carotid bifurcation on the right   with outward mass effect measuring approximately 2.3 x 2.7 cm   suspicious for recurrent disease. This encases the carotid distally   and the distal carotid stenosis may represent invasion. Comparison   studies would be necessary to distinguish recurrent neoplasm from   previous radiation therapy. Tumor invasion versus osteonecrosis of the medial right mandible         Chest x-ray February 16, 2020       Endotracheal tube terminates above the jovita.       Stable opacities in the lower lobes. Consider infectious/inflammatory   etiologies or edema. Follow-up to resolution.                   Chest x-ray 2/20/2020: Impression:     Stable abnormal chest with the diffuse bilateral infiltrates and  pleural effusions likely diffuse edema or pneumonia. BRIEF SUMMARY OF INITIAL CONSULT:    Briefly, Mr. Toya Herzog is a 42-year-old gentleman with a significant past medical history of hypertension, laryngeal cancer s/p laryngectomy in 2005 and chemo and radiation, history of CVA with baseline left sided weakness and some slurred speech, current smoker, and history of alcohol abuse who presented to the ED on 2/5/20 for fatigue. Patient was found to be extremely hypotensive at 50/palpated, tachycardic and fatigued in the ED. He was given fluid bolus with BP improvement, was found to have YVETTE (baseline unknown) with a creatinine of 4.1. Patients creatinine today is 3.0, reason for this consult. Problems resolved:  · Hypernatremia, with water deficit; calculated free water deficit is 1.8 L. Resolved. · YVETTE stage III on CKD;  volume responsive prerenal YVETTE due to poor oral intake in the setting of ACE inhibition, fraction excretion of sodium 0.7%, fractional secretion urea 20.9%. Resolved.   · Hypernatremia, with water deficit, resolved · Severe thrombocytopenia, resolved     IMPRESSION/RECOMMENDATIONS:      1. YVETTE on CKD, likely ischemic ATN s/p PEA cardiac arrest on 2/15/20,: Resolving   2. CKD stage I-II, with minimal proteinuria (urine albumin/creatinine: 81 mg/gr). Kidney ultrasound with increased renal cortical echogenicity. Probably due to nephrosclerosis. 3. Hypomagnesemia likely 2/2 poor oral intake, improved  4. Hypokalemia likely 2/2 to poor oral intake, improved  5. Status post PEA arrest 2/15/20  6. Respiratory failure status post intubation  7. Alkalemia (pH: 3.582) with metabolic alkalosis  8. HTN, on hydralazine, metoprolol. 9. Group G Streptococcus bacteremia, on amoxicillin-clavulanate  10. Normocytic Anemia, Acute blood loss? .  Iron studies consistent with chronic inflammation, normal B12 and folate levels. S/p 2 units PRBCs today. 11. Nutrition, s/p PEG, TF at 50cc/hr with FW 75 mL/hr      Plan:    1. Creatinine holding stable  2. Tolerating diuresis as of now  3. Using Bumex 1 mg twice daily/good amount of urine output still only minimal negative balance  4. Continue with the current diuretic schedule for the time being  5. Replace magnesium and potassium as needed based on labs  6. We will continue to follow closely  7.  Prognosis remains poor neurological changes and improvement minimal

## 2020-02-23 NOTE — PROGRESS NOTES
[facility]  SURGICAL INTENSIVE CARE UNIT (SICU)  ATTENDING PHYSICIAN CRITICAL CARE PROGRESS NOTE     I have examined the patient, reviewed the record, and discussed the case with the APN/ resident. Please refer to the APN/ resident's note. I agree with the assessment and plan. I have reviewed all relevant labs and imaging data. The following summarizes my clinical findings and independent assessment. CC:  Critical care management after cardiac arrest    Hospital Course/Overnight Events:  2/16 5yo male with history of smoking, ETOH abuse, multiple head and neck cancers s/p partial mandibular resection ([id number]), esophageal cancer, and R base of tongue cancer s/p hemiglossectomy and chemoradiation with a new Head and Neck cancer. PEA arrest likely from aspiration ( Barium sitting in the back of the through when PEG was placed)  leading to hypoxia or Head and neck cancer obstruction.    2/17 No overnight events   2/18  Hb dropped 5.2 received 2 units PRBC    2/19 No acute overnight issues, patient withdrawing from pain this am and blinking to threat   2/20 No changes overnight   2/21--nothing new overnight  2/22--no new issues  2/23--nothing new overnight    Intubated  Eyes to voice  Minimal response to pain  Disconjugate gaze  Hrt:  Regular  Lungs:  Fairly clear bilaterally  Abd:  Soft; BS active; PEG in place  Skin:  Warm/dry    Patient Active Problem List    Diagnosis Date Noted    Palliative care encounter     Goals of care, counseling/discussion     Acute upper respiratory infection     Elevated troponin     Acute ischemic stroke ([facility]     Anoxic brain damage (HCC)     Severe protein-calorie malnutrition ([address] 02/11/2020    Bacteremia 02/08/2020    Other dysphagia 02/08/2020    Acute renal failure (ARF) ([address] 02/05/2020     S/p cardiac arrest  Acute resp failure--wean mech vent support; attempt spont breathing trial; will need trach  Left occipital lobe ischemic stroke--monitor neuro exam  Hypoxic encephalopathy--monitor neuro exam  Hx of head/neck cancer  Dysphagia/hypoalbuminemia--cont TF  Renal insuff--monitor BUN/Cr/UO; on bumex  Electrolyte imbalance (hypomagnesemia)--correct as able  Acute blood loss anemia--monitor H/H  Thrombocytopenia--likely consumptive--cont to monitor  Zosyn empirically per ID  DVT risk--PCDs/subQ heparin    Pt is at risk for neurologic/metabolic/respiratory/hemodynamic deterioration and requires ongoing ICU care    Berny Wong MD, [facility]  2/23/2020  9:12 AM      Critical care time exclusive of teaching and procedures = 36minutes

## 2020-02-23 NOTE — PROGRESS NOTES
or lesions      Mental Status: Intubated, no sedation, not following commands     Appropriate attention/concentration--unable to assess at present time  Intact fundus of knowledge--unable to assess at present time  Intact memories--unable to assess at present time    Speech/Language: --unable to assess at present time     Cranial Nerves: CN 2-12 exam limited d/t pt's condition--able to follow commands  I: smell NA   II: visual acuity  NA   II: visual fields Blinks to threat   II: pupils 2-3 mm, very sluggish   III,VII: ptosis    III,IV,VI: extraocular muscles  Tracks me today intermittently    V: mastication    V: facial light touch sensation  No response to LT   V,VII: corneal reflex  Present   VII: facial muscle function - upper  Normal   VII: facial muscle function - lower L lower facial droop-- could be positional    VIII: hearing No response to loud    IX: soft palate elevation     IX,X: gag reflex Present   XI: trapezius strength     XI: sternocleidomastoid strength    XI: neck extension strength     XII: tongue strength       Motor:  Moves BLE and LUE to pain; no withdrawal to pain in RUE   Mildly increased tone, decreased muscle bulk     Sensory:  Withdraws to pain in BLE and LUE    Coordination:   unable to complete at present time    Gait:  unable to complete at present time    DTR:   Right Brachioradialis reflex 2+  Left Brachioradialis reflex 2+  Right Biceps reflex 2+  Left Biceps reflex 2+  Right Triceps reflex 2+  Left Triceps reflex 2+  Right Quadriceps reflex 2+  Left Quadriceps reflex 2+  Right Achilles reflex 2+  Left Achilles reflex 2+    No Babinskis  No Monson's    No pathological reflexes    Laboratory/Radiology:     CBC with Differential:    Lab Results   Component Value Date    WBC 8.3 02/23/2020    RBC 2.62 02/23/2020    HGB 7.7 02/23/2020    HCT 24.5 02/23/2020     02/23/2020    MCV 93.5 02/23/2020    MCH 29.4 02/23/2020    MCHC 31.4 02/23/2020    RDW 14.8 02/23/2020    NRBC 0.9 02/16/2020    METASPCT 0.9 02/09/2020    LYMPHOPCT 8.8 02/23/2020    MONOPCT 13.1 02/23/2020    MYELOPCT 1.7 02/11/2020    BASOPCT 0.4 02/23/2020    MONOSABS 1.08 02/23/2020    LYMPHSABS 0.73 02/23/2020    EOSABS 0.09 02/23/2020    BASOSABS 0.03 02/23/2020     CMP:    Lab Results   Component Value Date     02/23/2020    K 3.9 02/23/2020    K 2.9 02/11/2020    CL 97 02/23/2020    CO2 29 02/23/2020    BUN 29 02/23/2020    CREATININE 1.2 02/23/2020    GFRAA >60 02/23/2020    LABGLOM >60 02/23/2020    GLUCOSE 124 02/23/2020    PROT 6.6 02/23/2020    LABALBU 1.9 02/23/2020    CALCIUM 8.0 02/23/2020    BILITOT 0.6 02/23/2020    ALKPHOS 74 02/23/2020    AST 30 02/23/2020    ALT 10 02/23/2020     Hepatic Function Panel:    Lab Results   Component Value Date    ALKPHOS 74 02/23/2020    ALT 10 02/23/2020    AST 30 02/23/2020    PROT 6.6 02/23/2020    BILITOT 0.6 02/23/2020    BILIDIR 1.1 02/06/2020    LABALBU 1.9 02/23/2020     Troponin:    Lab Results   Component Value Date    TROPONINI 0.13 02/16/2020     U/A:    Lab Results   Component Value Date    COLORU Yellow 02/05/2020    PROTEINU 100 02/05/2020    PHUR 5.0 02/05/2020    WBCUA 1-3 02/05/2020    RBCUA 2-5 02/05/2020    BACTERIA MANY 02/05/2020    CLARITYU SL CLOUDY 02/05/2020    SPECGRAV 1.025 02/05/2020    LEUKOCYTESUR Negative 02/05/2020    UROBILINOGEN 1.0 02/05/2020    BILIRUBINUR Negative 02/05/2020    BLOODU LARGE 02/05/2020    GLUCOSEU Negative 02/05/2020   TSH:    Lab Results   Component Value Date    TSH 7.150 02/18/2020   FOLATE:    Lab Results   Component Value Date    FOLATE 14.2 02/15/2020     IRON:    Lab Results   Component Value Date    IRON 20 02/15/2020     XR CHEST PORTABLE   Final Result   Improving CHF with the persistent perihilar and bibasilar infiltrates   and effusions and a focal masslike infiltrate in the left midlung   field. Surveillance is recommended.             XR CHEST PORTABLE   Final Result   No interval change               MRA Final Result   No acute cardiopulmonary findings. XR FOOT LEFT (MIN 3 VIEWS)   Final Result   Moderate degenerative changes      XR CHEST STANDARD (2 VW)   Final Result   Findings compatible with atherosclerotic disease    No acute infiltrate                     XR CHEST PORTABLE    (Results Pending)   XR CHEST PORTABLE    (Results Pending)     Echo 2/16:   Trileaflet aortic valve. Mild leaflet calcification with more pronounced  calcification on the non coronary cusp. Normal leaflet mobility. No aortic stenosis. No aortic regurgitation. Concentric hypertrophy though in some views the walls are up to 1.4 cm thick. Normal left ventricular systolic function. Visually estimated LVEF is 60 %. Normal diastolic function. Normal left atrial pressure. Mildly dilated right ventricle. Difficult to visualize the free wall in all views. Small anterior and apical effusion. No signs of tamponade. EEG 2/19: This EEG shows moderate diffuse encephalopathy. No epileptiform activities or lateralizing signs are seen. All labs and imaging reviewed independently today. Assessment:     Pt was initially admitted on 2/5 for generalized fatigue,  productive cough, and myalgias   Pt was found to have MSSA pneumonia and group G Streptococcus bacteremia and was subsequently treated   After multiple failed swallow tests pt had a PEG placed    On 2/16 pt was bradycardic down to the 20s and had several episodes of PEA arrest with CPR for 8 minutes total in which he had to be intubated     Initial ABG showed severe respiratory acidosis with a pH of 6.795/167.8/31.8/25.2--- encephalopathic and unresponsive to pain afterwards   CT head completed 2/17 showed L occipital lobe hypo attentuation likely subacute stroke   EEG is consistent with moderate diffuse encephalopathy; no seizure activity noted.    MRI Brain showed posterior medial L occipital stroke; MRA unremarkable     Acute metabolic encephalopathy due to PEA arrest and hypoxia, YVETTE, hypocalcemia, hypokalemia, severe anemia, elevated BNP 94226 and elevated troponins. High TSH 2/18    Leukocytosis resolved but remains febrile    At present pt is intubated, off sedation, on pressure supoort and not following commands. Exam remains unchanged     Spoke to family on Friday at great length--MRI brain with MRAs, EEG and all other results reviewed. MRI imaging reviewed at bedside. All questions and concerns addressed. Family is leaning towards trach, pt currently has PEG in place as well. Plan:     Continue heavy doses of supportive care  Continue antibiotics per ID   Continue to treat other metabolic derangements  Treat TSH  A1c 5.2, LDL 29  Continue aspirin   No statin as low LDL without statin at home  Continue PT/OT as able  SCDs  Continue telemetry   Nephro, ID, Palliative, CC team following     Neuro will follow PRN and will be available as needed. Family to make a decision on trach or terminal extubation soon. No family  Here during my visit today.      TAI Galvez NP-C   11:45 AM  2/23/2020

## 2020-02-23 NOTE — PROGRESS NOTES
[facility] PROGRESS NOTE      Chief complaint: Pneumonia ,  group G Streptococcus bacteremia    Pt is intubated,opened eyes   Non communicating     Afebrile    Current Facility-Administered Medications   Medication Dose Route Frequency Provider Last Rate Last Dose    magnesium sulfate 4 g in 100 mL IVPB premix  4 g Intravenous Once Brandon Tomlin MD        bumetanide [facility]) injection 1 mg  1 mg Intravenous BID Carolina Vines MD   1 mg at 02/23/20 0827    amLODIPine (NORVASC) tablet 10 mg  10 mg PEG Tube Daily Geovanni Torres MD   10 mg at 02/23/20 0823    albuterol (PROVENTIL) nebulizer solution 2.5 mg  2.5 mg Nebulization Q4H While awake Brandon Tomlin MD   2.5 mg at 02/23/20 0806    piperacillin-tazobactam (ZOSYN) 3.375 g in dextrose 5 % 100 mL IVPB extended infusion (mini-bag)  3.375 g Intravenous Q8H Hailey Thakkar MD   Stopped at 02/23/20 0720    docusate sodium (COLACE) 150 MG/15ML liquid 100 mg  100 mg Per G Tube Daily Brandon Tomlin MD   100 mg at 02/23/20 0827    sennosides (SENOKOT) 8.8 MG/5ML syrup 5 mL  5 mL Per G Tube Nightly Brandon Tomlin MD   5 mL at 02/22/20 2001    sodium chloride flush 0.9 % injection 10 mL  10 mL Intravenous PRN Brandon Tomlin MD   10 mL at 02/18/20 1549    heparin flush 100 UNIT/ML injection 300 Units  3 mL Intravenous 2 times per day Brandon Tomlin MD   300 Units at 02/23/20 0828    heparin flush 100 UNIT/ML injection 300 Units  3 mL Intracatheter PRN Brandon Tomlin MD        heparin (porcine) injection 5,000 Units  5,000 Units Subcutaneous 3 times per day Brandon Tomlin MD   5,000 Units at 02/23/20 0525    acetaminophen (TYLENOL) 160 MG/5ML solution 650 mg  650 mg PEG Tube Q4H PRN Kenny Perkins MD   650 mg at 02/19/20 1643    chlorhexidine (PERIDEX) 0.12 % solution 15 mL  15 mL Mouth/Throat BID Brandon Tomlin MD   15 mL at 09/07/98 6929    folic acid (FOLVITE) tablet 1 mg  1 mg PEG Tube Daily Marco Antonio Hendrix., DO   1 mg at 02/23/20 4717    Regular rate and rhythm, no murmurs  Gastrointestinal: Bowel sounds present, soft, nontender  Skin: Warm and dry, no active dermatoses  Musculoskeletal: No joint swelling, no joint erythema      CBC with Differential:      Lab Results   Component Value Date    WBC 8.3 02/23/2020    RBC 2.62 02/23/2020    HGB 7.7 02/23/2020    HCT 24.5 02/23/2020     02/23/2020    MCV 93.5 02/23/2020    MCH 29.4 02/23/2020    MCHC 31.4 02/23/2020    RDW 14.8 02/23/2020    NRBC 0.9 02/16/2020    METASPCT 0.9 02/09/2020    LYMPHOPCT 8.8 02/23/2020    MONOPCT 13.1 02/23/2020    MYELOPCT 1.7 02/11/2020    BASOPCT 0.4 02/23/2020    MONOSABS 1.08 02/23/2020    LYMPHSABS 0.73 02/23/2020    EOSABS 0.09 02/23/2020    BASOSABS 0.03 02/23/2020       CMP:    Lab Results   Component Value Date     02/23/2020    K 3.9 02/23/2020    K 2.9 02/11/2020    CL 97 02/23/2020    CO2 29 02/23/2020    BUN 29 02/23/2020    CREATININE 1.2 02/23/2020    GFRAA >60 02/23/2020    LABGLOM >60 02/23/2020    GLUCOSE 124 02/23/2020    PROT 6.6 02/23/2020    LABALBU 1.9 02/23/2020    CALCIUM 8.0 02/23/2020    BILITOT 0.6 02/23/2020    ALKPHOS 74 02/23/2020    AST 30 02/23/2020    ALT 10 02/23/2020       Hepatic Function Panel:    Lab Results   Component Value Date    ALKPHOS 74 02/23/2020    ALT 10 02/23/2020    AST 30 02/23/2020    PROT 6.6 02/23/2020    BILITOT 0.6 02/23/2020    BILIDIR 1.1 02/06/2020    LABALBU 1.9 02/23/2020         Microbiology :    Blood culture -  Beta Strep Group G     Sputum Culture -   Group 6: <25 PMN's/LPF and <25 Epithelial cells/LPF   Rare Polymorphonuclear leukocytes   Rare Epithelial cells   Moderate yeast   Few Gram positive diplococci   Rare Gram positive cocci in clusters     Organism Staphylococcus aureusAbnormal     CULTURE, RESPIRATORY Light growth    Organism Candida albicansAbnormal     CULTURE, RESPIRATORY Moderate growth    Organism Yeast, not C. albicansAbnormal     CULTURE, RESPIRATORY Moderate growth

## 2020-02-24 ENCOUNTER — APPOINTMENT (OUTPATIENT)
Dept: GENERAL RADIOLOGY | Age: 65
DRG: 870 | End: 2020-02-24
Payer: MEDICARE

## 2020-02-24 LAB
AADO2: 80.9 MMHG
ANION GAP SERPL CALCULATED.3IONS-SCNC: 13 MMOL/L (ref 7–16)
B.E.: 8.9 MMOL/L (ref -3–3)
BASOPHILS ABSOLUTE: 0.03 E9/L (ref 0–0.2)
BASOPHILS RELATIVE PERCENT: 0.4 % (ref 0–2)
BUN BLDV-MCNC: 26 MG/DL (ref 8–23)
CALCIUM IONIZED: 1.13 MMOL/L (ref 1.15–1.33)
CALCIUM SERPL-MCNC: 7.7 MG/DL (ref 8.6–10.2)
CHLORIDE BLD-SCNC: 95 MMOL/L (ref 98–107)
CO2: 30 MMOL/L (ref 22–29)
COHB: 0.3 % (ref 0–1.5)
CREAT SERPL-MCNC: 1.2 MG/DL (ref 0.7–1.2)
CRITICAL: ABNORMAL
DATE ANALYZED: ABNORMAL
DATE OF COLLECTION: ABNORMAL
EOSINOPHILS ABSOLUTE: 0.1 E9/L (ref 0.05–0.5)
EOSINOPHILS RELATIVE PERCENT: 1.4 % (ref 0–6)
FIO2: 40 %
GFR AFRICAN AMERICAN: >60
GFR NON-AFRICAN AMERICAN: >60 ML/MIN/1.73
GLUCOSE BLD-MCNC: 199 MG/DL (ref 74–99)
HCO3: 32.5 MMOL/L (ref 22–26)
HCT VFR BLD CALC: 22.6 % (ref 37–54)
HEMOGLOBIN: 7.3 G/DL (ref 12.5–16.5)
HHB: 1.4 % (ref 0–5)
IMMATURE GRANULOCYTES #: 0.05 E9/L
IMMATURE GRANULOCYTES %: 0.7 % (ref 0–5)
LAB: ABNORMAL
LYMPHOCYTES ABSOLUTE: 0.83 E9/L (ref 1.5–4)
LYMPHOCYTES RELATIVE PERCENT: 11.3 % (ref 20–42)
Lab: ABNORMAL
MAGNESIUM: 1.7 MG/DL (ref 1.6–2.6)
MCH RBC QN AUTO: 29.9 PG (ref 26–35)
MCHC RBC AUTO-ENTMCNC: 32.3 % (ref 32–34.5)
MCV RBC AUTO: 92.6 FL (ref 80–99.9)
METER GLUCOSE: 120 MG/DL (ref 74–99)
METER GLUCOSE: 121 MG/DL (ref 74–99)
METER GLUCOSE: 138 MG/DL (ref 74–99)
METHB: 0.3 % (ref 0–1.5)
MODE: ABNORMAL
MONOCYTES ABSOLUTE: 1.11 E9/L (ref 0.1–0.95)
MONOCYTES RELATIVE PERCENT: 15.1 % (ref 2–12)
NEUTROPHILS ABSOLUTE: 5.24 E9/L (ref 1.8–7.3)
NEUTROPHILS RELATIVE PERCENT: 71.1 % (ref 43–80)
O2 CONTENT: 12.6 ML/DL
O2 SATURATION: 98.6 % (ref 92–98.5)
O2HB: 98 % (ref 94–97)
OPERATOR ID: ABNORMAL
PATIENT TEMP: 37 C
PCO2: 40.6 MMHG (ref 35–45)
PDW BLD-RTO: 14.6 FL (ref 11.5–15)
PEEP/CPAP: 5 CMH2O
PFO2: 3.69 MMHG/%
PH BLOOD GAS: 7.52 (ref 7.35–7.45)
PHOSPHORUS: 4.4 MG/DL (ref 2.5–4.5)
PLATELET # BLD: 127 E9/L (ref 130–450)
PMV BLD AUTO: 11.1 FL (ref 7–12)
PO2: 147.6 MMHG (ref 75–100)
POTASSIUM SERPL-SCNC: 3 MMOL/L (ref 3.5–5)
PS: 12 CMH20
RBC # BLD: 2.44 E12/L (ref 3.8–5.8)
RI(T): 0.55
SODIUM BLD-SCNC: 138 MMOL/L (ref 132–146)
SOURCE, BLOOD GAS: ABNORMAL
THB: 8.9 G/DL (ref 11.5–16.5)
TIME ANALYZED: 543
WBC # BLD: 7.4 E9/L (ref 4.5–11.5)

## 2020-02-24 PROCEDURE — 83735 ASSAY OF MAGNESIUM: CPT

## 2020-02-24 PROCEDURE — 85025 COMPLETE CBC W/AUTO DIFF WBC: CPT

## 2020-02-24 PROCEDURE — 2500000003 HC RX 250 WO HCPCS: Performed by: INTERNAL MEDICINE

## 2020-02-24 PROCEDURE — 6370000000 HC RX 637 (ALT 250 FOR IP): Performed by: INTERNAL MEDICINE

## 2020-02-24 PROCEDURE — 84100 ASSAY OF PHOSPHORUS: CPT

## 2020-02-24 PROCEDURE — 80048 BASIC METABOLIC PNL TOTAL CA: CPT

## 2020-02-24 PROCEDURE — 2580000003 HC RX 258: Performed by: STUDENT IN AN ORGANIZED HEALTH CARE EDUCATION/TRAINING PROGRAM

## 2020-02-24 PROCEDURE — 6360000002 HC RX W HCPCS: Performed by: STUDENT IN AN ORGANIZED HEALTH CARE EDUCATION/TRAINING PROGRAM

## 2020-02-24 PROCEDURE — 36415 COLL VENOUS BLD VENIPUNCTURE: CPT

## 2020-02-24 PROCEDURE — 99231 SBSQ HOSP IP/OBS SF/LOW 25: CPT | Performed by: INTERNAL MEDICINE

## 2020-02-24 PROCEDURE — 71045 X-RAY EXAM CHEST 1 VIEW: CPT

## 2020-02-24 PROCEDURE — 6370000000 HC RX 637 (ALT 250 FOR IP): Performed by: STUDENT IN AN ORGANIZED HEALTH CARE EDUCATION/TRAINING PROGRAM

## 2020-02-24 PROCEDURE — 82330 ASSAY OF CALCIUM: CPT

## 2020-02-24 PROCEDURE — 94640 AIRWAY INHALATION TREATMENT: CPT

## 2020-02-24 PROCEDURE — 6360000002 HC RX W HCPCS: Performed by: INTERNAL MEDICINE

## 2020-02-24 PROCEDURE — 82805 BLOOD GASES W/O2 SATURATION: CPT

## 2020-02-24 PROCEDURE — 2580000003 HC RX 258: Performed by: INTERNAL MEDICINE

## 2020-02-24 PROCEDURE — 94003 VENT MGMT INPAT SUBQ DAY: CPT

## 2020-02-24 PROCEDURE — 94669 MECHANICAL CHEST WALL OSCILL: CPT

## 2020-02-24 PROCEDURE — 82962 GLUCOSE BLOOD TEST: CPT

## 2020-02-24 PROCEDURE — 99291 CRITICAL CARE FIRST HOUR: CPT | Performed by: SURGERY

## 2020-02-24 PROCEDURE — 2000000000 HC ICU R&B

## 2020-02-24 PROCEDURE — 36592 COLLECT BLOOD FROM PICC: CPT

## 2020-02-24 RX ORDER — BUMETANIDE 1 MG/1
1 TABLET ORAL ONCE
Status: COMPLETED | OUTPATIENT
Start: 2020-02-24 | End: 2020-02-24

## 2020-02-24 RX ADMIN — LANSOPRAZOLE 30 MG: KIT at 06:14

## 2020-02-24 RX ADMIN — SODIUM CHLORIDE, PRESERVATIVE FREE 300 UNITS: 5 INJECTION INTRAVENOUS at 08:37

## 2020-02-24 RX ADMIN — DOCUSATE SODIUM 100 MG: 50 LIQUID ORAL at 08:02

## 2020-02-24 RX ADMIN — SODIUM CHLORIDE, PRESERVATIVE FREE 10 ML: 5 INJECTION INTRAVENOUS at 16:09

## 2020-02-24 RX ADMIN — METOPROLOL TARTRATE 50 MG: 50 TABLET, FILM COATED ORAL at 08:03

## 2020-02-24 RX ADMIN — SODIUM CHLORIDE, PRESERVATIVE FREE 300 UNITS: 5 INJECTION INTRAVENOUS at 20:06

## 2020-02-24 RX ADMIN — HYDRALAZINE HYDROCHLORIDE 25 MG: 25 TABLET, FILM COATED ORAL at 21:16

## 2020-02-24 RX ADMIN — ALBUTEROL SULFATE 2.5 MG: 2.5 SOLUTION RESPIRATORY (INHALATION) at 17:03

## 2020-02-24 RX ADMIN — ALBUTEROL SULFATE 2.5 MG: 2.5 SOLUTION RESPIRATORY (INHALATION) at 06:33

## 2020-02-24 RX ADMIN — SODIUM CHLORIDE SOLN NEBU 3% 4 ML: 3 NEBU SOLN at 20:01

## 2020-02-24 RX ADMIN — CALCIUM GLUCONATE 2 G: 98 INJECTION, SOLUTION INTRAVENOUS at 07:54

## 2020-02-24 RX ADMIN — SODIUM CHLORIDE SOLN NEBU 3% 4 ML: 3 NEBU SOLN at 06:34

## 2020-02-24 RX ADMIN — BUMETANIDE 1 MG: 0.25 INJECTION INTRAMUSCULAR; INTRAVENOUS at 08:02

## 2020-02-24 RX ADMIN — PIPERACILLIN AND TAZOBACTAM 3.38 G: 3; .375 INJECTION, POWDER, LYOPHILIZED, FOR SOLUTION INTRAVENOUS at 18:41

## 2020-02-24 RX ADMIN — HEPARIN SODIUM 5000 UNITS: 10000 INJECTION INTRAVENOUS; SUBCUTANEOUS at 05:48

## 2020-02-24 RX ADMIN — Medication 10 ML: at 08:04

## 2020-02-24 RX ADMIN — FOLIC ACID 1 MG: 1 TABLET ORAL at 08:03

## 2020-02-24 RX ADMIN — Medication 10 ML: at 20:06

## 2020-02-24 RX ADMIN — BUMETANIDE 1 MG: 1 TABLET ORAL at 13:27

## 2020-02-24 RX ADMIN — Medication 100 MG: at 08:04

## 2020-02-24 RX ADMIN — SENNOSIDES 5 ML: 8.8 LIQUID ORAL at 20:05

## 2020-02-24 RX ADMIN — CHLORHEXIDINE GLUCONATE 0.12% ORAL RINSE 15 ML: 1.2 LIQUID ORAL at 08:02

## 2020-02-24 RX ADMIN — HYDRALAZINE HYDROCHLORIDE 25 MG: 25 TABLET, FILM COATED ORAL at 06:11

## 2020-02-24 RX ADMIN — HYDRALAZINE HYDROCHLORIDE 25 MG: 25 TABLET, FILM COATED ORAL at 13:27

## 2020-02-24 RX ADMIN — ALBUTEROL SULFATE 2.5 MG: 2.5 SOLUTION RESPIRATORY (INHALATION) at 11:31

## 2020-02-24 RX ADMIN — POTASSIUM BICARBONATE 40 MEQ: 782 TABLET, EFFERVESCENT ORAL at 13:27

## 2020-02-24 RX ADMIN — CHLORHEXIDINE GLUCONATE 0.12% ORAL RINSE 15 ML: 1.2 LIQUID ORAL at 20:05

## 2020-02-24 RX ADMIN — HEPARIN SODIUM 5000 UNITS: 10000 INJECTION INTRAVENOUS; SUBCUTANEOUS at 21:16

## 2020-02-24 RX ADMIN — CALCIUM GLUCONATE 2 G: 98 INJECTION, SOLUTION INTRAVENOUS at 11:14

## 2020-02-24 RX ADMIN — PIPERACILLIN AND TAZOBACTAM 3.38 G: 3; .375 INJECTION, POWDER, LYOPHILIZED, FOR SOLUTION INTRAVENOUS at 10:04

## 2020-02-24 RX ADMIN — METOPROLOL TARTRATE 50 MG: 50 TABLET, FILM COATED ORAL at 20:05

## 2020-02-24 RX ADMIN — POTASSIUM BICARBONATE 40 MEQ: 782 TABLET, EFFERVESCENT ORAL at 07:54

## 2020-02-24 RX ADMIN — AMLODIPINE BESYLATE 10 MG: 10 TABLET ORAL at 08:04

## 2020-02-24 RX ADMIN — BUMETANIDE 1 MG: 0.25 INJECTION INTRAMUSCULAR; INTRAVENOUS at 16:09

## 2020-02-24 RX ADMIN — PIPERACILLIN AND TAZOBACTAM 3.38 G: 3; .375 INJECTION, POWDER, LYOPHILIZED, FOR SOLUTION INTRAVENOUS at 02:42

## 2020-02-24 RX ADMIN — ALBUTEROL SULFATE 2.5 MG: 2.5 SOLUTION RESPIRATORY (INHALATION) at 20:00

## 2020-02-24 RX ADMIN — HEPARIN SODIUM 5000 UNITS: 10000 INJECTION INTRAVENOUS; SUBCUTANEOUS at 13:37

## 2020-02-24 ASSESSMENT — PAIN SCALES - GENERAL
PAINLEVEL_OUTOF10: 0

## 2020-02-24 ASSESSMENT — PULMONARY FUNCTION TESTS
PIF_VALUE: 17
PIF_VALUE: 18
PIF_VALUE: 17
PIF_VALUE: 16
PIF_VALUE: 16
PIF_VALUE: 17
PIF_VALUE: 18
PIF_VALUE: 17
PIF_VALUE: 16
PIF_VALUE: 19
PIF_VALUE: 17
PIF_VALUE: 17
PIF_VALUE: 18
PIF_VALUE: 17

## 2020-02-24 NOTE — PROGRESS NOTES
[address]   Internal Medicine Residency / [address]    Attending Physician Statement  I have discussed the case, including pertinent history and exam findings with the resident and the team.  I have seen and examined the patient and the key elements of the encounter have been performed by me. I agree with the assessment, plan and orders as documented by the resident. Mental state unchanged   And Hx of anoxic brain and CVA occipita lobe  And still intubated and chronic TF with Jevity 1.2  HAd a colon blowout during rounds this AM  VS stable  Lungs same   Plan; ICU supportive care           Determine decision maker in family           Meds reviewd      Remainder of medical problems as per resident note.       Janelle Cruz  Internal Medicine Residency Faculty

## 2020-02-24 NOTE — PLAN OF CARE
Problem: Falls - Risk of:  Goal: Will remain free from falls  Description  Will remain free from falls  2/24/2020 9526 by Luis Carter RN  Outcome: Met This Shift  2/23/2020 2346 by Iftikhar Sosa RN  Outcome: Met This Shift     Problem: Falls - Risk of:  Goal: Absence of physical injury  Description  Absence of physical injury  2/24/2020 2708 by Luis Carter RN  Outcome: Met This Shift  2/23/2020 2346 by Iftikhar Sosa RN  Outcome: Met This Shift     Problem: Injury - Risk of, Physical Injury:  Goal: Will remain free from falls  Description  Will remain free from falls  2/24/2020 2948 by Luis Carter RN  Outcome: Met This Shift  2/23/2020 2346 by Iftikhar Sosa RN  Outcome: Met This Shift     Problem: Risk for Impaired Skin Integrity  Goal: Tissue integrity - skin and mucous membranes  Description  Structural intactness and normal physiological function of skin and  mucous membranes.   Outcome: Not Met This Shift     Problem: Confusion - Acute:  Goal: Mental status will be restored to baseline  Description  Mental status will be restored to baseline  Outcome: Not Met This Shift

## 2020-02-24 NOTE — PROGRESS NOTES
Mouth/Throat BID Jessica Buckner MD   15 mL at 70/10/67 0529    folic acid (FOLVITE) tablet 1 mg  1 mg PEG Tube Daily Paty Antis Jr., DO   1 mg at 02/24/20 2916    lansoprazole suspension SUSP 30 mg  30 mg Per G Tube QAM AC Jesup Bless., DO   30 mg at 02/24/20 9956    vitamin B-1 (THIAMINE) tablet 100 mg  100 mg PEG Tube Daily Paty Antis Jr., DO   100 mg at 02/24/20 4279    [Held by provider] aspirin chewable tablet 81 mg  81 mg Per G Tube Daily Dannielle Mcgee MD   81 mg at 02/14/20 1009    hydrALAZINE (APRESOLINE) tablet 25 mg  25 mg PEG Tube 3 times per day Eder Billy MD   25 mg at 02/24/20 4716    metoprolol tartrate (LOPRESSOR) tablet 50 mg  50 mg PEG Tube BID Saroj Schumacher MD   50 mg at 02/24/20 0803    glucose (GLUTOSE) 40 % oral gel 15 g  15 g Oral PRN Saqib B Capal, DO        dextrose 50 % IV solution  12.5 g Intravenous PRN Luke Goodness, DO        glucagon (rDNA) injection 1 mg  1 mg Intramuscular PRN Ephriam Reveal B Capal, DO        dextrose 5 % solution  100 mL/hr Intravenous PRN Luke Goodness, DO        sodium chloride (Inhalant) 3 % nebulizer solution 4 mL  4 mL Nebulization BID Saqib B Capal, DO   4 mL at 02/24/20 5133    labetalol (NORMODYNE;TRANDATE) injection 10 mg  10 mg Intravenous Q6H PRN Saqib B Capal, DO   10 mg at 02/22/20 1602    sodium chloride flush 0.9 % injection 10 mL  10 mL Intravenous 2 times per day Ephriam Reveal B Capal, DO   10 mL at 02/24/20 0804    magnesium hydroxide (MILK OF MAGNESIA) 400 MG/5ML suspension 30 mL  30 mL Oral Daily PRN Saqib B Capal, DO         REVIEW OF SYSTEMS:       CONSTITUTIONAL:  No fever    RESPIRATORY: Intubated   GENITOURINARY: Sanchez catheter   INTEGUMENT:no rash   HEMATOLOGIC/LYMPHATIC:  Denies lymph node swelling, gum bleeding or easy bruising.   NEUROLOGICAL: opened eyes           Objective:  BP (!) 147/86   Pulse 94   Temp 99 °F (37.2 °C) (Axillary)   Resp 22   Ht 5' 4\" (1.626 m)   Wt 135 lb 12.8 Organism Candida albicansAbnormal     CULTURE, RESPIRATORY Moderate growth    Organism Yeast, not C. albicansAbnormal     CULTURE, RESPIRATORY Moderate growth   Narrative:     Source: SPUEX       Site: Sputum&Sputum                     Radiology :    Chest X ray-    Lung congestion     Assessment:    Pneumonia / respiratory failure / intubated   Group G Streptococcus bacteremia   Leukocytosis - improved   S/P Cardiac arrest  Anoxic encephalopathy  MSSA pneumonia / Candida colonization   History of head and neck cancer      Recommendations:    Continue piperacillin-tazobactam 3.375 grams IV q 8 hrs  (stop 1/25)       Electronically signed by Louise Gonzales MD on 2/24/2020 at 9:47 AM

## 2020-02-24 NOTE — PLAN OF CARE
Problem: Falls - Risk of:  Goal: Will remain free from falls  Description  Will remain free from falls  2/23/2020 2346 by Daniel Stephens RN  Outcome: Met This Shift     Problem: Falls - Risk of:  Goal: Absence of physical injury  Description  Absence of physical injury  Outcome: Met This Shift     Problem: Sleep Pattern Disturbance:  Goal: Appears well-rested  Description  Appears well-rested  2/23/2020 2346 by Daniel Stephens RN  Outcome: Met This Shift

## 2020-02-24 NOTE — CARE COORDINATION
SOCIAL WORK/CASEMANAGEMENT TRANSITION OF CARE PLANNING: pt remains on vent in [facility]. family meeting with physicians tomorrow to discuss trach. Peg in place. Neuro signed off and pt with anoxic brain injury documented. Both [facility] continue to follow pt., precert would be needed to send to either.  Lex Burns  2/24/2020

## 2020-02-24 NOTE — PROGRESS NOTES
dextrose, labetalol, magnesium hydroxide      DATA:    CBC:   Lab Results   Component Value Date    WBC 7.4 02/24/2020    RBC 2.44 02/24/2020    HGB 7.3 02/24/2020    HCT 22.6 02/24/2020    MCV 92.6 02/24/2020    MCH 29.9 02/24/2020    MCHC 32.3 02/24/2020    RDW 14.6 02/24/2020     02/24/2020    MPV 11.1 02/24/2020     CMP:    Lab Results   Component Value Date     02/24/2020    K 3.0 02/24/2020    K 2.9 02/11/2020    CL 95 02/24/2020    CO2 30 02/24/2020    BUN 26 02/24/2020    CREATININE 1.2 02/24/2020    GFRAA >60 02/24/2020    LABGLOM >60 02/24/2020    GLUCOSE 199 02/24/2020    PROT 6.6 02/23/2020    LABALBU 1.9 02/23/2020    CALCIUM 7.7 02/24/2020    BILITOT 0.6 02/23/2020    ALKPHOS 74 02/23/2020    AST 30 02/23/2020    ALT 10 02/23/2020     Magnesium:    Lab Results   Component Value Date    MG 1.7 02/24/2020     Recent Labs     02/24/20  0543   PH 7.521*   PO2 147.6*   PCO2 40.6   HCO3 32.5*   BE 8.9*   O2SAT 98.6*   FIO2 40.0         Urine chemistry:  Urine creatinine = 69  Urine Microalbumin = 55.9  Urine Microalbumin/Cr ratio = 81  Urine osmolality = 393  Urine protein = 35  Urine protein/Cr ratio = 0.5    FENa: 0.7%  FEUrea: 20.9%     Ferritin: 1019  Iron: 20  Iron saturation: 20%  Folate: 14.2  B12: 383    Radiology Review:        US Retroperitoneum 2/7/20   Probable bilateral medical renal disease with some perinephric fluid   bilaterally that could indicate inflammation.       There is no evidence of calcification or obstruction in either kidney.       The bladder was not evaluated due to a decompressing Sanchez catheter. CT Soft Tissue Neck W WO Contrast 2/9/20   Probable prior right radical neck dissection with sequela   of prior surgery and radiation therapy.  There is extensive soft tissue   distortion which is suggestive of previous therapy   Finds compatible with a approximately 1 cm right thyroid nodule   There is evidence to suggest severe stenosis of the right distal internal carotid artery   Soft tissue mass at the level the carotid bifurcation on the right   with outward mass effect measuring approximately 2.3 x 2.7 cm   suspicious for recurrent disease. This encases the carotid distally   and the distal carotid stenosis may represent invasion. Comparison   studies would be necessary to distinguish recurrent neoplasm from   previous radiation therapy. Tumor invasion versus osteonecrosis of the medial right mandible         Chest x-ray February 16, 2020       Endotracheal tube terminates above the jovita.       Stable opacities in the lower lobes. Consider infectious/inflammatory   etiologies or edema. Follow-up to resolution.                        ECHO Complete 2/16/20:   Trileaflet aortic valve. Mild leaflet calcification with more pronounced   calcification on the non coronary cusp. Normal leaflet mobility. No aortic stenosis. No aortic regurgitation. Concentric hypertrophy though in some views the walls are up to 1.4 cm   thick. Normal left ventricular systolic function. Visually estimated LVEF   is 60 %. Normal diastolic function. Normal left atrial pressure. Mildly dilated right ventricle. Difficult to visualize the free wall in all views. Small anterior and apical effusion. No signs of tamponade. Chest x-ray 2/24/20   Worsening CHF.             BRIEF SUMMARY OF INITIAL CONSULT:    Briefly, Mr. Bigg Osorio is a 60-year-old gentleman with a significant past medical history of hypertension, laryngeal cancer s/p laryngectomy in 2005 and chemo and radiation, history of CVA with baseline left sided weakness and some slurred speech, current smoker, and history of alcohol abuse who presented to the ED on 2/5/20 for fatigue. Patient was found to be extremely hypotensive at 50/palpated, tachycardic and fatigued in the ED. He was given fluid bolus with BP improvement, was found to have YVETTE (baseline unknown) with a creatinine of 4.1.  Patients creatinine

## 2020-02-24 NOTE — PROGRESS NOTES
Palliative Medicine  Progress Note    Remains intubated. Sister at bedside. She reports there will be family meeting with physicians tomorrow to discuss trach. Dicussed that it is important to consider patient's wishes as well. Support given. Will be available as needed for further discussion. Adrien Mcmullen APRN-CNP  Palliative Medicine    Note: This report was completed using [employer] voiced recognition software. Every effort has been made to ensure accuracy; however, inadvertent computerized transcription errors may be present.

## 2020-02-24 NOTE — PROGRESS NOTES
Normal rate and regular rhythm. Pulses: Normal pulses. Carotid pulses are 2+ on the right side and 2+ on the left side. Radial pulses are 2+ on the right side and 2+ on the left side. Dorsalis pedis pulses are 2+ on the right side and 2+ on the left side. Heart sounds: S1 normal and S2 normal. No gallop. No S3 sounds. Pulmonary:      Effort: Pulmonary effort is normal. He is intubated. Breath sounds: Normal air entry. Transmitted upper airway sounds present. No decreased breath sounds, wheezing, rhonchi or rales. Musculoskeletal:      Right lower leg: No edema. Left lower leg: No edema. Neurological:      Comments: Cough and gag reflex present. Labs and Imaging Studies     CBC:   Recent Labs     02/22/20 0520 02/23/20  0450 02/24/20  0500   WBC 9.4 8.3 7.4   HGB 8.0* 7.7* 7.3*   HCT 24.8* 24.5* 22.6*   MCV 91.9 93.5 92.6   * 126* 127*       BMP:    Recent Labs     02/22/20 0520 02/23/20  0450 02/24/20  0500    139 138   K 3.5 3.9 3.0*   CL 99 97* 95*   CO2 27 29 30*   BUN 31* 29* 26*   CREATININE 1.3* 1.2 1.2   GLUCOSE 124* 124* 199*       LIVER PROFILE:   Recent Labs     02/22/20 0520 02/23/20  0450   AST 32 30   ALT 12 10   BILITOT 0.7 0.6   ALKPHOS 70 74       PT/INR:   No results for input(s): PROTIME, INR in the last 72 hours. APTT:   No results for input(s): APTT in the last 72 hours.     Fasting Lipid Panel:    Lab Results   Component Value Date    HDL 24 02/18/2020       Notable Cultures:      Blood cultures   Blood Culture, Routine   Date Value Ref Range Status   02/18/2020 5 Days- no growth  Final     Respiratory cultures No results found for: RESPCULTURE No results found for: LABGRAM  Urine   Urine Culture, Routine   Date Value Ref Range Status   02/05/2020 <10,000 CFU/mL  Mixed gram positive organisms    Final     Legionella No results found for: LABLEGI  C Diff PCR No results found for: CDIFPCR  Wound culture/abscess: No results for input(s): WNDABS in the last 72 hours. Tip culture:No results for input(s): CXCATHTIP in the last 72 hours. Xr Chest Standard (2 Vw)    Result Date: 2020  Patient MRN: [id number] : 1955 Age:  59 years Gender: Male Order Date: 2020 10:15 AM Exam: XR CHEST (2 VW) Number of Images: 2 views Indication:   Sepsis, sob Sepsis, sob Comparison: None. Findings: The heart is unremarkable The lung fields demonstrate no significant pulmonary vascular congestion and edema. The aorta is tortuous ectatic There are findings throughout the lung fields which are likely chronic     Findings compatible with atherosclerotic disease No acute infiltrate     Xr Foot Left (min 3 Views)    Result Date: 2020  Patient MRN:  [id number] : 1955 Age: 59 years Gender: Male Order Date:  2020 2:45 PM EXAM: XR FOOT LEFT (MIN 3 VIEWS) NUMBER OF IMAGES:  3 views INDICATION:  pain, swelling pain, swelling COMPARISON: None . The bones appear to be in anatomic alignment. No foreign body is identified No fracture is identified  There is moderate joint space loss The are moderate degenerative changes present     Moderate degenerative changes    Ct Soft Tissue Neck W Wo Contrast    Result Date: 2020  Patient MRN:  [id number] : 1955 Age: 59 years Gender: Male Order Date:  2020 3:45 PM EXAM: CT SOFT TISSUE NECK W WO CONTRAST NUMBER OF IMAGES \ views:  36 INDICATION:  h/o R mandible/base of tongue ca, sp resection and chemorads, concern for recurrence Per nephro team ok to do scan with contrast h/o R mandible/base of tongue ca, sp resection and chemorads, concern for recurrence COMPARISON: None Technique: Low-dose CT  acquisition technique included one of following options; 1 . Automated exposure control, 2. Adjustment of MA and or KV according to patient's size or 3. Use of iterative reconstruction. The larynx appears unremarkable The soft tissues appear abnormal there is sequela of extensive prior surgery. No convincing abscess is seen. There is distortion of the oropharynx and the posterior superior hypopharynx there is significant distortion of the right sternocleidomastoid and a pattern compatible with previous radical neck dissection. There are findings to suggest previous therapy. The right jugular vein is not seen likely status post resection. There is been resection at the level of the tongue base and floor the mouth. There is sequela of previous resection. . Atherosclerotic disease of both proximal internal carotid arteries is identified without proximal internal carotid artery hemodynamically significant stenosis. At the level the distal right internal carotid artery at the level the cervical component there is evidence to suggest severe stenosis. Also the level of the carotid bifurcation on the right there is a soft tissue mass effect suspicious for recurrent neoplasm. This measures approximately 2.7 x 2.3 cm. This could also be related to previous therapy. Comparison studies would be necessary. There is a mottled eaten appearance to the medial aspect of the right mandible. This could be related to tumor invasion or osteonecrosis. Scattered lymph nodes are visualized which do not meet the CT criteria for adenopathy. There is mixed density in the right thyroid possibly representing a thyroid nodule measuring approximately 1 cm. There is density scattered throughout the sinuses possibly related to mild chronic sinusitis. There is likely a right sphenoid retention cyst     Probable prior right radical neck dissection with sequela of prior surgery and radiation therapy.  There is extensive soft tissue distortion which is suggestive of previous therapy Finds compatible with a approximately 1 cm right thyroid nodule There is evidence to suggest severe stenosis of the right distal internal carotid artery Soft tissue mass at the level the carotid bifurcation on the right with outward mass effect measuring due to a decompressing Sanchez catheter. Fl Modified Barium Swallow W Video    Result Date: 2020  Patient MRN: [id number] : 1955 Age:  59 years Gender: Male Order Date: 2/10/2020 1:45 PM Exam: FL MODIFIED BARIUM SWALLOW W VIDEO Number of Images: 1 Indication:   dysphagia Comparison: None. Fluoroscopy time: 1.1 minute Findings: Textures given, nectar Aspiration, nectar Laryngeal penetration, nectar Cough, None Oral delay, Yes Retention in vallecula, Yes Retention in piriform sinuses, None. Pharyngeal delay, Yes Laryngeal elevation, reduced. Study is remarkable for silent aspiration with nectar consistency. This procedure was performed and dictated by Ana Conte PA-C with indirect supervision, and Ijeoma Sahni MD reviewed and concurred with the findings. Fl Modified Barium Swallow W Video    Result Date: 2020  Patient MRN: [id number] : 1955 Age:  59 years Gender: Male Order Date: 2020 7:45 AM Exam: FL MODIFIED BARIUM SWALLOW W VIDEO Number of Images: views Indication:   dysphagia dysphagia Comparison: None. Findings: Patient was given nectar and pudding patient is unable to swallow with no gag reflex. Therefore patient was suctioned and the study was canceled.      Patient unable to swallow nectar and pudding which was suctioned         Medications     Current Meds:  Current Facility-Administered Medications   Medication Dose Route Frequency Provider Last Rate Last Dose    bumetanide (BUMEX) injection 1 mg  1 mg Intravenous BID Florentin Walls MD   1 mg at 20 0802    amLODIPine (NORVASC) tablet 10 mg  10 mg PEG Tube Daily Deb Wolfe MD   10 mg at 20 0804    albuterol (PROVENTIL) nebulizer solution 2.5 mg  2.5 mg Nebulization Q4H While awake Tami Soriano MD   2.5 mg at 20 1131    piperacillin-tazobactam (ZOSYN) 3.375 g in dextrose 5 % 100 mL IVPB extended infusion (mini-bag)  3.375 g Intravenous Q8H Claudia Arceo MD   Stopped at 20 >> diffuse encephalopathy   MRI brain left occipital lesion (previously Treated in CT scan, no changes)  MRA head was unremarkable. No DVT on US BL LE     Hypertension and Tachycardia (resolving). Currently on hydralazine 25mg Q8H, metoprolol 50mg BID. Not tachycardic today. <Neurologic>  Acute Encephalopathy  2/2 anoxic brain injury. Cough and gag reflex present. Pupils: non reactive, withdrawing to pain in 4 extremities. Management per SICU  MRI brain left occipital lesion (previously Treated in CT scan, no changes)  MRA head was unremarkable. H/O CVA with residual Lt. Weakness - Stable  Aspirin placed on hold due to decrease in hemoglobin. TSH level was high, normal T4 level, low free T3 level, euthyroid sick syndrome. <Pulmonary>  Acute hypoxic hypercapnic respiratory failure  2/2 PEA arrest  Currently intubated and on the vent AC mode, on 40%. LTAC (tracheostomy and PEG tube), versus terminal extubation, family will decide, palliative on board. Pneumonia  2/2 MSSA  CXR: stable   ID on board, currently on Zosyn. Chest vest BID. Nebulization with ipratropium-albuterol Q4H and 3% NaCl nebulization BID.     <Gastrointestinal>  Dysphagia s/p PEG placement (2/12/2020)  2/2 Neck mass   Currently on tube feeds  CBC daily. Severe Malnutrition  2/2 Laryngeal Ca, dysphagia  Per dietitian, pt. Meets criteria for severe malnutrition. Currently on tube feeds    <Infectious Disease>  Group G Streptococcus Bacteremia - Resolved  Echo showed no vegetations. Repeat culture -ve. <Genitourinary/Renal>  YVETTE Stage III on CKD   Ischemic secondary to ATN after PEA cardiac arrest.  Nonoliguric. Nephrology on board, creatinine has been stable. Nephrology given 2 doses of 1 mg Bumex yesterday, will continue to diurese today. Chest x-ray today showing less signs of pulmonary vascular congestion. <Hematology/Oncology>  Acute Normocytic Anemia  S/P 4 U PRBC. Stable hemoglobin.   Soft Tissue

## 2020-02-25 ENCOUNTER — APPOINTMENT (OUTPATIENT)
Dept: GENERAL RADIOLOGY | Age: 65
DRG: 870 | End: 2020-02-25
Payer: MEDICARE

## 2020-02-25 LAB
AADO2: 76.3 MMHG
ANION GAP SERPL CALCULATED.3IONS-SCNC: 11 MMOL/L (ref 7–16)
B.E.: 9.1 MMOL/L (ref -3–3)
BASOPHILS ABSOLUTE: 0.02 E9/L (ref 0–0.2)
BASOPHILS RELATIVE PERCENT: 0.3 % (ref 0–2)
BUN BLDV-MCNC: 25 MG/DL (ref 8–23)
CALCIUM IONIZED: 1.2 MMOL/L (ref 1.15–1.33)
CALCIUM SERPL-MCNC: 8.3 MG/DL (ref 8.6–10.2)
CHLORIDE BLD-SCNC: 90 MMOL/L (ref 98–107)
CO2: 32 MMOL/L (ref 22–29)
COHB: 0.1 % (ref 0–1.5)
CREAT SERPL-MCNC: 1.2 MG/DL (ref 0.7–1.2)
CRITICAL: ABNORMAL
DATE ANALYZED: ABNORMAL
DATE OF COLLECTION: ABNORMAL
EOSINOPHILS ABSOLUTE: 0.14 E9/L (ref 0.05–0.5)
EOSINOPHILS RELATIVE PERCENT: 1.8 % (ref 0–6)
FIO2: 40 %
GFR AFRICAN AMERICAN: >60
GFR NON-AFRICAN AMERICAN: >60 ML/MIN/1.73
GLUCOSE BLD-MCNC: 117 MG/DL (ref 74–99)
HCO3: 32.8 MMOL/L (ref 22–26)
HCT VFR BLD CALC: 22.4 % (ref 37–54)
HEMOGLOBIN: 7.3 G/DL (ref 12.5–16.5)
HHB: 1.6 % (ref 0–5)
IMMATURE GRANULOCYTES #: 0.07 E9/L
IMMATURE GRANULOCYTES %: 0.9 % (ref 0–5)
LAB: ABNORMAL
LYMPHOCYTES ABSOLUTE: 1.11 E9/L (ref 1.5–4)
LYMPHOCYTES RELATIVE PERCENT: 14 % (ref 20–42)
Lab: ABNORMAL
MAGNESIUM: 1.5 MG/DL (ref 1.6–2.6)
MCH RBC QN AUTO: 29.9 PG (ref 26–35)
MCHC RBC AUTO-ENTMCNC: 32.6 % (ref 32–34.5)
MCV RBC AUTO: 91.8 FL (ref 80–99.9)
METHB: 0.2 % (ref 0–1.5)
MODE: AC
MONOCYTES ABSOLUTE: 1.15 E9/L (ref 0.1–0.95)
MONOCYTES RELATIVE PERCENT: 14.5 % (ref 2–12)
NEUTROPHILS ABSOLUTE: 5.46 E9/L (ref 1.8–7.3)
NEUTROPHILS RELATIVE PERCENT: 68.5 % (ref 43–80)
O2 CONTENT: 12.1 ML/DL
O2 SATURATION: 98.4 % (ref 92–98.5)
O2HB: 98.1 % (ref 94–97)
OPERATOR ID: ABNORMAL
PATIENT TEMP: 37 C
PCO2: 41.6 MMHG (ref 35–45)
PDW BLD-RTO: 14.5 FL (ref 11.5–15)
PEEP/CPAP: 5 CMH2O
PFO2: 3.78 MMHG/%
PH BLOOD GAS: 7.51 (ref 7.35–7.45)
PHOSPHORUS: 4.1 MG/DL (ref 2.5–4.5)
PLATELET # BLD: 154 E9/L (ref 130–450)
PMV BLD AUTO: 10.6 FL (ref 7–12)
PO2: 151.1 MMHG (ref 75–100)
POTASSIUM SERPL-SCNC: 3.5 MMOL/L (ref 3.5–5)
RBC # BLD: 2.44 E12/L (ref 3.8–5.8)
RI(T): 0.5
RR MECHANICAL: 14 B/MIN
SODIUM BLD-SCNC: 133 MMOL/L (ref 132–146)
SOURCE, BLOOD GAS: ABNORMAL
THB: 8.5 G/DL (ref 11.5–16.5)
TIME ANALYZED: 513
VT MECHANICAL: 400 ML
WBC # BLD: 8 E9/L (ref 4.5–11.5)

## 2020-02-25 PROCEDURE — 6370000000 HC RX 637 (ALT 250 FOR IP): Performed by: INTERNAL MEDICINE

## 2020-02-25 PROCEDURE — 99291 CRITICAL CARE FIRST HOUR: CPT | Performed by: SURGERY

## 2020-02-25 PROCEDURE — 94669 MECHANICAL CHEST WALL OSCILL: CPT

## 2020-02-25 PROCEDURE — 2000000000 HC ICU R&B

## 2020-02-25 PROCEDURE — 82330 ASSAY OF CALCIUM: CPT

## 2020-02-25 PROCEDURE — 2500000003 HC RX 250 WO HCPCS: Performed by: INTERNAL MEDICINE

## 2020-02-25 PROCEDURE — 2580000003 HC RX 258: Performed by: INTERNAL MEDICINE

## 2020-02-25 PROCEDURE — 6360000002 HC RX W HCPCS: Performed by: STUDENT IN AN ORGANIZED HEALTH CARE EDUCATION/TRAINING PROGRAM

## 2020-02-25 PROCEDURE — 82805 BLOOD GASES W/O2 SATURATION: CPT

## 2020-02-25 PROCEDURE — 83735 ASSAY OF MAGNESIUM: CPT

## 2020-02-25 PROCEDURE — 84100 ASSAY OF PHOSPHORUS: CPT

## 2020-02-25 PROCEDURE — 2580000003 HC RX 258: Performed by: STUDENT IN AN ORGANIZED HEALTH CARE EDUCATION/TRAINING PROGRAM

## 2020-02-25 PROCEDURE — 85025 COMPLETE CBC W/AUTO DIFF WBC: CPT

## 2020-02-25 PROCEDURE — 6370000000 HC RX 637 (ALT 250 FOR IP): Performed by: SURGERY

## 2020-02-25 PROCEDURE — 36415 COLL VENOUS BLD VENIPUNCTURE: CPT

## 2020-02-25 PROCEDURE — 99231 SBSQ HOSP IP/OBS SF/LOW 25: CPT | Performed by: INTERNAL MEDICINE

## 2020-02-25 PROCEDURE — 99232 SBSQ HOSP IP/OBS MODERATE 35: CPT | Performed by: NURSE PRACTITIONER

## 2020-02-25 PROCEDURE — 80048 BASIC METABOLIC PNL TOTAL CA: CPT

## 2020-02-25 PROCEDURE — 94640 AIRWAY INHALATION TREATMENT: CPT

## 2020-02-25 PROCEDURE — 6370000000 HC RX 637 (ALT 250 FOR IP): Performed by: STUDENT IN AN ORGANIZED HEALTH CARE EDUCATION/TRAINING PROGRAM

## 2020-02-25 PROCEDURE — 6360000002 HC RX W HCPCS: Performed by: INTERNAL MEDICINE

## 2020-02-25 PROCEDURE — 71045 X-RAY EXAM CHEST 1 VIEW: CPT

## 2020-02-25 PROCEDURE — 94003 VENT MGMT INPAT SUBQ DAY: CPT

## 2020-02-25 RX ORDER — MAGNESIUM GLUCONATE 27 MG(500)
500 TABLET ORAL DAILY
Status: DISCONTINUED | OUTPATIENT
Start: 2020-02-25 | End: 2020-02-25

## 2020-02-25 RX ORDER — MAGNESIUM SULFATE IN WATER 40 MG/ML
4 INJECTION, SOLUTION INTRAVENOUS ONCE
Status: COMPLETED | OUTPATIENT
Start: 2020-02-25 | End: 2020-02-25

## 2020-02-25 RX ORDER — MULTIVIT WITH IRON,MINERALS
15 LIQUID (ML) ORAL DAILY
Status: DISCONTINUED | OUTPATIENT
Start: 2020-02-25 | End: 2020-02-29

## 2020-02-25 RX ADMIN — PIPERACILLIN AND TAZOBACTAM 3.38 G: 3; .375 INJECTION, POWDER, LYOPHILIZED, FOR SOLUTION INTRAVENOUS at 02:55

## 2020-02-25 RX ADMIN — AMLODIPINE BESYLATE 10 MG: 10 TABLET ORAL at 08:28

## 2020-02-25 RX ADMIN — HEPARIN SODIUM 5000 UNITS: 10000 INJECTION INTRAVENOUS; SUBCUTANEOUS at 22:00

## 2020-02-25 RX ADMIN — ALBUTEROL SULFATE 2.5 MG: 2.5 SOLUTION RESPIRATORY (INHALATION) at 20:01

## 2020-02-25 RX ADMIN — ALBUTEROL SULFATE 2.5 MG: 2.5 SOLUTION RESPIRATORY (INHALATION) at 11:41

## 2020-02-25 RX ADMIN — POTASSIUM BICARBONATE 40 MEQ: 782 TABLET, EFFERVESCENT ORAL at 08:27

## 2020-02-25 RX ADMIN — CHLORHEXIDINE GLUCONATE 0.12% ORAL RINSE 15 ML: 1.2 LIQUID ORAL at 08:28

## 2020-02-25 RX ADMIN — MAGNESIUM GLUCONATE 500 MG ORAL TABLET 400 MG: 500 TABLET ORAL at 09:53

## 2020-02-25 RX ADMIN — BUMETANIDE 1 MG: 0.25 INJECTION INTRAMUSCULAR; INTRAVENOUS at 17:10

## 2020-02-25 RX ADMIN — Medication 10 ML: at 08:30

## 2020-02-25 RX ADMIN — Medication 10 ML: at 21:23

## 2020-02-25 RX ADMIN — SODIUM CHLORIDE SOLN NEBU 3% 4 ML: 3 NEBU SOLN at 08:30

## 2020-02-25 RX ADMIN — ALBUTEROL SULFATE 2.5 MG: 2.5 SOLUTION RESPIRATORY (INHALATION) at 08:30

## 2020-02-25 RX ADMIN — DOCUSATE SODIUM 100 MG: 50 LIQUID ORAL at 08:29

## 2020-02-25 RX ADMIN — BUMETANIDE 1 MG: 0.25 INJECTION INTRAMUSCULAR; INTRAVENOUS at 08:32

## 2020-02-25 RX ADMIN — METOPROLOL TARTRATE 50 MG: 50 TABLET, FILM COATED ORAL at 08:28

## 2020-02-25 RX ADMIN — MAGNESIUM SULFATE 4 G: 4 INJECTION INTRAVENOUS at 09:53

## 2020-02-25 RX ADMIN — HYDRALAZINE HYDROCHLORIDE 25 MG: 25 TABLET, FILM COATED ORAL at 17:10

## 2020-02-25 RX ADMIN — HEPARIN SODIUM 5000 UNITS: 10000 INJECTION INTRAVENOUS; SUBCUTANEOUS at 17:10

## 2020-02-25 RX ADMIN — SODIUM CHLORIDE, PRESERVATIVE FREE 300 UNITS: 5 INJECTION INTRAVENOUS at 21:22

## 2020-02-25 RX ADMIN — ASPIRIN 81 MG 81 MG: 81 TABLET ORAL at 08:28

## 2020-02-25 RX ADMIN — PIPERACILLIN AND TAZOBACTAM 3.38 G: 3; .375 INJECTION, POWDER, LYOPHILIZED, FOR SOLUTION INTRAVENOUS at 11:30

## 2020-02-25 RX ADMIN — CHLORHEXIDINE GLUCONATE 0.12% ORAL RINSE 15 ML: 1.2 LIQUID ORAL at 21:21

## 2020-02-25 RX ADMIN — MULTIVITAMIN 15 ML: LIQUID ORAL at 09:53

## 2020-02-25 RX ADMIN — ACETAMINOPHEN ORAL SOLUTION 650 MG: 650 SOLUTION ORAL at 21:22

## 2020-02-25 RX ADMIN — METOPROLOL TARTRATE 50 MG: 50 TABLET, FILM COATED ORAL at 21:22

## 2020-02-25 RX ADMIN — ALBUTEROL SULFATE 2.5 MG: 2.5 SOLUTION RESPIRATORY (INHALATION) at 16:56

## 2020-02-25 RX ADMIN — SODIUM CHLORIDE SOLN NEBU 3% 4 ML: 3 NEBU SOLN at 16:56

## 2020-02-25 RX ADMIN — SODIUM CHLORIDE, PRESERVATIVE FREE 300 UNITS: 5 INJECTION INTRAVENOUS at 09:55

## 2020-02-25 RX ADMIN — HYDRALAZINE HYDROCHLORIDE 25 MG: 25 TABLET, FILM COATED ORAL at 21:22

## 2020-02-25 RX ADMIN — HYDRALAZINE HYDROCHLORIDE 25 MG: 25 TABLET, FILM COATED ORAL at 05:42

## 2020-02-25 RX ADMIN — HEPARIN SODIUM 5000 UNITS: 10000 INJECTION INTRAVENOUS; SUBCUTANEOUS at 05:42

## 2020-02-25 RX ADMIN — LANSOPRAZOLE 30 MG: KIT at 06:47

## 2020-02-25 ASSESSMENT — PULMONARY FUNCTION TESTS
PIF_VALUE: 20
PIF_VALUE: 37
PIF_VALUE: 29
PIF_VALUE: 22
PIF_VALUE: 17
PIF_VALUE: 25
PIF_VALUE: 23
PIF_VALUE: 36
PIF_VALUE: 24
PIF_VALUE: 17
PIF_VALUE: 23
PIF_VALUE: 21
PIF_VALUE: 45
PIF_VALUE: 17
PIF_VALUE: 22
PIF_VALUE: 21
PIF_VALUE: 28
PIF_VALUE: 28
PIF_VALUE: 20
PIF_VALUE: 23
PIF_VALUE: 31
PIF_VALUE: 24
PIF_VALUE: 20
PIF_VALUE: 29
PIF_VALUE: 34

## 2020-02-25 ASSESSMENT — PAIN SCALES - GENERAL
PAINLEVEL_OUTOF10: 0

## 2020-02-25 NOTE — PROGRESS NOTES
[facility] PROGRESS NOTE      Chief complaint: Pneumonia ,  group G Streptococcus bacteremia    Pt is intubated  No fever   Opened eyes         Current Facility-Administered Medications   Medication Dose Route Frequency Provider Last Rate Last Dose    potassium bicarb-citric acid (EFFER-K) effervescent tablet 40 mEq  40 mEq Oral Daily Justus Garrett MD   40 mEq at 02/25/20 0827    magnesium oxide (MAG-OX) tablet 400 mg  400 mg Oral Daily David Lofton MD   400 mg at 02/25/20 7086    multivitamin with iron-minerals liquid 15 mL  15 mL Oral Daily David Lofton MD   15 mL at 02/25/20 0953    bumetanide (BUMEX) injection 1 mg  1 mg Intravenous BID Beverly Bach MD   1 mg at 02/25/20 0832    amLODIPine (NORVASC) tablet 10 mg  10 mg PEG Tube Daily Delio Tan MD   10 mg at 02/25/20 0828    albuterol (PROVENTIL) nebulizer solution 2.5 mg  2.5 mg Nebulization Q4H While awake Justus Garrett MD   2.5 mg at 02/25/20 1141    piperacillin-tazobactam (ZOSYN) 3.375 g in dextrose 5 % 100 mL IVPB extended infusion (mini-bag)  3.375 g Intravenous Q8H Werner Leventhal, MD 25 mL/hr at 02/25/20 1130 3.375 g at 02/25/20 1130    docusate sodium (COLACE) 150 MG/15ML liquid 100 mg  100 mg Per G Tube Daily Justus Garrett MD   100 mg at 02/25/20 0829    sennosides (SENOKOT) 8.8 MG/5ML syrup 5 mL  5 mL Per G Tube Nightly Justus Garrett MD   5 mL at 02/24/20 2005    sodium chloride flush 0.9 % injection 10 mL  10 mL Intravenous PRN Justus Garrett MD   10 mL at 02/24/20 1609    heparin flush 100 UNIT/ML injection 300 Units  3 mL Intravenous 2 times per day Justus Garrett MD   300 Units at 02/25/20 0955    heparin flush 100 UNIT/ML injection 300 Units  3 mL Intracatheter PRN Justus Garrett MD        heparin (porcine) injection 5,000 Units  5,000 Units Subcutaneous 3 times per day Justus Garrett MD   5,000 Units at 02/25/20 0556    acetaminophen (TYLENOL) 160 MG/5ML solution 650 mg  650 mg PEG Tube Q4H PRN Kathrin Skiff, MD   650 mg at 02/19/20 1643    chlorhexidine (PERIDEX) 0.12 % solution 15 mL  15 mL Mouth/Throat BID Sandhya Marmolejo MD   15 mL at 02/25/20 1881    lansoprazole suspension SUSP 30 mg  30 mg Per G Tube QAM AC Laurina Gain., DO   30 mg at 02/25/20 3245    aspirin chewable tablet 81 mg  81 mg Per G Tube Daily Yu Cespedes MD   81 mg at 02/25/20 2068    hydrALAZINE (APRESOLINE) tablet 25 mg  25 mg PEG Tube 3 times per day Noam Bowden MD   25 mg at 02/25/20 0542    metoprolol tartrate (LOPRESSOR) tablet 50 mg  50 mg PEG Tube BID Saroj Acosta MD   50 mg at 02/25/20 5881    sodium chloride (Inhalant) 3 % nebulizer solution 4 mL  4 mL Nebulization BID Saqib B Capal, DO   4 mL at 02/25/20 0830    labetalol (NORMODYNE;TRANDATE) injection 10 mg  10 mg Intravenous Q6H PRN Turkey Carton, DO   10 mg at 02/22/20 1602    sodium chloride flush 0.9 % injection 10 mL  10 mL Intravenous 2 times per day [address] B Capal, DO   10 mL at 02/25/20 0830    magnesium hydroxide (MILK OF MAGNESIA) 400 MG/5ML suspension 30 mL  30 mL Oral Daily PRN Turkey Carton, DO         REVIEW OF SYSTEMS:       CONSTITUTIONAL:  No fever    RESPIRATORY: Intubated   GENITOURINARY: Sanchez catheter   INTEGUMENT:no rash   HEMATOLOGIC/LYMPHATIC:  No bleeding   NEUROLOGICAL: opened eyes           Objective:  /79   Pulse 100   Temp 99 °F (37.2 °C) (Axillary)   Resp 16   Ht 5' 4\" (1.626 m)   Wt 140 lb (63.5 kg)   SpO2 100%   BMI 24.03 kg/m²      Constitutional: Intubated, opened eyes , does not tract or follow    HEENT : pallor +, no NL   Respiratory: Bilateral rhonchi   Cardiovascular: Regular rate and rhythm, no murmurs  Gastrointestinal: Bowel sounds present, soft, nontender  Skin: Warm and dry, no active dermatoses  Musculoskeletal: No joint swelling, no joint erythema      CBC with Differential:      Lab Results   Component Value Date    WBC 8.0 02/25/2020    RBC 2.44 02/25/2020    HGB 7.3 02/25/2020 HCT 22.4 02/25/2020     02/25/2020    MCV 91.8 02/25/2020    MCH 29.9 02/25/2020    MCHC 32.6 02/25/2020    RDW 14.5 02/25/2020    NRBC 0.9 02/16/2020    METASPCT 0.9 02/09/2020    LYMPHOPCT 14.0 02/25/2020    MONOPCT 14.5 02/25/2020    MYELOPCT 1.7 02/11/2020    BASOPCT 0.3 02/25/2020    MONOSABS 1.15 02/25/2020    LYMPHSABS 1.11 02/25/2020    EOSABS 0.14 02/25/2020    BASOSABS 0.02 02/25/2020       CMP:    Lab Results   Component Value Date     02/25/2020    K 3.5 02/25/2020    K 2.9 02/11/2020    CL 90 02/25/2020    CO2 32 02/25/2020    BUN 25 02/25/2020    CREATININE 1.2 02/25/2020    GFRAA >60 02/25/2020    LABGLOM >60 02/25/2020    GLUCOSE 117 02/25/2020    PROT 6.6 02/23/2020    LABALBU 1.9 02/23/2020    CALCIUM 8.3 02/25/2020    BILITOT 0.6 02/23/2020    ALKPHOS 74 02/23/2020    AST 30 02/23/2020    ALT 10 02/23/2020       Hepatic Function Panel:    Lab Results   Component Value Date    ALKPHOS 74 02/23/2020    ALT 10 02/23/2020    AST 30 02/23/2020    PROT 6.6 02/23/2020    BILITOT 0.6 02/23/2020    BILIDIR 1.1 02/06/2020    LABALBU 1.9 02/23/2020         Microbiology :    Blood culture -  Beta Strep Group G     Sputum Culture -   Group 6: <25 PMN's/LPF and <25 Epithelial cells/LPF   Rare Polymorphonuclear leukocytes   Rare Epithelial cells   Moderate yeast   Few Gram positive diplococci   Rare Gram positive cocci in clusters     Organism Staphylococcus aureusAbnormal     CULTURE, RESPIRATORY Light growth    Organism Candida albicansAbnormal     CULTURE, RESPIRATORY Moderate growth    Organism Yeast, not C. albicansAbnormal     CULTURE, RESPIRATORY Moderate growth   Narrative:     Source: SPUEX       Site: Sputum&Sputum                     Radiology :    Chest X ray-  Improving CHF with decreasing edema.     Assessment:    Pneumonia / respiratory failure / intubated   Group G Streptococcus bacteremia   Leukocytosis - improved   S/P Cardiac arrest  Anoxic encephalopathy  MSSA pneumonia /

## 2020-02-25 NOTE — PROGRESS NOTES
Department of Internal Medicine  Nephrology Attending Progress Note    Events reviewed. SUBJECTIVE: We are following Mr. Willow Schilling for YVETTE on CKD. Patient seen and examined bedside, remains intubated. PHYSICAL EXAM:      Vitals:    VITALS:  /80   Pulse 91   Temp 99 °F (37.2 °C) (Axillary)   Resp 10   Ht 5' 4\" (1.626 m)   Wt 140 lb (63.5 kg)   SpO2 100%   BMI 24.03 kg/m²        Intake/Output Summary (Last 24 hours) at 2/25/2020 1232  Last data filed at 2/25/2020 1100  Gross per 24 hour   Intake 2742 ml   Output 2820 ml   Net -78 ml         Constitutional:  Intubated  HEENT:  Scarred sugical incision at neck; facial droop  Respiratory:  Clear, diminished bases bilaterally  Cardiovascular/Edema:  RRR, no edema noted  Gastrointestinal:  Abdomen soft and nontender, bowel sounds active. PEG.   Neurologic:  Contracted extremities, facial droop   Skin:  Dry,warm  flaky   Other:  No edema     Scheduled Meds:   potassium bicarb-citric acid  40 mEq Oral Daily    magnesium sulfate  4 g Intravenous Once    magnesium oxide  400 mg Oral Daily    multivitamin with iron-minerals  15 mL Oral Daily    bumetanide  1 mg Intravenous BID    amLODIPine  10 mg PEG Tube Daily    albuterol  2.5 mg Nebulization Q4H While awake    piperacillin-tazobactam  3.375 g Intravenous Q8H    docusate sodium  100 mg Per G Tube Daily    sennosides  5 mL Per G Tube Nightly    heparin flush  3 mL Intravenous 2 times per day    heparin (porcine)  5,000 Units Subcutaneous 3 times per day    chlorhexidine  15 mL Mouth/Throat BID    lansoprazole  30 mg Per G Tube QAM AC    aspirin  81 mg Per G Tube Daily    hydrALAZINE  25 mg PEG Tube 3 times per day    metoprolol tartrate  50 mg PEG Tube BID    sodium chloride (Inhalant)  4 mL Nebulization BID    sodium chloride flush  10 mL Intravenous 2 times per day     Continuous Infusions:    PRN Meds:.sodium chloride flush, heparin flush, acetaminophen, labetalol, magnesium hydroxide      DATA:    CBC:   Lab Results   Component Value Date    WBC 8.0 02/25/2020    RBC 2.44 02/25/2020    HGB 7.3 02/25/2020    HCT 22.4 02/25/2020    MCV 91.8 02/25/2020    MCH 29.9 02/25/2020    MCHC 32.6 02/25/2020    RDW 14.5 02/25/2020     02/25/2020    MPV 10.6 02/25/2020     CMP:    Lab Results   Component Value Date     02/25/2020    K 3.5 02/25/2020    K 2.9 02/11/2020    CL 90 02/25/2020    CO2 32 02/25/2020    BUN 25 02/25/2020    CREATININE 1.2 02/25/2020    GFRAA >60 02/25/2020    LABGLOM >60 02/25/2020    GLUCOSE 117 02/25/2020    PROT 6.6 02/23/2020    LABALBU 1.9 02/23/2020    CALCIUM 8.3 02/25/2020    BILITOT 0.6 02/23/2020    ALKPHOS 74 02/23/2020    AST 30 02/23/2020    ALT 10 02/23/2020     Magnesium:    Lab Results   Component Value Date    MG 1.5 02/25/2020     Recent Labs     02/25/20  0513   PH 7.515*   PO2 151.1*   PCO2 41.6   HCO3 32.8*   BE 9.1*   O2SAT 98.4   FIO2 40.0         Urine chemistry:  Urine creatinine = 69  Urine Microalbumin = 55.9  Urine Microalbumin/Cr ratio = 81  Urine osmolality = 393  Urine protein = 35  Urine protein/Cr ratio = 0.5    FENa: 0.7%  FEUrea: 20.9%     Ferritin: 1019  Iron: 20  Iron saturation: 20%  Folate: 14.2  B12: 383    Radiology Review:        US Retroperitoneum 2/7/20   Probable bilateral medical renal disease with some perinephric fluid   bilaterally that could indicate inflammation.       There is no evidence of calcification or obstruction in either kidney.       The bladder was not evaluated due to a decompressing Sanchez catheter. CT Soft Tissue Neck W WO Contrast 2/9/20   Probable prior right radical neck dissection with sequela   of prior surgery and radiation therapy.  There is extensive soft tissue   distortion which is suggestive of previous therapy   Finds compatible with a approximately 1 cm right thyroid nodule   There is evidence to suggest severe stenosis of the right distal   internal carotid artery   Soft tissue mass at the level the carotid bifurcation on the right   with outward mass effect measuring approximately 2.3 x 2.7 cm   suspicious for recurrent disease. This encases the carotid distally   and the distal carotid stenosis may represent invasion. Comparison   studies would be necessary to distinguish recurrent neoplasm from   previous radiation therapy. Tumor invasion versus osteonecrosis of the medial right mandible         Chest x-ray February 16, 2020       Endotracheal tube terminates above the jovita.       Stable opacities in the lower lobes. Consider infectious/inflammatory   etiologies or edema. Follow-up to resolution.                        ECHO Complete 2/16/20:   Trileaflet aortic valve. Mild leaflet calcification with more pronounced   calcification on the non coronary cusp. Normal leaflet mobility. No aortic stenosis. No aortic regurgitation. Concentric hypertrophy though in some views the walls are up to 1.4 cm   thick. Normal left ventricular systolic function. Visually estimated LVEF   is 60 %. Normal diastolic function. Normal left atrial pressure. Mildly dilated right ventricle. Difficult to visualize the free wall in all views. Small anterior and apical effusion. No signs of tamponade. Chest x-ray 2/24/20   Worsening CHF.             BRIEF SUMMARY OF INITIAL CONSULT:    Briefly, Mr. Samuel Kate is a 80-year-old gentleman with a significant past medical history of hypertension, laryngeal cancer s/p laryngectomy in 2005 and chemo and radiation, history of CVA with baseline left sided weakness and some slurred speech, current smoker, and history of alcohol abuse who presented to the ED on 2/5/20 for fatigue. Patient was found to be extremely hypotensive at 50/palpated, tachycardic and fatigued in the ED. He was given fluid bolus with BP improvement, was found to have YVETTE (baseline unknown) with a creatinine of 4.1.  Patients creatinine today is 3.0, reason for this consult. Problems resolved:  · Hypernatremia, with water deficit; calculated free water deficit is 1.8 L. Resolved. · YVETTE stage III on CKD;  volume responsive prerenal YVETTE due to poor oral intake in the setting of ACE inhibition, fraction excretion of sodium 0.7%, fractional secretion urea 20.9%. Resolved. · Hypernatremia, with water deficit, resolved   · Severe thrombocytopenia, resolved   · Hypomagnesemia likely 2/2 poor oral intake, improved    IMPRESSION/RECOMMENDATIONS:      1. YVETTE on CKD, ischemic ATN s/p PEA cardiac arrest on 2/15/20, FeNa 0.62%   · Cr stable at 1.2 mg/dL today, excellent diuresis in response to bumex with improving pulmonary edema on CXR today. Will continue diuresis. 2. CKD stage I-II, with minimal proteinuria (urine albumin/creatinine: 81 mg/gr). Kidney ultrasound with increased renal cortical echogenicity. Probably due to nephrosclerosis. 3. Hypokalemia likely 2/2 to poor oral intake and diuresis   4. Status post PEA arrest 2/15/20  5. Respiratory failure status, post intubation, 2/2 pneumonia. Currently on piperacillin-tazobactam  6. HFpEF 60%  7. Alkalemia (pH: 4.862) with metabolic alkalosis  8. HTN, on hydralazine, metoprolol. 9. Group G Streptococcus bacteremia, on piperacillin-tazobactam  10. Normocytic Anemia, iron studies consistent with chronic inflammation, normal B12 and folate levels. S/p 4 units PRBCs. Hemoglobin 7.3 today. 11. Nutrition, s/p PEG on TF 50cc/hr with FW at 40cc/hr.     Plan:    · Replace potassium  · Continue Bumex 1 mg iv  BID  · Continue to monitor renal function  · Continue to monitor potassium  · Continue to monitor H&H

## 2020-02-25 NOTE — PLAN OF CARE
Problem: Falls - Risk of:  Goal: Will remain free from falls  Description  Will remain free from falls  2/25/2020 0110 by Juan Juan RN  Outcome: Met This Shift  2/24/2020 1644 by Surinder Rodrigues RN  Outcome: Met This Shift  Goal: Absence of physical injury  Description  Absence of physical injury  2/25/2020 0110 by Juan Juan RN  Outcome: Met This Shift  2/24/2020 1644 by Surinder Rodrigues RN  Outcome: Met This Shift     Problem: Risk for Impaired Skin Integrity  Goal: Tissue integrity - skin and mucous membranes  Description  Structural intactness and normal physiological function of skin and  mucous membranes.   2/25/2020 0110 by Juan Juan RN  Outcome: Met This Shift  2/24/2020 1644 by Surinder Rodrigues RN  Outcome: Not Met This Shift     Problem: Injury - Risk of, Physical Injury:  Goal: Will remain free from falls  Description  Will remain free from falls  2/25/2020 0110 by Juan Juan RN  Outcome: Met This Shift  2/24/2020 1644 by Surinder Rodrigues RN  Outcome: Met This Shift  Goal: Absence of physical injury  Description  Absence of physical injury  2/25/2020 0110 by Juan Juan RN  Outcome: Met This Shift  2/24/2020 1644 by Surinder Rodrigues RN  Outcome: Met This Shift

## 2020-02-25 NOTE — PROGRESS NOTES
[address] [facility]  PROGRESS NOTE  ATTENDING NOTE    CRITICAL CARE    Patient Active Problem List   Diagnosis    Acute renal failure (ARF) ([facility] [address]    Bacteremia    Other dysphagia    Severe protein-calorie malnutrition ([facility] [address]    Acute upper respiratory infection    Elevated troponin    Acute ischemic stroke ([facility] [address]    Anoxic brain injury ([facility] [address]    Palliative care encounter    Goals of care, counseling/discussion         OVERNIGHT EVENTS:  Increase PS for low Vt; opens eyes, nonpurposeful mvmt    HOSPITAL COURSE:  2/16 3yo male with history of smoking, ETOH abuse, multiple head and neck cancers s/p partial mandibular resection ([id number]), esophageal cancer, and R base of tongue cancer s/p hemiglossectomy and chemoradiation with a new Head and Neck cancer. PEA arrest likely from aspiration ( Barium sitting in the back of the through when PEG was placed)  leading to hypoxia or Head and neck cancer obstruction. 2/17 No overnight events   2/18  Hb dropped 5.2 received 2 units PRBC    2/19 No acute overnight issues, patient withdrawing from pain this am and blinking to threat   2/20 No changes overnight   2/21--nothing new overnight  2/22--no new issues  2/23--nothing new overnight    /78   Pulse 96   Temp 99 °F (37.2 °C) (Axillary)   Resp 15   Ht 5' 4\" (1.626 m)   Wt 135 lb 12.8 oz (61.6 kg)   SpO2 100%   BMI 23.31 kg/m²   Physical Exam  Constitutional:       General: He is not in acute distress. Appearance: He is normal weight. HENT:      Head: Normocephalic and atraumatic. Nose: Nose normal.      Mouth/Throat:      Mouth: Mucous membranes are moist.      Pharynx: Oropharynx is clear. Eyes:      Extraocular Movements: Extraocular movements intact. Pupils: Pupils are equal, round, and reactive to light. Neck:      Musculoskeletal: Normal range of motion and neck supple. Cardiovascular:      Rate and Rhythm: Normal rate and regular rhythm. Pulses: Normal pulses.

## 2020-02-25 NOTE — PROGRESS NOTES
[facility]  Internal Medicine Residency Program  Progress Note - House Team 1    Patient:  Ekaterina Avila 59 y.o. male   MRN: [id number]       Date of Service: 2020    Allergy: Patient has no known allergies. Subjective      I saw and examined the patient in the AM today. Patient is intubated, no changes in his neurologic and mental status. Objective     TEMPERATURE:  Current - Temp: 99 °F (37.2 °C); Max - Temp  Av.2 °F (37.3 °C)  Min: 98.9 °F (37.2 °C)  Max: 100.2 °F (37.9 °C)  RESPIRATIONS RANGE: Resp  Av.1  Min: 10  Max: 47  PULSE RANGE: Pulse  Av.4  Min: 91  Max: 105  BLOOD PRESSURE RANGE:  Systolic (23MBZ), CBM:114 , Min:133 , TLK:739   ; Diastolic (31SYZ), TZB:27, Min:77, Max:94    PULSE OXIMETRY RANGE: SpO2  Av %  Min: 99 %  Max: 100 %    I & O - 24hr:    Intake/Output Summary (Last 24 hours) at 2020 1150  Last data filed at 2020 1100  Gross per 24 hour   Intake 2742 ml   Output 2945 ml   Net -203 ml     I/O last 3 completed shifts: In: 1921 [I.V.:100; NG/GT:2252; IV Piggyback:400]  Out: 4766 [Urine:2870] I/O this shift:  In: -   Out: 800 [Urine:800]   Weight change: 4 lb 3.2 oz (1.905 kg)    Physical Exam  Vitals signs and nursing note reviewed. Constitutional:       Appearance: He is normal weight. He is ill-appearing. Interventions: He is intubated. HENT:      Head: Normocephalic and atraumatic. Right Ear: External ear normal.      Left Ear: External ear normal.      Nose: Nose normal.      Mouth/Throat:      Mouth: Mucous membranes are moist.   Eyes:      General: Lids are normal. No scleral icterus. Right eye: No discharge. Left eye: No discharge. Conjunctiva/sclera: Conjunctivae normal.      Pupils: Pupils are equal.      Right eye: Pupil is not reactive (Pinpoint pupil). Pupil is not sluggish. Left eye: Pupil is not reactive (Pinpoint pupil). Pupil is not sluggish.    Cardiovascular:      Rate and Rhythm: Normal rate and regular rhythm. Pulses: Normal pulses. Carotid pulses are 2+ on the right side and 2+ on the left side. Radial pulses are 2+ on the right side and 2+ on the left side. Dorsalis pedis pulses are 2+ on the right side and 2+ on the left side. Heart sounds: S1 normal and S2 normal. No gallop. No S3 sounds. Pulmonary:      Effort: Pulmonary effort is normal. He is intubated. Breath sounds: Normal air entry. Transmitted upper airway sounds present. No decreased breath sounds, wheezing, rhonchi or rales. Musculoskeletal:      Right lower leg: No edema. Left lower leg: No edema. Neurological:      Comments: Cough and gag reflex present. Labs and Imaging Studies     CBC:   Recent Labs     02/23/20  0450 02/24/20  0500 02/25/20  0514   WBC 8.3 7.4 8.0   HGB 7.7* 7.3* 7.3*   HCT 24.5* 22.6* 22.4*   MCV 93.5 92.6 91.8   * 127* 154       BMP:    Recent Labs     02/23/20  0450 02/24/20  0500 02/25/20  0514    138 133   K 3.9 3.0* 3.5   CL 97* 95* 90*   CO2 29 30* 32*   BUN 29* 26* 25*   CREATININE 1.2 1.2 1.2   GLUCOSE 124* 199* 117*       LIVER PROFILE:   Recent Labs     02/23/20  0450   AST 30   ALT 10   BILITOT 0.6   ALKPHOS 74       PT/INR:   No results for input(s): PROTIME, INR in the last 72 hours. APTT:   No results for input(s): APTT in the last 72 hours.     Fasting Lipid Panel:    Lab Results   Component Value Date    HDL 24 02/18/2020       Notable Cultures:      Blood cultures   Blood Culture, Routine   Date Value Ref Range Status   02/18/2020 5 Days- no growth  Final     Respiratory cultures No results found for: RESPCULTURE No results found for: LABGRAM  Urine   Urine Culture, Routine   Date Value Ref Range Status   02/05/2020 <10,000 CFU/mL  Mixed gram positive organisms    Final     Legionella No results found for: LABLEGI  C Diff PCR No results found for: CDIFPCR  Wound culture/abscess: No results for input(s): WNDABS in the last 72 hours. Tip culture:No results for input(s): CXCATHTIP in the last 72 hours. Xr Chest Standard (2 Vw)    Result Date: 2020  Patient MRN: [id number] : 1955 Age:  59 years Gender: Male Order Date: 2020 10:15 AM Exam: XR CHEST (2 VW) Number of Images: 2 views Indication:   Sepsis, sob Sepsis, sob Comparison: None. Findings: The heart is unremarkable The lung fields demonstrate no significant pulmonary vascular congestion and edema. The aorta is tortuous ectatic There are findings throughout the lung fields which are likely chronic     Findings compatible with atherosclerotic disease No acute infiltrate     Xr Foot Left (min 3 Views)    Result Date: 2020  Patient MRN:  [id number] : 1955 Age: 59 years Gender: Male Order Date:  2020 2:45 PM EXAM: XR FOOT LEFT (MIN 3 VIEWS) NUMBER OF IMAGES:  3 views INDICATION:  pain, swelling pain, swelling COMPARISON: None . The bones appear to be in anatomic alignment. No foreign body is identified No fracture is identified  There is moderate joint space loss The are moderate degenerative changes present     Moderate degenerative changes    Ct Soft Tissue Neck W Wo Contrast    Result Date: 2020  Patient MRN:  [id number] : 1955 Age: 59 years Gender: Male Order Date:  2020 3:45 PM EXAM: CT SOFT TISSUE NECK W WO CONTRAST NUMBER OF IMAGES \ views:  36 INDICATION:  h/o R mandible/base of tongue ca, sp resection and chemorads, concern for recurrence Per nephro team ok to do scan with contrast h/o R mandible/base of tongue ca, sp resection and chemorads, concern for recurrence COMPARISON: None Technique: Low-dose CT  acquisition technique included one of following options; 1 . Automated exposure control, 2. Adjustment of MA and or KV according to patient's size or 3. Use of iterative reconstruction. The larynx appears unremarkable The soft tissues appear abnormal there is sequela of extensive prior surgery.  No convincing abscess is seen. There is distortion of the oropharynx and the posterior superior hypopharynx there is significant distortion of the right sternocleidomastoid and a pattern compatible with previous radical neck dissection. There are findings to suggest previous therapy. The right jugular vein is not seen likely status post resection. There is been resection at the level of the tongue base and floor the mouth. There is sequela of previous resection. . Atherosclerotic disease of both proximal internal carotid arteries is identified without proximal internal carotid artery hemodynamically significant stenosis. At the level the distal right internal carotid artery at the level the cervical component there is evidence to suggest severe stenosis. Also the level of the carotid bifurcation on the right there is a soft tissue mass effect suspicious for recurrent neoplasm. This measures approximately 2.7 x 2.3 cm. This could also be related to previous therapy. Comparison studies would be necessary. There is a mottled eaten appearance to the medial aspect of the right mandible. This could be related to tumor invasion or osteonecrosis. Scattered lymph nodes are visualized which do not meet the CT criteria for adenopathy. There is mixed density in the right thyroid possibly representing a thyroid nodule measuring approximately 1 cm. There is density scattered throughout the sinuses possibly related to mild chronic sinusitis. There is likely a right sphenoid retention cyst     Probable prior right radical neck dissection with sequela of prior surgery and radiation therapy.  There is extensive soft tissue distortion which is suggestive of previous therapy Finds compatible with a approximately 1 cm right thyroid nodule There is evidence to suggest severe stenosis of the right distal internal carotid artery Soft tissue mass at the level the carotid bifurcation on the right with outward mass effect measuring approximately 2.3 x 2.7 cm suspicious for recurrent disease. This encases the carotid distally and the distal carotid stenosis may represent invasion. Comparison studies would be necessary to distinguish recurrent neoplasm from previous radiation therapy. Tumor invasion versus osteonecrosis of the medial right mandible ALERT:  THIS IS AN ABNORMAL REPORT      Us Head Neck Soft Tissue Thyroid    Result Date: 2020  Patient MRN:  [id number] : 1955 Age: 59 years Gender: Male Order Date:  2020 2:15 PM Exam: US HEAD NECK SOFT TISSUE THYROID Number of images:  26 Indication:  right carotid bifurcation mass With special focus to soft tissue mass at the right carotid bifurcation. right carotid bifurcation mass Comparison: Correlation with CT dated 2020. Technique: Grayscale and color Doppler ultrasound images of the region of interest were obtained by the sonographic technologist. FINDINGS: The study is limited by technically suboptimal image quality. There is abnormal heterogeneous soft tissue in the region of interest.     Ill-defined, heterogeneous, abnormal appearing soft tissue in the region of interest. No specific clinical question is asked. Ultrasound is not the appropriate modality for this evaluation. Contrast-enhanced MRI may provide more information. Correlation with clinical and surgical history is needed. Xr Chest Portable    Result Date: 2020  Patient MRN:  [id number] : 1955 Age: 59 years Gender: Male Order Date:  2020 6:00 AM EXAM: XR CHEST PORTABLE COMPARISON: February 10, 2020 INDICATION:  PNA PNA FINDINGS: There are opacities at the left lung base silhouetting left hemidiaphragm which appears similar since prior. The heart is normal size. No pneumothorax. Stable opacities at left lung base. Continued follow-up could be helpful for further evaluation.     Xr Chest Portable    Result Date: 2/10/2020  Patient MRN: [id number] : 1955 Age:  59 years Gender: Male Order Date: 2/10/2020 3:00 PM Exam: XR CHEST PORTABLE Number of Images: 1 view Indication:   SOB SOB Comparison: 2020 Findings: The heart is enlarged. The lung fields demonstrate evidence for airspace disease. The aorta is tortuous and ectatic. Tortuous ectatic aorta Cardiomegaly Airspace disease compatible with atelectasis or pneumonia, worse at the left lung base as compared to the right The chest appears to be worse in the interval     Xr Chest 1 Vw    Result Date: 2020  Patient MRN: [id number] : 1955 Age:  59 years Gender: Male Order Date: 2020 6:00 AM Exam: XR CHEST 1 VIEW Number of Images: 1 view Indication: Acute weakness. Comparison: None. FINDINGS: Heart and pulmonary vascularity normal. Lungs clear. Costophrenic angles sharp. Normal aorta. No acute cardiopulmonary findings. Us Retroperitoneal Complete    Result Date: 2020  Patient Mrn: [id number] : 1955 Age:  59 years Gender: Male Order Date: 2020 7:45 PM Exam: US RETROPERITONEAL COMPLETE Number Of Images: 54 Views Indication:  Acute renal insufficiency Comparison: None. TECHNIQUE: 2-D grayscale and color Doppler imaging were utilized for evaluation of the kidneys and bladder. Findings: The right kidney measures 11.8 x 5.5 x 6.3 cm, and the left kidney measures 11.5 x 5.6 x 5.9 cm. There is no evidence of collecting system calcification, obstructive dilation, or perinephric inflammatory change. Cortical echogenicity is increased bilaterally,. Suggesting the spectrum of medical renal disease. Central perfusion is intact bilaterally by color Doppler imaging, however. There is some perinephric fluid bilaterally. The bladder is decompressed with Sanchez catheter, and is not further characterized. Probable bilateral medical renal disease with some perinephric fluid bilaterally that could indicate inflammation. There is no evidence of calcification or obstruction in either kidney.  The bladder was not evaluated due to a decompressing Intravenous BID Javier Woo MD   1 mg at 02/25/20 0832    amLODIPine (NORVASC) tablet 10 mg  10 mg PEG Tube Daily Ilana Davies MD   10 mg at 02/25/20 0828    albuterol (PROVENTIL) nebulizer solution 2.5 mg  2.5 mg Nebulization Q4H While awake Maria Del Rosario Schwartz MD   2.5 mg at 02/25/20 1141    piperacillin-tazobactam (ZOSYN) 3.375 g in dextrose 5 % 100 mL IVPB extended infusion (mini-bag)  3.375 g Intravenous Q8H Adolfo Hernandez MD 25 mL/hr at 02/25/20 1130 3.375 g at 02/25/20 1130    docusate sodium (COLACE) 150 MG/15ML liquid 100 mg  100 mg Per G Tube Daily Maria Del Rosario Schwartz MD   100 mg at 02/25/20 0829    sennosides (SENOKOT) 8.8 MG/5ML syrup 5 mL  5 mL Per G Tube Nightly Maria Del Rosario Schwartz MD   5 mL at 02/24/20 2005    sodium chloride flush 0.9 % injection 10 mL  10 mL Intravenous PRN Maria Del Rosario Schwartz MD   10 mL at 02/24/20 1609    heparin flush 100 UNIT/ML injection 300 Units  3 mL Intravenous 2 times per day Maria Del Rosario Schwartz MD   300 Units at 02/25/20 0955    heparin flush 100 UNIT/ML injection 300 Units  3 mL Intracatheter PRN Maria Del Rosario Schwartz MD        heparin (porcine) injection 5,000 Units  5,000 Units Subcutaneous 3 times per day Maria Del Rosario Schwartz MD   5,000 Units at 02/25/20 0542    acetaminophen (TYLENOL) 160 MG/5ML solution 650 mg  650 mg PEG Tube Q4H PRN Sony Chavez MD   650 mg at 02/19/20 1643    chlorhexidine (PERIDEX) 0.12 % solution 15 mL  15 mL Mouth/Throat BID Maria Del Rosario Schwartz MD   15 mL at 02/25/20 0828    lansoprazole suspension SUSP 30 mg  30 mg Per G Tube QAM  Chang Farmer Jr., DO   30 mg at 02/25/20 0893    aspirin chewable tablet 81 mg  81 mg Per G Tube Daily Graeme Barker MD   81 mg at 02/25/20 3906    hydrALAZINE (APRESOLINE) tablet 25 mg  25 mg PEG Tube 3 times per day Holland Hilton MD   25 mg at 02/25/20 0542    metoprolol tartrate (LOPRESSOR) tablet 50 mg  50 mg PEG Tube BID Saroj Schumacher MD   50 mg at 02/25/20 2638    sodium chloride (Inhalant) 3 % (2005) with chemo and radio, HTN, H/O CVA with residual Lt. Weakness and slurred speech, smoker and active alcohol abuser. Diagnosis Date    Cancer [facility])     mouth    Hip fracture ([facility] [address]     Hypertension      <Cardiovascular>  PEA arrest s/p ROSC  2/2 ? Hypoxia 2/2 neck mass vs upper airway collapse vs acute anemia   Management per surgical ICU. EEG >> diffuse encephalopathy   MRI brain left occipital lesion (previously Treated in CT scan, no changes)  MRA head was unremarkable. No DVT on US BL LE     Hypertension and Tachycardia (resolving). Currently on hydralazine 25mg Q8H, metoprolol 50mg BID. Not tachycardic today. <Neurologic>  Acute Encephalopathy  2/2 anoxic brain injury. Cough and gag reflex present. Pupils: non reactive, withdrawing to pain in 4 extremities. Management per SICU  MRI brain left occipital lesion (previously Treated in CT scan, no changes)  MRA head was unremarkable. H/O CVA with residual Lt. Weakness - Stable  Aspirin placed on hold due to decrease in hemoglobin. TSH level was high, normal T4 level, low free T3 level, euthyroid sick syndrome. <Pulmonary>  Acute hypoxic hypercapnic respiratory failure  2/2 PEA arrest  Currently intubated and on the vent AC mode, on 40%. LTAC (tracheostomy and PEG tube), versus terminal extubation, family will decide, palliative on board. Pneumonia  2/2 MSSA  CXR: stable   ID on board, currently on Zosyn. Chest vest BID. Nebulization with ipratropium-albuterol Q4H and 3% NaCl nebulization BID.     <Gastrointestinal>  Dysphagia s/p PEG placement (2/12/2020)  2/2 Neck mass   Currently on tube feeds  CBC daily. Severe Malnutrition  2/2 Laryngeal Ca, dysphagia  Per dietitian, pt. Meets criteria for severe malnutrition. Currently on tube feeds    <Infectious Disease>  Group G Streptococcus Bacteremia - Resolved  Echo showed no vegetations. Repeat culture -ve.         <Genitourinary/Renal>  YVETTE Stage III on CKD   Ischemic secondary to ATN after PEA cardiac arrest.  Nonoliguric. Nephrology on board, creatinine has been stable. Nephrology given 2 doses of 1 mg Bumex yesterday, will continue to diurese today. Chest x-ray today showing less signs of pulmonary vascular congestion. <Hematology/Oncology>  Acute Normocytic Anemia  S/P 4 U PRBC. Stable hemoglobin. Soft Tissue Mass  2/2 possible recurrence of laryngeal Ca. CT and USG suggestive of mass at Rt. Carotic bifurcation - 2.3 x 2.7 cm. Thrombocytopenia -resolved  2/2 Sepsis vs alcohol use      H/O Alcohol Abuse - Stable  Currently on thiamine 290LL OD and folic acid 1mg OD. PLAN 2/24:   1. Family to meet with physicians today at 1pm and decide on Trach and LTAC. # Peptic ulcer prophylaxis: Lansoprazole. # DVT Prophylaxis: Heparin  # Disposition: Cont current care.        [address], DO  PGY-1    Internal medicine resident    Attending Physician: Dr. Brittany Marx

## 2020-02-25 NOTE — PROGRESS NOTES
[address] [facility]  PROGRESS NOTE  ATTENDING NOTE    CRITICAL CARE    Patient Active Problem List   Diagnosis    Acute renal failure (ARF) ([facility] [address]    Bacteremia    Other dysphagia    Severe protein-calorie malnutrition ([facility] [address]    Acute upper respiratory infection    Elevated troponin    Acute ischemic stroke ([facility] [address]    Anoxic brain injury ([facility] [address]    Palliative care encounter    Goals of care, counseling/discussion         OVERNIGHT EVENTS:  Placed on AC; + [address]:  2/16 5yo male with history of smoking, ETOH abuse, multiple head and neck cancers s/p partial mandibular resection ([id number]), esophageal cancer, and R base of tongue cancer s/p hemiglossectomy and chemoradiation with a new Head and Neck cancer. PEA arrest likely from aspiration ( Barium sitting in the back of the through when PEG was placed)  leading to hypoxia or Head and neck cancer obstruction. 2/17 No overnight events   2/18  Hb dropped 5.2 received 2 units PRBC    2/19 No acute overnight issues, patient withdrawing from pain this am and blinking to threat   2/20 No changes overnight   2/21--nothing new overnight  2/22--no new issues  2/23--nothing new overnight  2/25--family discussion; placed on AC    /79   Pulse 100   Temp 99 °F (37.2 °C) (Axillary)   Resp 16   Ht 5' 4\" (1.626 m)   Wt 140 lb (63.5 kg)   SpO2 100%   BMI 24.03 kg/m²   Physical Exam  Constitutional:       General: He is not in acute distress. Appearance: He is normal weight. HENT:      Head: Normocephalic and atraumatic. Nose: Nose normal.      Mouth/Throat:      Mouth: Mucous membranes are moist.      Pharynx: Oropharynx is clear. Eyes:      Extraocular Movements: Extraocular movements intact. Pupils: Pupils are equal, round, and reactive to light. Neck:      Musculoskeletal: Normal range of motion and neck supple. Cardiovascular:      Rate and Rhythm: Normal rate and regular rhythm. Pulses: Normal pulses. did not know much about his cancers and prognosis from that.

## 2020-02-25 NOTE — PROGRESS NOTES
loud noise or command   4 - Moves part of body but does not remove noxious stimulus  1 - Makes no noise    Pupil size:  Left 3 mm    Right 3 mm    Pupil reaction: Yes    Wiggles fingers: Left Yes (not to command) Right Yes (not to command)    Hand grasp:   Left none       Right none    Wiggles toes: Left No    Right No    Plantar flexion: Left decreased     Right decreased      General appearance: intubated, chronically ill appearing  Lungs: coarse breath sounds  Heart: regulra rate and rhythm. Abdomen: soft, no apparent tenderness, non distended. PEG in place  : islas yellow urine  Skin: No skin abnormalities  Neurologic: Off sedation. Withdraws from painful stimuli but does not localize or follow commands. Pupils equal. Opens eyes to voice and blinks to threat, wandering gaze    ASSESSMENT / PLAN:   · Neuro:            Likely hypoxic brain injury during code blue difficult intubation; minimally responsive off sedation. Neurology following for encephalopathy and prognosis. EEG showed diffuse encephalopathy without seizures. MRI with CVA. ? History of ETOH abuse, previously on CIWA  ? PMH CVA  ? Neurology following    · CV:      Bradycardic PEA arrest 2/16, likely hypoxia -  Monitor hemodynamics. ? Home metoprolol 50 BID. Aspirin held due to nasopharyngeal bleeding  ? HTN: continue amlodipine, home metoprolol      · Pulm:  Hypercapnic respiratory failure; possibly component of obstruction from ENT mass s/p intubation 2/16. Pressure support trials. Continue albuterol nebs, 3% saline nebs -  Monitor RR, SpO2  ? CXR, ABG while intubated  ? Awaiting family decision regarding trach, PEG - scheduled for today    · GI:S/p PEG 2/12 for failure to thrive. TF at goal    · Renal: YVETTE, renal following. Off IVF -  Monitor UOP. Replete MG > 2 K > 4  ? Bumex    · ID:  Fevers, possible aspiration pneumonia.  Zosyn per ID through 2/25 for streptococcus bacteremia and MSSA pneumonia-  Recent blood cultures NG x24 hours, sputum cultures 2/7 with MSSA. Tylenol prn fevers  ·   · Endo:  No acute issues -  Monitor glucose  ·   · MSK:   Turn and reposition. Local wound care for elbow pressure wounds  ·   · Heme: Acute blood loss anemia and nasopharyngeal bleedin gfrom ENT mass, stable. Continue SQH     Bowel regimen:         Colace, senna  DVT proph:                SCDs, SQH  GI proph:                    PPI        Glucose protocol:     monitor  Mouth/eye care:        Peridex, drops  Sanchez:                          keep in place for critical care monitoring of fluid balance.   CVC sites:                  PICC  Ancillary consults:    IM (admitting), renal, ENT, General Surgery, ID, Neurology  Family Update:          Meeting planned for 13:00 tomorrow  CODE Status: Full Code    Dispo: SICU    Electronically signed by Missy Godwin DO 2/25/2020  5:55 AM

## 2020-02-25 NOTE — PROGRESS NOTES
prior to admission and lived independently in [place]. The patient was hypotensive and tachycardic per EMS, given fluid resuscitation with improvement in blood pressure and no improvement in heart rate. Patient continued to smoke and use ETOH. His sister reported that the patient had increased weakness and gait disturbance over the past couple of days. Patient had left sided weakness and slurred speech per baseline s/p CVA history. The patient had a laryngectomy in 2005 with chemotherapy and radiation. At his sister's residence, his appetite began to decline and he also had a fall on 2/4/2020 prior to admission. His last drink of alcohol was reported to be on 2/3/2020. He was admitted with YVETTE, SIRS, hypomagnesemia, oral cancer, and elevated troponin level. Patient was started on IVATB, IV hydration, and seizure precautions. Further imaging of the head and neck indicated a soft tissue mass, questionable recurrence of disease. PT/OT/ST were consulted to evaluate and treat. Patient was found to have severe oropharyngeal dysphagia, absent swallow with pudding consistency and it was recommended that patient be strictly NPO. On 2/12/2020 a PEG was placed and patient was started on enteral feedings. On the morning of 2/16/2020 patient was found to be unresponsive and bradycardic in the 20's and went into PEA x 3 with ROSC x 3. He received multiple doses of epinephrine and an 1 amp bicarb. Patient was a difficult intubation due and anesthesia was consulted. Subjective/Events/Discussions:  2/17/2020  Met with patient at the bedside who is on mechanical ventilation status post PEA on 2/16/2020. He is unable to make his needs known, is unresponsive other than to painful stimuli. He is not on sedation. Met with his sister Dennis Osorio in the waiting area and Palliative Medicine introduced. Discussed goals of care and code status; FULL, DNR-CCA, and DNR-CC.   Reviewed patient's current medical status with epileptiform   activities or lateralizing signs are seen. US Retroperitoneal 2/7/2020  Probable bilateral medical renal disease with some perinephric fluid   bilaterally that could indicate inflammation.       There is no evidence of calcification or obstruction in either kidney.       The bladder was not evaluated due to a decompressing Sanchez catheter. CT soft tissue neck W/WO contrast 2/9/2020  Probable prior right radical neck dissection with sequela   of prior surgery and radiation therapy. There is extensive soft tissue   distortion which is suggestive of previous therapy   Finds compatible with a approximately 1 cm right thyroid nodule   There is evidence to suggest severe stenosis of the right distal   internal carotid artery   Soft tissue mass at the level the carotid bifurcation on the right   with outward mass effect measuring approximately 2.3 x 2.7 cm   suspicious for recurrent disease. This encases the carotid distally   and the distal carotid stenosis may represent invasion. Comparison   studies would be necessary to distinguish recurrent neoplasm from   previous radiation therapy. Tumor invasion versus osteonecrosis of the medial right mandible         US Head neck soft tissue thyroid 2/14/2020  Ill-defined, heterogeneous, abnormal appearing soft tissue in the   region of interest.   No specific clinical question is asked. Ultrasound is not the   appropriate modality for this evaluation. Contrast-enhanced MRI may provide more information. Correlation with clinical and surgical history is needed. Echocardiogram 2/16/2020  Summary   Trileaflet aortic valve. Mild leaflet calcification with more pronounced   calcification on the non coronary cusp. Normal leaflet mobility. No aortic stenosis. No aortic regurgitation. Concentric hypertrophy though in some views the walls are up to 1.4 cm   thick. Normal left ventricular systolic function. Visually estimated LVEF   is 60 %. family and floor nurse. Thank you for allowing Palliative Medicine to participate in the care of Monalisa Ochoa. Note: This report was completed using [employer] voiced recognition software. Every effort has been made to ensure accuracy; however, inadvertent computerized transcription errors may be present.

## 2020-02-26 ENCOUNTER — APPOINTMENT (OUTPATIENT)
Dept: GENERAL RADIOLOGY | Age: 65
DRG: 870 | End: 2020-02-26
Payer: MEDICARE

## 2020-02-26 LAB
AADO2: 81.3 MMHG
ABO/RH: NORMAL
ANION GAP SERPL CALCULATED.3IONS-SCNC: 13 MMOL/L (ref 7–16)
ANTIBODY SCREEN: NORMAL
B.E.: 10.6 MMOL/L (ref -3–3)
BASOPHILS ABSOLUTE: 0.02 E9/L (ref 0–0.2)
BASOPHILS RELATIVE PERCENT: 0.3 % (ref 0–2)
BLOOD BANK DISPENSE STATUS: NORMAL
BLOOD BANK PRODUCT CODE: NORMAL
BPU ID: NORMAL
BUN BLDV-MCNC: 23 MG/DL (ref 8–23)
CALCIUM IONIZED: 1.17 MMOL/L (ref 1.15–1.33)
CALCIUM SERPL-MCNC: 8 MG/DL (ref 8.6–10.2)
CHLORIDE BLD-SCNC: 92 MMOL/L (ref 98–107)
CO2: 29 MMOL/L (ref 22–29)
COHB: 0.3 % (ref 0–1.5)
CREAT SERPL-MCNC: 1.1 MG/DL (ref 0.7–1.2)
CRITICAL: ABNORMAL
DATE ANALYZED: ABNORMAL
DATE OF COLLECTION: ABNORMAL
DESCRIPTION BLOOD BANK: NORMAL
EOSINOPHILS ABSOLUTE: 0.1 E9/L (ref 0.05–0.5)
EOSINOPHILS RELATIVE PERCENT: 1.3 % (ref 0–6)
FIO2: 40 %
GFR AFRICAN AMERICAN: >60
GFR NON-AFRICAN AMERICAN: >60 ML/MIN/1.73
GLUCOSE BLD-MCNC: 132 MG/DL (ref 74–99)
HCO3: 34.4 MMOL/L (ref 22–26)
HCT VFR BLD CALC: 20.8 % (ref 37–54)
HCT VFR BLD CALC: 26.4 % (ref 37–54)
HEMOGLOBIN: 6.7 G/DL (ref 12.5–16.5)
HEMOGLOBIN: 8.5 G/DL (ref 12.5–16.5)
HHB: 1.2 % (ref 0–5)
IMMATURE GRANULOCYTES #: 0.04 E9/L
IMMATURE GRANULOCYTES %: 0.5 % (ref 0–5)
LAB: ABNORMAL
LYMPHOCYTES ABSOLUTE: 0.76 E9/L (ref 1.5–4)
LYMPHOCYTES RELATIVE PERCENT: 9.7 % (ref 20–42)
Lab: ABNORMAL
MAGNESIUM: 1.9 MG/DL (ref 1.6–2.6)
MCH RBC QN AUTO: 29.3 PG (ref 26–35)
MCHC RBC AUTO-ENTMCNC: 32.2 % (ref 32–34.5)
MCV RBC AUTO: 90.8 FL (ref 80–99.9)
METHB: 0.4 % (ref 0–1.5)
MODE: AC
MONOCYTES ABSOLUTE: 1.03 E9/L (ref 0.1–0.95)
MONOCYTES RELATIVE PERCENT: 13.1 % (ref 2–12)
NEUTROPHILS ABSOLUTE: 5.89 E9/L (ref 1.8–7.3)
NEUTROPHILS RELATIVE PERCENT: 75.1 % (ref 43–80)
O2 CONTENT: 12.2 ML/DL
O2 SATURATION: 98.8 % (ref 92–98.5)
O2HB: 98.1 % (ref 94–97)
OPERATOR ID: ABNORMAL
PATIENT TEMP: 37 C
PCO2: 42.6 MMHG (ref 35–45)
PDW BLD-RTO: 14.4 FL (ref 11.5–15)
PEEP/CPAP: 5 CMH2O
PFO2: 3.62 MMHG/%
PH BLOOD GAS: 7.53 (ref 7.35–7.45)
PHOSPHORUS: 4.1 MG/DL (ref 2.5–4.5)
PLATELET # BLD: 153 E9/L (ref 130–450)
PMV BLD AUTO: 10.5 FL (ref 7–12)
PO2: 144.9 MMHG (ref 75–100)
POTASSIUM SERPL-SCNC: 3.1 MMOL/L (ref 3.5–5)
RBC # BLD: 2.29 E12/L (ref 3.8–5.8)
RI(T): 56 %
RR MECHANICAL: 14 B/MIN
SODIUM BLD-SCNC: 134 MMOL/L (ref 132–146)
SOURCE, BLOOD GAS: ABNORMAL
THB: 8.6 G/DL (ref 11.5–16.5)
TIME ANALYZED: 524
VT MECHANICAL: 400 ML
WBC # BLD: 7.8 E9/L (ref 4.5–11.5)

## 2020-02-26 PROCEDURE — 36415 COLL VENOUS BLD VENIPUNCTURE: CPT

## 2020-02-26 PROCEDURE — 86923 COMPATIBILITY TEST ELECTRIC: CPT

## 2020-02-26 PROCEDURE — 94669 MECHANICAL CHEST WALL OSCILL: CPT

## 2020-02-26 PROCEDURE — 2500000003 HC RX 250 WO HCPCS: Performed by: INTERNAL MEDICINE

## 2020-02-26 PROCEDURE — 2000000000 HC ICU R&B

## 2020-02-26 PROCEDURE — 6370000000 HC RX 637 (ALT 250 FOR IP): Performed by: SURGERY

## 2020-02-26 PROCEDURE — 94640 AIRWAY INHALATION TREATMENT: CPT

## 2020-02-26 PROCEDURE — 86900 BLOOD TYPING SEROLOGIC ABO: CPT

## 2020-02-26 PROCEDURE — 94003 VENT MGMT INPAT SUBQ DAY: CPT

## 2020-02-26 PROCEDURE — 6370000000 HC RX 637 (ALT 250 FOR IP): Performed by: INTERNAL MEDICINE

## 2020-02-26 PROCEDURE — 99291 CRITICAL CARE FIRST HOUR: CPT | Performed by: SURGERY

## 2020-02-26 PROCEDURE — 84100 ASSAY OF PHOSPHORUS: CPT

## 2020-02-26 PROCEDURE — 82330 ASSAY OF CALCIUM: CPT

## 2020-02-26 PROCEDURE — 86901 BLOOD TYPING SEROLOGIC RH(D): CPT

## 2020-02-26 PROCEDURE — 82805 BLOOD GASES W/O2 SATURATION: CPT

## 2020-02-26 PROCEDURE — 2580000003 HC RX 258: Performed by: STUDENT IN AN ORGANIZED HEALTH CARE EDUCATION/TRAINING PROGRAM

## 2020-02-26 PROCEDURE — 83735 ASSAY OF MAGNESIUM: CPT

## 2020-02-26 PROCEDURE — 36430 TRANSFUSION BLD/BLD COMPNT: CPT

## 2020-02-26 PROCEDURE — 6360000002 HC RX W HCPCS: Performed by: STUDENT IN AN ORGANIZED HEALTH CARE EDUCATION/TRAINING PROGRAM

## 2020-02-26 PROCEDURE — 86850 RBC ANTIBODY SCREEN: CPT

## 2020-02-26 PROCEDURE — P9016 RBC LEUKOCYTES REDUCED: HCPCS

## 2020-02-26 PROCEDURE — 85025 COMPLETE CBC W/AUTO DIFF WBC: CPT

## 2020-02-26 PROCEDURE — 85014 HEMATOCRIT: CPT

## 2020-02-26 PROCEDURE — 6370000000 HC RX 637 (ALT 250 FOR IP): Performed by: STUDENT IN AN ORGANIZED HEALTH CARE EDUCATION/TRAINING PROGRAM

## 2020-02-26 PROCEDURE — 71045 X-RAY EXAM CHEST 1 VIEW: CPT

## 2020-02-26 PROCEDURE — 85018 HEMOGLOBIN: CPT

## 2020-02-26 PROCEDURE — 80048 BASIC METABOLIC PNL TOTAL CA: CPT

## 2020-02-26 RX ORDER — MAGNESIUM SULFATE IN WATER 40 MG/ML
2 INJECTION, SOLUTION INTRAVENOUS ONCE
Status: COMPLETED | OUTPATIENT
Start: 2020-02-26 | End: 2020-02-26

## 2020-02-26 RX ORDER — POTASSIUM CHLORIDE 29.8 MG/ML
20 INJECTION INTRAVENOUS
Status: COMPLETED | OUTPATIENT
Start: 2020-02-26 | End: 2020-02-26

## 2020-02-26 RX ORDER — 0.9 % SODIUM CHLORIDE 0.9 %
20 INTRAVENOUS SOLUTION INTRAVENOUS ONCE
Status: COMPLETED | OUTPATIENT
Start: 2020-02-26 | End: 2020-02-26

## 2020-02-26 RX ADMIN — POTASSIUM BICARBONATE 40 MEQ: 782 TABLET, EFFERVESCENT ORAL at 09:05

## 2020-02-26 RX ADMIN — ALBUTEROL SULFATE 2.5 MG: 2.5 SOLUTION RESPIRATORY (INHALATION) at 08:43

## 2020-02-26 RX ADMIN — SODIUM CHLORIDE SOLN NEBU 3% 4 ML: 3 NEBU SOLN at 21:14

## 2020-02-26 RX ADMIN — CHLORHEXIDINE GLUCONATE 0.12% ORAL RINSE 15 ML: 1.2 LIQUID ORAL at 09:05

## 2020-02-26 RX ADMIN — Medication 10 ML: at 21:30

## 2020-02-26 RX ADMIN — ACETAMINOPHEN ORAL SOLUTION 650 MG: 650 SOLUTION ORAL at 05:20

## 2020-02-26 RX ADMIN — SODIUM CHLORIDE, PRESERVATIVE FREE 10 ML: 5 INJECTION INTRAVENOUS at 16:33

## 2020-02-26 RX ADMIN — ALBUTEROL SULFATE 2.5 MG: 2.5 SOLUTION RESPIRATORY (INHALATION) at 21:14

## 2020-02-26 RX ADMIN — MAGNESIUM GLUCONATE 500 MG ORAL TABLET 400 MG: 500 TABLET ORAL at 09:05

## 2020-02-26 RX ADMIN — HEPARIN SODIUM 5000 UNITS: 10000 INJECTION INTRAVENOUS; SUBCUTANEOUS at 05:21

## 2020-02-26 RX ADMIN — HEPARIN SODIUM 5000 UNITS: 10000 INJECTION INTRAVENOUS; SUBCUTANEOUS at 21:51

## 2020-02-26 RX ADMIN — POTASSIUM CHLORIDE 20 MEQ: 400 INJECTION, SOLUTION INTRAVENOUS at 11:31

## 2020-02-26 RX ADMIN — HYDRALAZINE HYDROCHLORIDE 25 MG: 25 TABLET, FILM COATED ORAL at 13:29

## 2020-02-26 RX ADMIN — LANSOPRAZOLE 30 MG: KIT at 05:21

## 2020-02-26 RX ADMIN — AMLODIPINE BESYLATE 10 MG: 10 TABLET ORAL at 09:06

## 2020-02-26 RX ADMIN — HYDRALAZINE HYDROCHLORIDE 25 MG: 25 TABLET, FILM COATED ORAL at 05:20

## 2020-02-26 RX ADMIN — ACETAMINOPHEN ORAL SOLUTION 650 MG: 650 SOLUTION ORAL at 21:30

## 2020-02-26 RX ADMIN — METOPROLOL TARTRATE 50 MG: 50 TABLET, FILM COATED ORAL at 09:12

## 2020-02-26 RX ADMIN — POTASSIUM BICARBONATE 40 MEQ: 782 TABLET, EFFERVESCENT ORAL at 21:30

## 2020-02-26 RX ADMIN — SODIUM CHLORIDE, PRESERVATIVE FREE 300 UNITS: 5 INJECTION INTRAVENOUS at 09:07

## 2020-02-26 RX ADMIN — SODIUM CHLORIDE, PRESERVATIVE FREE 300 UNITS: 5 INJECTION INTRAVENOUS at 21:30

## 2020-02-26 RX ADMIN — BUMETANIDE 1 MG: 0.25 INJECTION INTRAMUSCULAR; INTRAVENOUS at 09:06

## 2020-02-26 RX ADMIN — DOCUSATE SODIUM 100 MG: 50 LIQUID ORAL at 09:06

## 2020-02-26 RX ADMIN — Medication 10 ML: at 09:06

## 2020-02-26 RX ADMIN — POTASSIUM CHLORIDE 20 MEQ: 400 INJECTION, SOLUTION INTRAVENOUS at 10:37

## 2020-02-26 RX ADMIN — ASPIRIN 81 MG 81 MG: 81 TABLET ORAL at 09:08

## 2020-02-26 RX ADMIN — HYDRALAZINE HYDROCHLORIDE 25 MG: 25 TABLET, FILM COATED ORAL at 21:30

## 2020-02-26 RX ADMIN — MAGNESIUM SULFATE HEPTAHYDRATE 2 G: 40 INJECTION, SOLUTION INTRAVENOUS at 09:11

## 2020-02-26 RX ADMIN — CHLORHEXIDINE GLUCONATE 0.12% ORAL RINSE 15 ML: 1.2 LIQUID ORAL at 21:30

## 2020-02-26 RX ADMIN — ALBUTEROL SULFATE 2.5 MG: 2.5 SOLUTION RESPIRATORY (INHALATION) at 18:01

## 2020-02-26 RX ADMIN — ALBUTEROL SULFATE 2.5 MG: 2.5 SOLUTION RESPIRATORY (INHALATION) at 12:08

## 2020-02-26 RX ADMIN — MULTIVITAMIN 15 ML: LIQUID ORAL at 09:10

## 2020-02-26 RX ADMIN — METOPROLOL TARTRATE 50 MG: 50 TABLET, FILM COATED ORAL at 21:30

## 2020-02-26 RX ADMIN — SODIUM CHLORIDE SOLN NEBU 3% 4 ML: 3 NEBU SOLN at 08:43

## 2020-02-26 RX ADMIN — HEPARIN SODIUM 5000 UNITS: 10000 INJECTION INTRAVENOUS; SUBCUTANEOUS at 13:29

## 2020-02-26 RX ADMIN — BUMETANIDE 1 MG: 0.25 INJECTION INTRAMUSCULAR; INTRAVENOUS at 15:33

## 2020-02-26 RX ADMIN — SODIUM CHLORIDE 20 ML: 9 INJECTION, SOLUTION INTRAVENOUS at 11:31

## 2020-02-26 ASSESSMENT — PULMONARY FUNCTION TESTS
PIF_VALUE: 26
PIF_VALUE: 21
PIF_VALUE: 21
PIF_VALUE: 24
PIF_VALUE: 21
PIF_VALUE: 20
PIF_VALUE: 20
PIF_VALUE: 19
PIF_VALUE: 24
PIF_VALUE: 20
PIF_VALUE: 24
PIF_VALUE: 25
PIF_VALUE: 20
PIF_VALUE: 25
PIF_VALUE: 22
PIF_VALUE: 21
PIF_VALUE: 25
PIF_VALUE: 20
PIF_VALUE: 23
PIF_VALUE: 19
PIF_VALUE: 21
PIF_VALUE: 22
PIF_VALUE: 29
PIF_VALUE: 21
PIF_VALUE: 23
PIF_VALUE: 26
PIF_VALUE: 23
PIF_VALUE: 21

## 2020-02-26 ASSESSMENT — PAIN SCALES - GENERAL
PAINLEVEL_OUTOF10: 0

## 2020-02-26 NOTE — FLOWSHEET NOTE
Called pharmacy for KCL bags not being stocked on floor. Order placed for replacement, but unable to complete this, until they are stocked.

## 2020-02-26 NOTE — PROGRESS NOTES
placement on 02/12. He went into PEA arrest with ROSC after about 13 minutes of resuscitation and was intubated and transferred to SICU. CXR showed stable opacities in the lower lobes. CT head without contrast showed left occipital lobe subacute infarct. Procalcitonin level was elevated at 31 ng/mL. Amoxicillin-clavulanate was switched to piperacillin-tazobactam on 02/19 and discontinued on 02/25. Procalcitonin trended down to 13 ng/mL as of 02/21. Blood cultures showed no growth. Subjective: Patient was seen and examined. He is intubated, awake but gives no meaningful response to questions or commands. Afebrile. Objective:  BP (!) 138/90   Pulse 92   Temp 99.3 °F (37.4 °C)   Resp 16   Ht 5' 4\" (1.626 m)   Wt 140 lb (63.5 kg)   SpO2 100%   BMI 24.03 kg/m²   Constitutional: Intubated, no MV dyssynchrony  Respiratory: Clear breath sounds, no crackles, no wheezes  Cardiovascular: Regular rate and rhythm, no murmurs  Gastrointestinal: Bowel sounds present, soft, nontender  Skin: Warm and dry, no active dermatoses  Musculoskeletal: No joint swelling, no joint erythema    Labs, imaging, and medical records/notes were personally reviewed. Assessment:  Fever, resolved, multifactorial, suspect sepsis vs central fever   Leukocytosis  Cardiac arrest  Anoxic encephalopathy  Left occipital lobe subacute infarct  Group G Streptococcus bacteremia, etiology unclear, suspect oropharyngeal in etiology with possible aspiration pneumonia  MSSA pneumonia  History of head and neck cancer, s/p resection and chemoradiation therapy (details unclear)    Recommendations:  Monitor clinically off antibiotics for now. Observe aspiration precautions and oral hygiene. Follow up goals of care.     Thank you for involving me in the care of Willian Spaulding. I will continue to follow. Please do not hesitate to call for any questions or concerns.     Electronically signed by Kiera Dos Santos MD on 2/26/2020 at 10:23 AM

## 2020-02-26 NOTE — PROGRESS NOTES
Value Date    WBC 7.8 02/26/2020    RBC 2.29 02/26/2020    HGB 6.7 02/26/2020    HCT 20.8 02/26/2020    MCV 90.8 02/26/2020    MCH 29.3 02/26/2020    MCHC 32.2 02/26/2020    RDW 14.4 02/26/2020     02/26/2020    MPV 10.5 02/26/2020     CMP:    Lab Results   Component Value Date     02/26/2020    K 3.1 02/26/2020    K 2.9 02/11/2020    CL 92 02/26/2020    CO2 29 02/26/2020    BUN 23 02/26/2020    CREATININE 1.1 02/26/2020    GFRAA >60 02/26/2020    LABGLOM >60 02/26/2020    GLUCOSE 132 02/26/2020    PROT 6.6 02/23/2020    LABALBU 1.9 02/23/2020    CALCIUM 8.0 02/26/2020    BILITOT 0.6 02/23/2020    ALKPHOS 74 02/23/2020    AST 30 02/23/2020    ALT 10 02/23/2020     Magnesium:    Lab Results   Component Value Date    MG 1.9 02/26/2020     Recent Labs     02/26/20  0524   PH 7.525*   PO2 144.9*   PCO2 42.6   HCO3 34.4*   BE 10.6*   O2SAT 98.8*   FIO2 40.0         Urine chemistry:  Urine creatinine = 69  Urine Microalbumin = 55.9  Urine Microalbumin/Cr ratio = 81  Urine osmolality = 393  Urine protein = 35  Urine protein/Cr ratio = 0.5    FENa: 0.7%  FEUrea: 20.9%     Ferritin: 1019  Iron: 20  Iron saturation: 20%  Folate: 14.2  B12: 383    Radiology Review:        US Retroperitoneum 2/7/20   Probable bilateral medical renal disease with some perinephric fluid   bilaterally that could indicate inflammation.       There is no evidence of calcification or obstruction in either kidney.       The bladder was not evaluated due to a decompressing Sanchez catheter. CT Soft Tissue Neck W WO Contrast 2/9/20   Probable prior right radical neck dissection with sequela   of prior surgery and radiation therapy.  There is extensive soft tissue   distortion which is suggestive of previous therapy   Finds compatible with a approximately 1 cm right thyroid nodule   There is evidence to suggest severe stenosis of the right distal   internal carotid artery   Soft tissue mass at the level the carotid bifurcation on the right   with outward mass effect measuring approximately 2.3 x 2.7 cm   suspicious for recurrent disease. This encases the carotid distally   and the distal carotid stenosis may represent invasion. Comparison   studies would be necessary to distinguish recurrent neoplasm from   previous radiation therapy. Tumor invasion versus osteonecrosis of the medial right mandible         Chest x-ray February 16, 2020       Endotracheal tube terminates above the jovita.       Stable opacities in the lower lobes. Consider infectious/inflammatory   etiologies or edema. Follow-up to resolution.                        ECHO Complete 2/16/20:   Trileaflet aortic valve. Mild leaflet calcification with more pronounced   calcification on the non coronary cusp. Normal leaflet mobility. No aortic stenosis. No aortic regurgitation. Concentric hypertrophy though in some views the walls are up to 1.4 cm   thick. Normal left ventricular systolic function. Visually estimated LVEF   is 60 %. Normal diastolic function. Normal left atrial pressure. Mildly dilated right ventricle. Difficult to visualize the free wall in all views. Small anterior and apical effusion. No signs of tamponade. Chest x-ray 2/24/20   Worsening CHF.             BRIEF SUMMARY OF INITIAL CONSULT:    Briefly, Mr. Hannah Madrigal is a 70-year-old gentleman with a significant past medical history of hypertension, laryngeal cancer s/p laryngectomy in 2005 and chemo and radiation, history of CVA with baseline left sided weakness and some slurred speech, current smoker, and history of alcohol abuse who presented to the ED on 2/5/20 for fatigue. Patient was found to be extremely hypotensive at 50/palpated, tachycardic and fatigued in the ED. He was given fluid bolus with BP improvement, was found to have YVETTE (baseline unknown) with a creatinine of 4.1. Patients creatinine today is 3.0, reason for this consult.     Problems resolved:  · Hypernatremia, with water deficit; calculated free water deficit is 1.8 L. Resolved. · YVETTE stage III on CKD;  volume responsive prerenal YVETTE due to poor oral intake in the setting of ACE inhibition, fraction excretion of sodium 0.7%, fractional secretion urea 20.9%. Resolved. · Hypernatremia, with water deficit, resolved   · Severe thrombocytopenia, resolved   · Hypomagnesemia likely 2/2 poor oral intake, improved    IMPRESSION/RECOMMENDATIONS:      1. YVETTE on CKD, ischemic ATN s/p PEA cardiac arrest on 2/15/20, FeNa 0.62%   · Cr stable at 1.1 mg/dL today, excellent diuresis in response to bumex with stable pulmonary edema on CXR today. Will continue diuresis. 2. CKD stage I-II, with minimal proteinuria (urine albumin/creatinine: 81 mg/gr). Kidney ultrasound with increased renal cortical echogenicity. Probably due to nephrosclerosis. 3. Hypokalemia 2/2 to poor oral intake and diuresis   4. Status post PEA arrest 2/15/20  5. Respiratory failure status, post intubation, 2/2 pneumonia. Completed piperacillin-tazobactam course. 6. HFpEF 60%  7. Alkalemia (pH: 4.824) with metabolic alkalosis  8. HTN, on hydralazine, metoprolol. 9. Group G Streptococcus bacteremia, completed piperacillin-tazobactam course. 10. Normocytic Anemia, iron studies consistent with chronic inflammation, normal B12 and folate levels. S/p 4 units PRBCs. Hemoglobin 6.9 today. Will transfuse 1 unit today. 11. Nutrition, s/p PEG on TF 50cc/hr with FW at 40cc/hr.     Plan:    · Replace potassium  · Transfuse 1 unit pRBCs  · Continue Bumex 1 mg IV BID  · Continue to monitor renal function  · Continue to monitor potassium  · Continue to monitor H&H

## 2020-02-26 NOTE — PROGRESS NOTES
[address] [facility]  Internal Medicine Residency / [address]    Attending Physician Statement  I have discussed the case, including pertinent history and exam findings with the resident and the team.  I have seen and examined the patient and the key elements of the encounter have been performed by me. I agree with the assessment, plan and orders as documented by the resident. Mental state unchanged  Anoxic encephalopathy  VS stable  And decision for trach pending   Plan; Conrtinue same ICU support  Remainder of medical problems as per resident note.       Agustín Cao  Internal Medicine Residency Faculty

## 2020-02-26 NOTE — PROGRESS NOTES
[facility]  PROGRESS NOTE  ATTENDING NOTE    CRITICAL CARE    Patient Active Problem List   Diagnosis    Acute renal failure (ARF) ([facility] [address] [facility]    Bacteremia    Other dysphagia    Severe protein-calorie malnutrition ([facility] [address]    Acute upper respiratory infection    Elevated troponin    Acute ischemic stroke ([facility] [address]    Anoxic brain injury ([facility] [address]    Palliative care encounter    Goals of care, counseling/discussion         OVERNIGHT EVENTS:  Placed on AC; + [address]:  [birth year]/16 5yo male with history of smoking, ETOH abuse, multiple head and neck cancers s/p partial mandibular resection ([id number]), esophageal cancer, and R base of tongue cancer s/p hemiglossectomy and chemoradiation with a new Head and Neck cancer. PEA arrest likely from aspiration ( Barium sitting in the back of the through when PEG was placed)  leading to hypoxia or Head and neck cancer obstruction. 2/17 No overnight events   2/18  Hb dropped 5.2 received 2 units PRBC    2/19 No acute overnight issues, patient withdrawing from pain this am and blinking to threat   2/20 No changes overnight   2/21--nothing new overnight  2/22--no new issues  2/23--nothing new overnight  2/25--family discussion; placed on AC    BP (!) 141/88   Pulse 100   Temp 99.9 °F (37.7 °C) (Axillary)   Resp 18   Ht 5' 4\" (1.626 m)   Wt 140 lb (63.5 kg)   SpO2 100%   BMI 24.03 kg/m²   Physical Exam  Constitutional:       General: He is not in acute distress. Appearance: He is normal weight. HENT:      Head: Normocephalic and atraumatic. Nose: Nose normal.      Mouth/Throat:      Mouth: Mucous membranes are moist.      Pharynx: Oropharynx is clear. Eyes:      Extraocular Movements: Extraocular movements intact. Pupils: Pupils are equal, round, and reactive to light. Neck:      Musculoskeletal: Normal range of motion and neck supple. Cardiovascular:      Rate and Rhythm: Normal rate and regular rhythm. Pulses: Normal pulses.

## 2020-02-26 NOTE — PLAN OF CARE
Problem: Falls - Risk of:  Goal: Will remain free from falls  Description  Will remain free from falls  Outcome: Met This Shift  Goal: Absence of physical injury  Description  Absence of physical injury  Outcome: Met This Shift     Problem: Risk for Impaired Skin Integrity  Goal: Tissue integrity - skin and mucous membranes  Description  Structural intactness and normal physiological function of skin and  mucous membranes.   Outcome: Met This Shift     Problem: Skin Integrity:  Goal: Absence of new skin breakdown  Description  Absence of new skin breakdown  Outcome: Met This Shift     Problem: Injury - Risk of, Physical Injury:  Goal: Will remain free from falls  Description  Will remain free from falls  Outcome: Met This Shift  Goal: Absence of physical injury  Description  Absence of physical injury  Outcome: Met This Shift     Problem: Breathing Pattern - Ineffective:  Goal: Ability to achieve and maintain a regular respiratory rate will improve  Description  Ability to achieve and maintain a regular respiratory rate will improve  Outcome: Met This Shift

## 2020-02-26 NOTE — PROGRESS NOTES
[facility]  Internal Medicine Residency Program  Progress Note - House Team 1    Patient:  Vanessa Bule 59 y.o. male   MRN: [id number]       Date of Service: 2020    Allergy: Patient has no known allergies. Subjective      I saw and examined the patient in the AM today. Patient is intubated, no changes in his neurologic and mental status. Objective     TEMPERATURE:  Current - Temp: 98.6 °F (37 °C); Max - Temp  Av °F (37.2 °C)  Min: 98.6 °F (37 °C)  Max: 99.8 °F (37.7 °C)  RESPIRATIONS RANGE: Resp  Av.1  Min: 12  Max: 26  PULSE RANGE: Pulse  Av.4  Min: 85  Max: 107  BLOOD PRESSURE RANGE:  Systolic (79GKE), ZER:018 , Min:110 , CXN:531   ; Diastolic (11KYF), YGU:25, Min:70, Max:100    PULSE OXIMETRY RANGE: SpO2  Av %  Min: 99 %  Max: 100 %    I & O - 24hr:    Intake/Output Summary (Last 24 hours) at 2020 1230  Last data filed at 2020 1200  Gross per 24 hour   Intake 2165 ml   Output 2516 ml   Net -351 ml     I/O last 3 completed shifts: In: 2165 [I.V.:357; NG/GT:1808]  Out: 2766 [Urine:2766] I/O this shift:  In: -   Out: 750 [Urine:750]   Weight change:     Physical Exam  Vitals signs and nursing note reviewed. Constitutional:       Appearance: He is normal weight. He is ill-appearing. Interventions: He is intubated. HENT:      Head: Normocephalic and atraumatic. Right Ear: External ear normal.      Left Ear: External ear normal.      Nose: Nose normal.      Mouth/Throat:      Mouth: Mucous membranes are moist.   Eyes:      General: Lids are normal. No scleral icterus. Right eye: No discharge. Left eye: No discharge. Conjunctiva/sclera: Conjunctivae normal.      Pupils: Pupils are equal.      Right eye: Pupil is not reactive (Pinpoint pupil). Pupil is not sluggish. Left eye: Pupil is not reactive (Pinpoint pupil). Pupil is not sluggish. Cardiovascular:      Rate and Rhythm: Normal rate and regular rhythm. Pulses: Normal pulses. Carotid pulses are 2+ on the right side and 2+ on the left side. Radial pulses are 2+ on the right side and 2+ on the left side. Dorsalis pedis pulses are 2+ on the right side and 2+ on the left side. Heart sounds: S1 normal and S2 normal. No gallop. No S3 sounds. Pulmonary:      Effort: Pulmonary effort is normal. He is intubated. Breath sounds: Normal air entry. Transmitted upper airway sounds present. No decreased breath sounds, wheezing, rhonchi or rales. Musculoskeletal:      Right lower leg: No edema. Left lower leg: No edema. Neurological:      Comments: Cough and gag reflex present. Labs and Imaging Studies     CBC:   Recent Labs     02/24/20  0500 02/25/20  0514 02/26/20  0455   WBC 7.4 8.0 7.8   HGB 7.3* 7.3* 6.7*   HCT 22.6* 22.4* 20.8*   MCV 92.6 91.8 90.8   * 154 153       BMP:    Recent Labs     02/24/20  0500 02/25/20  0514 02/26/20  0455    133 134   K 3.0* 3.5 3.1*   CL 95* 90* 92*   CO2 30* 32* 29   BUN 26* 25* 23   CREATININE 1.2 1.2 1.1   GLUCOSE 199* 117* 132*       LIVER PROFILE:   No results for input(s): AST, ALT, LIPASE, BILIDIR, BILITOT, ALKPHOS in the last 72 hours. Invalid input(s): AMYLASE,  ALB    PT/INR:   No results for input(s): PROTIME, INR in the last 72 hours. APTT:   No results for input(s): APTT in the last 72 hours.     Fasting Lipid Panel:    Lab Results   Component Value Date    HDL 24 02/18/2020       Notable Cultures:      Blood cultures   Blood Culture, Routine   Date Value Ref Range Status   02/18/2020 5 Days- no growth  Final     Respiratory cultures No results found for: RESPCULTURE No results found for: LABGRAM  Urine   Urine Culture, Routine   Date Value Ref Range Status   02/05/2020 <10,000 CFU/mL  Mixed gram positive organisms    Final     Legionella No results found for: LABLEGI  C Diff PCR No results found for: CDIFPCR  Wound culture/abscess: No results for input(s): WNDABS in the last 72 hours. Tip culture:No results for input(s): CXCATHTIP in the last 72 hours. Xr Chest Standard (2 Vw)    Result Date: 2020  Patient MRN: [id number] : 1955 Age:  59 years Gender: Male Order Date: 2020 10:15 AM Exam: XR CHEST (2 VW) Number of Images: 2 views Indication:   Sepsis, sob Sepsis, sob Comparison: None. Findings: The heart is unremarkable The lung fields demonstrate no significant pulmonary vascular congestion and edema. The aorta is tortuous ectatic There are findings throughout the lung fields which are likely chronic     Findings compatible with atherosclerotic disease No acute infiltrate     Xr Foot Left (min 3 Views)    Result Date: 2020  Patient MRN:  [id number] : 1955 Age: 59 years Gender: Male Order Date:  2020 2:45 PM EXAM: XR FOOT LEFT (MIN 3 VIEWS) NUMBER OF IMAGES:  3 views INDICATION:  pain, swelling pain, swelling COMPARISON: None . The bones appear to be in anatomic alignment. No foreign body is identified No fracture is identified  There is moderate joint space loss The are moderate degenerative changes present     Moderate degenerative changes    Ct Soft Tissue Neck W Wo Contrast    Result Date: 2020  Patient MRN:  [id number] : 1955 Age: 59 years Gender: Male Order Date:  2020 3:45 PM EXAM: CT SOFT TISSUE NECK W WO CONTRAST NUMBER OF IMAGES \ views:  36 INDICATION:  h/o R mandible/base of tongue ca, sp resection and chemorads, concern for recurrence Per nephro team ok to do scan with contrast h/o R mandible/base of tongue ca, sp resection and chemorads, concern for recurrence COMPARISON: None Technique: Low-dose CT  acquisition technique included one of following options; 1 . Automated exposure control, 2. Adjustment of MA and or KV according to patient's size or 3. Use of iterative reconstruction. The larynx appears unremarkable The soft tissues appear abnormal there is sequela of extensive prior surgery.  No convincing abscess is seen. There is distortion of the oropharynx and the posterior superior hypopharynx there is significant distortion of the right sternocleidomastoid and a pattern compatible with previous radical neck dissection. There are findings to suggest previous therapy. The right jugular vein is not seen likely status post resection. There is been resection at the level of the tongue base and floor the mouth. There is sequela of previous resection. . Atherosclerotic disease of both proximal internal carotid arteries is identified without proximal internal carotid artery hemodynamically significant stenosis. At the level the distal right internal carotid artery at the level the cervical component there is evidence to suggest severe stenosis. Also the level of the carotid bifurcation on the right there is a soft tissue mass effect suspicious for recurrent neoplasm. This measures approximately 2.7 x 2.3 cm. This could also be related to previous therapy. Comparison studies would be necessary. There is a mottled eaten appearance to the medial aspect of the right mandible. This could be related to tumor invasion or osteonecrosis. Scattered lymph nodes are visualized which do not meet the CT criteria for adenopathy. There is mixed density in the right thyroid possibly representing a thyroid nodule measuring approximately 1 cm. There is density scattered throughout the sinuses possibly related to mild chronic sinusitis. There is likely a right sphenoid retention cyst     Probable prior right radical neck dissection with sequela of prior surgery and radiation therapy.  There is extensive soft tissue distortion which is suggestive of previous therapy Finds compatible with a approximately 1 cm right thyroid nodule There is evidence to suggest severe stenosis of the right distal internal carotid artery Soft tissue mass at the level the carotid bifurcation on the right with outward mass effect measuring approximately 2.3 x 2.7 cm suspicious for recurrent disease. This encases the carotid distally and the distal carotid stenosis may represent invasion. Comparison studies would be necessary to distinguish recurrent neoplasm from previous radiation therapy. Tumor invasion versus osteonecrosis of the medial right mandible ALERT:  THIS IS AN ABNORMAL REPORT      Us Head Neck Soft Tissue Thyroid    Result Date: 2020  Patient MRN:  [id number] : 1955 Age: 59 years Gender: Male Order Date:  2020 2:15 PM Exam: US HEAD NECK SOFT TISSUE THYROID Number of images:  26 Indication:  right carotid bifurcation mass With special focus to soft tissue mass at the right carotid bifurcation. right carotid bifurcation mass Comparison: Correlation with CT dated 2020. Technique: Grayscale and color Doppler ultrasound images of the region of interest were obtained by the sonographic technologist. FINDINGS: The study is limited by technically suboptimal image quality. There is abnormal heterogeneous soft tissue in the region of interest.     Ill-defined, heterogeneous, abnormal appearing soft tissue in the region of interest. No specific clinical question is asked. Ultrasound is not the appropriate modality for this evaluation. Contrast-enhanced MRI may provide more information. Correlation with clinical and surgical history is needed. Xr Chest Portable    Result Date: 2020  Patient MRN:  [id number] : 1955 Age: 59 years Gender: Male Order Date:  2020 6:00 AM EXAM: XR CHEST PORTABLE COMPARISON: February 10, 2020 INDICATION:  PNA PNA FINDINGS: There are opacities at the left lung base silhouetting left hemidiaphragm which appears similar since prior. The heart is normal size. No pneumothorax. Stable opacities at left lung base. Continued follow-up could be helpful for further evaluation.     Xr Chest Portable    Result Date: 2/10/2020  Patient MRN: [id number] : 1955 Age:  59 years Gender: Male Order Date: 2/10/2020 Amanda Herrera MD   1 mg at 02/26/20 0906    amLODIPine (NORVASC) tablet 10 mg  10 mg PEG Tube Daily Darlene Burton MD   10 mg at 02/26/20 0906    albuterol (PROVENTIL) nebulizer solution 2.5 mg  2.5 mg Nebulization Q4H While awake Diane Hobson MD   2.5 mg at 02/26/20 1208    docusate sodium (COLACE) 150 MG/15ML liquid 100 mg  100 mg Per G Tube Daily Diane Hobson MD   100 mg at 02/26/20 0906    sennosides (SENOKOT) 8.8 MG/5ML syrup 5 mL  5 mL Per G Tube Nightly Diane Hobson MD   5 mL at 02/24/20 2005    sodium chloride flush 0.9 % injection 10 mL  10 mL Intravenous PRN Diane Hobson MD   10 mL at 02/24/20 1609    heparin flush 100 UNIT/ML injection 300 Units  3 mL Intravenous 2 times per day Diane Hobson MD   300 Units at 02/26/20 0907    heparin flush 100 UNIT/ML injection 300 Units  3 mL Intracatheter PRN Diane Hobson MD        heparin (porcine) injection 5,000 Units  5,000 Units Subcutaneous 3 times per day Diane Hobson MD   5,000 Units at 02/26/20 0521    acetaminophen (TYLENOL) 160 MG/5ML solution 650 mg  650 mg PEG Tube Q4H PRN Nadia Tabares MD   650 mg at 02/26/20 0520    chlorhexidine (PERIDEX) 0.12 % solution 15 mL  15 mL Mouth/Throat BID Diane Hobson MD   15 mL at 02/26/20 0905    lansoprazole suspension SUSP 30 mg  30 mg Per G Tube [facility] Jr., DO   30 mg at 02/26/20 3985    aspirin chewable tablet 81 mg  81 mg Per G Tube Daily Kalpana Waggoner MD   81 mg at 02/26/20 0908    hydrALAZINE (APRESOLINE) tablet 25 mg  25 mg PEG Tube 3 times per day Tj Brennan MD   25 mg at 02/26/20 0520    metoprolol tartrate (LOPRESSOR) tablet 50 mg  50 mg PEG Tube BID Saroj Schumacher MD   50 mg at 02/26/20 0912    sodium chloride (Inhalant) 3 % nebulizer solution 4 mL  4 mL Nebulization BID Saqib B Capal, DO   4 mL at 02/26/20 0843    labetalol (NORMODYNE;TRANDATE) injection 10 mg  10 mg Intravenous Q6H PRN Saqib B Capal, DO   10 mg at 02/22/20 1602    sodium vs upper airway collapse vs acute anemia   Management per surgical ICU. EEG >> diffuse encephalopathy   MRI brain left occipital lesion (previously Treated in CT scan, no changes)  MRA head was unremarkable. No DVT on US BL LE     Hypertension and Tachycardia (resolving). Currently on hydralazine 25mg Q8H, metoprolol 50mg BID. Not tachycardic today. <Neurologic>  Acute Encephalopathy  2/2 anoxic brain injury. Cough and gag reflex present. Pupils: non reactive, withdrawing to pain in 4 extremities. Management per SICU  MRI brain left occipital lesion (previously Treated in CT scan, no changes)  MRA head was unremarkable. H/O CVA with residual Lt. Weakness - Stable  Aspirin placed on hold due to decrease in hemoglobin. TSH level was high, normal T4 level, low free T3 level, euthyroid sick syndrome. <Pulmonary>  Acute hypoxic hypercapnic respiratory failure  2/2 PEA arrest  Currently intubated and on the vent AC mode, on 40%. LTAC (tracheostomy and PEG tube), versus terminal extubation, family will decide, palliative on board. Pneumonia  2/2 MSSA  CXR: stable   ID on board, currently on Zosyn. Chest vest BID. Nebulization with ipratropium-albuterol Q4H and 3% NaCl nebulization BID.     <Gastrointestinal>  Dysphagia s/p PEG placement (2/12/2020)  2/2 Neck mass   Currently on tube feeds  CBC daily. Severe Malnutrition  2/2 Laryngeal Ca, dysphagia  Per dietitian, pt. Meets criteria for severe malnutrition. Currently on tube feeds    <Infectious Disease>  Group G Streptococcus Bacteremia - Resolved  Echo showed no vegetations. Repeat culture -ve. <Genitourinary/Renal>  YVETTE Stage III on CKD   Ischemic secondary to ATN after PEA cardiac arrest.  Nonoliguric. Nephrology on board, creatinine has been stable. Nephrology given 2 doses of 1 mg Bumex yesterday, will continue to diurese today.   Chest x-ray today showing less signs of pulmonary vascular

## 2020-02-27 LAB
ANION GAP SERPL CALCULATED.3IONS-SCNC: 12 MMOL/L (ref 7–16)
BASOPHILS ABSOLUTE: 0.03 E9/L (ref 0–0.2)
BASOPHILS RELATIVE PERCENT: 0.3 % (ref 0–2)
BUN BLDV-MCNC: 22 MG/DL (ref 8–23)
CALCIUM IONIZED: 1.46 MMOL/L (ref 1.15–1.33)
CALCIUM SERPL-MCNC: 8.3 MG/DL (ref 8.6–10.2)
CHLORIDE BLD-SCNC: 94 MMOL/L (ref 98–107)
CO2: 28 MMOL/L (ref 22–29)
CREAT SERPL-MCNC: 1 MG/DL (ref 0.7–1.2)
EOSINOPHILS ABSOLUTE: 0.12 E9/L (ref 0.05–0.5)
EOSINOPHILS RELATIVE PERCENT: 1.3 % (ref 0–6)
GFR AFRICAN AMERICAN: >60
GFR NON-AFRICAN AMERICAN: >60 ML/MIN/1.73
GLUCOSE BLD-MCNC: 125 MG/DL (ref 74–99)
HCT VFR BLD CALC: 24.7 % (ref 37–54)
HEMOGLOBIN: 8.1 G/DL (ref 12.5–16.5)
IMMATURE GRANULOCYTES #: 0.06 E9/L
IMMATURE GRANULOCYTES %: 0.6 % (ref 0–5)
LYMPHOCYTES ABSOLUTE: 1.05 E9/L (ref 1.5–4)
LYMPHOCYTES RELATIVE PERCENT: 11.3 % (ref 20–42)
MAGNESIUM: 1.9 MG/DL (ref 1.6–2.6)
MCH RBC QN AUTO: 29.2 PG (ref 26–35)
MCHC RBC AUTO-ENTMCNC: 32.8 % (ref 32–34.5)
MCV RBC AUTO: 89.2 FL (ref 80–99.9)
MONOCYTES ABSOLUTE: 1.25 E9/L (ref 0.1–0.95)
MONOCYTES RELATIVE PERCENT: 13.4 % (ref 2–12)
NEUTROPHILS ABSOLUTE: 6.82 E9/L (ref 1.8–7.3)
NEUTROPHILS RELATIVE PERCENT: 73.1 % (ref 43–80)
PDW BLD-RTO: 15.1 FL (ref 11.5–15)
PHOSPHORUS: 4 MG/DL (ref 2.5–4.5)
PLATELET # BLD: 176 E9/L (ref 130–450)
PMV BLD AUTO: 11.1 FL (ref 7–12)
POTASSIUM SERPL-SCNC: 4.1 MMOL/L (ref 3.5–5)
RBC # BLD: 2.77 E12/L (ref 3.8–5.8)
SODIUM BLD-SCNC: 134 MMOL/L (ref 132–146)
WBC # BLD: 9.3 E9/L (ref 4.5–11.5)

## 2020-02-27 PROCEDURE — 80048 BASIC METABOLIC PNL TOTAL CA: CPT

## 2020-02-27 PROCEDURE — 2000000000 HC ICU R&B

## 2020-02-27 PROCEDURE — 2580000003 HC RX 258: Performed by: STUDENT IN AN ORGANIZED HEALTH CARE EDUCATION/TRAINING PROGRAM

## 2020-02-27 PROCEDURE — 6370000000 HC RX 637 (ALT 250 FOR IP): Performed by: SURGERY

## 2020-02-27 PROCEDURE — 99291 CRITICAL CARE FIRST HOUR: CPT | Performed by: SURGERY

## 2020-02-27 PROCEDURE — 94669 MECHANICAL CHEST WALL OSCILL: CPT

## 2020-02-27 PROCEDURE — 36415 COLL VENOUS BLD VENIPUNCTURE: CPT

## 2020-02-27 PROCEDURE — 82330 ASSAY OF CALCIUM: CPT

## 2020-02-27 PROCEDURE — 94640 AIRWAY INHALATION TREATMENT: CPT

## 2020-02-27 PROCEDURE — 85025 COMPLETE CBC W/AUTO DIFF WBC: CPT

## 2020-02-27 PROCEDURE — 2500000003 HC RX 250 WO HCPCS: Performed by: INTERNAL MEDICINE

## 2020-02-27 PROCEDURE — 94003 VENT MGMT INPAT SUBQ DAY: CPT

## 2020-02-27 PROCEDURE — 6360000002 HC RX W HCPCS: Performed by: STUDENT IN AN ORGANIZED HEALTH CARE EDUCATION/TRAINING PROGRAM

## 2020-02-27 PROCEDURE — 6370000000 HC RX 637 (ALT 250 FOR IP): Performed by: INTERNAL MEDICINE

## 2020-02-27 PROCEDURE — 99231 SBSQ HOSP IP/OBS SF/LOW 25: CPT | Performed by: INTERNAL MEDICINE

## 2020-02-27 PROCEDURE — 83735 ASSAY OF MAGNESIUM: CPT

## 2020-02-27 PROCEDURE — 6370000000 HC RX 637 (ALT 250 FOR IP): Performed by: STUDENT IN AN ORGANIZED HEALTH CARE EDUCATION/TRAINING PROGRAM

## 2020-02-27 PROCEDURE — 84100 ASSAY OF PHOSPHORUS: CPT

## 2020-02-27 PROCEDURE — 99233 SBSQ HOSP IP/OBS HIGH 50: CPT | Performed by: NURSE PRACTITIONER

## 2020-02-27 RX ADMIN — SODIUM CHLORIDE, PRESERVATIVE FREE 300 UNITS: 5 INJECTION INTRAVENOUS at 08:39

## 2020-02-27 RX ADMIN — MULTIVITAMIN 15 ML: LIQUID ORAL at 08:39

## 2020-02-27 RX ADMIN — ALBUTEROL SULFATE 2.5 MG: 2.5 SOLUTION RESPIRATORY (INHALATION) at 21:02

## 2020-02-27 RX ADMIN — HEPARIN SODIUM 5000 UNITS: 10000 INJECTION INTRAVENOUS; SUBCUTANEOUS at 05:51

## 2020-02-27 RX ADMIN — Medication 10 ML: at 20:00

## 2020-02-27 RX ADMIN — CHLORHEXIDINE GLUCONATE 0.12% ORAL RINSE 15 ML: 1.2 LIQUID ORAL at 08:33

## 2020-02-27 RX ADMIN — SODIUM CHLORIDE SOLN NEBU 3% 4 ML: 3 NEBU SOLN at 21:02

## 2020-02-27 RX ADMIN — POTASSIUM BICARBONATE 40 MEQ: 782 TABLET, EFFERVESCENT ORAL at 08:38

## 2020-02-27 RX ADMIN — SODIUM CHLORIDE, PRESERVATIVE FREE 300 UNITS: 5 INJECTION INTRAVENOUS at 20:00

## 2020-02-27 RX ADMIN — AMLODIPINE BESYLATE 10 MG: 10 TABLET ORAL at 08:38

## 2020-02-27 RX ADMIN — LANSOPRAZOLE 30 MG: KIT at 05:50

## 2020-02-27 RX ADMIN — BUMETANIDE 1 MG: 0.25 INJECTION INTRAMUSCULAR; INTRAVENOUS at 15:47

## 2020-02-27 RX ADMIN — CHLORHEXIDINE GLUCONATE 0.12% ORAL RINSE 15 ML: 1.2 LIQUID ORAL at 20:01

## 2020-02-27 RX ADMIN — HEPARIN SODIUM 5000 UNITS: 10000 INJECTION INTRAVENOUS; SUBCUTANEOUS at 13:55

## 2020-02-27 RX ADMIN — METOPROLOL TARTRATE 50 MG: 50 TABLET, FILM COATED ORAL at 20:00

## 2020-02-27 RX ADMIN — MAGNESIUM GLUCONATE 500 MG ORAL TABLET 400 MG: 500 TABLET ORAL at 08:38

## 2020-02-27 RX ADMIN — ALBUTEROL SULFATE 2.5 MG: 2.5 SOLUTION RESPIRATORY (INHALATION) at 11:52

## 2020-02-27 RX ADMIN — ALBUTEROL SULFATE 2.5 MG: 2.5 SOLUTION RESPIRATORY (INHALATION) at 09:14

## 2020-02-27 RX ADMIN — HYDRALAZINE HYDROCHLORIDE 25 MG: 25 TABLET, FILM COATED ORAL at 21:33

## 2020-02-27 RX ADMIN — SODIUM CHLORIDE SOLN NEBU 3% 4 ML: 3 NEBU SOLN at 09:14

## 2020-02-27 RX ADMIN — HYDRALAZINE HYDROCHLORIDE 25 MG: 25 TABLET, FILM COATED ORAL at 13:55

## 2020-02-27 RX ADMIN — POTASSIUM BICARBONATE 40 MEQ: 782 TABLET, EFFERVESCENT ORAL at 20:00

## 2020-02-27 RX ADMIN — HEPARIN SODIUM 5000 UNITS: 10000 INJECTION INTRAVENOUS; SUBCUTANEOUS at 21:32

## 2020-02-27 RX ADMIN — BUMETANIDE 1 MG: 0.25 INJECTION INTRAMUSCULAR; INTRAVENOUS at 08:38

## 2020-02-27 RX ADMIN — ACETAMINOPHEN ORAL SOLUTION 650 MG: 650 SOLUTION ORAL at 05:50

## 2020-02-27 RX ADMIN — ASPIRIN 81 MG 81 MG: 81 TABLET ORAL at 08:38

## 2020-02-27 RX ADMIN — METOPROLOL TARTRATE 50 MG: 50 TABLET, FILM COATED ORAL at 08:38

## 2020-02-27 RX ADMIN — Medication 10 ML: at 08:39

## 2020-02-27 RX ADMIN — ALBUTEROL SULFATE 2.5 MG: 2.5 SOLUTION RESPIRATORY (INHALATION) at 17:24

## 2020-02-27 RX ADMIN — HYDRALAZINE HYDROCHLORIDE 25 MG: 25 TABLET, FILM COATED ORAL at 05:50

## 2020-02-27 ASSESSMENT — PULMONARY FUNCTION TESTS
PIF_VALUE: 21
PIF_VALUE: 21
PIF_VALUE: 18
PIF_VALUE: 19
PIF_VALUE: 22
PIF_VALUE: 18
PIF_VALUE: 21
PIF_VALUE: 21
PIF_VALUE: 27
PIF_VALUE: 23
PIF_VALUE: 23
PIF_VALUE: 20
PIF_VALUE: 18
PIF_VALUE: 21
PIF_VALUE: 21
PIF_VALUE: 23
PIF_VALUE: 21
PIF_VALUE: 22
PIF_VALUE: 21
PIF_VALUE: 26
PIF_VALUE: 21
PIF_VALUE: 27
PIF_VALUE: 18
PIF_VALUE: 22
PIF_VALUE: 26
PIF_VALUE: 21
PIF_VALUE: 18
PIF_VALUE: 23

## 2020-02-27 ASSESSMENT — PAIN SCALES - GENERAL
PAINLEVEL_OUTOF10: 0

## 2020-02-27 NOTE — PROGRESS NOTES
for: LABLEGI  C Diff PCR No results found for: CDIFPCR  Wound culture/abscess: No results for input(s): WNDABS in the last 72 hours. Tip culture:No results for input(s): CXCATHTIP in the last 72 hours. Xr Chest Standard (2 Vw)    Result Date: 2020  Patient MRN: [id number] : 1955 Age:  59 years Gender: Male Order Date: 2020 10:15 AM Exam: XR CHEST (2 VW) Number of Images: 2 views Indication:   Sepsis, sob Sepsis, sob Comparison: None. Findings: The heart is unremarkable The lung fields demonstrate no significant pulmonary vascular congestion and edema. The aorta is tortuous ectatic There are findings throughout the lung fields which are likely chronic     Findings compatible with atherosclerotic disease No acute infiltrate     Xr Foot Left (min 3 Views)    Result Date: 2020  Patient MRN:  [id number] : 1955 Age: 59 years Gender: Male Order Date:  2020 2:45 PM EXAM: XR FOOT LEFT (MIN 3 VIEWS) NUMBER OF IMAGES:  3 views INDICATION:  pain, swelling pain, swelling COMPARISON: None . The bones appear to be in anatomic alignment. No foreign body is identified No fracture is identified  There is moderate joint space loss The are moderate degenerative changes present     Moderate degenerative changes    Ct Soft Tissue Neck W Wo Contrast    Result Date: 2020  Patient MRN:  [id number] : 1955 Age: 59 years Gender: Male Order Date:  2020 3:45 PM EXAM: CT SOFT TISSUE NECK W WO CONTRAST NUMBER OF IMAGES \ views:  36 INDICATION:  h/o R mandible/base of tongue ca, sp resection and chemorads, concern for recurrence Per nephro team ok to do scan with contrast h/o R mandible/base of tongue ca, sp resection and chemorads, concern for recurrence COMPARISON: None Technique: Low-dose CT  acquisition technique included one of following options; 1 . Automated exposure control, 2. Adjustment of MA and or KV according to patient's size or 3. Use of iterative reconstruction.  The larynx appears unremarkable The soft tissues appear abnormal there is sequela of extensive prior surgery. No convincing abscess is seen. There is distortion of the oropharynx and the posterior superior hypopharynx there is significant distortion of the right sternocleidomastoid and a pattern compatible with previous radical neck dissection. There are findings to suggest previous therapy. The right jugular vein is not seen likely status post resection. There is been resection at the level of the tongue base and floor the mouth. There is sequela of previous resection. . Atherosclerotic disease of both proximal internal carotid arteries is identified without proximal internal carotid artery hemodynamically significant stenosis. At the level the distal right internal carotid artery at the level the cervical component there is evidence to suggest severe stenosis. Also the level of the carotid bifurcation on the right there is a soft tissue mass effect suspicious for recurrent neoplasm. This measures approximately 2.7 x 2.3 cm. This could also be related to previous therapy. Comparison studies would be necessary. There is a mottled eaten appearance to the medial aspect of the right mandible. This could be related to tumor invasion or osteonecrosis. Scattered lymph nodes are visualized which do not meet the CT criteria for adenopathy. There is mixed density in the right thyroid possibly representing a thyroid nodule measuring approximately 1 cm. There is density scattered throughout the sinuses possibly related to mild chronic sinusitis. There is likely a right sphenoid retention cyst     Probable prior right radical neck dissection with sequela of prior surgery and radiation therapy.  There is extensive soft tissue distortion which is suggestive of previous therapy Finds compatible with a approximately 1 cm right thyroid nodule There is evidence to suggest severe stenosis of the right distal internal carotid artery Soft tissue mass at the level the carotid bifurcation on the right with outward mass effect measuring approximately 2.3 x 2.7 cm suspicious for recurrent disease. This encases the carotid distally and the distal carotid stenosis may represent invasion. Comparison studies would be necessary to distinguish recurrent neoplasm from previous radiation therapy. Tumor invasion versus osteonecrosis of the medial right mandible ALERT:  THIS IS AN ABNORMAL REPORT      Us Head Neck Soft Tissue Thyroid    Result Date: 2020  Patient MRN:  [id number] : 1955 Age: 59 years Gender: Male Order Date:  2020 2:15 PM Exam: US HEAD NECK SOFT TISSUE THYROID Number of images:  26 Indication:  right carotid bifurcation mass With special focus to soft tissue mass at the right carotid bifurcation. right carotid bifurcation mass Comparison: Correlation with CT dated 2020. Technique: Grayscale and color Doppler ultrasound images of the region of interest were obtained by the sonographic technologist. FINDINGS: The study is limited by technically suboptimal image quality. There is abnormal heterogeneous soft tissue in the region of interest.     Ill-defined, heterogeneous, abnormal appearing soft tissue in the region of interest. No specific clinical question is asked. Ultrasound is not the appropriate modality for this evaluation. Contrast-enhanced MRI may provide more information. Correlation with clinical and surgical history is needed. Xr Chest Portable    Result Date: 2020  Patient MRN:  [id number] : 1955 Age: 59 years Gender: Male Order Date:  2020 6:00 AM EXAM: XR CHEST PORTABLE COMPARISON: February 10, 2020 INDICATION:  PNA PNA FINDINGS: There are opacities at the left lung base silhouetting left hemidiaphragm which appears similar since prior. The heart is normal size. No pneumothorax. Stable opacities at left lung base. Continued follow-up could be helpful for further evaluation.     Xr Chest Portable    Result magnesium hydroxide (MILK OF MAGNESIA) 400 MG/5ML suspension 30 mL  30 mL Oral Daily PRN Saqib B Capal, DO           Continuous Infusions:    Scheduled Meds:   potassium bicarb-citric acid  40 mEq Oral BID    magnesium oxide  400 mg Oral Daily    multivitamin with iron-minerals  15 mL Oral Daily    bumetanide  1 mg Intravenous BID    amLODIPine  10 mg PEG Tube Daily    albuterol  2.5 mg Nebulization Q4H While awake    [Held by provider] sennosides  5 mL Per G Tube Nightly    heparin flush  3 mL Intravenous 2 times per day    heparin (porcine)  5,000 Units Subcutaneous 3 times per day    chlorhexidine  15 mL Mouth/Throat BID    lansoprazole  30 mg Per G Tube QAM AC    aspirin  81 mg Per G Tube Daily    hydrALAZINE  25 mg PEG Tube 3 times per day    metoprolol tartrate  50 mg PEG Tube BID    sodium chloride (Inhalant)  4 mL Nebulization BID    sodium chloride flush  10 mL Intravenous 2 times per day     PRN Meds: sodium chloride flush, heparin flush, acetaminophen, labetalol, magnesium hydroxide      Resident's Assessment and Plan     Bridget Salvador is 59 y.o. male was admitted on 2/5/2020 due to fatigue, unable to eat and had an episode of a fall. He was found to be hypotensive, thrombocytopenic (platelet 49,399) and in YVETTE. He has baseline Lt. Sided weakness and slurred speech. Had a PEA arrest on 2/16/2020. Was intubated following ROSC and transferred to the SICU. Bridget Salvador has a PH of laryngeal cancer s/p laryngectomy (2005) with chemo and radio, HTN, H/O CVA with residual Lt. Weakness and slurred speech, smoker and active alcohol abuser. Diagnosis Date    Cancer [facility])     mouth    Hip fracture ([facility] [address]     Hypertension      <Cardiovascular>  PEA arrest s/p ROSC  2/2 ? Hypoxia 2/2 neck mass vs upper airway collapse vs acute anemia   Management per surgical ICU.   EEG >> diffuse encephalopathy   MRI brain left occipital lesion (previously Treated in CT scan, no changes)  MRA head was unremarkable. No DVT on US BL LE     Hypertension and Tachycardia (resolving). Currently on hydralazine 25mg Q8H, metoprolol 50mg BID. Not tachycardic today. <Neurologic>  Acute Encephalopathy  2/2 anoxic brain injury. Cough and gag reflex present. Pupils: non reactive, withdrawing to pain in 4 extremities. Management per SICU  MRI brain left occipital lesion (previously Treated in CT scan, no changes)  MRA head was unremarkable. H/O CVA with residual Lt. Weakness - Stable  Aspirin placed on hold due to decrease in hemoglobin. TSH level was high, normal T4 level, low free T3 level, euthyroid sick syndrome. <Pulmonary>  Acute hypoxic hypercapnic respiratory failure  2/2 PEA arrest  Currently intubated and on the vent AC mode, on 40%. LTAC (tracheostomy and PEG tube), versus terminal extubation, family will decide, palliative on board. Pneumonia  2/2 MSSA  CXR: stable   ID on board, currently on Zosyn. Chest vest BID. Nebulization with ipratropium-albuterol Q4H and 3% NaCl nebulization BID.     <Gastrointestinal>  Dysphagia s/p PEG placement (2/12/2020)  2/2 Neck mass   Currently on tube feeds  CBC daily. Severe Malnutrition  2/2 Laryngeal Ca, dysphagia  Per dietitian, pt. Meets criteria for severe malnutrition. Currently on tube feeds    <Infectious Disease>  Group G Streptococcus Bacteremia - Resolved  Echo showed no vegetations. Repeat culture -ve. <Genitourinary/Renal>  YVETTE Stage III on CKD   Ischemic secondary to ATN after PEA cardiac arrest.  Nonoliguric. Nephrology on board, creatinine has been stable. Nephrology given 2 doses of 1 mg Bumex yesterday, will continue to diurese today. Chest x-ray today showing less signs of pulmonary vascular congestion. <Hematology/Oncology>  Acute Normocytic Anemia  S/P 4 U PRBC. Stable hemoglobin. Soft Tissue Mass  2/2 possible recurrence of laryngeal Ca. CT and USG suggestive of mass at Rt.  Carotic bifurcation - 2.3 x 2.7

## 2020-02-27 NOTE — PROGRESS NOTES
Bret Riojas is a 59 y.o.  male     Neurology is following for generalized fatigue     PMH: HTN, CVA with residual left-sided weakness, multiple head and neck cancers s/p chemotherapy/radiation s/p mandibular resection, esophageal cancer and right base tongue cancer s/p hemiglossectomy, smoking    The patient was admitted on 2/5 with MSSA pneumonia and GBS bacteremia/sepsis--eventually undergoing PEG placement due to dysphagia. PEA arrested on 2/16 and had CPR for 8 minutes requiring intubation-- pH 6.79. Has been encephalopathic since. Initial CT showed hypodensity left occipital lobe. EEG 2/19 with moderate encephalopathy    He remains in SICU    MRI of the brain on 2/20 confirmed an acute left occipital lobe stroke. He is intubated and on no sedation. He is encephalopathic but awake and follows no commands for me. He is a full code and family is discussing trach placement vs comfort care palliative following--possible decision tomorrow. There is no family at the bedside and he is medically stable.   Unable to complete ROS due to his mental status    Current Facility-Administered Medications   Medication Dose Route Frequency Provider Last Rate Last Dose    potassium bicarb-citric acid (EFFER-K) effervescent tablet 40 mEq  40 mEq Oral BID Solitario Kovacs MD   40 mEq at 02/27/20 0838    magnesium oxide (MAG-OX) tablet 400 mg  400 mg Oral Daily Kwadwo Pearson MD   400 mg at 02/27/20 6887    multivitamin with iron-minerals liquid 15 mL  15 mL Oral Daily Kwadwo Pearson MD   15 mL at 02/27/20 0839    bumetanide (BUMEX) injection 1 mg  1 mg Intravenous BID Rebeka Ellis MD   1 mg at 02/27/20 0838    amLODIPine (NORVASC) tablet 10 mg  10 mg PEG Tube Daily Ruth Ann Thomas MD   10 mg at 02/27/20 0838    albuterol (PROVENTIL) nebulizer solution 2.5 mg  2.5 mg Nebulization Q4H While awake Solitario Kovacs MD   2.5 mg at 02/27/20 0914    [Held by provider] sennosides (SENOKOT) 8.8 MG/5ML syrup 5 89.2 02/27/2020    MCH 29.2 02/27/2020    MCHC 32.8 02/27/2020    RDW 15.1 02/27/2020    LYMPHOPCT 11.3 02/27/2020    MONOPCT 13.4 02/27/2020    BASOPCT 0.3 02/27/2020    MONOSABS 1.25 02/27/2020    LYMPHSABS 1.05 02/27/2020    EOSABS 0.12 02/27/2020    BASOSABS 0.03 02/27/2020     CMP:    Lab Results   Component Value Date     02/27/2020    K 4.1 02/27/2020    CL 94 02/27/2020    CO2 28 02/27/2020    BUN 22 02/27/2020    CREATININE 1.0 02/27/2020    GFRAA >60 02/27/2020    LABGLOM >60 02/27/2020    GLUCOSE 125 02/27/2020    CALCIUM 8.3 02/27/2020     LDL 29    A1C 5.2    MRI brain 2/20: acute stroke L occipital lobe; slightly increased signal in b/l mesial temporal lobes on DWI and T2 FLAIR        MRA head: negative    Echo bubble trileaflet aortic valve. Mild leaflet calcification with more pronounced  calcification on the non coronary cusp. Normal leaflet mobility. No aortic stenosis. No aortic regurgitation. Concentric hypertrophy though in some views the walls are up to 1.4 cmthick. Normal left ventricular systolic function. Visually estimated LVEF  is 60 %. Normal diastolic function. Normal left atrial pressure. Mildly dilated right ventricle. Difficult to visualize the free wall in all views. Small anterior and apical effusion. No signs of tamponade. All labs and imaging reviewed independently today.     Assessment:     Mixed hypoxic and metabolic encephalopathy 2/2 resp failure atop PEA arrest   8 minutes of CPR--some evidence hypoxic injury on personal review of MRI   Remains encephalopathic--- now 11 days post arrest   No evidence of subclinical seizures on EEG    L occipital lobe ischemic stroke   ?embolic or hypoperfusion mechanism in the setting of PEA arrest   Decreased blink to threat in R visual field on exam today   No LV athero and echo ok--no AF on tele    Multiple previous cancers of head/neck   S/p chemo/radiation/surgery   Increase risk for hypercoag state   Poor compliance for tx in

## 2020-02-27 NOTE — PROGRESS NOTES
[facility] PROGRESS NOTE      Chief complaint: Follow-up of group G Streptococcus bacteremia    The patient is a 59 y.o. 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 On admission, he was afebrile with no leukocytosis. Respiratory pathogen PCR panel, urine Streptococcus pneumoniae and Legionella antigens were negative. Chest x-ray showed no acute infiltrate. CT of the neck and soft tissues showed sequela of extensive prior surgery, distortion of oropharynx and posterior superior hypopharynx and of the right sternocleidomastoid with pattern compatible with previous radical neck dissection, resection at the level of the tongue base and floor of the mouth, soft tissue mass-effect suspicious for recurrent neoplasm at the level of the carotid bifurcation on the right measuring 2.7 x 2.3 cm, moth-eaten appearance to the medial aspect of the right mandible probably tumor invasion or osteonecrosis, no evident abscess. Blood cultures grew beta-hemolytic group G Streptococcus (susceptible to ampicillin, resistant to clindamycin). Repeat blood cultures from 02/08 and from 02/10 showed no growth to date. Sputum Gram stain and culture showed less than 25 PMNs per LPF and less than 25 epithelial cells per LPF, light growth of MSSA, moderate growth of Candida albicans, absent oral pharyngeal talib. He received doxycycline on 02/05, ceftriaxone on 02/05 and from 02/08-02/10, piperacillin-tazobactam since 02/05 then switched to nafcillin on 02/12. Nafcillin was switched to ampicillin-sulbactam on 02/12.  He underwent PEG tube Tristian Solorzano MD on 2/27/2020 at 10:04 AM

## 2020-02-27 NOTE — PROGRESS NOTES
x 2.7 cm   suspicious for recurrent disease. This encases the carotid distally   and the distal carotid stenosis may represent invasion. Comparison   studies would be necessary to distinguish recurrent neoplasm from   previous radiation therapy. Tumor invasion versus osteonecrosis of the medial right mandible         Chest x-ray February 16, 2020       Endotracheal tube terminates above the jovita.       Stable opacities in the lower lobes. Consider infectious/inflammatory   etiologies or edema. Follow-up to resolution.                        ECHO Complete 2/16/20:   Trileaflet aortic valve. Mild leaflet calcification with more pronounced   calcification on the non coronary cusp. Normal leaflet mobility. No aortic stenosis. No aortic regurgitation. Concentric hypertrophy though in some views the walls are up to 1.4 cm   thick. Normal left ventricular systolic function. Visually estimated LVEF   is 60 %. Normal diastolic function. Normal left atrial pressure. Mildly dilated right ventricle. Difficult to visualize the free wall in all views. Small anterior and apical effusion. No signs of tamponade. Chest x-ray 2/24/20   Worsening CHF.             BRIEF SUMMARY OF INITIAL CONSULT:    Briefly, Mr. Samuel Kate is a 27-year-old gentleman with a significant past medical history of hypertension, laryngeal cancer s/p laryngectomy in 2005 and chemo and radiation, history of CVA with baseline left sided weakness and some slurred speech, current smoker, and history of alcohol abuse who presented to the ED on 2/5/20 for fatigue. Patient was found to be extremely hypotensive at 50/palpated, tachycardic and fatigued in the ED. He was given fluid bolus with BP improvement, was found to have YVETTE (baseline unknown) with a creatinine of 4.1. Patients creatinine today is 3.0, reason for this consult. Problems resolved:  · Hypernatremia, with water deficit; calculated free water deficit is 1.8 L.

## 2020-02-27 NOTE — PROGRESS NOTES
Palliative Medicine  Progress Note    Remains intubated. No family at bedside. Decsion on trach vs withdrawal pending. Available for support as needed. Pratibha LUNSFORD  Palliative Medicine    Note: This report was completed using [employer] voiced recognition software. Every effort has been made to ensure accuracy; however, inadvertent computerized transcription errors may be present.

## 2020-02-27 NOTE — PROGRESS NOTES
Rate and Rhythm: Normal rate and regular rhythm. Pulses: Normal pulses. Heart sounds: Normal heart sounds. Pulmonary:      Effort: Pulmonary effort is normal.      Breath sounds: Normal breath sounds. Abdominal:      General: There is no distension. Palpations: Abdomen is soft. Tenderness: There is no abdominal tenderness. Musculoskeletal:         General: No swelling or tenderness. Skin:     General: Skin is warm and dry. Neurological:      Comments: Eye opening, minimally tracks  Does not follow commands, nonpurposeful mvmt       Lines: Islas:  yes - Continue islas catheter for managing strict I and Os in this critically ill patient. Central line:  no  PICC:  yes - left basilic    CAM-ICU:  negative  RASS:  RASS -1 (Drowsy)    ASSESSMENT/PLAN:  1.  Multiple prior cancers--laryngeal, mandibular, esophageal  --family to provide more information--numbers of physician's in [place] provided--resident to call ENT and PCP for more info. Per oncologist, patient was noncompliant and most of his care was in [phone]. --patient's PCP to call back tomorrow--PCP called back but there was no additional information that was pertinent. There was a CT scan of the H&N that was done one year ago with out significant findings    2. Acute respiratory failure d/t hypoxia  --c/w vent management  --family meeting regarding trach--was held, they will have a decision on Friday. 3.  Stroke  --neurology following  --ASA    4. Dysphagia/severe protein-calorie malnutrition  --s/p PEG  --c/w TFs    5. Electrolyte imbalances  --hypokalemia--replace  --hypomagnesium--replace    6. YVETTE on CKD  --nephrology following  --c/w bumex    7. Anoxic brain injury  --supportive care  --family meeting today    8.   Acute blood loss anemia  --monitor    DVT/GI ppx--heparin, lansoprazole    Jordana Orozco MD, MSc, FACS  2/27/2020  1:15 PM      CC TIME:  I spent 35 min managing this patients critical issues which are a constant threat to life excluding time teaching and performing procedures. I called patient's sister, Hali Romo, to find out if she had any questions and asked that if they are leaning towards trach if we could have a decision today so he can be scheduled tomorrow. She stated they are going to withdraw care, they are just waiting for one more family member to come to town tomorrow.

## 2020-02-28 PROCEDURE — 6370000000 HC RX 637 (ALT 250 FOR IP): Performed by: INTERNAL MEDICINE

## 2020-02-28 PROCEDURE — 2580000003 HC RX 258: Performed by: STUDENT IN AN ORGANIZED HEALTH CARE EDUCATION/TRAINING PROGRAM

## 2020-02-28 PROCEDURE — 6370000000 HC RX 637 (ALT 250 FOR IP): Performed by: STUDENT IN AN ORGANIZED HEALTH CARE EDUCATION/TRAINING PROGRAM

## 2020-02-28 PROCEDURE — 99231 SBSQ HOSP IP/OBS SF/LOW 25: CPT | Performed by: INTERNAL MEDICINE

## 2020-02-28 PROCEDURE — 99291 CRITICAL CARE FIRST HOUR: CPT | Performed by: SURGERY

## 2020-02-28 PROCEDURE — 2000000000 HC ICU R&B

## 2020-02-28 PROCEDURE — 94640 AIRWAY INHALATION TREATMENT: CPT

## 2020-02-28 PROCEDURE — 6370000000 HC RX 637 (ALT 250 FOR IP): Performed by: SURGERY

## 2020-02-28 PROCEDURE — 6360000002 HC RX W HCPCS: Performed by: STUDENT IN AN ORGANIZED HEALTH CARE EDUCATION/TRAINING PROGRAM

## 2020-02-28 PROCEDURE — 2500000003 HC RX 250 WO HCPCS: Performed by: INTERNAL MEDICINE

## 2020-02-28 PROCEDURE — 94003 VENT MGMT INPAT SUBQ DAY: CPT

## 2020-02-28 PROCEDURE — 94669 MECHANICAL CHEST WALL OSCILL: CPT

## 2020-02-28 RX ADMIN — MULTIVITAMIN 15 ML: LIQUID ORAL at 07:56

## 2020-02-28 RX ADMIN — HYDRALAZINE HYDROCHLORIDE 25 MG: 25 TABLET, FILM COATED ORAL at 05:24

## 2020-02-28 RX ADMIN — ALBUTEROL SULFATE 2.5 MG: 2.5 SOLUTION RESPIRATORY (INHALATION) at 20:56

## 2020-02-28 RX ADMIN — CHLORHEXIDINE GLUCONATE 0.12% ORAL RINSE 15 ML: 1.2 LIQUID ORAL at 20:06

## 2020-02-28 RX ADMIN — HYDRALAZINE HYDROCHLORIDE 25 MG: 25 TABLET, FILM COATED ORAL at 21:17

## 2020-02-28 RX ADMIN — ALBUTEROL SULFATE 2.5 MG: 2.5 SOLUTION RESPIRATORY (INHALATION) at 17:58

## 2020-02-28 RX ADMIN — HEPARIN SODIUM 5000 UNITS: 10000 INJECTION INTRAVENOUS; SUBCUTANEOUS at 21:51

## 2020-02-28 RX ADMIN — ALBUTEROL SULFATE 2.5 MG: 2.5 SOLUTION RESPIRATORY (INHALATION) at 09:20

## 2020-02-28 RX ADMIN — LANSOPRAZOLE 30 MG: KIT at 05:25

## 2020-02-28 RX ADMIN — BUMETANIDE 1 MG: 0.25 INJECTION INTRAMUSCULAR; INTRAVENOUS at 08:03

## 2020-02-28 RX ADMIN — Medication 10 ML: at 08:03

## 2020-02-28 RX ADMIN — SODIUM CHLORIDE SOLN NEBU 3% 4 ML: 3 NEBU SOLN at 05:49

## 2020-02-28 RX ADMIN — AMLODIPINE BESYLATE 10 MG: 10 TABLET ORAL at 07:56

## 2020-02-28 RX ADMIN — ALBUTEROL SULFATE 2.5 MG: 2.5 SOLUTION RESPIRATORY (INHALATION) at 05:49

## 2020-02-28 RX ADMIN — MAGNESIUM GLUCONATE 500 MG ORAL TABLET 400 MG: 500 TABLET ORAL at 07:56

## 2020-02-28 RX ADMIN — POTASSIUM BICARBONATE 40 MEQ: 782 TABLET, EFFERVESCENT ORAL at 20:37

## 2020-02-28 RX ADMIN — HEPARIN SODIUM 5000 UNITS: 10000 INJECTION INTRAVENOUS; SUBCUTANEOUS at 05:24

## 2020-02-28 RX ADMIN — SODIUM CHLORIDE, PRESERVATIVE FREE 300 UNITS: 5 INJECTION INTRAVENOUS at 08:04

## 2020-02-28 RX ADMIN — ALBUTEROL SULFATE 2.5 MG: 2.5 SOLUTION RESPIRATORY (INHALATION) at 13:23

## 2020-02-28 RX ADMIN — SODIUM CHLORIDE SOLN NEBU 3% 4 ML: 3 NEBU SOLN at 20:56

## 2020-02-28 RX ADMIN — POTASSIUM BICARBONATE 40 MEQ: 782 TABLET, EFFERVESCENT ORAL at 08:03

## 2020-02-28 RX ADMIN — ASPIRIN 81 MG 81 MG: 81 TABLET ORAL at 07:56

## 2020-02-28 RX ADMIN — HEPARIN SODIUM 5000 UNITS: 10000 INJECTION INTRAVENOUS; SUBCUTANEOUS at 13:58

## 2020-02-28 RX ADMIN — METOPROLOL TARTRATE 50 MG: 50 TABLET, FILM COATED ORAL at 20:37

## 2020-02-28 RX ADMIN — METOPROLOL TARTRATE 50 MG: 50 TABLET, FILM COATED ORAL at 08:03

## 2020-02-28 RX ADMIN — CHLORHEXIDINE GLUCONATE 0.12% ORAL RINSE 15 ML: 1.2 LIQUID ORAL at 08:03

## 2020-02-28 RX ADMIN — HYDRALAZINE HYDROCHLORIDE 25 MG: 25 TABLET, FILM COATED ORAL at 13:58

## 2020-02-28 RX ADMIN — BUMETANIDE 1 MG: 0.25 INJECTION INTRAMUSCULAR; INTRAVENOUS at 16:14

## 2020-02-28 ASSESSMENT — PULMONARY FUNCTION TESTS
PIF_VALUE: 39
PIF_VALUE: 21
PIF_VALUE: 24
PIF_VALUE: 20
PIF_VALUE: 24
PIF_VALUE: 19
PIF_VALUE: 20
PIF_VALUE: 19
PIF_VALUE: 22
PIF_VALUE: 25
PIF_VALUE: 21
PIF_VALUE: 20
PIF_VALUE: 21
PIF_VALUE: 22
PIF_VALUE: 18
PIF_VALUE: 21
PIF_VALUE: 17
PIF_VALUE: 19
PIF_VALUE: 33
PIF_VALUE: 21

## 2020-02-28 ASSESSMENT — PAIN SCALES - GENERAL
PAINLEVEL_OUTOF10: 0

## 2020-02-28 NOTE — PROGRESS NOTES
[facility] PROGRESS NOTE      Chief complaint: Follow-up of group G Streptococcus bacteremia    The patient is a 59 y.o. 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 On admission, he was afebrile with no leukocytosis. Respiratory pathogen PCR panel, urine Streptococcus pneumoniae and Legionella antigens were negative. Chest x-ray showed no acute infiltrate. CT of the neck and soft tissues showed sequela of extensive prior surgery, distortion of oropharynx and posterior superior hypopharynx and of the right sternocleidomastoid with pattern compatible with previous radical neck dissection, resection at the level of the tongue base and floor of the mouth, soft tissue mass-effect suspicious for recurrent neoplasm at the level of the carotid bifurcation on the right measuring 2.7 x 2.3 cm, moth-eaten appearance to the medial aspect of the right mandible probably tumor invasion or osteonecrosis, no evident abscess. Blood cultures grew beta-hemolytic group G Streptococcus (susceptible to ampicillin, resistant to clindamycin). Repeat blood cultures from 02/08 and from 02/10 showed no growth to date. Sputum Gram stain and culture showed less than 25 PMNs per LPF and less than 25 epithelial cells per LPF, light growth of MSSA, moderate growth of Candida albicans, absent oral pharyngeal talib. He received doxycycline on 02/05, ceftriaxone on 02/05 and from 02/08-02/10, piperacillin-tazobactam since 02/05 then switched to nafcillin on 02/12. Nafcillin was switched to ampicillin-sulbactam on 02/12.  He underwent PEG tube placement on 02/12. He went into PEA arrest with ROSC after about 13 minutes of resuscitation and was intubated and transferred to SICU. CXR showed stable opacities in the lower lobes. CT head without contrast showed left occipital lobe subacute infarct. Procalcitonin level was elevated at 31 ng/mL. Amoxicillin-clavulanate was switched to piperacillin-tazobactam on 02/19 and discontinued on 02/25. Procalcitonin trended down to 13 ng/mL as of 02/21. Blood cultures showed no growth. Subjective: Patient was seen and examined. He is intubated, awake but gives no meaningful response to questions or commands. Has intermittent episodes of low-grade fever up to 100.4 F. Objective:  BP (!) 148/92   Pulse 108   Temp 100.4 °F (38 °C) (Temporal)   Resp 18   Ht 5' 4\" (1.626 m)   Wt 132 lb 12.8 oz (60.2 kg)   SpO2 100%   BMI 22.80 kg/m²   Constitutional: Intubated, no MV dyssynchrony  Respiratory: Clear breath sounds, no crackles, no wheezes  Cardiovascular: Regular rate and rhythm, no murmurs  Gastrointestinal: Bowel sounds present, soft, nontender  Skin: Warm and dry, no active dermatoses  Musculoskeletal: No joint swelling, no joint erythema    Labs, imaging, and medical records/notes were personally reviewed. Assessment:  Fever, resolved, multifactorial, suspect sepsis vs central fever   Leukocytosis  Cardiac arrest  Anoxic encephalopathy  Left occipital lobe subacute infarct  Group G Streptococcus bacteremia, etiology unclear, suspect oropharyngeal in etiology with possible aspiration pneumonia  MSSA pneumonia  History of head and neck cancer, s/p resection and chemoradiation therapy (details unclear)    Recommendations:  Monitor clinically off antibiotics for now. Observe aspiration precautions and oral hygiene. Follow up goals of care. Family's plan for withdrawal of care pending arrival of one other family member is noted.     Thank you for involving me in the care of Sara Rodriguez.  I will continue to

## 2020-02-28 NOTE — PROGRESS NOTES
Surgical Intensive Care Unit   Daily Progress Note     Patient's name:  Amie Glaser  Age/Gender: 59 y.o. male  Date of Admission: 2/5/2020  9:48 AM  Length of Stay: 23    Reason for ICU:     HPI:   60 yo male with history of smoking, ETOH abuse, multiple head and neck cancers s/p partial mandibular resection ([id number]), esophageal cancer, and R base of tongue cancer s/p hemiglossectomy and chemoradiation with a new Head and Neck cancer. PEA arrest likely from aspiration ( Barium sitting in the back of the through when PEG was placed)  leading to hypoxia or Head and neck cancer obstruction. Anoxic brain injury    Overnight Events:   No acute events overnight. Hospital Course:   2/16 3yo male with history of smoking, ETOH abuse, multiple head and neck cancers s/p partial mandibular resection ([id number]), esophageal cancer, and R base of tongue cancer s/p hemiglossectomy and chemoradiation with a new Head and Neck cancer. PEA arrest likely from aspiration ( Barium sitting in the back of the through when PEG was placed)  leading to hypoxia or Head and neck cancer obstruction.    2/17 No overnight events   2/18  Hb dropped 5.2 received 2 units PRBC    2/19 No acute overnight issues, patient withdrawing from pain this am and blinking to threat   2/20 No changes overnight   2/21--nothing new overnight  2/22--no new issues  2/23--nothing new overnight  2/25--family discussion; placed on [facility]  2/26--family deciding to withdraw care, but awaiting more family to come to town    Problem List:   Patient Active Problem List   Diagnosis    Acute renal failure (ARF) ([address]    Bacteremia    Other dysphagia    Severe protein-calorie malnutrition ([address]    Acute upper respiratory infection    Elevated troponin    Occipital stroke ([address]    Hypoxic encephalopathy ([address]    Palliative care encounter    Goals of care, counseling/discussion       Surgical/Interventional Procedures:       Vent Settings: Additional Respiratory stable. Continue SQH     Bowel regimen:         Colace, senna  DVT proph:                SCDs, SQH  GI proph:                    PPI        Glucose protocol:     monitor  Mouth/eye care:        Peridex, drops  Sanchez:                          keep in place for critical care monitoring of fluid balance.   CVC sites:                  PICC  Ancillary consults:    IM (admitting), renal, ENT, General Surgery, ID, Neurology  Family Update:          Meeting planned for 13:00 tomorrow  CODE Status: Full Code    Dispo: SICU    Electronically signed by Lorie Leon DO 2/28/2020  7:58 AM

## 2020-02-28 NOTE — PROGRESS NOTES
[facility]  [facility] Residency Program  Progress Note - House Team 1    Patient:  Jaylyn Mckeon 59 y.o. male MRN: [id number]     Date of Service: 2/28/2020     CC: had concerns including Fatigue (for a couple of days). Overnight events: None    Subjective     Patient seen and examined this morning. Patient opens eyes to verbal stimuli. Objective     Physical Exam:  · Vitals: BP (!) 148/95   Pulse 117   Temp 99.7 °F (37.6 °C) (Temporal)   Resp 18   Ht 5' 4\" (1.626 m)   Wt 132 lb 12.8 oz (60.2 kg)   SpO2 100%   BMI 22.80 kg/m²     · I & O - 24hr: No intake/output data recorded. · General Appearance: appears stated age and somnolent   · HEENT:  Head: Normal, normocephalic, atraumatic. · Lung: diffuse rhonchi vs endotracheal bronchophony  · Heart: regular rate and rhythm, S1, S2 normal, no murmur, click, rub or gallop  · Extremities:  extremities normal, atraumatic, no cyanosis or edema  · Neurologic: Mental status: somnolent, awakens to verbal stimuli  Subject  Pertinent Labs & Imaging Studies   janice  None     Resident's Assessment and Plan     Jaylyn Mckeon is 59 y.o. male was admitted on 2/5/2020 due to fatigue, unable to eat and had an episode of a fall. He was found to be hypotensive, thrombocytopenic (platelet 11,460) and in YVETTE. He has baseline Lt. Sided weakness and slurred speech. Had a PEA arrest on 2/16/2020. Was intubated following ROSC and transferred to the SICU.     Jaylyn Mckeon has a PH of laryngeal cancer s/p laryngectomy (2005) with chemo and radio, HTN, H/O CVA with residual Lt. Weakness and slurred speech, smoker and active alcohol abuser.      PEA arrest s/p ROSC  · 2/2 ? Hypoxia 2/2 neck mass vs upper airway collapse vs acute anemia   · Management per surgical ICU. · EEG >> diffuse encephalopathy   · MRI brain left occipital lesion (previously Treated in CT scan, no changes)  · MRA head was unremarkable.   · No DVT on US BL LE   · Family plan to meet today, possibly withdraw care     Hypertension and Tachycardia   · Currently on hydralazine 25mg Q8H, metoprolol 50mg BID, amlodipine 10 mg     <Neurologic>  Acute Encephalopathy  2/2 anoxic brain injury. · Cough and gag reflex present. Pupils: non reactive, withdrawing to pain in 4 extremities. · Management per SICU  · MRI brain left occipital lesion (previously Treated in CT scan, no changes)  · MRA head was unremarkable.     H/O CVA with residual Lt. Weakness - Stable  · Aspirin placed on hold due to decrease in hemoglobin. · TSH level was high, normal T4 level, low free T3 level, euthyroid sick syndrome.     <Pulmonary>  Acute hypoxic hypercapnic respiratory failure  · 2/2 PEA arrest  · Currently intubated and on the vent AC mode, on 40%. · LTAC (tracheostomy and PEG tube), versus terminal extubation, family will decide, palliative on board. Pneumonia  2/2 MSSA  · CXR: stable   · ID on board, currently on Zosyn. · Chest vest BID. Nebulization with ipratropium-albuterol Q4H and 3% NaCl nebulization BID.      <Gastrointestinal>  Dysphagia s/p PEG placement (2/12/2020)  2/2 Neck mass   · Currently on tube feeds     Severe Malnutrition  2/2 Laryngeal CA, dysphagia  · Per dietitian, pt. Meets criteria for severe malnutrition. · Currently on tube feeds     <Infectious Disease>  Group G Streptococcus Bacteremia - Resolved  · Echo showed no vegetations. Repeat culture -ve.       <Genitourinary/Renal>  YVETTE Stage III on CKD   · Ischemic secondary to ATN after PEA cardiac arrest.  · Nonoliguric. · Nephrology on board, creatinine has been stable. · Continue bumex BID        <Hematology/Oncology>  Acute Normocytic Anemia  · S/P 4 U PRBC. Stable hemoglobin. Soft Tissue Mass  2/2 possible recurrence of laryngeal Ca. · CT and USG suggestive of mass at Rt.  Carotic bifurcation - 2.3 x 2.7 cm.      Thrombocytopenia -resolved  · 2/2 Sepsis vs alcohol use      H/O Alcohol Abuse - Stable  · Currently on multivitamin       PT/OT evaluation: not at this time  DVT prophylaxis/ GI prophylaxis: heparin/ no GI prophylaxis  Disposition: LTAC vs withdrawal of care    Paola Reddy DO, PGY-1  Attending physician: Dr. Tammie Heard

## 2020-02-28 NOTE — PROGRESS NOTES
Musculoskeletal: Normal range of motion and neck supple. Cardiovascular:      Rate and Rhythm: Normal rate and regular rhythm. Pulses: Normal pulses. Heart sounds: Normal heart sounds. Pulmonary:      Effort: Pulmonary effort is normal.      Breath sounds: Normal breath sounds. Abdominal:      General: There is no distension. Palpations: Abdomen is soft. Tenderness: There is no abdominal tenderness. Musculoskeletal:         General: No swelling or tenderness. Skin:     General: Skin is warm and dry. Neurological:      Comments: Eye opening, minimally tracks  Does not follow commands, nonpurposeful mvmt       Lines: Islas:  yes - Continue islas catheter for managing strict I and Os in this critically ill patient. Central line:  no  PICC:  yes - left basilic    CAM-ICU:  negative  RASS:  RASS -1 (Drowsy)    ASSESSMENT/PLAN:  1.  Multiple prior cancers--laryngeal, mandibular, esophageal  --family to provide more information--numbers of physician's in [place] provided--resident to call ENT and PCP for more info. Per oncologist, patient was noncompliant and most of his care was in [phone]. --patient's PCP to call back tomorrow--PCP called back but there was no additional information that was pertinent. There was a CT scan of the H&N that was done one year ago with out significant findings    2. Acute respiratory failure d/t hypoxia  --c/w vent management  --family meeting regarding trach--was held, they will have a decision on Friday. 3.  Stroke  --neurology following  --ASA    4. Dysphagia/severe protein-calorie malnutrition  --s/p PEG  --c/w TFs    5. Electrolyte imbalances  --hypokalemia--replace  --hypomagnesium--replace    6. YVETTE on CKD  --nephrology following  --c/w bumex    7. Anoxic brain injury  --supportive care  --family meeting today    8.   Acute blood loss anemia  --monitor    DVT/GI ppx--heparin, lansoprazole    Naz Peña MD, MSc, FACS  2/28/2020  6:42 PM      CC TIME:  I spent 31 min managing this patients critical issues which are a constant threat to life excluding time teaching and performing procedures.

## 2020-02-28 NOTE — PLAN OF CARE
Problem: Falls - Risk of:  Goal: Will remain free from falls  Description  Will remain free from falls  2/28/2020 0941 by Ivette Pena RN  Outcome: Met This Shift     Problem: Falls - Risk of:  Goal: Absence of physical injury  Description  Absence of physical injury  2/28/2020 0941 by Ivette Pena RN  Outcome: Met This Shift

## 2020-02-28 NOTE — PROGRESS NOTES
Department of Internal Medicine  Nephrology Attending Progress Note    Events reviewed. SUBJECTIVE: We are following Mr. Henri Gabriel for YVETTE on CKD. Patient seen and examined bedside, remains intubated and sedated. PHYSICAL EXAM:      Vitals:    VITALS:  BP (!) 149/91   Pulse 102   Temp 100.4 °F (38 °C) (Temporal)   Resp 21   Ht 5' 4\" (1.626 m)   Wt 132 lb 12.8 oz (60.2 kg)   SpO2 100%   BMI 22.80 kg/m²        Intake/Output Summary (Last 24 hours) at 2/28/2020 0846  Last data filed at 2/28/2020 0830  Gross per 24 hour   Intake 1656 ml   Output 2900 ml   Net -1244 ml         Constitutional:  Intubated  HEENT:  Scarred sugical incision at neck; facial droop  Respiratory:  Clear, diminished bases bilaterally  Cardiovascular/Edema:  RRR, no edema noted  Gastrointestinal:  Abdomen soft and nontender, bowel sounds active. PEG.   Neurologic:  Contracted extremities, facial droop   Skin:  Dry,warm  flaky   Other:  No edema     Scheduled Meds:   potassium bicarb-citric acid  40 mEq Oral BID    magnesium oxide  400 mg Oral Daily    multivitamin with iron-minerals  15 mL Oral Daily    bumetanide  1 mg Intravenous BID    amLODIPine  10 mg PEG Tube Daily    albuterol  2.5 mg Nebulization Q4H While awake    [Held by provider] sennosides  5 mL Per G Tube Nightly    heparin flush  3 mL Intravenous 2 times per day    heparin (porcine)  5,000 Units Subcutaneous 3 times per day    chlorhexidine  15 mL Mouth/Throat BID    lansoprazole  30 mg Per G Tube QAM AC    aspirin  81 mg Per G Tube Daily    hydrALAZINE  25 mg PEG Tube 3 times per day    metoprolol tartrate  50 mg PEG Tube BID    sodium chloride (Inhalant)  4 mL Nebulization BID    sodium chloride flush  10 mL Intravenous 2 times per day     Continuous Infusions:    PRN Meds:.sodium chloride flush, heparin flush, acetaminophen, labetalol, magnesium hydroxide      DATA:    CBC:   Lab Results   Component Value Date    WBC 9.3 02/27/2020    RBC 2.77 L. Resolved. · YVETTE stage III on CKD;  volume responsive prerenal YVETTE due to poor oral intake in the setting of ACE inhibition, fraction excretion of sodium 0.7%, fractional secretion urea 20.9%. Resolved. · Hypernatremia, with water deficit, resolved   · Severe thrombocytopenia, resolved   · Hypomagnesemia likely 2/2 poor oral intake, improved  · Alkalemia (pH: 4.396) with metabolic alkalosis    IMPRESSION/RECOMMENDATIONS:      1. YVETTE on CKD, ischemic ATN s/p PEA cardiac arrest on 2/15/20, FeNa 0.62%   · Renal function stable with excellent diuresis in response to bumex. Will continue diuresis. 2. CKD stage I-II, with minimal proteinuria (urine albumin/creatinine: 81 mg/gr). Kidney ultrasound with increased renal cortical echogenicity. Probably due to nephrosclerosis. 3. Hypokalemia 2/2 to poor oral intake and diuresis   4. Hypotonic hyponatremia, 2/2 FW administration. Will decrease free water rate. 5. Status post PEA arrest 2/15/20  6. Respiratory failure status, post intubation, 2/2 pneumonia. Completed piperacillin-tazobactam course. 7. HFpEF 60%  8. HTN, on hydralazine, metoprolol. 9. Group G Streptococcus bacteremia, completed piperacillin-tazobactam course. 10. Normocytic Anemia, iron studies consistent with chronic inflammation, normal B12 and folate levels. S/p 5 units PRBCs. Hemoglobin 8.1 today. 11. Nutrition, s/p PEG on TF 50cc/hr with FW at 30cc/4 hrs. Plan:    · Continue Bumex 1 mg IV BID  · Continue to monitor renal function  · Continue to monitor potassium  · Continue to monitor H&H  · Awaiting family to withdraw care, we will sign off.

## 2020-02-28 NOTE — PLAN OF CARE
Problem: Falls - Risk of:  Goal: Will remain free from falls  Description  Will remain free from falls  2/27/2020 2010 by Lynn Hu RN  Outcome: Met This Shift  2/27/2020 0651 by Bala Coulter RN  Outcome: Met This Shift  Goal: Absence of physical injury  Description  Absence of physical injury  2/27/2020 2010 by Lynn Hu RN  Outcome: Met This Shift  2/27/2020 0651 by Bala Coulter RN  Outcome: Met This Shift     Problem: Risk for Impaired Skin Integrity  Goal: Tissue integrity - skin and mucous membranes  Description  Structural intactness and normal physiological function of skin and  mucous membranes. 2/27/2020 2010 by Lynn Hu RN  Outcome: Met This Shift  2/27/2020 0651 by Bala Coulter RN  Outcome: Met This Shift     Problem: Malnutrition  (NI-5.2)  Goal: Food and/or Nutrient Delivery  Description  Individualized approach for food/nutrient provision.   2/27/2020 1128 by Janeth Hadley RD, LD  Outcome: Met This Shift     Problem: Skin Integrity:  Goal: Will show no infection signs and symptoms  Description  Will show no infection signs and symptoms  Outcome: Met This Shift  Goal: Absence of new skin breakdown  Description  Absence of new skin breakdown  2/27/2020 2010 by Lynn Hu RN  Outcome: Met This Shift  2/27/2020 0651 by Bala Coulter RN  Outcome: Met This Shift     Problem: Injury - Risk of, Physical Injury:  Goal: Will remain free from falls  Description  Will remain free from falls  2/27/2020 2010 by Lynn Hu RN  Outcome: Met This Shift  2/27/2020 0651 by Bala Coulter RN  Outcome: Met This Shift  Goal: Absence of physical injury  Description  Absence of physical injury  2/27/2020 2010 by Lynn Hu RN  Outcome: Met This Shift  2/27/2020 0651 by Bala Coulter RN  Outcome: Met This Shift     Problem: Breathing Pattern - Ineffective:  Goal: Ability to achieve and maintain a regular respiratory rate will improve  Description  Ability to achieve and maintain a regular respiratory rate will improve  2/27/2020 0651 by Romulo Ding RN  Outcome: Met This Shift

## 2020-02-29 PROCEDURE — 2500000003 HC RX 250 WO HCPCS: Performed by: INTERNAL MEDICINE

## 2020-02-29 PROCEDURE — 6370000000 HC RX 637 (ALT 250 FOR IP): Performed by: INTERNAL MEDICINE

## 2020-02-29 PROCEDURE — 6360000002 HC RX W HCPCS: Performed by: STUDENT IN AN ORGANIZED HEALTH CARE EDUCATION/TRAINING PROGRAM

## 2020-02-29 PROCEDURE — 2580000003 HC RX 258: Performed by: STUDENT IN AN ORGANIZED HEALTH CARE EDUCATION/TRAINING PROGRAM

## 2020-02-29 PROCEDURE — 99231 SBSQ HOSP IP/OBS SF/LOW 25: CPT | Performed by: INTERNAL MEDICINE

## 2020-02-29 PROCEDURE — 6370000000 HC RX 637 (ALT 250 FOR IP): Performed by: SURGERY

## 2020-02-29 PROCEDURE — 2500000003 HC RX 250 WO HCPCS

## 2020-02-29 PROCEDURE — 1200000000 HC SEMI PRIVATE

## 2020-02-29 PROCEDURE — 94003 VENT MGMT INPAT SUBQ DAY: CPT

## 2020-02-29 PROCEDURE — 99232 SBSQ HOSP IP/OBS MODERATE 35: CPT | Performed by: SURGERY

## 2020-02-29 PROCEDURE — 6360000002 HC RX W HCPCS

## 2020-02-29 PROCEDURE — 6370000000 HC RX 637 (ALT 250 FOR IP): Performed by: STUDENT IN AN ORGANIZED HEALTH CARE EDUCATION/TRAINING PROGRAM

## 2020-02-29 PROCEDURE — 94640 AIRWAY INHALATION TREATMENT: CPT

## 2020-02-29 RX ORDER — MORPHINE SULFATE 2 MG/ML
INJECTION, SOLUTION INTRAMUSCULAR; INTRAVENOUS
Status: COMPLETED
Start: 2020-02-29 | End: 2020-02-29

## 2020-02-29 RX ORDER — MIDAZOLAM HYDROCHLORIDE 1 MG/ML
0.5 INJECTION INTRAMUSCULAR; INTRAVENOUS
Status: DISCONTINUED | OUTPATIENT
Start: 2020-02-29 | End: 2020-03-03 | Stop reason: HOSPADM

## 2020-02-29 RX ORDER — GLYCOPYRROLATE 0.2 MG/ML
0.2 INJECTION INTRAMUSCULAR; INTRAVENOUS EVERY 4 HOURS PRN
Status: DISCONTINUED | OUTPATIENT
Start: 2020-02-29 | End: 2020-02-29 | Stop reason: SDUPTHER

## 2020-02-29 RX ORDER — MIDAZOLAM HYDROCHLORIDE 1 MG/ML
1 INJECTION INTRAMUSCULAR; INTRAVENOUS
Status: DISCONTINUED | OUTPATIENT
Start: 2020-02-29 | End: 2020-03-03 | Stop reason: HOSPADM

## 2020-02-29 RX ORDER — MORPHINE SULFATE 2 MG/ML
2 INJECTION, SOLUTION INTRAMUSCULAR; INTRAVENOUS
Status: DISCONTINUED | OUTPATIENT
Start: 2020-02-29 | End: 2020-02-29 | Stop reason: SDUPTHER

## 2020-02-29 RX ORDER — GLYCOPYRROLATE 0.2 MG/ML
0.2 INJECTION INTRAMUSCULAR; INTRAVENOUS EVERY 4 HOURS PRN
Status: DISCONTINUED | OUTPATIENT
Start: 2020-02-29 | End: 2020-03-03 | Stop reason: HOSPADM

## 2020-02-29 RX ORDER — LORAZEPAM 2 MG/ML
0.5 INJECTION INTRAMUSCULAR
Status: DISCONTINUED | OUTPATIENT
Start: 2020-02-29 | End: 2020-02-29 | Stop reason: SDUPTHER

## 2020-02-29 RX ORDER — MORPHINE SULFATE 4 MG/ML
4 INJECTION, SOLUTION INTRAMUSCULAR; INTRAVENOUS
Status: DISCONTINUED | OUTPATIENT
Start: 2020-02-29 | End: 2020-03-03 | Stop reason: HOSPADM

## 2020-02-29 RX ORDER — GLYCOPYRROLATE 0.2 MG/ML
INJECTION INTRAMUSCULAR; INTRAVENOUS
Status: COMPLETED
Start: 2020-02-29 | End: 2020-02-29

## 2020-02-29 RX ORDER — MORPHINE SULFATE 2 MG/ML
2 INJECTION, SOLUTION INTRAMUSCULAR; INTRAVENOUS
Status: DISCONTINUED | OUTPATIENT
Start: 2020-02-29 | End: 2020-03-03 | Stop reason: HOSPADM

## 2020-02-29 RX ADMIN — MULTIVITAMIN 15 ML: LIQUID ORAL at 08:14

## 2020-02-29 RX ADMIN — BUMETANIDE 1 MG: 0.25 INJECTION INTRAMUSCULAR; INTRAVENOUS at 08:14

## 2020-02-29 RX ADMIN — MAGNESIUM GLUCONATE 500 MG ORAL TABLET 400 MG: 500 TABLET ORAL at 08:14

## 2020-02-29 RX ADMIN — SODIUM CHLORIDE, PRESERVATIVE FREE 300 UNITS: 5 INJECTION INTRAVENOUS at 08:14

## 2020-02-29 RX ADMIN — MORPHINE SULFATE 2 MG: 2 INJECTION, SOLUTION INTRAMUSCULAR; INTRAVENOUS at 13:10

## 2020-02-29 RX ADMIN — HEPARIN SODIUM 5000 UNITS: 10000 INJECTION INTRAVENOUS; SUBCUTANEOUS at 06:08

## 2020-02-29 RX ADMIN — LANSOPRAZOLE 30 MG: KIT at 05:16

## 2020-02-29 RX ADMIN — ACETAMINOPHEN ORAL SOLUTION 650 MG: 650 SOLUTION ORAL at 11:06

## 2020-02-29 RX ADMIN — ALBUTEROL SULFATE 2.5 MG: 2.5 SOLUTION RESPIRATORY (INHALATION) at 08:58

## 2020-02-29 RX ADMIN — METOPROLOL TARTRATE 50 MG: 50 TABLET, FILM COATED ORAL at 08:14

## 2020-02-29 RX ADMIN — Medication 10 ML: at 08:15

## 2020-02-29 RX ADMIN — ASPIRIN 81 MG 81 MG: 81 TABLET ORAL at 08:14

## 2020-02-29 RX ADMIN — GLYCOPYRROLATE 0.2 MG: 0.2 INJECTION, SOLUTION INTRAMUSCULAR; INTRAVENOUS at 13:10

## 2020-02-29 RX ADMIN — SODIUM CHLORIDE SOLN NEBU 3% 4 ML: 3 NEBU SOLN at 05:37

## 2020-02-29 RX ADMIN — CHLORHEXIDINE GLUCONATE 0.12% ORAL RINSE 15 ML: 1.2 LIQUID ORAL at 08:15

## 2020-02-29 RX ADMIN — HYDRALAZINE HYDROCHLORIDE 25 MG: 25 TABLET, FILM COATED ORAL at 05:16

## 2020-02-29 RX ADMIN — ALBUTEROL SULFATE 2.5 MG: 2.5 SOLUTION RESPIRATORY (INHALATION) at 12:43

## 2020-02-29 RX ADMIN — ALBUTEROL SULFATE 2.5 MG: 2.5 SOLUTION RESPIRATORY (INHALATION) at 05:36

## 2020-02-29 RX ADMIN — AMLODIPINE BESYLATE 10 MG: 10 TABLET ORAL at 08:14

## 2020-02-29 RX ADMIN — POTASSIUM BICARBONATE 40 MEQ: 782 TABLET, EFFERVESCENT ORAL at 08:14

## 2020-02-29 ASSESSMENT — PAIN SCALES - GENERAL
PAINLEVEL_OUTOF10: 0

## 2020-02-29 ASSESSMENT — PULMONARY FUNCTION TESTS
PIF_VALUE: 22
PIF_VALUE: 21
PIF_VALUE: 19
PIF_VALUE: 25
PIF_VALUE: 19
PIF_VALUE: 19
PIF_VALUE: 21
PIF_VALUE: 20
PIF_VALUE: 20
PIF_VALUE: 26
PIF_VALUE: 25
PIF_VALUE: 20
PIF_VALUE: 19
PIF_VALUE: 19

## 2020-02-29 NOTE — PROGRESS NOTES
placement on 02/12. He went into PEA arrest with ROSC after about 13 minutes of resuscitation and was intubated and transferred to SICU. CXR showed stable opacities in the lower lobes. CT head without contrast showed left occipital lobe subacute infarct. Procalcitonin level was elevated at 31 ng/mL. Amoxicillin-clavulanate was switched to piperacillin-tazobactam on 02/19 and discontinued on 02/25. Procalcitonin trended down to 13 ng/mL as of 02/21. Blood cultures showed no growth. Subjective: Patient was seen and examined. He is intubated, awake but gives no meaningful response to questions or commands. Has febrile episodes up to 100.7 F today. Objective:  /74   Pulse 108   Temp 100.2 °F (37.9 °C) (Axillary)   Resp 23   Ht 5' 4\" (1.626 m)   Wt 128 lb 4.8 oz (58.2 kg)   SpO2 100%   BMI 22.02 kg/m²   Constitutional: Intubated, no MV dyssynchrony  Respiratory: Clear breath sounds, no crackles, no wheezes  Cardiovascular: Regular rate and rhythm, no murmurs  Gastrointestinal: Bowel sounds present, soft, nontender  Skin: Warm and dry, no active dermatoses  Musculoskeletal: No joint swelling, no joint erythema    Labs, imaging, and medical records/notes were personally reviewed. Assessment:  Fever, multifactorial, suspect sepsis vs central fever   Leukocytosis  Cardiac arrest  Anoxic encephalopathy  Left occipital lobe subacute infarct  Group G Streptococcus bacteremia, etiology unclear, suspect oropharyngeal in etiology with possible aspiration pneumonia  MSSA pneumonia  History of head and neck cancer, s/p resection and chemoradiation therapy (details unclear)    Recommendations:  Monitor clinically off antibiotics for now. Check blood cultures. Observe aspiration precautions and oral hygiene. Follow up goals of care. Family's plan for withdrawal of care pending arrival of one other family member is noted.     Thank you for involving me in the care of Bigg Osorio. I will continue to follow. Please do not hesitate to call for any questions or concerns.     Electronically signed by Rimma Pillai MD on 2/29/2020 at 9:03 AM

## 2020-02-29 NOTE — PROGRESS NOTES
Respirations remain easy and unlabored on room air. No distress noted. Patient appears comfortable in bed. Family left facility at this time.

## 2020-02-29 NOTE — PROGRESS NOTES
[facility]  Internal Medicine Residency    Internal Medicine     Attending Physician Statement  I have discussed the case, including pertinent history and exam findings with the resident and the team.  I have seen and examined the patient and the key elements of the encounter have been performed by me. I agree with the assessment, plan and orders as documented by the resident. 60 yo M with history of laryngeal cancer noted to have an occipital subacute stroke. 2/5- fall, c/o fatigue  Hypotensive, abnormal labs  2/16- PEA     Patient is currently awake but not alert or oriented. Does not track to voice or commands. Withdraws to pain. Currently febrile with leukocytosis. He has Group G strep and MSSA bacteremia. Patient's family to come in for withdrawal at 12 pm today.     Reinaldo Beltre MD

## 2020-02-29 NOTE — PROGRESS NOTES
[facility]  PROGRESS NOTE  ATTENDING NOTE    CRITICAL CARE    Patient Active Problem List   Diagnosis    Acute renal failure (ARF) ([facility]    Bacteremia    Other dysphagia    Severe protein-calorie malnutrition (HCC)    Acute upper respiratory infection    Elevated troponin    Occipital stroke ([facility]    Hypoxic encephalopathy ([facility]    Palliative care encounter    Goals of care, counseling/discussion     OVERNIGHT EVENTS:  Awaiting family to arrive    HOSPITAL COURSE:  2/16 5yo male with history of smoking, ETOH abuse, multiple head and neck cancers s/p partial mandibular resection ([id number]), esophageal cancer, and R base of tongue cancer s/p hemiglossectomy and chemoradiation with a new Head and Neck cancer. PEA arrest likely from aspiration ( Barium sitting in the back of the through when PEG was placed)  leading to hypoxia or Head and neck cancer obstruction. 2/17 No overnight events   2/18  Hb dropped 5.2 received 2 units PRBC    2/19 No acute overnight issues, patient withdrawing from pain this am and blinking to threat   2/20 No changes overnight   2/21--nothing new overnight  2/22--no new issues  2/23--nothing new overnight  2/25--family discussion; placed on [facility]  2/26--family deciding to withdraw care, but awaiting more family to come to [facility]  2/27--no family here during rounds today; awaiting all family to arrive  2/28 -- terminal extubation    /74   Pulse 111   Temp 100.6 °F (38.1 °C) (Axillary)   Resp 21   Ht 5' 4\" (1.626 m)   Wt 128 lb 4.8 oz (58.2 kg)   SpO2 96%   BMI 22.02 kg/m²   Physical Exam  Constitutional:       General: He is not in acute distress. Appearance: He is normal weight. HENT:      Head: Normocephalic and atraumatic. Nose: Nose normal.      Mouth/Throat:      Mouth: Mucous membranes are moist.      Pharynx: Oropharynx is clear. Eyes:      Extraocular Movements: Extraocular movements intact.       Pupils: Pupils are equal, round, and

## 2020-02-29 NOTE — PLAN OF CARE
Problem: Falls - Risk of:  Goal: Will remain free from falls  Description  Will remain free from falls  Outcome: Met This Shift  Goal: Absence of physical injury  Description  Absence of physical injury  Outcome: Met This Shift     Problem: Risk for Impaired Skin Integrity  Goal: Tissue integrity - skin and mucous membranes  Description  Structural intactness and normal physiological function of skin and  mucous membranes.   Outcome: Met This Shift     Problem: Skin Integrity:  Goal: Will show no infection signs and symptoms  Description  Will show no infection signs and symptoms  Outcome: Met This Shift  Goal: Absence of new skin breakdown  Description  Absence of new skin breakdown  Outcome: Met This Shift     Problem: Injury - Risk of, Physical Injury:  Goal: Will remain free from falls  Description  Will remain free from falls  Outcome: Met This Shift  Goal: Absence of physical injury  Description  Absence of physical injury  Outcome: Met This Shift

## 2020-02-29 NOTE — PROGRESS NOTES
[facility]  Internal Medicine Residency Program  Progress Note - House Team 1    Patient:  Ekaterina Avial 59 y.o. male   MRN: [id number]       Date of Service: 2020    Allergy: Patient has no known allergies. Subjective      I saw and examined the patient in the AM today. Patient is intubated, no changes in his neurologic and mental status. Objective     TEMPERATURE:  Current - Temp: 100.2 °F (37.9 °C); Max - Temp  Av.5 °F (37.5 °C)  Min: 98.2 °F (36.8 °C)  Max: 100.3 °F (37.9 °C)  RESPIRATIONS RANGE: Resp  Av.5  Min: 3  Max: 27  PULSE RANGE: Pulse  Av.7  Min: 101  Max: 124  BLOOD PRESSURE RANGE:  Systolic (13LDT), XXL:099 , Min:120 , XRX:370   ; Diastolic (41SEG), HFR:42, Min:71, Max:103    PULSE OXIMETRY RANGE: SpO2  Av %  Min: 100 %  Max: 100 %    I & O - 24hr:    Intake/Output Summary (Last 24 hours) at 2020 0828  Last data filed at 2020 0700  Gross per 24 hour   Intake 1242 ml   Output 2700 ml   Net -1458 ml     I/O last 3 completed shifts: In: 3109 [NG/GT:1242]  Out: 2825 [Urine:2825] No intake/output data recorded. Weight change: -4 lb 8 oz (-2.041 kg)    Physical Exam  Vitals signs and nursing note reviewed. Constitutional:       Appearance: He is normal weight. He is ill-appearing. Interventions: He is intubated. HENT:      Head: Normocephalic and atraumatic. Right Ear: External ear normal.      Left Ear: External ear normal.      Nose: Nose normal.      Mouth/Throat:      Mouth: Mucous membranes are moist.   Eyes:      General: Lids are normal. No scleral icterus. Right eye: No discharge. Left eye: No discharge. Conjunctiva/sclera: Conjunctivae normal.      Pupils: Pupils are equal.      Right eye: Pupil is not reactive (Pinpoint pupil). Pupil is not sluggish. Left eye: Pupil is not reactive (Pinpoint pupil). Pupil is not sluggish.    Cardiovascular:      Rate and Rhythm: Normal rate and regular rhythm. Pulses: Normal pulses. Carotid pulses are 2+ on the right side and 2+ on the left side. Radial pulses are 2+ on the right side and 2+ on the left side. Dorsalis pedis pulses are 2+ on the right side and 2+ on the left side. Heart sounds: S1 normal and S2 normal. No gallop. No S3 sounds. Pulmonary:      Effort: Pulmonary effort is normal. He is intubated. Breath sounds: Normal air entry. Transmitted upper airway sounds present. No decreased breath sounds, wheezing, rhonchi or rales. Musculoskeletal:      Right lower leg: No edema. Left lower leg: No edema. Comments: UE spasticity bilaterally. Neurological:      Comments: Cough and gag reflex present. Labs and Imaging Studies     CBC:   Recent Labs     02/26/20  1633 02/27/20  0445   WBC  --  9.3   HGB 8.5* 8.1*   HCT 26.4* 24.7*   MCV  --  89.2   PLT  --  176       BMP:    Recent Labs     02/27/20  0445      K 4.1   CL 94*   CO2 28   BUN 22   CREATININE 1.0   GLUCOSE 125*       LIVER PROFILE:   No results for input(s): AST, ALT, LIPASE, BILIDIR, BILITOT, ALKPHOS in the last 72 hours. Invalid input(s): AMYLASE,  ALB    PT/INR:   No results for input(s): PROTIME, INR in the last 72 hours. APTT:   No results for input(s): APTT in the last 72 hours. Fasting Lipid Panel:    Lab Results   Component Value Date    HDL 24 02/18/2020       Notable Cultures:      Blood cultures   Blood Culture, Routine   Date Value Ref Range Status   02/18/2020 5 Days- no growth  Final     Respiratory cultures No results found for: RESPCULTURE No results found for: LABGRAM  Urine   Urine Culture, Routine   Date Value Ref Range Status   02/05/2020 <10,000 CFU/mL  Mixed gram positive organisms    Final     Legionella No results found for: LABLEGI  C Diff PCR No results found for: CDIFPCR  Wound culture/abscess: No results for input(s): WNDABS in the last 72 hours.   Tip culture:No results for input(s): CXCATHTIP in the last 72 hours. Xr Chest Standard (2 Vw)    Result Date: 2020  Patient MRN: [id number] : 1955 Age:  59 years Gender: Male Order Date: 2020 10:15 AM Exam: XR CHEST (2 VW) Number of Images: 2 views Indication:   Sepsis, sob Sepsis, sob Comparison: None. Findings: The heart is unremarkable The lung fields demonstrate no significant pulmonary vascular congestion and edema. The aorta is tortuous ectatic There are findings throughout the lung fields which are likely chronic     Findings compatible with atherosclerotic disease No acute infiltrate     Xr Foot Left (min 3 Views)    Result Date: 2020  Patient MRN:  [id number] : 1955 Age: 59 years Gender: Male Order Date:  2020 2:45 PM EXAM: XR FOOT LEFT (MIN 3 VIEWS) NUMBER OF IMAGES:  3 views INDICATION:  pain, swelling pain, swelling COMPARISON: None . The bones appear to be in anatomic alignment. No foreign body is identified No fracture is identified  There is moderate joint space loss The are moderate degenerative changes present     Moderate degenerative changes    Ct Soft Tissue Neck W Wo Contrast    Result Date: 2020  Patient MRN:  [id number] : 1955 Age: 59 years Gender: Male Order Date:  2020 3:45 PM EXAM: CT SOFT TISSUE NECK W WO CONTRAST NUMBER OF IMAGES \ views:  36 INDICATION:  h/o R mandible/base of tongue ca, sp resection and chemorads, concern for recurrence Per nephro team ok to do scan with contrast h/o R mandible/base of tongue ca, sp resection and chemorads, concern for recurrence COMPARISON: None Technique: Low-dose CT  acquisition technique included one of following options; 1 . Automated exposure control, 2. Adjustment of MA and or KV according to patient's size or 3. Use of iterative reconstruction. The larynx appears unremarkable The soft tissues appear abnormal there is sequela of extensive prior surgery. No convincing abscess is seen.  There is distortion of the oropharynx and the posterior 2020  Patient MRN: [id number] : 1955 Age:  59 years Gender: Male Order Date: 2/10/2020 1:45 PM Exam: FL MODIFIED BARIUM SWALLOW W VIDEO Number of Images: 1 Indication:   dysphagia Comparison: None. Fluoroscopy time: 1.1 minute Findings: Textures given, nectar Aspiration, nectar Laryngeal penetration, nectar Cough, None Oral delay, Yes Retention in vallecula, Yes Retention in piriform sinuses, None. Pharyngeal delay, Yes Laryngeal elevation, reduced. Study is remarkable for silent aspiration with nectar consistency. This procedure was performed and dictated by Vikki Ayala PA-C with indirect supervision, and Cristino Poole. [facility] MD reviewed and concurred with the findings. Fl Modified Barium Swallow W Video    Result Date: 2020  Patient MRN: [id number] : 1955 Age:  59 years Gender: Male Order Date: 2020 7:45 AM Exam: FL MODIFIED BARIUM SWALLOW W VIDEO Number of Images: views Indication:   dysphagia dysphagia Comparison: None. Findings: Patient was given nectar and pudding patient is unable to swallow with no gag reflex. Therefore patient was suctioned and the study was canceled.      Patient unable to swallow nectar and pudding which was suctioned         Medications     Current Meds:  Current Facility-Administered Medications   Medication Dose Route Frequency Provider Last Rate Last Dose    potassium bicarb-citric acid (EFFER-K) effervescent tablet 40 mEq  40 mEq Oral BID Cat Cobos MD   40 mEq at 20 0814    magnesium oxide (MAG-OX) tablet 400 mg  400 mg Oral Daily Kraig Martinez MD   400 mg at 20 6040    multivitamin with iron-minerals liquid 15 mL  15 mL Oral Daily Kraig Martinez MD   15 mL at 20 0814    bumetanide (BUMEX) injection 1 mg  1 mg Intravenous BID Abdirahman Kent MD   1 mg at 20 0814    amLODIPine (NORVASC) tablet 10 mg  10 mg PEG Tube Daily Faviola Antoine MD   10 mg at 20 0814    albuterol (PROVENTIL) nebulizer solution 2.5 mg  2.5 mg Nebulization Q4H While awake Diane Hobson MD   2.5 mg at 02/29/20 0536    [Held by provider] sennosides (SENOKOT) 8.8 MG/5ML syrup 5 mL  5 mL Per G Tube Nightly Diane Hobson MD   5 mL at 02/24/20 2005    sodium chloride flush 0.9 % injection 10 mL  10 mL Intravenous PRN Diane Hobson MD   10 mL at 02/26/20 1633    heparin flush 100 UNIT/ML injection 300 Units  3 mL Intravenous 2 times per day Diane Hobson MD   300 Units at 02/29/20 0814    heparin flush 100 UNIT/ML injection 300 Units  3 mL Intracatheter PRN Diane Hobsno MD        heparin (porcine) injection 5,000 Units  5,000 Units Subcutaneous 3 times per day Diane Hobson MD   5,000 Units at 02/29/20 0608    acetaminophen (TYLENOL) 160 MG/5ML solution 650 mg  650 mg PEG Tube Q4H PRN Nadia Tabares MD   650 mg at 02/27/20 0550    chlorhexidine (PERIDEX) 0.12 % solution 15 mL  15 mL Mouth/Throat BID Diane Hobson MD   15 mL at 02/29/20 0815    lansoprazole suspension SUSP 30 mg  30 mg Per G Tube [facility] Oz Jr., DO   30 mg at 02/29/20 0188    aspirin chewable tablet 81 mg  81 mg Per G Tube Daily Kalpana Waggoner MD   81 mg at 02/29/20 6137    hydrALAZINE (APRESOLINE) tablet 25 mg  25 mg PEG Tube 3 times per day Tj Brennan MD   25 mg at 02/29/20 0516    metoprolol tartrate (LOPRESSOR) tablet 50 mg  50 mg PEG Tube BID Saroj Schumacher MD   50 mg at 02/29/20 0814    sodium chloride (Inhalant) 3 % nebulizer solution 4 mL  4 mL Nebulization BID Delphine Mathew DO   Stopped at 02/29/20 0800    labetalol (NORMODYNE;TRANDATE) injection 10 mg  10 mg Intravenous Q6H PRN Saqib CASTILLO Capal, DO   10 mg at 02/22/20 1602    sodium chloride flush 0.9 % injection 10 mL  10 mL Intravenous 2 times per day Jose CASTILLO Capal, DO   10 mL at 02/29/20 0815    magnesium hydroxide (MILK OF MAGNESIA) 400 MG/5ML suspension 30 mL  30 mL Oral Daily PRN Saqib Solis,            Continuous Infusions:    Scheduled Meds:   potassium Possible withdrawl this afternoon. # Peptic ulcer prophylaxis: Lansoprazole. # DVT Prophylaxis: Heparin  # Disposition: Cont current care.        [address], DO  PGY-1    Internal medicine resident    Attending Physician: Dr. Luis Gatica

## 2020-03-01 PROCEDURE — 1200000000 HC SEMI PRIVATE

## 2020-03-01 PROCEDURE — 99231 SBSQ HOSP IP/OBS SF/LOW 25: CPT | Performed by: INTERNAL MEDICINE

## 2020-03-01 PROCEDURE — 2500000003 HC RX 250 WO HCPCS: Performed by: STUDENT IN AN ORGANIZED HEALTH CARE EDUCATION/TRAINING PROGRAM

## 2020-03-01 PROCEDURE — 99231 SBSQ HOSP IP/OBS SF/LOW 25: CPT | Performed by: SURGERY

## 2020-03-01 PROCEDURE — 6360000002 HC RX W HCPCS: Performed by: STUDENT IN AN ORGANIZED HEALTH CARE EDUCATION/TRAINING PROGRAM

## 2020-03-01 RX ADMIN — MIDAZOLAM 0.5 MG: 1 INJECTION INTRAMUSCULAR; INTRAVENOUS at 11:11

## 2020-03-01 RX ADMIN — GLYCOPYRROLATE 0.2 MG: 0.2 INJECTION, SOLUTION INTRAMUSCULAR; INTRAVENOUS at 10:55

## 2020-03-01 RX ADMIN — MORPHINE SULFATE 2 MG: 2 INJECTION, SOLUTION INTRAMUSCULAR; INTRAVENOUS at 11:11

## 2020-03-01 ASSESSMENT — PAIN SCALES - GENERAL
PAINLEVEL_OUTOF10: 0

## 2020-03-01 NOTE — PROGRESS NOTES
Called ICU spoke with bedside nurse, Peyton Pichardo. She told me family is not present. I informed Peyton Pichardo I just got to [facility] to visit a patient. Peyton Pichardo gave me sister, Cinda Sorensen, phone [phone]. Will call her when I am finished with my next patient to set up time to discuss hospice. This may be tomorrow.

## 2020-03-01 NOTE — PROGRESS NOTES
Marylee Flores from [address] [place] [address] called back and given patients sisters phone number. Stated she would contact family and set up a meeting for tomorrow morning.

## 2020-03-01 NOTE — PROGRESS NOTES
Normal pulses. Carotid pulses are 2+ on the right side and 2+ on the left side. Radial pulses are 2+ on the right side and 2+ on the left side. Dorsalis pedis pulses are 2+ on the right side and 2+ on the left side. Heart sounds: S1 normal and S2 normal. No gallop. No S3 sounds. Pulmonary:      Effort: Pulmonary effort is normal. He is intubated. Breath sounds: Normal air entry. Transmitted upper airway sounds present. No decreased breath sounds, wheezing, rhonchi or rales. Musculoskeletal:      Right lower leg: No edema. Left lower leg: No edema. Comments: UE spasticity bilaterally. Neurological:      Comments: Cough and gag reflex present. Labs and Imaging Studies     CBC:   No results for input(s): WBC, HGB, HCT, MCV, PLT in the last 72 hours. BMP:    No results for input(s): NA, K, CL, CO2, BUN, CREATININE, GLUCOSE in the last 72 hours. LIVER PROFILE:   No results for input(s): AST, ALT, LIPASE, BILIDIR, BILITOT, ALKPHOS in the last 72 hours. Invalid input(s): AMYLASE,  ALB    PT/INR:   No results for input(s): PROTIME, INR in the last 72 hours. APTT:   No results for input(s): APTT in the last 72 hours. Fasting Lipid Panel:    Lab Results   Component Value Date    HDL 24 2020       Notable Cultures:      Blood cultures   Blood Culture, Routine   Date Value Ref Range Status   2020 5 Days- no growth  Final     Respiratory cultures No results found for: RESPCULTURE No results found for: LABGRAM  Urine   Urine Culture, Routine   Date Value Ref Range Status   2020 <10,000 CFU/mL  Mixed gram positive organisms    Final     Legionella No results found for: LABLEGI  C Diff PCR No results found for: CDIFPCR  Wound culture/abscess: No results for input(s): WNDABS in the last 72 hours. Tip culture:No results for input(s): CXCATHTIP in the last 72 hours.     Xr Chest Standard (2 Vw)    Result Date: 2020  Patient MRN: [id number] : distinguish recurrent neoplasm from previous radiation therapy. Tumor invasion versus osteonecrosis of the medial right mandible ALERT:  THIS IS AN ABNORMAL REPORT      Us Head Neck Soft Tissue Thyroid    Result Date: 2020  Patient MRN:  [id number] : 1955 Age: 59 years Gender: Male Order Date:  2020 2:15 PM Exam: US HEAD NECK SOFT TISSUE THYROID Number of images:  26 Indication:  right carotid bifurcation mass With special focus to soft tissue mass at the right carotid bifurcation. right carotid bifurcation mass Comparison: Correlation with CT dated 2020. Technique: Grayscale and color Doppler ultrasound images of the region of interest were obtained by the sonographic technologist. FINDINGS: The study is limited by technically suboptimal image quality. There is abnormal heterogeneous soft tissue in the region of interest.     Ill-defined, heterogeneous, abnormal appearing soft tissue in the region of interest. No specific clinical question is asked. Ultrasound is not the appropriate modality for this evaluation. Contrast-enhanced MRI may provide more information. Correlation with clinical and surgical history is needed. Xr Chest Portable    Result Date: 2020  Patient MRN:  [id number] : 1955 Age: 59 years Gender: Male Order Date:  2020 6:00 AM EXAM: XR CHEST PORTABLE COMPARISON: February 10, 2020 INDICATION:  PNA PNA FINDINGS: There are opacities at the left lung base silhouetting left hemidiaphragm which appears similar since prior. The heart is normal size. No pneumothorax. Stable opacities at left lung base. Continued follow-up could be helpful for further evaluation. Xr Chest Portable    Result Date: 2/10/2020  Patient MRN: [id number] : 1955 Age:  59 years Gender: Male Order Date: 2/10/2020 3:00 PM Exam: XR CHEST PORTABLE Number of Images: 1 view Indication:   SOB SOB Comparison: 2020 Findings: The heart is enlarged.  The lung fields demonstrate evidence for airspace disease. The aorta is tortuous and ectatic. Tortuous ectatic aorta Cardiomegaly Airspace disease compatible with atelectasis or pneumonia, worse at the left lung base as compared to the right The chest appears to be worse in the interval     Xr Chest 1 Vw    Result Date: 2020  Patient MRN: [id number] : 1955 Age:  59 years Gender: Male Order Date: 2020 6:00 AM Exam: XR CHEST 1 VIEW Number of Images: 1 view Indication: Acute weakness. Comparison: None. FINDINGS: Heart and pulmonary vascularity normal. Lungs clear. Costophrenic angles sharp. Normal aorta. No acute cardiopulmonary findings. Us Retroperitoneal Complete    Result Date: 2020  Patient Mrn: [id number] : 1955 Age:  59 years Gender: Male Order Date: 2020 7:45 PM Exam: US RETROPERITONEAL COMPLETE Number Of Images: 54 Views Indication:  Acute renal insufficiency Comparison: None. TECHNIQUE: 2-D grayscale and color Doppler imaging were utilized for evaluation of the kidneys and bladder. Findings: The right kidney measures 11.8 x 5.5 x 6.3 cm, and the left kidney measures 11.5 x 5.6 x 5.9 cm. There is no evidence of collecting system calcification, obstructive dilation, or perinephric inflammatory change. Cortical echogenicity is increased bilaterally,. Suggesting the spectrum of medical renal disease. Central perfusion is intact bilaterally by color Doppler imaging, however. There is some perinephric fluid bilaterally. The bladder is decompressed with Sanchez catheter, and is not further characterized. Probable bilateral medical renal disease with some perinephric fluid bilaterally that could indicate inflammation. There is no evidence of calcification or obstruction in either kidney. The bladder was not evaluated due to a decompressing Sanchez catheter.      Fl Modified Barium Swallow W Video    Result Date: 2020  Patient MRN: [id number] : 1955 Age:  59 years Gender: Male Order Date: Meds: glycopyrrolate, morphine **OR** morphine, midazolam **OR** midazolam, heparin flush      Resident's Assessment and Plan     Maryan Cano is 59 y.o. male was admitted on 2/5/2020 due to fatigue, unable to eat and had an episode of a fall. He was found to be hypotensive, thrombocytopenic (platelet 94,922) and in YVETTE. He has baseline Lt. Sided weakness and slurred speech. Had a PEA arrest on 2/16/2020. Was intubated following ROSC and transferred to the SICU. Maryan Cano has a PH of laryngeal cancer s/p laryngectomy (2005) with chemo and radio, HTN, H/O CVA with residual Lt. Weakness and slurred speech, smoker and active alcohol abuser. Diagnosis Date    [facility])     mouth    Hip fracture ([facility] [address]     Hypertension      <Cardiovascular>  PEA arrest s/p ROSC  2/2 ? Hypoxia 2/2 neck mass vs upper airway collapse vs acute anemia   Management per surgical ICU. EEG >> diffuse encephalopathy   MRI brain left occipital lesion (previously Treated in CT scan, no changes)  MRA head was unremarkable. No DVT on US BL LE     Hypertension and Tachycardia (resolving). Currently on hydralazine 25mg Q8H, metoprolol 50mg BID. Not tachycardic today. <Neurologic>  Acute Encephalopathy  2/2 anoxic brain injury. Cough and gag reflex present. Pupils: non reactive, withdrawing to pain in 4 extremities. Management per SICU  MRI brain left occipital lesion (previously Treated in CT scan, no changes)  MRA head was unremarkable. H/O CVA with residual Lt. Weakness - Stable  Aspirin placed on hold due to decrease in hemoglobin. TSH level was high, normal T4 level, low free T3 level, euthyroid sick syndrome. <Pulmonary>  Acute hypoxic hypercapnic respiratory failure  2/2 PEA arrest  Currently intubated and on the vent AC mode, on 40%. LTAC (tracheostomy and PEG tube), versus terminal extubation, family will decide, palliative on board.       Pneumonia  2/2 MSSA  CXR: stable   ID on board, currently on Zosyn.  Chest vest BID. Nebulization with ipratropium-albuterol Q4H and 3% NaCl nebulization BID.     <Gastrointestinal>  Dysphagia s/p PEG placement (2/12/2020)  2/2 Neck mass   Currently on tube feeds  CBC daily. Severe Malnutrition  2/2 Laryngeal Ca, dysphagia  Per dietitian, pt. Meets criteria for severe malnutrition. Currently on tube feeds    <Infectious Disease>  Group G Streptococcus Bacteremia - Resolved  Echo showed no vegetations. Repeat culture -ve. <Genitourinary/Renal>  YVETTE Stage III on CKD   Ischemic secondary to ATN after PEA cardiac arrest.  Nonoliguric. Nephrology on board, creatinine has been stable. Nephrology given 2 doses of 1 mg Bumex yesterday, will continue to diurese today. Chest x-ray today showing less signs of pulmonary vascular congestion. <Hematology/Oncology>  Acute Normocytic Anemia  S/P 4 U PRBC. Stable hemoglobin. Soft Tissue Mass  2/2 possible recurrence of laryngeal Ca. CT and USG suggestive of mass at Rt. Carotic bifurcation - 2.3 x 2.7 cm. Thrombocytopenia -resolved  2/2 Sepsis vs alcohol use      H/O Alcohol Abuse - Stable  Currently on thiamine 909NE OD and folic acid 1mg OD. Terminally extubated 2/29. Resting comfortably. Breathing easy on room air. Hospice consulted. Receiving PRN Robinul, versed and morphine. # Peptic ulcer prophylaxis:  # DVT Prophylaxis: heparin  # Disposition: Hospice care.       [address], DO  PGY-1    Internal medicine resident    Attending Physician: Dr. Luis Gatica

## 2020-03-01 NOTE — PROGRESS NOTES
Patients family called and stated they were unsure if they made the right decision about withdrawing care and making patient a DNR-CC. Perfect Serve message sent to palliative medicine regarding situation, and families wishes to speak with them.

## 2020-03-01 NOTE — PROGRESS NOTES
Patient with moist cough/secretions. Medicated with robinul per prn order at this time. Will continue to monitor.

## 2020-03-01 NOTE — PROGRESS NOTES
PREOPERATIVE DIAGNOSIS:  Elevated prostate-specific antigen. POSTOPERATIVE DIAGNOSIS:  Elevated prostate-specific antigen. PROCEDURES PERFORMED:  Transrectal ultrasound, ultrasound needle guidance,  and prostate biopsy. SURGEON:  Zola Gary, M.D. Lavenia Heimlich: None      ANESTHESIA:  MAC      ESTIMATED BLOOD LOSS:  Minimal.      SPECIMENS REMOVED:  Right apex, right base, right mid, left apex, left  base, and left mid of prostate. FINDINGS:  Very heterogeneous prostate. There was a 71.11 mL in size prostate. COMPLICATIONS:  None. IMPLANTS:  None. CLINICAL NOTE:  The patient is a very pleasant 70 year-old gentleman with an elevated PSA. DESCRIPTION OF PROCEDURE:  Preoperatively, the risks and benefits of  surgery were described to the patient. The risks include, but are not  limited to, bleeding, infection, and possible need for a future procedure. The patient assumed the risks and signed the informed consent. The patient  was taken to the operating room and placed on the OR table in supine  position. He was administered a MAC anesthetic. He was administered  intravenous antibiotics. He was placed in dorsolithotomy position. A  transrectal ultrasound probe was then inserted transanally without  difficulty into the rectum. Ultrasound pictures were taken of the prostate  in transverse and longitudinal views. The prostate was measured using the  standard Eliptoid formula and was found to be 71.11 mL in size. The  ultrasound views were obtained of the seminal vesicles, which were normal;  the transition zone, which was normal; the right periprosthetic tissue,  which was normal; and the left periprosthetic tissue, which was normal.  The left apex, mid and base, showed no hypoechoic lesions or areas of  abnormality. At this point, using ultrasound guidance, biopsies were taken. At the left base, 2 biopsies were taken. At the left mid 2 biopsies were taken.  At the Transition to hospice is noted. Thank you for involving me in the care of Amie Glaser. I will sign off for now. Please do not hesitate to call for any questions, concerns, or new findings. left apex 2 were taken. Then, 2 biopsies were taken at the  right base, 2 biopsies were taken at the right mid, and 2 biopsies  taken at the right apex. At this point, the ultrasound probe was removed. There was no significant bleeding. The patient was taken out of lithotomy  position, revived from anesthesia, and taken to recovery in stable condition.       Chaz Quintero MD

## 2020-03-01 NOTE — PROGRESS NOTES
[facility] called and notified of referral. Hernan from hospice stated she would pass along patients information to the liaison, but someone may not be able to make contact until tomorrow morning. Will inform physician.

## 2020-03-02 PROCEDURE — 6370000000 HC RX 637 (ALT 250 FOR IP): Performed by: INTERNAL MEDICINE

## 2020-03-02 PROCEDURE — 6360000002 HC RX W HCPCS: Performed by: STUDENT IN AN ORGANIZED HEALTH CARE EDUCATION/TRAINING PROGRAM

## 2020-03-02 PROCEDURE — 1200000000 HC SEMI PRIVATE

## 2020-03-02 PROCEDURE — 99231 SBSQ HOSP IP/OBS SF/LOW 25: CPT | Performed by: INTERNAL MEDICINE

## 2020-03-02 RX ORDER — ACETAMINOPHEN 650 MG/1
650 SUPPOSITORY RECTAL EVERY 4 HOURS PRN
Status: DISCONTINUED | OUTPATIENT
Start: 2020-03-02 | End: 2020-03-03 | Stop reason: HOSPADM

## 2020-03-02 RX ADMIN — MORPHINE SULFATE 2 MG: 2 INJECTION, SOLUTION INTRAMUSCULAR; INTRAVENOUS at 15:53

## 2020-03-02 RX ADMIN — MORPHINE SULFATE 2 MG: 2 INJECTION, SOLUTION INTRAMUSCULAR; INTRAVENOUS at 03:14

## 2020-03-02 RX ADMIN — ACETAMINOPHEN 650 MG: 650 SUPPOSITORY RECTAL at 15:54

## 2020-03-02 RX ADMIN — MORPHINE SULFATE 2 MG: 2 INJECTION, SOLUTION INTRAMUSCULAR; INTRAVENOUS at 22:46

## 2020-03-02 RX ADMIN — MIDAZOLAM 0.5 MG: 1 INJECTION INTRAMUSCULAR; INTRAVENOUS at 09:36

## 2020-03-02 RX ADMIN — MORPHINE SULFATE 2 MG: 2 INJECTION, SOLUTION INTRAMUSCULAR; INTRAVENOUS at 09:37

## 2020-03-02 RX ADMIN — ACETAMINOPHEN 650 MG: 650 SUPPOSITORY RECTAL at 09:30

## 2020-03-02 ASSESSMENT — PAIN SCALES - GENERAL
PAINLEVEL_OUTOF10: 0
PAINLEVEL_OUTOF10: 3
PAINLEVEL_OUTOF10: 2
PAINLEVEL_OUTOF10: 0

## 2020-03-02 NOTE — PROGRESS NOTES
[address] [facility]  Internal Medicine Residency / [address]    Attending Physician Statement  I have discussed the case, including pertinent history and exam findings with the resident and the team.  I have seen and examined the patient and the key elements of the encounter have been performed by me. I agree with the assessment, plan and orders as documented by the resident. Still unresponsive  And temp up  Extubated and stable  Plan; Comfort measures with Hospice    Remainder of medical problems as per resident note.       [facility]  Internal Medicine Residency Faculty

## 2020-03-02 NOTE — PROGRESS NOTES
not reactive (Pinpoint pupil). Pupil is not sluggish. Cardiovascular:      Rate and Rhythm: Normal rate and regular rhythm. Pulses: Normal pulses. Carotid pulses are 2+ on the right side and 2+ on the left side. Radial pulses are 2+ on the right side and 2+ on the left side. Dorsalis pedis pulses are 2+ on the right side and 2+ on the left side. Heart sounds: S1 normal and S2 normal. No gallop. No S3 sounds. Pulmonary:      Effort: Pulmonary effort is normal.      Breath sounds: Normal air entry. Transmitted upper airway sounds present. No decreased breath sounds, wheezing, rhonchi or rales. Musculoskeletal:      Right lower leg: No edema. Left lower leg: No edema. Comments: UE spasticity bilaterally. Neurological:      Comments: Withdraws from pain, does not follow commands        Labs and Imaging Studies     CBC:   No results for input(s): WBC, HGB, HCT, MCV, PLT in the last 72 hours. BMP:    No results for input(s): NA, K, CL, CO2, BUN, CREATININE, GLUCOSE in the last 72 hours. LIVER PROFILE:   No results for input(s): AST, ALT, LIPASE, BILIDIR, BILITOT, ALKPHOS in the last 72 hours. Invalid input(s): AMYLASE,  ALB    PT/INR:   No results for input(s): PROTIME, INR in the last 72 hours. APTT:   No results for input(s): APTT in the last 72 hours. Fasting Lipid Panel:    Lab Results   Component Value Date    HDL 24 02/18/2020       Notable Cultures:      Blood cultures   Blood Culture, Routine   Date Value Ref Range Status   02/18/2020 5 Days- no growth  Final     Respiratory cultures No results found for: RESPCULTURE No results found for: LABGRAM  Urine   Urine Culture, Routine   Date Value Ref Range Status   02/05/2020 <10,000 CFU/mL  Mixed gram positive organisms    Final     Legionella No results found for: LABLEGI  C Diff PCR No results found for: CDIFPCR  Wound culture/abscess: No results for input(s): WNDABS in the last 72 hours.   Tip distortion of the oropharynx and the posterior superior hypopharynx there is significant distortion of the right sternocleidomastoid and a pattern compatible with previous radical neck dissection. There are findings to suggest previous therapy. The right jugular vein is not seen likely status post resection. There is been resection at the level of the tongue base and floor the mouth. There is sequela of previous resection. . Atherosclerotic disease of both proximal internal carotid arteries is identified without proximal internal carotid artery hemodynamically significant stenosis. At the level the distal right internal carotid artery at the level the cervical component there is evidence to suggest severe stenosis. Also the level of the carotid bifurcation on the right there is a soft tissue mass effect suspicious for recurrent neoplasm. This measures approximately 2.7 x 2.3 cm. This could also be related to previous therapy. Comparison studies would be necessary. There is a mottled eaten appearance to the medial aspect of the right mandible. This could be related to tumor invasion or osteonecrosis. Scattered lymph nodes are visualized which do not meet the CT criteria for adenopathy. There is mixed density in the right thyroid possibly representing a thyroid nodule measuring approximately 1 cm. There is density scattered throughout the sinuses possibly related to mild chronic sinusitis. There is likely a right sphenoid retention cyst     Probable prior right radical neck dissection with sequela of prior surgery and radiation therapy.  There is extensive soft tissue distortion which is suggestive of previous therapy Finds compatible with a approximately 1 cm right thyroid nodule There is evidence to suggest severe stenosis of the right distal internal carotid artery Soft tissue mass at the level the carotid bifurcation on the right with outward mass effect measuring approximately 2.3 x 2.7 cm suspicious for recurrent disease. This encases the carotid distally and the distal carotid stenosis may represent invasion. Comparison studies would be necessary to distinguish recurrent neoplasm from previous radiation therapy. Tumor invasion versus osteonecrosis of the medial right mandible ALERT:  THIS IS AN ABNORMAL REPORT      Us Head Neck Soft Tissue Thyroid    Result Date: 2020  Patient MRN:  [id number] : 1955 Age: 59 years Gender: Male Order Date:  2020 2:15 PM Exam: US HEAD NECK SOFT TISSUE THYROID Number of images:  26 Indication:  right carotid bifurcation mass With special focus to soft tissue mass at the right carotid bifurcation. right carotid bifurcation mass Comparison: Correlation with CT dated 2020. Technique: Grayscale and color Doppler ultrasound images of the region of interest were obtained by the sonographic technologist. FINDINGS: The study is limited by technically suboptimal image quality. There is abnormal heterogeneous soft tissue in the region of interest.     Ill-defined, heterogeneous, abnormal appearing soft tissue in the region of interest. No specific clinical question is asked. Ultrasound is not the appropriate modality for this evaluation. Contrast-enhanced MRI may provide more information. Correlation with clinical and surgical history is needed. Xr Chest Portable    Result Date: 2020  Patient MRN:  [id number] : 1955 Age: 59 years Gender: Male Order Date:  2020 6:00 AM EXAM: XR CHEST PORTABLE COMPARISON: February 10, 2020 INDICATION:  PNA PNA FINDINGS: There are opacities at the left lung base silhouetting left hemidiaphragm which appears similar since prior. The heart is normal size. No pneumothorax. Stable opacities at left lung base. Continued follow-up could be helpful for further evaluation.     Xr Chest Portable    Result Date: 2/10/2020  Patient MRN: [id number] : 1955 Age:  59 years Gender: Male Order Date: 2/10/2020 3:00 PM Exam: XR CHEST PORTABLE Number of Images: 1 view Indication:   SOB SOB Comparison: 2020 Findings: The heart is enlarged. The lung fields demonstrate evidence for airspace disease. The aorta is tortuous and ectatic. Tortuous ectatic aorta Cardiomegaly Airspace disease compatible with atelectasis or pneumonia, worse at the left lung base as compared to the right The chest appears to be worse in the interval     Xr Chest 1 Vw    Result Date: 2020  Patient MRN: [id number] : 1955 Age:  59 years Gender: Male Order Date: 2020 6:00 AM Exam: XR CHEST 1 VIEW Number of Images: 1 view Indication: Acute weakness. Comparison: None. FINDINGS: Heart and pulmonary vascularity normal. Lungs clear. Costophrenic angles sharp. Normal aorta. No acute cardiopulmonary findings. Us Retroperitoneal Complete    Result Date: 2020  Patient Mrn: [id number] : 1955 Age:  59 years Gender: Male Order Date: 2020 7:45 PM Exam: US RETROPERITONEAL COMPLETE Number Of Images: 54 Views Indication:  Acute renal insufficiency Comparison: None. TECHNIQUE: 2-D grayscale and color Doppler imaging were utilized for evaluation of the kidneys and bladder. Findings: The right kidney measures 11.8 x 5.5 x 6.3 cm, and the left kidney measures 11.5 x 5.6 x 5.9 cm. There is no evidence of collecting system calcification, obstructive dilation, or perinephric inflammatory change. Cortical echogenicity is increased bilaterally,. Suggesting the spectrum of medical renal disease. Central perfusion is intact bilaterally by color Doppler imaging, however. There is some perinephric fluid bilaterally. The bladder is decompressed with Sanchez catheter, and is not further characterized. Probable bilateral medical renal disease with some perinephric fluid bilaterally that could indicate inflammation. There is no evidence of calcification or obstruction in either kidney. The bladder was not evaluated due to a decompressing Sanchez catheter.      Fl 0.5 mg at 03/02/20 9720    Or    midazolam (VERSED) injection 1 mg  1 mg Intravenous Q15 Min PRN Marimar Chang MD        heparin flush 100 UNIT/ML injection 300 Units  3 mL Intracatheter PRN Marimar Chang MD           Continuous Infusions:    Scheduled Meds:    PRN Meds: acetaminophen, glycopyrrolate, morphine **OR** morphine, midazolam **OR** midazolam, heparin flush      Resident's Assessment and Plan     Willow Schilling is 59 y.o. male was admitted on 2/5/2020 due to fatigue, unable to eat and had an episode of a fall. He was found to be hypotensive, thrombocytopenic (platelet 63,816) and in YVETTE. He has baseline Lt. Sided weakness and slurred speech. Had a PEA arrest on 2/16/2020. Was intubated following ROSC and transferred to the SICU. Willow Schilling has a PH of laryngeal cancer s/p laryngectomy (2005) with chemo and radio, HTN, H/O CVA with residual Lt. Weakness and slurred speech, smoker and active alcohol abuser. <Cardiovascular>  PEA arrest s/p ROSC  2/2 ? Hypoxia 2/2 neck mass vs upper airway collapse vs acute anemia   EEG >> diffuse encephalopathy   MRI brain left occipital lesion (previously Treated in CT scan, no changes)  MRA head was unremarkable. No DVT on US BL LE     Hypertension and Tachycardia (resolving). Currently on hydralazine 25mg Q8H, metoprolol 50mg BID. Not tachycardic today. <Neurologic>  Acute Encephalopathy  2/2 anoxic brain injury. Terminal extubation on 2/29  Pupils: non reactive, withdrawing to pain in 4 extremities. MRI brain left occipital lesion (previously Treated in CT scan, no changes)  MRA head was unremarkable. H/O CVA with residual Lt. Weakness - Stable  Aspirin placed on hold due to decrease in hemoglobin. TSH level was high, normal T4 level, low free T3 level, euthyroid sick syndrome. <Pulmonary>  Acute hypoxic hypercapnic respiratory failure  Terminally extubated on 2/29, on room air     Pneumonia  2/2 MSSA  CXR: stable. ..  No new labs since 2/26  Chest vest BID. Nebulization with ipratropium-albuterol Q4H and 3% NaCl nebulization BID.     <Gastrointestinal>  Dysphagia s/p PEG placement (2/12/2020)  2/2 Neck mass   Currently on tube feeds    Severe Malnutrition  2/2 Laryngeal Ca, dysphagia  Per dietitian, pt. Meets criteria for severe malnutrition. Currently on tube feeds    <Infectious Disease>  Group G Streptococcus Bacteremia - Resolved  Echo showed no vegetations. Repeat culture -ve. <Hematology/Oncology>  Acute Normocytic Anemia  S/P 4 U PRBC. Stable hemoglobin. No new labs since 2/26  Soft Tissue Mass  2/2 possible recurrence of laryngeal Ca. CT and USG suggestive of mass at Rt. Carotic bifurcation - 2.3 x 2.7 cm. Terminally extubated 2/29. Resting comfortably. Breathing easy on room air. Hospice consulted. Receiving PRN Robinul, versed and morphine. # Peptic ulcer prophylaxis:  # DVT Prophylaxis: heparin  # Disposition: Hospice care.  Awaiting to hear from family       José Miguel Carroll DO  PGY-1    Attending Physician: Dr. Mae Mcgraw

## 2020-03-02 NOTE — PROGRESS NOTES
Liaison Informational Visit Note      Referral received from            Call back number        Patient Name: Samuel Kate   :  1955  MRN:  [id number]    Admit date:  2020    Admitted from: [facility] Admitting Physician:  Tesha Kearney MD   PCP:  No primary care provider on file. Primary Insurance: Payor: [facility] /  /  /    Secondary Insurance:  unknown    Emergency Contact:      Contact/Relation:   /         Phone:       Contact/Relation:   /     Phone:     Advance Directive  Advance directives received No  Patient has NOT completed an advance directive   Discussed with: Family member  DPOA-HC Name-Relation:    Phone:       Terminal Diagnosis SP Cardiac arrest with anoxic brain injury as confirmed by Dr. Shireen Yañez Problem List:   Patient Active Problem List   Diagnosis Code    Acute renal failure (ARF) ([facility] N17.9    Bacteremia R78.81    Other dysphagia R13.19    Severe protein-calorie malnutrition ([facility] [address] E43    Acute upper respiratory infection J06.9    Elevated troponin R79.89    Occipital stroke ([facility] [address] I63.9    Hypoxic encephalopathy ([facility] [address] G93.1    Palliative care encounter Z51.5    Goals of care, counseling/discussion Z71.89       Code Status Order: DNR-CC     Past Medical History:        Diagnosis Date    Cancer ([facility] [address]     mouth    Hip fracture ([facility] [address]     Hypertension      Past Surgical History:        Procedure Laterality Date    GASTROSTOMY TUBE PLACEMENT N/A 2020    EGD PEG TUBE PLACEMENT performed by Good Sandoval MD at [facility] END[facility]       Allergies:  Patient has no known allergies. Family Goal: comfort    HPI:    Meeting held with spoke with sister Estela Knapp over the phone    The hospice benefit and philosophy was explained to Estela Knapp.   The following levels of care were discussed including, routine level of care at private home or facility, which hospice is not responsible for any room and board fees, and GIP level of

## 2020-03-02 NOTE — PLAN OF CARE
Notes reviewed    Patient is now a [facility] with hospice consult pending    Neuro signing off--call with new issues    Dalton Rachid  8:29 AM

## 2020-03-02 NOTE — PROGRESS NOTES
Visit to patients room, no family present. Call placed to sister Sho Knapp, no answer, left a message for her to call me regarding hospice consult.

## 2020-03-02 NOTE — CARE COORDINATION
I placed a call to for the [place] [id number] liaison to call me back with a plan for this patient. Await call back.   Ara Vora RN CM

## 2020-03-03 VITALS
HEART RATE: 130 BPM | WEIGHT: 128.3 LBS | TEMPERATURE: 99.3 F | SYSTOLIC BLOOD PRESSURE: 134 MMHG | HEIGHT: 64 IN | RESPIRATION RATE: 18 BRPM | OXYGEN SATURATION: 94 % | BODY MASS INDEX: 21.91 KG/M2 | DIASTOLIC BLOOD PRESSURE: 98 MMHG

## 2020-03-03 PROCEDURE — 6370000000 HC RX 637 (ALT 250 FOR IP): Performed by: INTERNAL MEDICINE

## 2020-03-03 PROCEDURE — 99999 PR OFFICE/OUTPT VISIT,PROCEDURE ONLY: CPT | Performed by: INTERNAL MEDICINE

## 2020-03-03 PROCEDURE — 6360000002 HC RX W HCPCS: Performed by: STUDENT IN AN ORGANIZED HEALTH CARE EDUCATION/TRAINING PROGRAM

## 2020-03-03 RX ORDER — MORPHINE SULFATE 100 MG/5ML
2.5 SOLUTION ORAL
Qty: 15 ML | Refills: 0 | Status: SHIPPED | OUTPATIENT
Start: 2020-03-03 | End: 2020-03-03 | Stop reason: SDUPTHER

## 2020-03-03 RX ORDER — LORAZEPAM 0.5 MG/1
0.5 TABLET ORAL EVERY 6 HOURS PRN
Qty: 21 TABLET | Refills: 0 | Status: SHIPPED | OUTPATIENT
Start: 2020-03-03 | End: 2020-03-09

## 2020-03-03 RX ORDER — MORPHINE SULFATE 100 MG/5ML
2.5 SOLUTION ORAL
Qty: 15 ML | Refills: 0 | Status: SHIPPED | OUTPATIENT
Start: 2020-03-03 | End: 2020-03-06

## 2020-03-03 RX ADMIN — ACETAMINOPHEN 650 MG: 650 SUPPOSITORY RECTAL at 02:48

## 2020-03-03 RX ADMIN — MORPHINE SULFATE 2 MG: 2 INJECTION, SOLUTION INTRAMUSCULAR; INTRAVENOUS at 02:48

## 2020-03-03 ASSESSMENT — PAIN SCALES - GENERAL: PAINLEVEL_OUTOF10: 3

## 2020-03-03 NOTE — DISCHARGE SUMMARY
1955 Age: 59 years Gender: Male Order Date:  2/9/2020 3:45 PM EXAM: CT SOFT TISSUE NECK W WO CONTRAST NUMBER OF IMAGES \ views:  36 INDICATION:  h/o R mandible/base of tongue ca, sp resection and chemorads, concern for recurrence Per nephro team ok to do scan with contrast h/o R mandible/base of tongue ca, sp resection and chemorads, concern for recurrence COMPARISON: None Technique: Low-dose CT  acquisition technique included one of following options; 1 . Automated exposure control, 2. Adjustment of MA and or KV according to patient's size or 3. Use of iterative reconstruction. The larynx appears unremarkable The soft tissues appear abnormal there is sequela of extensive prior surgery. No convincing abscess is seen. There is distortion of the oropharynx and the posterior superior hypopharynx there is significant distortion of the right sternocleidomastoid and a pattern compatible with previous radical neck dissection. There are findings to suggest previous therapy. The right jugular vein is not seen likely status post resection. There is been resection at the level of the tongue base and floor the mouth. There is sequela of previous resection. . Atherosclerotic disease of both proximal internal carotid arteries is identified without proximal internal carotid artery hemodynamically significant stenosis. At the level the distal right internal carotid artery at the level the cervical component there is evidence to suggest severe stenosis. Also the level of the carotid bifurcation on the right there is a soft tissue mass effect suspicious for recurrent neoplasm. This measures approximately 2.7 x 2.3 cm. This could also be related to previous therapy. Comparison studies would be necessary. There is a mottled eaten appearance to the medial aspect of the right mandible. This could be related to tumor invasion or osteonecrosis. Scattered lymph nodes are visualized which do not meet the CT criteria for adenopathy. SOFT TISSUE THYROID Number of images:  26 Indication:  right carotid bifurcation mass With special focus to soft tissue mass at the right carotid bifurcation. right carotid bifurcation mass Comparison: Correlation with CT dated 2020. Technique: Grayscale and color Doppler ultrasound images of the region of interest were obtained by the sonographic technologist. FINDINGS: The study is limited by technically suboptimal image quality. There is abnormal heterogeneous soft tissue in the region of interest.     Ill-defined, heterogeneous, abnormal appearing soft tissue in the region of interest. No specific clinical question is asked. Ultrasound is not the appropriate modality for this evaluation. Contrast-enhanced MRI may provide more information. Correlation with clinical and surgical history is needed. Xr Chest Portable    Result Date: 2020  Patient MRN: [id number] : 1955 Age:  59 years Gender: Male Order Date: 2020 6:00 AM Exam: XR CHEST PORTABLE Number of Images: 1 view Indication:  Follow-up respiratory failure. Previously intubated intubated Comparison: 2020 FINDINGS: Stable left-sided PICC line and endotracheal tube. Accounting for technical differences, bilateral airspace disease is stable on the right and stable to minimally progressive on the left. There is a probable left pleural effusion as well which is not clearly changed. Ongoing airspace disease which is probably stable on the right and stable to minimally progressive on the left. Stable suspected left pleural effusion. See above. Xr Chest Portable    Result Date: 2020  Patient MRN:  [id number] : 1955 Age: 59 years Gender: Male Order Date:  2020 6:00 AM EXAM: XR CHEST PORTABLE one image INDICATION:  intubated intubated COMPARISON: 2020 FINDINGS: There is cardiomegaly. Endotracheal tube and left subclavian central line are unchanged.  There is vascular congestion with improving perihilar atelectasis and pleural effusions in the right lung base. Improving CHF with the persistent perihilar and bibasilar infiltrates and effusions and a focal masslike infiltrate in the left midlung field. Surveillance is recommended. Xr Chest Portable    Result Date: 2020  Patient MRN:  [id number] : 1955 Age: 59 years Gender: Male Order Date:  2020 6:00 AM EXAM: XR CHEST PORTABLE INDICATION:  intubated intubated COMPARISON: 2020 FINDINGS: ET tube is appropriate. Moderate left-sided infiltrates are unchanged. There is fibrosis/infiltrative changes in the right upper lobe, unchanged. Heart size is normal.     No interval change     Xr Chest Portable    Result Date: 2020  Patient MRN:  [id number] : 1955 Age: 59 years Gender: Male Order Date:  2020 6:00 AM EXAM: XR CHEST PORTABLE one image INDICATION:  intubated intubated COMPARISON: 2020 FINDINGS: There is cardiomegaly. Endotracheal tube and a left PICC line are unchanged. There is diffuse bilateral infiltrates and pleural effusions with a focal masslike infiltrate in the right upper lobe with a marginal improvement     Stable abnormal chest with the diffuse bilateral infiltrates and pleural effusions likely diffuse edema or pneumonia. Xr Chest Portable    Result Date: 2020  Patient MRN:  [id number] : 1955 Age: 59 years Gender: Male Order Date:  2020 6:00 AM EXAM: XR CHEST PORTABLE one image INDICATION:  intubated intubated COMPARISON: 2020 FINDINGS: There is cardiomegaly. Endotracheal tube is unchanged. Left PICC line is noted with the tip at the junction of superior vena cava and innominate vein. There is vascular congestion with the perihilar and bilateral infiltrates and effusions. Stable CHF.      Xr Chest Portable    Result Date: 2020  Patient MRN:  [id number] : 1955 Age: 59 years Gender: Male Order Date:  2020 6:00 AM EXAM: XR CHEST PORTABLE one image INDICATION: intubated intubated COMPARISON: 2020 FINDINGS: There is cardiomegaly. Endotracheal tube and left subclavian PICC line are unchanged. There is vascular congestion with the perihilar and bilateral infiltrates and pleural effusions. Improving CHF with decreasing edema. Xr Chest Portable    Result Date: 2020  Patient MRN:  [id number] : 1955 Age: 59 years Gender: Male Order Date:  2020 6:00 AM EXAM: XR CHEST PORTABLE INDICATION:  intubated intubated COMPARISON: 2020 FINDINGS:  Since the prior study, there is developed significant left lower lobe infiltrate and patchy infiltrates in the right hemithorax. ET tube is appropriate. There may be a small left effusion. Development of significant bilateral pulmonary infiltrates     Xr Chest Portable    Result Date: 2020  Patient MRN:  [id number] : 1955 Age: 59 years Gender: Male Order Date:  2020 6:45 AM EXAM: XR CHEST PORTABLE NUMBER OF IMAGES:  1 INDICATION:  line placement line placement COMPARISON: 2020  RESULT: Lines, tubes, and devices:  Endotracheal tube terminates above the jovita. Lungs and pleura:  Scattered opacities in the lower lobes, similar to prior exam. No substantial pleural effusion. No pneumothorax. Cardiomediastinal silhouette:  Normal cardiomediastinal silhouette. Endotracheal tube terminates above the jovita. Stable opacities in the lower lobes. Consider infectious/inflammatory etiologies or edema. Follow-up to resolution. Xr Chest Portable    Result Date: 2020  Patient MRN:  [id number] : 1955 Age: 59 years Gender: Male Order Date:  2020 6:00 AM EXAM: XR CHEST PORTABLE COMPARISON: February 10, 2020 INDICATION:  PNA PNA FINDINGS: There are opacities at the left lung base silhouetting left hemidiaphragm which appears similar since prior. The heart is normal size. No pneumothorax. Stable opacities at left lung base.  Continued follow-up could be helpful for further evaluation. Xr Chest Portable    Result Date: 2/10/2020  Patient MRN: [id number] : 1955 Age:  59 years Gender: Male Order Date: 2/10/2020 3:00 PM Exam: XR CHEST PORTABLE Number of Images: 1 view Indication:   SOB SOB Comparison: 2020 Findings: The heart is enlarged. The lung fields demonstrate evidence for airspace disease. The aorta is tortuous and ectatic. Tortuous ectatic aorta Cardiomegaly Airspace disease compatible with atelectasis or pneumonia, worse at the left lung base as compared to the right The chest appears to be worse in the interval     Xr Chest 1 Vw    Result Date: 2020  Patient MRN:  [id number] : 1955 Age: 59 years Gender: Male Order Date:  2020 12:00 PM EXAM: XR CHEST 1 VIEW at 1216 hours INDICATION:  left arm picc COMPARISON: Portable chest 2020 at 0637 hours. FINDINGS:  A new left PICC line catheter has tip in the brachiocephalic vein, near the junction with the SVC. The endotracheal tube has tip 3.8 cm above the jovita. No pneumothorax. Persistent dense consolidating infiltrates throughout the right upper lobe, right perihilar region, and in the right lower lung field. Persistent dense consolidating infiltrate in the left lower lobe and increasing patchy infiltrate in the lingula. Slight interval worsening. Loss of the left diaphragm, there may be a small left pleural effusion. No right effusion. Heart size within normal. Aorta is not dilated or calcified. Monitor leads. 1. New left PICC line catheter tip at the brachycephalic vein, no pneumothorax. 2. Slight worsening of dense consolidating bilateral pneumonias. Xr Chest 1 Vw    Result Date: 2020  Patient MRN: [id number] : 1955 Age:  59 years Gender: Male Order Date: 2020 6:00 AM Exam: XR CHEST 1 VIEW Number of Images: 1 view Indication: Acute weakness. Comparison: None. FINDINGS: Heart and pulmonary vascularity normal. Lungs clear. Costophrenic angles sharp. Normal aorta. fluid levels are evident. Otherwise the brain parenchyma, CSF spaces, paranasal sinuses and mastoid air cells and surrounding soft tissue and osseous structures have a satisfactory appearance. 2.7 cm focus of acute subacute ischemia within the posterior medial left occipital lobe as demonstrated on CT scans of 2020. Underlying cortical atrophy and chronic periventricular microangiopathy. Fl Modified Barium Swallow W Video    Result Date: 2020  Patient MRN: [id number] : 1955 Age:  59 years Gender: Male Order Date: 2/10/2020 1:45 PM Exam: FL MODIFIED BARIUM SWALLOW W VIDEO Number of Images: 1 Indication:   dysphagia Comparison: None. Fluoroscopy time: 1.1 minute Findings: Textures given, nectar Aspiration, nectar Laryngeal penetration, nectar Cough, None Oral delay, Yes Retention in vallecula, Yes Retention in piriform sinuses, None. Pharyngeal delay, Yes Laryngeal elevation, reduced. Study is remarkable for silent aspiration with nectar consistency. This procedure was performed and dictated by Carlita Cazares PA-C with indirect supervision, and Leanna Burton. Sam Sabine MD reviewed and concurred with the findings. Fl Modified Barium Swallow W Video    Result Date: 2020  Patient MRN: [id number] : 1955 Age:  59 years Gender: Male Order Date: 2020 7:45 AM Exam: FL MODIFIED BARIUM SWALLOW W VIDEO Number of Images: views Indication:   dysphagia dysphagia Comparison: None. Findings: Patient was given nectar and pudding patient is unable to swallow with no gag reflex. Therefore patient was suctioned and the study was canceled. Patient unable to swallow nectar and pudding which was suctioned     Us Dup Lower Extremities Bilateral Venous    Result Date: 2020  Patient MRN:  [id number] : 1955 Age: 59 years Gender: Male Order Date:  2020 1:00 PM EXAM: US DUP LOWER EXTREMITIES BILATERAL VENOUS INDICATION:  r/o dvt . Extended hospital stay. Hypertension. Taking heparin. COMPARISON: None FINDINGS:  Limited portable bedside exam. Patient on a vent. Technologist noted that the lower extremities are contracted. No evidence for deep venous thrombosis in the right or left lower extremity. Flow is documented with augmentation and compression in the right and left common femoral vein, superficial femoral vein, and popliteal vein. There is flow documented in the posterior tibial trunk, posterior tibial vein and peroneal vein in both legs. Evaluation of the calves. No evidence for DVT. Negative for deep venous thrombosis bilateral lower extremity. Pending test results: None    Consults: Nephrology, ENT, general surgery, ID, critical care, neurology, and palliative care     Condition at discharge: Stable     Disposition: home hospice     Discharge Medications:  Current Discharge Medication List      START taking these medications    Details   morphine sulfate 20 MG/ML concentrated oral solution Take 0.125 mLs by mouth every 2 hours as needed for Pain (breathlessness) for up to 3 days. Qty: 15 mL, Refills: 0    Associated Diagnoses: Hypoxic encephalopathy ([address] Palliative care encounter      LORazepam (ATIVAN) 0.5 MG tablet Take 1 tablet by mouth every 6 hours as needed for Anxiety (agitation) for up to 6 days. Qty: 21 tablet, Refills: 0    Associated Diagnoses: Hypoxic encephalopathy ([address] Palliative care encounter         STOP taking these medications       metoprolol tartrate (LOPRESSOR) 25 MG tablet Comments:   Reason for Stopping:         lisinopril (PRINIVIL;ZESTRIL) 10 MG tablet Comments:   Reason for Stopping:         famotidine (PEPCID) 20 MG tablet Comments:   Reason for Stopping:         aspirin 81 MG tablet Comments:   Reason for Stopping:               Activity: bedrest  Diet: tube feeds     Follow-up appointments: none, patient for home hospice     Patient instructions:   Call [facility] when patient arrives at home.   [phone] so that admission nurse

## 2020-03-03 NOTE — PROGRESS NOTES
Palliative Medicine  Progress Note    Plan is to sister's house with hospice. Scripts for morphine and ativan in chart. Adrien Mcmullen APRN-CNP  Palliative Medicine    Note: This report was completed using [employer] voiced recognition software. Every effort has been made to ensure accuracy; however, inadvertent computerized transcription errors may be present.

## 2020-03-03 NOTE — CARE COORDINATION
Discharge order noted. Patient is discharging home with [facility]. Transportation set up by Denice from [facility] for 1:30 pm today via [employer]. Patient's sister Shelbie Arcos aware.   Gutierrez Garcia RN CM

## 2020-03-03 NOTE — PROGRESS NOTES
Visit to patients room, no family present. Spoke with sister over the phone to verify patient discharge to her house today once equipment delivered- equipment being delivered now. Scripts dropped off at outpatient pharmacy to be filled and sent home with patient. [facility] updated on Morphine and Ativan. [employer] trans set up for transport at 1330. Updated to CM and charge nurse. [facility] intake updated. Updated Sarath Vela, patients sister. Patient will need hospice medical director to follow, Alessio Juarez NP updated.

## (undated) DEVICE — BINDER ABD SM MED 30 IN - 45 IN HT 12 IN

## (undated) DEVICE — Device

## (undated) DEVICE — CANNULA NSL ORAL AD FOR CAPNOFLEX CO2 O2 AIRLFE

## (undated) DEVICE — GAUZE,SPONGE,POST-OP,4X3,STRL,LF: Brand: MEDLINE

## (undated) DEVICE — BLOCK BITE 60FR RUBBER ADLT DENTAL

## (undated) DEVICE — DEFENDO AIR WATER SUCTION AND BIOPSY VALVE KIT FOR  OLYMPUS: Brand: DEFENDO AIR/WATER/SUCTION AND BIOPSY VALVE